# Patient Record
Sex: FEMALE | Race: OTHER | NOT HISPANIC OR LATINO | Employment: FULL TIME | URBAN - METROPOLITAN AREA
[De-identification: names, ages, dates, MRNs, and addresses within clinical notes are randomized per-mention and may not be internally consistent; named-entity substitution may affect disease eponyms.]

---

## 2017-01-20 ENCOUNTER — OFFICE VISIT (OUTPATIENT)
Dept: INTERNAL MEDICINE | Facility: CLINIC | Age: 47
End: 2017-01-20
Payer: OTHER GOVERNMENT

## 2017-01-20 ENCOUNTER — HOSPITAL ENCOUNTER (OUTPATIENT)
Dept: RADIOLOGY | Facility: HOSPITAL | Age: 47
Discharge: HOME | End: 2017-01-20
Attending: FAMILY MEDICINE
Payer: OTHER GOVERNMENT

## 2017-01-20 VITALS
OXYGEN SATURATION: 97 % | HEIGHT: 62 IN | HEART RATE: 68 BPM | BODY MASS INDEX: 23.9 KG/M2 | TEMPERATURE: 99 F | SYSTOLIC BLOOD PRESSURE: 140 MMHG | WEIGHT: 129.88 LBS | DIASTOLIC BLOOD PRESSURE: 80 MMHG

## 2017-01-20 DIAGNOSIS — J84.10 CALCIFIED GRANULOMA OF LUNG: ICD-10-CM

## 2017-01-20 DIAGNOSIS — Z00.00 ANNUAL PHYSICAL EXAM: ICD-10-CM

## 2017-01-20 DIAGNOSIS — Z00.00 ANNUAL PHYSICAL EXAM: Primary | ICD-10-CM

## 2017-01-20 PROBLEM — J98.4 CALCIFIED GRANULOMA OF LUNG: Status: ACTIVE | Noted: 2017-01-20

## 2017-01-20 PROCEDURE — 71020 XR CHEST PA AND LATERAL: CPT | Mod: 26,,, | Performed by: RADIOLOGY

## 2017-01-20 PROCEDURE — 99396 PREV VISIT EST AGE 40-64: CPT | Mod: S$PBB,,, | Performed by: FAMILY MEDICINE

## 2017-01-20 PROCEDURE — 71020 XR CHEST PA AND LATERAL: CPT | Mod: TC

## 2017-01-20 PROCEDURE — 99999 PR PBB SHADOW E&M-EST. PATIENT-LVL III: CPT | Mod: PBBFAC,,, | Performed by: FAMILY MEDICINE

## 2017-01-20 PROCEDURE — 99213 OFFICE O/P EST LOW 20 MIN: CPT | Mod: PBBFAC | Performed by: FAMILY MEDICINE

## 2017-01-20 NOTE — PROGRESS NOTES
Subjective:       Patient ID: Kayleen Patel is a 46 y.o. female.    Chief Complaint: Annual Exam    HPI Comments: Established patient for an annual wellness check/physical exam. No specific complaints, please see ROS.      Review of Systems   Constitutional: Negative for chills, fatigue and fever.   HENT: Negative for congestion and trouble swallowing.    Eyes: Negative for redness.   Respiratory: Negative for cough, chest tightness and shortness of breath.    Cardiovascular: Negative for chest pain, palpitations and leg swelling.   Gastrointestinal: Negative for abdominal pain and blood in stool.   Genitourinary: Negative for hematuria and menstrual problem.   Musculoskeletal: Negative for arthralgias, back pain, gait problem, joint swelling, myalgias and neck pain.   Skin: Negative for color change and rash.   Neurological: Negative for tremors, speech difficulty, weakness, numbness and headaches.   Hematological: Negative for adenopathy. Does not bruise/bleed easily.   Psychiatric/Behavioral: Negative for behavioral problems, confusion and sleep disturbance. The patient is not nervous/anxious.        Objective:      Physical Exam   Constitutional: She is oriented to person, place, and time. She appears well-developed and well-nourished. No distress.   HENT:   Head: Normocephalic.   Right Ear: Tympanic membrane, external ear and ear canal normal.   Left Ear: Tympanic membrane, external ear and ear canal normal.   Nose: Nose normal.   Mouth/Throat: Oropharynx is clear and moist. No oropharyngeal exudate.   Eyes: Conjunctivae are normal. Pupils are equal, round, and reactive to light. Right eye exhibits no discharge. Left eye exhibits no discharge. No scleral icterus.   Neck: Normal range of motion. Neck supple. Carotid bruit is not present. No thyromegaly present.   Cardiovascular: Normal rate, regular rhythm, normal heart sounds and intact distal pulses.  Exam reveals no gallop and no friction rub.     No murmur heard.  Pulmonary/Chest: Effort normal and breath sounds normal. No respiratory distress. She has no wheezes. She has no rales. She exhibits no tenderness.   Abdominal: Soft. Bowel sounds are normal. She exhibits no distension and no mass. There is no tenderness. There is no rebound and no guarding.   Musculoskeletal: Normal range of motion. She exhibits no edema or tenderness.   Lymphadenopathy:     She has no cervical adenopathy.   Neurological: She is alert and oriented to person, place, and time. She has normal strength. She displays normal reflexes. No cranial nerve deficit or sensory deficit. Coordination and gait normal.   Skin: Skin is warm and dry. No rash noted. She is not diaphoretic.   Psychiatric: She has a normal mood and affect. Her behavior is normal. Judgment and thought content normal.   Nursing note and vitals reviewed.      Assessment:       1. Annual physical exam    2. Calcified granuloma of lung        Plan:   Kayleen was seen today for annual exam.    Diagnoses and all orders for this visit:    Annual physical exam  -     CBC Without Differential; Future  -     Basic metabolic panel; Future  -     Lipid panel; Future  -     Cardiac treadmill stress test; Future  -     HIV-1 and HIV-2 antibodies; Future  -     RPR; Future  -     X-Ray Chest PA And Lateral; Future    Calcified granuloma of lung  -     X-Ray Chest PA And Lateral; Future      See meds, orders, follow up, routing and instructions sections of encounter.  Annual physical examination.  The patient was reporting some increase in her   heart rate with exercise; however, she is having no dyspnea, syncope, near   syncope or palpitations.  She has been exercising per year.    She quit smoking a few years ago.  She has a 20-pack-year history.  She has no   overt pulmonary complaints.  She had a calcified granuloma on an old chest x-ray   and would like to have that repeated at this time.    She had a distant stress  echocardiogram that was normal.    RECOMMENDATIONS:  Continue exercise as is.  See meds and orders and she will   follow up with me p.r.n. or next year for her annual.      STEVE/HN  dd: 01/20/2017 18:18:37 (CST)  td: 01/21/2017 05:47:08 (CST)  Doc ID   #[id number]  Job ID #[id number]    CC:

## 2017-01-20 NOTE — MR AVS SNAPSHOT
"    [facility]  [address]  [place] [address]  Phone: [phone]  Fax: [phone]                  Kayleen Patel   2017 4:00 PM   Office Visit    Description:  Female : 1970   Provider:  Herbert Clay MD   Department:  [facility]           Reason for Visit     Annual Exam           Diagnoses this Visit        Comments    Annual physical exam    -  Primary     Calcified granuloma of lung                To Do List           Goals (5 Years of Data)     None      Follow-Up and Disposition     Return in about 1 year (around 2018) for annual physical exam.    Follow-up and Disposition History      [facility] On [facility]     [facility] On Call Nurse Care Line -  Assistance  Registered nurses in the [facility] Center provide clinical advisement, health education, appointment booking, and other advisory services.  Call for this free service at [phone].             Medications           Message regarding Medications     Verify the changes and/or additions to your medication regime listed below are the same as discussed with your clinician today.  If any of these changes or additions are incorrect, please notify your healthcare provider.             Verify that the below list of medications is an accurate representation of the medications you are currently taking.  If none reported, the list may be blank. If incorrect, please contact your healthcare provider. Carry this list with you in case of emergency.           Current Medications     drospirenone-ethinyl estradiol (BRIANNA, 28,) 3-0.02 mg per tablet Take 1 tablet by mouth once daily.    omeprazole (PRILOSEC) 40 MG capsule TAKE 1 CAPSULE DAILY           Clinical Reference Information           Vital Signs - Last Recorded  Most recent update: 2017  4:33 PM by Skylar Valdes MA    BP Pulse Temp Ht Wt SpO2    (!) 140/80 68 98.9 °F (37.2 °C) 5' 2" (1.575 m) 58.9 kg (129 lb 13.6 " oz) 97%    BMI                23.75 kg/m2          Blood Pressure          Most Recent Value    BP  (!)  140/80      Allergies as of 1/20/2017     Penicillins    Adhesive      Immunizations Administered on Date of Encounter - 1/20/2017     None      Orders Placed During Today's Visit     Future Labs/Procedures Expected by Expires    Basic metabolic panel  1/20/2017 1/20/2018    Cardiac treadmill stress test  1/20/2017 1/20/2018    CBC Without Differential  1/20/2017 1/20/2018    HIV-1 and HIV-2 antibodies  1/20/2017 1/20/2018    Lipid panel  1/20/2017 1/20/2018    RPR  1/20/2017 1/20/2018    X-Ray Chest PA And Lateral  1/20/2017 1/20/2018

## 2017-01-21 ENCOUNTER — PATIENT MESSAGE (OUTPATIENT)
Dept: INTERNAL MEDICINE | Facility: CLINIC | Age: 47
End: 2017-01-21

## 2017-02-02 ENCOUNTER — HOSPITAL ENCOUNTER (OUTPATIENT)
Dept: CARDIOLOGY | Facility: CLINIC | Age: 47
Discharge: HOME | End: 2017-02-02
Payer: OTHER GOVERNMENT

## 2017-02-02 DIAGNOSIS — Z00.00 ANNUAL PHYSICAL EXAM: ICD-10-CM

## 2017-02-02 LAB — DIASTOLIC DYSFUNCTION: NO

## 2017-02-02 PROCEDURE — 93018 CV STRESS TEST I&R ONLY: CPT | Mod: S$PBB,,, | Performed by: INTERNAL MEDICINE

## 2017-02-02 PROCEDURE — 93016 CV STRESS TEST SUPVJ ONLY: CPT | Mod: S$PBB,,, | Performed by: INTERNAL MEDICINE

## 2017-02-02 PROCEDURE — 93017 CV STRESS TEST TRACING ONLY: CPT | Mod: PBBFAC | Performed by: INTERNAL MEDICINE

## 2017-03-02 ENCOUNTER — TELEPHONE (OUTPATIENT)
Dept: OBSTETRICS AND GYNECOLOGY | Facility: CLINIC | Age: 47
End: 2017-03-02

## 2017-03-02 DIAGNOSIS — Z12.39 BREAST CANCER SCREENING: Primary | ICD-10-CM

## 2017-03-02 NOTE — TELEPHONE ENCOUNTER
----- Message from Luis Louis sent at 3/1/2017  2:26 PM CST -----  Please put orders in for a mammo so I can schedule thanks

## 2017-03-18 RX ORDER — OMEPRAZOLE 40 MG/1
CAPSULE, DELAYED RELEASE ORAL
Qty: 90 CAPSULE | Refills: 0 | Status: SHIPPED | OUTPATIENT
Start: 2017-03-18 | End: 2017-06-16 | Stop reason: SDUPTHER

## 2017-03-21 ENCOUNTER — HOSPITAL ENCOUNTER (OUTPATIENT)
Dept: RADIOLOGY | Facility: HOSPITAL | Age: 47
Discharge: HOME | End: 2017-03-21
Attending: NURSE PRACTITIONER
Payer: OTHER GOVERNMENT

## 2017-03-21 ENCOUNTER — OFFICE VISIT (OUTPATIENT)
Dept: OBSTETRICS AND GYNECOLOGY | Facility: CLINIC | Age: 47
End: 2017-03-21
Payer: OTHER GOVERNMENT

## 2017-03-21 VITALS
HEIGHT: 62 IN | WEIGHT: 130.06 LBS | BODY MASS INDEX: 23.93 KG/M2 | SYSTOLIC BLOOD PRESSURE: 120 MMHG | DIASTOLIC BLOOD PRESSURE: 68 MMHG

## 2017-03-21 DIAGNOSIS — N95.1 PERIMENOPAUSE: ICD-10-CM

## 2017-03-21 DIAGNOSIS — Z12.39 BREAST CANCER SCREENING: ICD-10-CM

## 2017-03-21 DIAGNOSIS — Z87.42 HISTORY OF ABNORMAL CERVICAL PAP SMEAR: ICD-10-CM

## 2017-03-21 DIAGNOSIS — Z30.41 ENCOUNTER FOR SURVEILLANCE OF CONTRACEPTIVE PILLS: ICD-10-CM

## 2017-03-21 DIAGNOSIS — Z01.419 VISIT FOR GYNECOLOGIC EXAMINATION: Primary | ICD-10-CM

## 2017-03-21 PROCEDURE — 99396 PREV VISIT EST AGE 40-64: CPT | Mod: S$PBB,,, | Performed by: NURSE PRACTITIONER

## 2017-03-21 PROCEDURE — 77067 SCR MAMMO BI INCL CAD: CPT | Mod: 26,,, | Performed by: RADIOLOGY

## 2017-03-21 PROCEDURE — 77067 SCR MAMMO BI INCL CAD: CPT | Mod: TC

## 2017-03-21 PROCEDURE — 88175 CYTOPATH C/V AUTO FLUID REDO: CPT

## 2017-03-21 PROCEDURE — 99213 OFFICE O/P EST LOW 20 MIN: CPT | Mod: PBBFAC | Performed by: NURSE PRACTITIONER

## 2017-03-21 PROCEDURE — 99999 PR PBB SHADOW E&M-EST. PATIENT-LVL III: CPT | Mod: PBBFAC,,, | Performed by: NURSE PRACTITIONER

## 2017-03-21 RX ORDER — DROSPIRENONE AND ETHINYL ESTRADIOL 0.02-3(28)
1 KIT ORAL DAILY
Qty: 84 TABLET | Refills: 4 | Status: SHIPPED | OUTPATIENT
Start: 2017-03-21 | End: 2018-04-27 | Stop reason: SDUPTHER

## 2017-03-21 NOTE — PROGRESS NOTES
HISTORY OF PRESENT ILLNESS:    Kayleen Patel is a 46 y.o. female, , No LMP recorded. Patient is not currently having periods (Reason: Perimenopausal).,  presents for a routine exam and OCP refills.   -Denies withdrawal bleeding for the past year and no vasomotor symptoms.    Past Medical History:   Diagnosis Date    Abnormal cervical Papanicolaou smear ,     Colpo/Cryo    GERD (gastroesophageal reflux disease)     History of acute PID     Thrombocytosis        Past Surgical History:   Procedure Laterality Date    GANGLION CYST EXCISION      Tonsillectomy      TONSILLECTOMY         MEDICATIONS AND ALLERGIES:    Current Outpatient Prescriptions:     drospirenone-ethinyl estradiol (BRIANNA, 28,) 3-0.02 mg per tablet, Take 1 tablet by mouth once daily., Disp: 84 tablet, Rfl: 4    omeprazole (PRILOSEC) 40 MG capsule, TAKE 1 CAPSULE DAILY, Disp: 90 capsule, Rfl: 0    Review of patient's allergies indicates:   Allergen Reactions    Penicillins Hives    Adhesive Rash       Family History   Problem Relation Age of Onset    Adopted: Yes    Heart disease Mother     Cancer Father     Breast cancer Neg Hx     Colon cancer Neg Hx     Ovarian cancer Neg Hx        Social History     Social History    Marital status: Single     Spouse name: N/A    Number of children: N/A    Years of education: N/A     Occupational History    Not on file.     Social History Main Topics    Smoking status: Former Smoker     Packs/day: 1.00     Years: 23.00     Quit date: 2007    Smokeless tobacco: Never Used    Alcohol use Yes      Comment: Social    Drug use: No    Sexual activity: Not Currently     Partners: Male     Birth control/ protection: OCP     Other Topics Concern    Not on file     Social History Narrative    Retired [employer]. Currently, working for the [employer]. Criminal justice degree from [employer].                OBSTETRIC HISTORY: None    COMPREHENSIVE GYN HISTORY:  PAP HISTORY:  "Reports abnormal Paps. Date:1988, 2004. Treatment:1988 Cryo and repeat Paps. LAST PAP 2-23-16 NORMAL.  INFECTION HISTORY: Reports STDs. HPV. Reports PID. Date:1998.  BENIGN HISTORY: Denies uterine fibroids. Denies ovarian cysts. Denies endometriosis. Denies other conditions.  CANCER HISTORY: Denies cervical cancer. Denies uterine cancer or hyperplasia. Denies ovarian cancer. Denies vulvar cancer or pre-cancer. Denies vaginal cancer or pre-cancer. Denies breast cancer. Denies colon cancer.  SEXUAL ACTIVITY HISTORY: Denies currently being sexually active.  MENSTRUAL HISTORY: No withdrawal bleeding.  DYSMENORRHEA HISTORY: Denies dysmenorrhea.  CONTRACEPTION HISTORY: OCP's     ROS:  GENERAL: No weight changes. No swelling. No fatigue. No fever. No vasomotor symptoms.  CARDIOVASCULAR: No chest pain. No shortness of breath. No leg cramps.   NEUROLOGICAL: No headaches. No vision changes.  BREASTS: No pain. No lumps. No discharge.  ABDOMEN: No pain. No nausea. No vomiting. No diarrhea. No constipation.  REPRODUCTIVE: No abnormal bleeding.   VULVA: No pain. No lesions. No itching.  VAGINA: No relaxation. No itching. No odor. No discharge. No lesions.  URINARY: No incontinence. No nocturia. No frequency. No dysuria.    /68  Ht 5' 2" (1.575 m)  Wt 59 kg (130 lb 1.1 oz)  BMI 23.79 kg/m2    PE:  APPEARANCE: Well nourished, well developed, in no acute distress.  AFFECT: WNL, alert and oriented x 3.  SKIN: No acne or hirsutism.  NECK: Neck symmetric, without masses or thyromegaly.  NODES: No inguinal, cervical, axillary or femoral lymph node enlargement.  CHEST: Good respiratory effort.   ABDOMEN: Soft. No tenderness or masses. No hepatosplenomegaly. No hernias.  BREASTS: Symmetrical, no skin changes, visible lesions, palpable masses or nipple discharge bilaterally.  PELVIC: External female genitalia without lesions.  Female hair distribution. Adequate perineal body, Normal urethral meatus. Vagina moist and well rugated " without lesions or discharge.  No significant cystocele or rectocele present. Cervix pink without lesions, discharge or tenderness. Uterus is 6 week size, regular, mobile and nontender. Adnexa without masses or tenderness.  EXTREMITIES: No edema    DIAGNOSIS:  1. Visit for gynecologic examination    2. Perimenopause    3. Encounter for surveillance of contraceptive pills    4. History of abnormal cervical Pap smear    Has mammogram scheduled for today    PLAN:    Orders Placed This Encounter    Liquid-based pap smear, screening    drospirenone-ethinyl estradiol (BRIANNA, Kathrine,) 3-0.02 mg per tablet       COUNSELING:  The patient was counseled today on:  -perimenopause vs menopause and to report severe vasomotor symptoms and/or skipping periods, heavy, prolonged bleeding, spotting in between periods;  -OCP use and potential side effects;  -A.C.S. Pap and pelvic exam guidelines (pap every 3 years - WANTS YEARLY), recomendations for yearly mammogram;  -to follow up with her PCP for other health maintenance.    FOLLOW-UP with me annually.

## 2017-05-19 ENCOUNTER — PATIENT MESSAGE (OUTPATIENT)
Dept: INTERNAL MEDICINE | Facility: CLINIC | Age: 47
End: 2017-05-19

## 2017-06-16 RX ORDER — OMEPRAZOLE 40 MG/1
CAPSULE, DELAYED RELEASE ORAL
Qty: 90 CAPSULE | Refills: 0 | Status: SHIPPED | OUTPATIENT
Start: 2017-06-16 | End: 2017-09-14 | Stop reason: SDUPTHER

## 2017-06-27 ENCOUNTER — PATIENT MESSAGE (OUTPATIENT)
Dept: ADMINISTRATIVE | Facility: OTHER | Age: 47
End: 2017-06-27

## 2017-06-27 ENCOUNTER — LAB VISIT (OUTPATIENT)
Dept: LAB | Facility: HOSPITAL | Age: 47
End: 2017-06-27
Attending: FAMILY MEDICINE
Payer: OTHER GOVERNMENT

## 2017-06-27 ENCOUNTER — OFFICE VISIT (OUTPATIENT)
Dept: INTERNAL MEDICINE | Facility: CLINIC | Age: 47
End: 2017-06-27
Attending: FAMILY MEDICINE
Payer: OTHER GOVERNMENT

## 2017-06-27 VITALS
SYSTOLIC BLOOD PRESSURE: 151 MMHG | HEIGHT: 62 IN | WEIGHT: 133.38 LBS | HEART RATE: 65 BPM | BODY MASS INDEX: 24.54 KG/M2 | DIASTOLIC BLOOD PRESSURE: 101 MMHG

## 2017-06-27 DIAGNOSIS — I10 HYPERTENSION, ESSENTIAL: ICD-10-CM

## 2017-06-27 DIAGNOSIS — I10 HYPERTENSION, ESSENTIAL: Primary | ICD-10-CM

## 2017-06-27 DIAGNOSIS — R25.3 FASCICULATION: ICD-10-CM

## 2017-06-27 LAB — TSH SERPL DL<=0.005 MIU/L-ACNC: 1.16 UIU/ML

## 2017-06-27 PROCEDURE — 99214 OFFICE O/P EST MOD 30 MIN: CPT | Mod: S$PBB,,, | Performed by: FAMILY MEDICINE

## 2017-06-27 PROCEDURE — 99999 PR PBB SHADOW E&M-EST. PATIENT-LVL III: CPT | Mod: PBBFAC,,, | Performed by: FAMILY MEDICINE

## 2017-06-27 PROCEDURE — 99213 OFFICE O/P EST LOW 20 MIN: CPT | Mod: PBBFAC | Performed by: FAMILY MEDICINE

## 2017-06-27 PROCEDURE — 36415 COLL VENOUS BLD VENIPUNCTURE: CPT

## 2017-06-27 PROCEDURE — 84443 ASSAY THYROID STIM HORMONE: CPT

## 2017-06-27 RX ORDER — HYDROCHLOROTHIAZIDE 12.5 MG/1
12.5 TABLET ORAL DAILY
Qty: 30 TABLET | Refills: 11 | Status: SHIPPED | OUTPATIENT
Start: 2017-06-27 | End: 2017-08-07 | Stop reason: SDUPTHER

## 2017-06-27 NOTE — PROGRESS NOTES
Subjective:       Patient ID: Kayleen Patel is a 47 y.o. female.    Chief Complaint: Spasms and Numbness    Spasms   Associated symptoms include numbness and weakness. Pertinent negatives include no abdominal pain, arthralgias, chest pain, chills, congestion, coughing, fatigue, fever, headaches, joint swelling, myalgias, neck pain or rash.     Review of Systems   Constitutional: Negative for chills, fatigue and fever.   HENT: Negative for congestion and trouble swallowing.    Eyes: Negative for redness.   Respiratory: Negative for cough, chest tightness and shortness of breath.    Cardiovascular: Negative for chest pain, palpitations and leg swelling.   Gastrointestinal: Negative for abdominal pain and blood in stool.   Genitourinary: Negative for hematuria and menstrual problem.   Musculoskeletal: Negative for arthralgias, back pain, gait problem, joint swelling, myalgias and neck pain.   Skin: Negative for color change and rash.   Neurological: Positive for tremors, weakness and numbness. Negative for speech difficulty and headaches.   Hematological: Negative for adenopathy. Does not bruise/bleed easily.   Psychiatric/Behavioral: Negative for behavioral problems, confusion and sleep disturbance. The patient is not nervous/anxious.        Objective:      Physical Exam   Constitutional: She is oriented to person, place, and time. She appears well-developed and well-nourished. No distress.   HENT:   Head: Normocephalic and atraumatic.   Right Ear: Hearing normal.   Left Ear: Hearing normal.   Mouth/Throat: Uvula is midline, oropharynx is clear and moist and mucous membranes are normal.   Eyes: EOM are normal. Pupils are equal, round, and reactive to light.   Neck: Normal range of motion. Neck supple.   Pulmonary/Chest: Effort normal.   Musculoskeletal: Normal range of motion. She exhibits no edema or tenderness.        Right lower leg: She exhibits no edema.        Left lower leg: She exhibits no edema.    Neurological: She is alert and oriented to person, place, and time. She has normal strength. No cranial nerve deficit or sensory deficit. She exhibits normal muscle tone. She displays a negative Romberg sign. Coordination normal. GCS eye subscore is 4. GCS verbal subscore is 5. GCS motor subscore is 6.   Reflex Scores:       Tricep reflexes are 2+ on the right side and 2+ on the left side.       Bicep reflexes are 2+ on the right side and 2+ on the left side.       Brachioradialis reflexes are 2+ on the right side and 2+ on the left side.       Patellar reflexes are 1+ on the right side and 1+ on the left side.       Achilles reflexes are 1+ on the right side and 1+ on the left side.  ANT:  HALEY, FNF, Heel-Shin, Finger Tap, Foot Tap WNL.   Skin: Skin is warm and dry. No rash noted.   Psychiatric: She has a normal mood and affect. Her speech is normal and behavior is normal. Judgment and thought content normal. Cognition and memory are normal.   Nursing note and vitals reviewed.      Assessment:       1. Hypertension, essential    2. Fasciculation        Plan:   Kayleen was seen today for spasms and numbness.    Diagnoses and all orders for this visit:    Hypertension, essential  -     TSH; Future  -     Hypertension [facility] ([facility]) Enrollment Order  -     Hypertension Digital Medicine ([facility]): Assign Onboarding Questionnaires    Fasciculation  -     Ambulatory referral to Neurology    Other orders  -     hydrochlorothiazide (HYDRODIURIL) 12.5 MG Tab; Take 1 tablet (12.5 mg total) by mouth once daily.      See meds, orders, follow up, routing and instructions sections of encounter.  The patient is in with twitching or fasciculations, left eye.  She also has left   arm numbness and tingling and twitching in left foot.  These symptoms are   relatively intermittent.    Her blood pressure was noted today and confirmed by repeat with me.    Recommendations noted. Diet and exercise discussed.  Follow up in one  month, BP   recheck.      STEVE/MALLORY  dd: 06/27/2017 12:13:50 (CDT)  td: 07/13/2017 01:09:43 (CDT)  Doc ID   #[id number]  Job ID #[id number]    CC:

## 2017-06-27 NOTE — PATIENT INSTRUCTIONS
Information about cholesterol, high blood pressure and healthy diet and activity recommendations can be found at the following links on the Internet.    http://www.nhlbi.nih.gov/health/health-topics/topics/hbc    http://www.nhlbi.nih.gov/health/educational/lose_wt/index.htm    http://www.nhlbi.nih.gov/files/docs/public/heart/hbp_low.pdf        Digital Hypertension Management Program [phone]    https://www.[employer].org/services/hypertension-digital-medicine/      A New Approach to Treating High Blood Pressure    For decades, high blood pressure has been treated in the doctors office.  Most patients will have anywhere from two to four doctor visits per year and the blood pressure information obtained on those visits form the basis for further treatment decisions.  This method has several problems:    A small number of blood pressure readings  A long period between readings for adjustments in medicine to take place (i.e. needed changes in medication or dosing)  What has been called White Coat Hypertension.  This can occur in some individuals due to anxiety in coming to the doctors office, thus leading to false high blood pressure readings.    A recent study from the Journal of the American Medical Association (JEFFREY) showed that monitoring blood pressure from home using a wireless blood pressure monitor resulted in more patients reaching their blood pressure goal than the standard doctors office method.  This was done using specially trained pharmacists who received the blood pressure readings automatically and called patients with any needed changes to their therapy.  Patients had a high satisfaction rating for the program overall, and the resulting better blood pressure reading would lead to less strokes and heart attacks in the future.

## 2017-07-03 ENCOUNTER — PATIENT MESSAGE (OUTPATIENT)
Dept: ADMINISTRATIVE | Facility: OTHER | Age: 47
End: 2017-07-03

## 2017-07-05 ENCOUNTER — OFFICE VISIT (OUTPATIENT)
Dept: NEUROLOGY | Facility: CLINIC | Age: 47
End: 2017-07-05
Attending: FAMILY MEDICINE
Payer: OTHER GOVERNMENT

## 2017-07-05 VITALS
SYSTOLIC BLOOD PRESSURE: 152 MMHG | HEART RATE: 67 BPM | WEIGHT: 130.06 LBS | HEIGHT: 62 IN | BODY MASS INDEX: 23.93 KG/M2 | DIASTOLIC BLOOD PRESSURE: 96 MMHG

## 2017-07-05 DIAGNOSIS — R25.3 FASCICULATION: ICD-10-CM

## 2017-07-05 PROCEDURE — 99212 OFFICE O/P EST SF 10 MIN: CPT | Mod: PBBFAC | Performed by: PSYCHIATRY & NEUROLOGY

## 2017-07-05 PROCEDURE — 99999 PR PBB SHADOW E&M-EST. PATIENT-LVL II: CPT | Mod: PBBFAC,,, | Performed by: PSYCHIATRY & NEUROLOGY

## 2017-07-05 PROCEDURE — 99202 OFFICE O/P NEW SF 15 MIN: CPT | Mod: S$PBB,,, | Performed by: PSYCHIATRY & NEUROLOGY

## 2017-07-05 NOTE — LETTER
July 5, 2017      Herbert Clay MD  [address]  [place] [address]           [facility]  [address]  [facility] [address]  Phone: [phone]          Patient: Kayleen Patel   MR Number: [id number]   YOB: 1970   Date of Visit: 7/5/2017       Dear Dr. Herbert Clay:    Thank you for referring Kayleen Patel to me for evaluation. Attached you will find relevant portions of my assessment and plan of care.    If you have questions, please do not hesitate to call me. I look forward to following Kayleen Patel along with you.    Sincerely,    Apple Stone MD    Enclosure  CC:  No Recipients    If you would like to receive this communication electronically, please contact [email or web address] or [phone] to request more information on [facility] access.    For providers and/or their staff who would like to refer a patient to [facility], please contact us through our one-stop-shop provider referral line, [facility] , at [phone].    If you feel you have received this communication in error or would no longer like to receive these types of communications, please e-mail [email or web address]

## 2017-07-05 NOTE — PROGRESS NOTES
Subjective:       Patient ID: Kayleen Patel is a 47 y.o. female.    Chief Complaint:  Fasiculations      History of Present Illness  HPI  Twitching  She complains ofTwitching  . Twitching primarily involves the periorbital and recently left arm  Onset of symptoms was sudden, not related to any specific activity. Symptoms are currently of mild severity. Twitching exacerbated by activity (intention) and stress. Twitching is alleviated by sleep. Symptoms occur all day and last month. She also describes symptoms of none. She denies none.         Review of SystemsReview of Systems   Constitutional: Negative.    HENT: Negative.    Respiratory: Negative.    Cardiovascular: Negative.    Gastrointestinal: Negative.    Genitourinary: Negative.    Musculoskeletal: Negative.    Neurological: Positive for numbness.   Psychiatric/Behavioral: Negative.        Objective:      Neurologic Exam     Mental Status   Oriented to person, place, and time.   Registration: recalls 3 of 3 objects. Recall at 5 minutes: recalls 3 of 3 objects. Follows 3 step commands.   Attention: normal.   Level of consciousness: alert  Knowledge: good. Able to perform simple calculations.   Able to name object. Able to read. Able to repeat. Able to write.     Cranial Nerves   Cranial nerves II through XII intact.     CN III, IV, VI   Pupils are equal, round, and reactive to light.  Extraocular motions are normal.     Motor Exam   Muscle bulk: normal  Overall muscle tone: normal    Strength   Strength 5/5 throughout.     Sensory Exam   Light touch normal.   Vibration normal.   Proprioception normal.   Pinprick normal.     Gait, Coordination, and Reflexes     Gait  Gait: normal    Coordination   Romberg: negative  Finger to nose coordination: normal  Heel to shin coordination: normal  Tandem walking coordination: normal    Tremor   Resting tremor: absent  Intention tremor: absent  Action tremor: absent    Reflexes   Right brachioradialis: 2+  Left  brachioradialis: 2+  Right biceps: 2+  Left biceps: 2+  Right triceps: 2+  Left triceps: 2+  Right patellar: 2+  Left patellar: 2+  Right achilles: 2+  Left achilles: 2+  Right : 2+  Left : 2+      Physical Exam   Constitutional: She is oriented to person, place, and time. She appears well-developed and well-nourished.   HENT:   Head: Normocephalic and atraumatic.   Eyes: EOM are normal. Pupils are equal, round, and reactive to light.   Neck: Normal range of motion. Neck supple.   Cardiovascular: Normal rate and normal heart sounds.    Musculoskeletal: Normal range of motion.   Neurological: She is alert and oriented to person, place, and time. She has normal strength and normal reflexes. She has a normal Finger-Nose-Finger Test, a normal Heel to Shin Test, a normal Romberg Test and a normal Tandem Gait Test. Gait normal.   Reflex Scores:       Tricep reflexes are 2+ on the right side and 2+ on the left side.       Bicep reflexes are 2+ on the right side and 2+ on the left side.       Brachioradialis reflexes are 2+ on the right side and 2+ on the left side.       Patellar reflexes are 2+ on the right side and 2+ on the left side.       Achilles reflexes are 2+ on the right side and 2+ on the left side.  Skin: Skin is warm and dry.   Vitals reviewed.        Assessment:           ICD-10-CM ICD-9-CM    1. Fasciculation R25.3 781.0          Plan:     RTC prn

## 2017-07-06 ENCOUNTER — PATIENT OUTREACH (OUTPATIENT)
Dept: OTHER | Facility: OTHER | Age: 47
End: 2017-07-06

## 2017-07-06 ENCOUNTER — OFFICE VISIT (OUTPATIENT)
Dept: OPTOMETRY | Facility: CLINIC | Age: 47
End: 2017-07-06
Payer: OTHER GOVERNMENT

## 2017-07-06 DIAGNOSIS — H52.4 PRESBYOPIA OU: ICD-10-CM

## 2017-07-06 DIAGNOSIS — H02.889 MEIBOMIAN GLAND DYSFUNCTION: Primary | ICD-10-CM

## 2017-07-06 DIAGNOSIS — H52.13 MYOPIA OF BOTH EYES: ICD-10-CM

## 2017-07-06 DIAGNOSIS — I10 HYPERTENSION, ESSENTIAL: ICD-10-CM

## 2017-07-06 PROCEDURE — 99999 PR PBB SHADOW E&M-EST. PATIENT-LVL I: CPT | Mod: PBBFAC,,, | Performed by: OPTOMETRIST

## 2017-07-06 PROCEDURE — 92004 COMPRE OPH EXAM NEW PT 1/>: CPT | Mod: S$PBB,,, | Performed by: OPTOMETRIST

## 2017-07-06 PROCEDURE — 99211 OFF/OP EST MAY X REQ PHY/QHP: CPT | Mod: PBBFAC,PO | Performed by: OPTOMETRIST

## 2017-07-06 RX ORDER — PNV NO.95/FERROUS FUM/FOLIC AC 28MG-0.8MG
100 TABLET ORAL DAILY
COMMUNITY

## 2017-07-06 RX ORDER — FERROUS SULFATE 325(65) MG
325 TABLET ORAL
COMMUNITY

## 2017-07-06 NOTE — TELEPHONE ENCOUNTER
HTN Digital Medicine Program Medication Reconciliation Outreach    Called patient to introduce him/her into the HDMP. Reviewed program details including blood pressure goals, technique for taking readings (timing, cuff placement, body positioning), and what to do in case of emergency. Verified patient's understanding of [facility] use for contacting clinical team and to ensure that [facility] cuff readings continue to transmit (by logging in at least every 2 weeks). Reviewed health , PharmD and Responsible Provider information.  Pt had been taking pressure once daily but would like to do so at least twice - advised that it was ok to do so.  Would like to be contacted prior to HCTZ rx needing to be refilled in case medication changes need to be made. Has been on this medication <1 week at this time.  Takes all medications in the AM along with all of her OTC vitamins.    Screening Questionnaire Review:  1. Depression - not indicated  2. Sleep apnea - not indicated       Verified the following information with the patient:  1. Medication list  Current Outpatient Prescriptions on File Prior to Visit   Medication Sig Dispense Refill    drospirenone-ethinyl estradiol (BRIANNA, 28,) 3-0.02 mg per tablet Take 1 tablet by mouth once daily. 84 tablet 4    hydrochlorothiazide (HYDRODIURIL) 12.5 MG Tab Take 1 tablet (12.5 mg total) by mouth once daily. 30 tablet 11    omeprazole (PRILOSEC) 40 MG capsule TAKE 1 CAPSULE DAILY 90 capsule 0     No current facility-administered medications on file prior to visit.        Previous ADRs      2. Medication compliance: has been compliant with the medicaiton regimen    3. Medication Allergies:   Review of patient's allergies indicates:   Allergen Reactions    Penicillins Hives    Adhesive Rash       Explained that our goal is to get her BP consistently below 140/90mmHg.  Patient denies having further questions, concerns. BP is not at goal.       Last 5 Patient Entered Readings                                                                Current 30 Day Average: 152/95     Recent Readings 7/6/2017 7/5/2017 7/4/2017 7/3/2017    Systolic BP (mmHg) 133 141 163 171    Diastolic BP (mmHg) 88 93 101 97    Pulse 67 69 69 82

## 2017-07-06 NOTE — PROGRESS NOTES
"HPI     Patient states she has noticed changes in her vision at both distance and   near.   DORI 5 years,   S/p Lasik 2009, Dr. Vazquez Forbes   Used OTC Readers +1.50    Gtts: Generic ATs prn       Last edited by Glen Montilla, OD on 7/6/2017  1:09 PM. (History)            Assessment /Plan     For exam results, see Encounter Report.    Meibomian gland dysfunction    Hypertension, essential    Myopia of both eyes    Presbyopia OU      1) Patient is asymptomatic, Monitor yearly     2) No HTN retinopathy, recently dx, pt edu on continued care and monitoring with PCP, Monitor yearly     3-4) Small regression of Lasik (2009) OS>OD BCVA OU 20/25, Patient may consider "touch-up" with Sruthi in the near future, continue used of OTC reader    RTC 1 year Annual Exam ]  Prn with Sruthi for lasik touch-up if desired                     "

## 2017-07-06 NOTE — PROGRESS NOTES
Last 5 Patient Entered Readings                                                               Current 30 Day Average: 152/95     Recent Readings 7/6/2017 7/5/2017 7/4/2017 7/3/2017    Systolic BP (mmHg) 133 141 163 171    Diastolic BP (mmHg) 88 93 101 97    Pulse 67 69 69 82

## 2017-07-06 NOTE — LETTER
Rosina Bach, PharmPIOTR  [address] [place] [address]     Dear Kayleen Patel,    Welcome to the [facility] Program!         My name is Rosina Bach PharmD and I am your dedicated Digital Medicine clinician.  As an expert in medication management, I will help ensure that the medications you are taking continue to provide you with the intended benefits.      I am Monse Wharton and I will be your health  for the duration of the program.  My  job is to help you identify lifestyle changes to improve your blood pressure control.  We will talk about nutrition, exercise, and other ways that you may be able to adjust your current habits to better your health. Together, we will work to improve your overall health and encourage you to meet your goals for a healthier lifestyle.    What we expect from YOU:    You will need to take blood pressure readings multiple times a week and no less than one reading per week.   It is important that you take your measurements at different times during the day, when possible.     What you should expect from your Digital Medicine Care Team:   We will provide you with education about high blood pressure, including lifestyle changes that could help you to control your blood pressure.   We will review your weekly readings and provide you with monthly blood pressure progress reports after you have been in the program for more than 30 days.   We will send monthly progress reports on your blood pressure control to your physician so they can follow along with your progress as well.    You will be able to reach me by phone at [phone] or through your [facility] account by clicking my name under Care Team on the right side of the home screen.    I look forward to working with you to achieve your blood pressure goals!    Sincerely,    Rosina Bach PharmD  Your personal clinician    Please visit  [email or web address] to learn more about high blood pressure and what you can do lower your blood pressure.                                                                                           Kayleen Patel  [address] [place] [id number]

## 2017-07-11 ENCOUNTER — PATIENT OUTREACH (OUTPATIENT)
Dept: OTHER | Facility: OTHER | Age: 47
End: 2017-07-11

## 2017-07-11 NOTE — PROGRESS NOTES
Last 5 Patient Entered Redings Current 30 Day Average: 145/89     Recent Readings 7/11/2017 7/10/2017 7/10/2017 7/9/2017 7/9/2017    Systolic BP (mmHg) 148 143 138 139 145    Diastolic BP (mmHg) 91 91 87 89 88    Pulse 61 70 66 74 75        Feels she follow a low sodium diet. Lots of steamed veggies.  Dr Clay suggested she talk about birth control with OB/GYN due to age - might be a factor for HTN.  Very active - Boot camp 2x/wk & [employer] 2x/wk. Was in the [employer] for 21yrs.  Social drinker, weekends.  Non smoker.    Initial introduction completed with the Ms. Kayleen Patel and the role of the health  was explained via [facility].   Expectations and the process of the program was explained as well. I encouraged her to call or message me back with any questions she may have. I will follow up in a few weeks to see how she are doing.     Resources on diet and exercise were sent.     she is aware that I am not available for emergencies and to call 911 or [facility] on call if one arises.  she is aware of the importance of medication adherence.  she is aware of the importance of diet and exercise.  she is aware that his sodium intake should be no more than 2000mg per day.  she is aware that the recommended physical activity each week should be about 30 minutes per day at least 5 times per week.   she is aware of the importance of taking BP readings at least weekly if not more and during different times each day.  she is aware that the health  can be used as a resource for lifestyle modifications to help reduce or maintain a healthy BP

## 2017-08-04 ENCOUNTER — PATIENT OUTREACH (OUTPATIENT)
Dept: OTHER | Facility: OTHER | Age: 47
End: 2017-08-04

## 2017-08-04 NOTE — PROGRESS NOTES
Last 5 Patient Entered Redings Current 30 Day Average: 136/84     Recent Readings 8/4/2017 8/4/2017 8/3/2017 8/3/2017 8/2/2017    Systolic BP (mmHg) 128 122 143 120 126    Diastolic BP (mmHg) 80 76 83 95 88    Pulse 65 66 65 66 67          Called Ms. Patel to introduce her into the Hypertension Digital Medicine Program.     Ms. Patel's BP is improved since starting HCTZ about 1 month ago. She has no side effects and feels well on current dose. She has follow up with Dr. Sanchez on Monday.    Reviewed patient's medications and verified allergies on file.   Hypertension Medications             hydrochlorothiazide (HYDRODIURIL) 12.5 MG Tab Take 1 tablet (12.5 mg total) by mouth once daily.          Explained that we expect her to obtain several blood pressures/week at random times of day. Also asked that the BP be taken at least 1 hour after taking BP medications.     Explained that our goal is to get her BP to consistently below 140/90mmHg.     Patient and I agreed that the patient will take her BP daily to every other day at varying times of the day.     Emailed patient link to [facility] HTN webpage as well as my direct phone number in case she has in questions.

## 2017-08-07 ENCOUNTER — OFFICE VISIT (OUTPATIENT)
Dept: INTERNAL MEDICINE | Facility: CLINIC | Age: 47
End: 2017-08-07
Attending: FAMILY MEDICINE
Payer: OTHER GOVERNMENT

## 2017-08-07 VITALS
BODY MASS INDEX: 25.39 KG/M2 | HEIGHT: 61 IN | DIASTOLIC BLOOD PRESSURE: 66 MMHG | HEART RATE: 74 BPM | WEIGHT: 134.5 LBS | SYSTOLIC BLOOD PRESSURE: 130 MMHG | OXYGEN SATURATION: 93 %

## 2017-08-07 DIAGNOSIS — I10 HYPERTENSION, ESSENTIAL: Primary | ICD-10-CM

## 2017-08-07 DIAGNOSIS — L98.9 SKIN LESION: ICD-10-CM

## 2017-08-07 PROCEDURE — 99213 OFFICE O/P EST LOW 20 MIN: CPT | Mod: S$PBB,,, | Performed by: FAMILY MEDICINE

## 2017-08-07 PROCEDURE — 3078F DIAST BP <80 MM HG: CPT | Mod: ,,, | Performed by: FAMILY MEDICINE

## 2017-08-07 PROCEDURE — 99213 OFFICE O/P EST LOW 20 MIN: CPT | Mod: PBBFAC | Performed by: FAMILY MEDICINE

## 2017-08-07 PROCEDURE — 3008F BODY MASS INDEX DOCD: CPT | Mod: ,,, | Performed by: FAMILY MEDICINE

## 2017-08-07 PROCEDURE — 99999 PR PBB SHADOW E&M-EST. PATIENT-LVL III: CPT | Mod: PBBFAC,,, | Performed by: FAMILY MEDICINE

## 2017-08-07 PROCEDURE — 3075F SYST BP GE 130 - 139MM HG: CPT | Mod: ,,, | Performed by: FAMILY MEDICINE

## 2017-08-07 RX ORDER — HYDROCHLOROTHIAZIDE 12.5 MG/1
12.5 TABLET ORAL DAILY
Qty: 90 TABLET | Refills: 3 | Status: SHIPPED | OUTPATIENT
Start: 2017-08-07 | End: 2017-09-12 | Stop reason: ALTCHOICE

## 2017-08-07 NOTE — PROGRESS NOTES
Subjective:       Patient ID: Kayleen Patel is a 47 y.o. female.    Chief Complaint: Follow-up    HPI  Review of Systems   Constitutional: Negative for chills, fatigue and fever.   HENT: Negative for congestion and trouble swallowing.    Eyes: Negative for redness.   Respiratory: Negative for cough, chest tightness and shortness of breath.    Cardiovascular: Negative for chest pain, palpitations and leg swelling.   Gastrointestinal: Negative for abdominal pain and blood in stool.   Genitourinary: Negative for hematuria and menstrual problem.   Musculoskeletal: Negative for arthralgias, back pain, gait problem, joint swelling, myalgias and neck pain.   Skin: Negative for color change and rash.   Neurological: Negative for tremors, speech difficulty, weakness, numbness and headaches.   Hematological: Negative for adenopathy. Does not bruise/bleed easily.   Psychiatric/Behavioral: Negative for behavioral problems, confusion and sleep disturbance. The patient is not nervous/anxious.        Objective:      Physical Exam   Constitutional: She is oriented to person, place, and time. She appears well-developed and well-nourished. No distress.   Neck: Neck supple.   Pulmonary/Chest: Effort normal.   Musculoskeletal: She exhibits no edema.        Right lower leg: She exhibits no edema.        Left lower leg: She exhibits no edema.   Neurological: She is alert and oriented to person, place, and time.   Skin: Skin is warm and dry. No rash noted.   Psychiatric: She has a normal mood and affect. Her behavior is normal. Judgment and thought content normal.   Nursing note and vitals reviewed.      Assessment:       1. Hypertension, essential    2. Skin lesion        Plan:   Kayleen was seen today for follow-up.    Diagnoses and all orders for this visit:    Hypertension, essential    Skin lesion  -     Ambulatory referral to Dermatology    Other orders  -     hydrochlorothiazide (HYDRODIURIL) 12.5 MG Tab; Take 1  tablet (12.5 mg total) by mouth once daily.      See meds, orders, follow up, routing and instructions sections of encounter.  A 47-year-old patient who is following up for blood pressure.  She is on HCTZ   12.5.  I did look at her readings on her cell phone through the digital program.    She has come down a little bit.  Last couple of days looked good.    RECOMMENDATIONS:  Continue HCTZ 12.5, follow up in six months.  Continue digital   monitoring.      STEVE/MALLORY  dd: 08/07/2017 16:41:41 (CDT)  td: 08/08/2017 02:00:13 (CDT)  Doc ID   #[id number]  Job ID #[id number]    CC:

## 2017-08-08 ENCOUNTER — INITIAL CONSULT (OUTPATIENT)
Dept: DERMATOLOGY | Facility: CLINIC | Age: 47
End: 2017-08-08
Attending: FAMILY MEDICINE
Payer: OTHER GOVERNMENT

## 2017-08-08 ENCOUNTER — PATIENT OUTREACH (OUTPATIENT)
Dept: OTHER | Facility: OTHER | Age: 47
End: 2017-08-08

## 2017-08-08 VITALS — WEIGHT: 134 LBS | BODY MASS INDEX: 25.32 KG/M2

## 2017-08-08 DIAGNOSIS — D22.9 NEVUS OF MULTIPLE SITES: Primary | ICD-10-CM

## 2017-08-08 DIAGNOSIS — L91.8 CUTANEOUS SKIN TAGS: ICD-10-CM

## 2017-08-08 DIAGNOSIS — L82.1 SEBORRHEIC KERATOSES: ICD-10-CM

## 2017-08-08 PROCEDURE — 99213 OFFICE O/P EST LOW 20 MIN: CPT | Mod: PBBFAC,PO | Performed by: DERMATOLOGY

## 2017-08-08 PROCEDURE — 99203 OFFICE O/P NEW LOW 30 MIN: CPT | Mod: S$PBB,,, | Performed by: DERMATOLOGY

## 2017-08-08 PROCEDURE — 99999 PR PBB SHADOW E&M-EST. PATIENT-LVL III: CPT | Mod: PBBFAC,,, | Performed by: DERMATOLOGY

## 2017-08-08 NOTE — PROGRESS NOTES
"  Subjective:       Patient ID:  Kayleen Patel is a 47 y.o. female who presents for   Chief Complaint   Patient presents with    Skin Check     TBSE    Mole     History of Present Illness: The patient presents with chief complaint of spot.  Location: forehead  Duration: months  Signs/Symptoms: peels     Prior treatments: none        Mole         Review of Systems   Constitutional: Negative for fever.   Skin: Negative for itching and rash.   Hematologic/Lymphatic: Does not bruise/bleed easily.        Objective:    Physical Exam   Constitutional: She appears well-developed and well-nourished. No distress.   Neurological: She is alert and oriented to person, place, and time. She is not disoriented.   Psychiatric: She has a normal mood and affect.   Skin:   Areas Examined (abnormalities noted in diagram):   Scalp / Hair Palpated and Inspected  Head / Face Inspection Performed  Neck Inspection Performed  Chest / Axilla Inspection Performed  Abdomen Inspection Performed  Genitals / Buttocks / Groin Inspection Performed  Back Inspection Performed  RUE Inspected  LUE Inspection Performed  RLE Inspected  LLE Inspection Performed  Nails and Digits Inspection Performed                   Diagram Legend        Pigmented verrucoid papule/plaque c/w seborrheic keratosis        Pedunculated fleshy papule(s) c/w skin tag(s)         Assessment / Plan:        1. Nevus of multiple sites   The "ABCD" rules to observe pigmented lesions were reviewed.  Brochure provided      2. Seborrheic keratoses   Reassurance  Call if grow, bleed or desires removal      3. Cutaneous skin tags   reassurance                 Return in about 1 year (around 8/8/2018).  "

## 2017-08-08 NOTE — LETTER
August 8, 2017      Herbert Clay MD  [address]  [facility] [address]           [place] [facility]  [address]  [place] [address]  Phone: [phone]  Fax: [phone]          Patient: Kayleen Patel   MR Number: [id number]   YOB: 1970   Date of Visit: 8/8/2017       Dear Dr. Herbert Clay:    Thank you for referring Kayleen Patel to me for evaluation. Attached you will find relevant portions of my assessment and plan of care.    If you have questions, please do not hesitate to call me. I look forward to following Kayleen Patel along with you.    Sincerely,    Idalmis Olivas MD    Enclosure  CC:  No Recipients    If you would like to receive this communication electronically, please contact [email or web address] or [phone] to request more information on [employer] [facility] access.    For providers and/or their staff who would like to refer a patient to [facility], please contact us through our one-stop-shop provider referral line, [facility], at [phone].    If you feel you have received this communication in error or would no longer like to receive these types of communications, please e-mail [email or web address]

## 2017-08-08 NOTE — PROGRESS NOTES
Last 5 Patient Entered Redings Current 30 Day Average: 136/84     Recent Readings 8/7/2017 8/6/2017 8/6/2017 8/5/2017 8/5/2017    Systolic BP (mmHg) 129 126 132 140 157    Diastolic BP (mmHg) 80 82 82 88 93    Pulse 62 64 77 58 61        Continuing low sodium diet and regular exercise - Boot camp 2x/wk & [employer] 2x/wk.  Went to PCP yesterday. States Dr Clay was pleased with her recent BP readings.    Follow up with Ms. Kayleen Patel completed. Patient is maintaining a low sodium diet and continuing her exercise regime. Patient did not have any further questions or concerns. I will follow up in a few weeks to see how she is doing and progressing.

## 2017-09-02 ENCOUNTER — PATIENT MESSAGE (OUTPATIENT)
Dept: INTERNAL MEDICINE | Facility: CLINIC | Age: 47
End: 2017-09-02

## 2017-09-08 ENCOUNTER — PATIENT OUTREACH (OUTPATIENT)
Dept: OTHER | Facility: OTHER | Age: 47
End: 2017-09-08

## 2017-09-08 NOTE — PROGRESS NOTES
Last 5 Patient Entered Redings Current 30 Day Average: 147/89     Recent Readings 9/8/2017 9/7/2017 9/6/2017 9/5/2017 9/5/2017    Systolic BP (mmHg) 140 139 146 167 167    Diastolic BP (mmHg) 90 91 92 96 101    Pulse 71 68 71 68 71        Hypertension Digital Medicine Program (HDMP): Health  Follow Up    Recently returned from a trip to [place], feels readings have been higher since her return.    Lifestyle Modifications:    1. Low sodium diet: yes - maintaining low sodium diet.    2.Physical activity: yes - working out regularly - [employer] [place] [employer] fitness    3.Hypotension/Hypertension symptoms: no   Frequency/Alleviating factors/Precipitating factors, etc.     4. Patient has been compliant with the medicaiton regimen    Follow up with Ms. Kayleen Patel completed. No further questions or concerns. I will follow up in a few weeks to assess progress.

## 2017-09-12 ENCOUNTER — PATIENT OUTREACH (OUTPATIENT)
Dept: OTHER | Facility: OTHER | Age: 47
End: 2017-09-12

## 2017-09-12 DIAGNOSIS — I10 ESSENTIAL HYPERTENSION: Primary | ICD-10-CM

## 2017-09-12 RX ORDER — CHLORTHALIDONE 25 MG/1
TABLET ORAL
Qty: 30 TABLET | Refills: 3 | Status: SHIPPED | OUTPATIENT
Start: 2017-09-12 | End: 2017-10-13 | Stop reason: SDUPTHER

## 2017-09-12 NOTE — PROGRESS NOTES
Last 5 Patient Entered Redings Current 30 Day Average: 148/89     Recent Readings 9/12/2017 9/12/2017 9/12/2017 9/11/2017 9/10/2017    Systolic BP (mmHg) 142 141 151 152 150    Diastolic BP (mmHg) 90 102 107 94 93    Pulse 63 62 65 66 70        Ms. Almaraz BP has trended up. She has not changed her diet. She eats a banana for breakfast, spinach with an amino acid supplement sprinkled on top and lean cuisine for lunch, and then a chicken salad or salad with no protein for dinner. On weekends, she eats out at restaurants and drinks alcoholic beverages. She does not follow a low sodium diet on weekends at all. She says she does not drink during the week. Explained that she is likely retaining fluid as a result of her sodium intake. She has been on HCTZ 12.5 mg QD for about 2 months. Will change HCTZ to chlorthalidone 12.5 mg QD and will check BMP on 9/19. Asked that she call back with any side effects or concerns.     Current HTN regimen:  Hypertension Medications             chlorthalidone (HYGROTEN) 25 MG Tab Take 1/2 tablet (12.5 mg) by mouth once daily.               Will continue to monitor regularly. Will follow up in 2-3 weeks, sooner if BP begins to trend upward or downward.    Patient has my contact information and knows to call with any concerns or clinical changes.

## 2017-09-14 RX ORDER — OMEPRAZOLE 40 MG/1
CAPSULE, DELAYED RELEASE ORAL
Qty: 90 CAPSULE | Refills: 0 | Status: SHIPPED | OUTPATIENT
Start: 2017-09-14 | End: 2018-03-22

## 2017-09-19 ENCOUNTER — LAB VISIT (OUTPATIENT)
Dept: LAB | Facility: HOSPITAL | Age: 47
End: 2017-09-19
Payer: OTHER GOVERNMENT

## 2017-09-19 DIAGNOSIS — I10 ESSENTIAL HYPERTENSION: ICD-10-CM

## 2017-09-19 LAB
ANION GAP SERPL CALC-SCNC: 13 MMOL/L
BUN SERPL-MCNC: 12 MG/DL
CALCIUM SERPL-MCNC: 9.5 MG/DL
CHLORIDE SERPL-SCNC: 101 MMOL/L
CO2 SERPL-SCNC: 24 MMOL/L
CREAT SERPL-MCNC: 0.8 MG/DL
EST. GFR  (AFRICAN AMERICAN): >60 ML/MIN/1.73 M^2
EST. GFR  (NON AFRICAN AMERICAN): >60 ML/MIN/1.73 M^2
GLUCOSE SERPL-MCNC: 145 MG/DL
POTASSIUM SERPL-SCNC: 4.2 MMOL/L
SODIUM SERPL-SCNC: 138 MMOL/L

## 2017-09-19 PROCEDURE — 80048 BASIC METABOLIC PNL TOTAL CA: CPT

## 2017-09-19 PROCEDURE — 36415 COLL VENOUS BLD VENIPUNCTURE: CPT

## 2017-09-21 ENCOUNTER — PATIENT MESSAGE (OUTPATIENT)
Dept: INTERNAL MEDICINE | Facility: CLINIC | Age: 47
End: 2017-09-21

## 2017-09-21 ENCOUNTER — TELEPHONE (OUTPATIENT)
Dept: INTERNAL MEDICINE | Facility: CLINIC | Age: 47
End: 2017-09-21

## 2017-09-21 DIAGNOSIS — R73.9 ELEVATED BLOOD SUGAR: Primary | ICD-10-CM

## 2017-09-21 NOTE — TELEPHONE ENCOUNTER
Please call patient and explain that I would like to repeat some labs since there was a borderline result. See lab orders and please schedule patient. Thank you

## 2017-09-22 ENCOUNTER — TELEPHONE (OUTPATIENT)
Dept: DERMATOLOGY | Facility: CLINIC | Age: 47
End: 2017-09-22

## 2017-09-22 ENCOUNTER — OFFICE VISIT (OUTPATIENT)
Dept: URGENT CARE | Facility: CLINIC | Age: 47
End: 2017-09-22
Payer: OTHER GOVERNMENT

## 2017-09-22 ENCOUNTER — PATIENT MESSAGE (OUTPATIENT)
Dept: INTERNAL MEDICINE | Facility: CLINIC | Age: 47
End: 2017-09-22

## 2017-09-22 ENCOUNTER — LAB VISIT (OUTPATIENT)
Dept: LAB | Facility: HOSPITAL | Age: 47
End: 2017-09-22
Attending: FAMILY MEDICINE
Payer: OTHER GOVERNMENT

## 2017-09-22 VITALS
HEART RATE: 71 BPM | TEMPERATURE: 99 F | RESPIRATION RATE: 18 BRPM | HEIGHT: 61 IN | BODY MASS INDEX: 25.3 KG/M2 | OXYGEN SATURATION: 98 % | DIASTOLIC BLOOD PRESSURE: 87 MMHG | WEIGHT: 134 LBS | SYSTOLIC BLOOD PRESSURE: 148 MMHG

## 2017-09-22 DIAGNOSIS — L02.411 CUTANEOUS ABSCESS OF RIGHT AXILLA: ICD-10-CM

## 2017-09-22 DIAGNOSIS — L72.0 EIC (EPIDERMAL INCLUSION CYST): Primary | ICD-10-CM

## 2017-09-22 DIAGNOSIS — R73.9 ELEVATED BLOOD SUGAR: ICD-10-CM

## 2017-09-22 LAB
ESTIMATED AVG GLUCOSE: 103 MG/DL
GLUCOSE SERPL-MCNC: 103 MG/DL
HBA1C MFR BLD HPLC: 5.2 %

## 2017-09-22 PROCEDURE — 10060 I&D ABSCESS SIMPLE/SINGLE: CPT | Mod: S$GLB,,, | Performed by: PHYSICIAN ASSISTANT

## 2017-09-22 PROCEDURE — 83036 HEMOGLOBIN GLYCOSYLATED A1C: CPT

## 2017-09-22 PROCEDURE — 3008F BODY MASS INDEX DOCD: CPT | Mod: S$GLB,,, | Performed by: PHYSICIAN ASSISTANT

## 2017-09-22 PROCEDURE — 36415 COLL VENOUS BLD VENIPUNCTURE: CPT

## 2017-09-22 PROCEDURE — 82947 ASSAY GLUCOSE BLOOD QUANT: CPT

## 2017-09-22 PROCEDURE — 3079F DIAST BP 80-89 MM HG: CPT | Mod: S$GLB,,, | Performed by: PHYSICIAN ASSISTANT

## 2017-09-22 PROCEDURE — 99203 OFFICE O/P NEW LOW 30 MIN: CPT | Mod: 25,S$GLB,, | Performed by: PHYSICIAN ASSISTANT

## 2017-09-22 PROCEDURE — 3077F SYST BP >= 140 MM HG: CPT | Mod: S$GLB,,, | Performed by: PHYSICIAN ASSISTANT

## 2017-09-22 RX ORDER — SULFAMETHOXAZOLE AND TRIMETHOPRIM 800; 160 MG/1; MG/1
1 TABLET ORAL 2 TIMES DAILY
Qty: 14 TABLET | Refills: 0 | Status: SHIPPED | OUTPATIENT
Start: 2017-09-22 | End: 2017-09-29

## 2017-09-22 RX ORDER — IBUPROFEN 800 MG/1
800 TABLET ORAL EVERY 8 HOURS PRN
Qty: 30 TABLET | Refills: 0 | Status: SHIPPED | OUTPATIENT
Start: 2017-09-22 | End: 2017-10-02

## 2017-09-22 NOTE — TELEPHONE ENCOUNTER
Spoke with pt and pt stated she didn't have any further questions and would like to thank dr zuñiga

## 2017-09-22 NOTE — PATIENT INSTRUCTIONS
- Follow up in 2 days for wound check and packing change.   - Please return here or go to the Emergency Department for any concerns or worsening of condition.   - If you were prescribed antibiotics, please take them to completion.  - Please follow up with your primary care provider (PCP) or discussed specialist(s) as needed.       Abscess (Incision & Drainage)  An abscess is sometimes called a boil. It happens when bacteria get trapped under the skin and start to grow. Pus forms inside the abscess as the body responds to the bacteria. An abscess can happen with an insect bite, ingrown hair, blocked oil gland, pimple, cyst, or puncture wound.  Your healthcare provider has drained the pus from your abscess. If the abscess pocket was large, your healthcare provider may have put in gauze packing. Your provider will need to remove it on your next visit. He or she may also replace it at that time. You may not need antibiotics to treat a simple abscess, unless the infection is spreading into the skin around the wound (cellulitis).  The wound will take about 1 to 2 weeks to heal, depending on the size of the abscess. Healthy tissue will grow from the bottom and sides of the opening until it seals over.  Home care  These tips can help your wound heal:  · The wound may drain for the first 2 days. Cover the wound with a clean dry dressing. Change the dressing if it becomes soaked with blood or pus.  · If a gauze packing was placed inside the abscess pocket, you may be told to remove it yourself. You may do this in the shower. Once the packing is removed, you should wash the area in the shower, or clean the area as directed by your provider. Continue to do this until the skin opening has closed. Make sure you wash your hands after changing the packing or cleaning the wound.  · If you were prescribed antibiotics, take them as directed until they are all gone.  · You may use acetaminophen or ibuprofen to control pain, unless  another pain medicine was prescribed. If you have liver disease or ever had a stomach ulcer, talk with your doctor before using these medicines.  Follow-up care  Follow up with your healthcare provider, or as advised. If a gauze packing was put in your wound, it should be removed in 1 to 2 days. Check your wound every day for any signs that the infection is getting worse. The signs are listed below.  When to seek medical advice  Call your healthcare provider right away if any of these occur:  · Increasing redness or swelling  · Red streaks in the skin leading away from the wound  · Increasing local pain or swelling  · Continued pus draining from the wound 2 days after treatment  · Fever of 100.4ºF (38ºC) or higher, or as directed by your healthcare provider  · Boil returns when you are at home  Date Last Reviewed: 9/1/2016  © 4922-3349 [employer]. [address] [place] [address]. All rights reserved. This information is not intended as a substitute for professional medical care. Always follow your healthcare professional's instructions.                Epidermoid Cyst (Sebaceous Cyst), Infected (Incision and Drainage)  You have an epidermoid cyst. This is a small, painless lump under your skin. An epidermoid cyst (often called a sebaceous cyst, epidermal cyst, or epidermal inclusion cyst) is a term most often used for 2 similar types of cysts:  · Epidermoid cysts. These cysts form slowly under the skin. They can be found on most parts of the body. But they are most often found on areas with more hair such as the scalp, face, upper back, and genitals.  · Pilar cysts. These are similar to epidermoid cysts. But they start from a different part of the hair follicle. They are more likely to be on the scalp.  Some general facts about these cysts:  · A cyst is a sac filled with material that is often cheesy, fatty, oily, or stringy. The material inside them can be thick. Or it can be a thin  liquid.  · You can usually move the cyst slightly if you try.  · The cysts can be smaller than a pea or as large as a few inches.  · The cysts are usually not painful, unless they become inflamed or infected.  · The area around the cyst may smell bad. If the cyst breaks open, the material inside it often smells bad too.  Your cyst became inflamed or infected and your healthcare provider wanted to drain it. Gauze packing may have been inserted into the cyst opening (cavity). This keeps the cyst open so it doesnt seal up before it has time to drain more. No matter how well it was cleaned out, no cleaning is perfect. The packing will need to be removed.  Once the pus is drained, antibiotics may not be needed unless the infection has spread into the skin around the wound. The wound will take about 1 to 2 weeks to heal, depending on the size of the abscess.  Home care  The following will help you care for your wound at home:  · The wound may drain for the first 2 days. Cover the opening with a clean dry bandage. If the dressing becomes soaked with blood or pus, change it.  · If a gauze packing was placed inside the opening of the cyst, it will need to be removed. Your healthcare provider will usually do this after 2 days. If it falls out sooner, do not try to put it back inside the wound. Once the packing is removed, you should wash the area carefully in the shower once a day, until the skin opening has closed. This could take up to 5 days depending on the size of the cyst. It is good to direct the shower spray directly into the opening if this is not too painful.  · If you were prescribed antibiotics, take them as directed until they are all used up.  · You may use over-the-counter pain medicine to control pain, unless another medicine was given. If you have chronic liver or kidney disease or ever had a stomach ulcer or GI bleeding, talk with your provider before using these medicines.  Prevention  Once this infection  has healed, use these prevention tips to avoid another infection:  · Keep the cyst opening clean by bathing or showering daily.  · Avoid tight-fitting clothing in the cyst area.  · Watch for the signs of infection listed below so that treatment may be started early.  Follow-up care  Follow up with your healthcare provider, or as advised. If a gauze packing was put in your wound, it should be removed as instructed by your healthcare provider. Check your wound every day for the signs listed below.  When to seek medical advice  Call your healthcare provider right away if any of these occur:  · Pus continues to come from the cyst 2 days after the incision and drainage  · Increasing redness around the wound.  · Increasing local pain or swelling  · Fever of 100.4°F (38ºC) or higher, or as directed by your provider  Date Last Reviewed: 10/1/2016  © 8139-5440 [employer]. [address] [place] [address], [place] [address]. All rights reserved. This information is not intended as a substitute for professional medical care. Always follow your healthcare professional's instructions.

## 2017-09-22 NOTE — TELEPHONE ENCOUNTER
----- Message from Sheri Foy sent at 9/22/2017  9:54 AM CDT -----  Contact: patient  Please call above patient need to get in now has a bump under arm waiting on a call from the nurse call work number

## 2017-09-22 NOTE — PROGRESS NOTES
"Subjective:       Patient ID: Kayleen Patel is a 47 y.o. female.    Vitals:  height is 5' 1" (1.549 m) and weight is 60.8 kg (134 lb). Her temperature is 98.9 °F (37.2 °C). Her blood pressure is 148/87 (abnormal) and her pulse is 71. Her respiration is 18 and oxygen saturation is 98%.     Chief Complaint: Abscess    This is a 47 y.o. female with Past Medical History:  '88, '04: Abnormal cervical Papanicolaou smear      Comment: Colpo/Cryo  No date: GERD (gastroesophageal reflux disease)  1988: History of acute PID  No date: Hypertension  No date: Thrombocytosis   who presents today with a chief complaint of an abcess under her right arm for two days.         Abscess   Chronicity:  NewProgression Since Onset: worsening  Location:  Shoulder/arm  Associated Symptoms: no fever, no chills  Characteristics: draining, painful, redness and swelling    Pain Scale:  5/10  Treatments Tried:  Topical antibiotics  Relieved by:  Warm compresses and topical antibiotics  Worsened by:  Nothing    Review of Systems   Constitution: Negative for chills, fever and malaise/fatigue.   HENT: Negative for congestion, ear pain and sore throat.    Eyes: Negative for blurred vision, pain and visual disturbance.   Cardiovascular: Negative for chest pain, near-syncope and syncope.   Respiratory: Negative for cough, shortness of breath and wheezing.    Hematologic/Lymphatic: Negative for adenopathy.   Skin: Negative for itching and rash.   Musculoskeletal: Negative for back pain, joint pain, neck pain and stiffness.   Gastrointestinal: Negative for abdominal pain, diarrhea, nausea and vomiting.   Neurological: Negative for dizziness, light-headedness and numbness.   Psychiatric/Behavioral: Negative for altered mental status.   Allergic/Immunologic: Negative for hives.   All other systems reviewed and are negative.      Objective:      Physical Exam   Constitutional: She is oriented to person, place, and time. She appears " "well-developed and well-nourished.  Non-toxic appearance. She does not have a sickly appearance. She does not appear ill. No distress.   HENT:   Head: Normocephalic and atraumatic. Head is without abrasion, without contusion and without laceration.   Right Ear: External ear normal.   Left Ear: External ear normal.   Nose: Nose normal.   Mouth/Throat: Oropharynx is clear and moist.   Eyes: Conjunctivae, EOM and lids are normal. Pupils are equal, round, and reactive to light.   Neck: Trachea normal, full passive range of motion without pain and phonation normal. Neck supple.   Cardiovascular: Normal rate, regular rhythm and normal heart sounds.    Pulmonary/Chest: Effort normal and breath sounds normal. No stridor. No respiratory distress.   Musculoskeletal: Normal range of motion.   Neurological: She is alert and oriented to person, place, and time.   Skin: Skin is warm, dry and intact. Capillary refill takes less than 2 seconds. No abrasion, no bruising, no burn, no ecchymosis, no laceration, no lesion and no rash noted. There is erythema.        Psychiatric: She has a normal mood and affect. Her speech is normal and behavior is normal. Judgment and thought content normal. Cognition and memory are normal.   Nursing note and vitals reviewed.      BP (!) 148/87   Pulse 71   Temp 98.9 °F (37.2 °C)   Resp 18   Ht 5' 1" (1.549 m)   Wt 60.8 kg (134 lb)   SpO2 98%   BMI 25.32 kg/m²      Procedure: Kayleen was seen in the clinic room and placed in the supine position on the treatment table. Attention was directed to the abscess which was located on the right axilla, where an wound measuring 1.5 cm is noted. The area was prepped with betadine. Approximately 8cc of 2% lidocaine without epi was injected into the herman-abscess area. Once the area was anesthetized allowing 2-3 minutes for the anesthetic to take effect, I utilized a #11 scalpel to cut through the skin into the abscess area. I allowed the small amount of " "pus and cystic material to drain utilizing gauze to absorb drainage and blood. The abscess cavity was explored breaking up any loculations within the abscess cavity. The wound was then packed with 1/4" plain nugauze, placing a gauze dressing over the wound and securing with paper tape. The patient tolerated the procedure well.    Assessment:       1. EIC (epidermal inclusion cyst)    2. Cutaneous abscess of right axilla        Plan:         EIC (epidermal inclusion cyst)    Cutaneous abscess of right axilla  -     sulfamethoxazole-trimethoprim 800-160mg (BACTRIM DS) 800-160 mg Tab; Take 1 tablet by mouth 2 (two) times daily.  Dispense: 14 tablet; Refill: 0  -     ibuprofen (ADVIL,MOTRIN) 800 MG tablet; Take 1 tablet (800 mg total) by mouth every 8 (eight) hours as needed for Pain.  Dispense: 30 tablet; Refill: 0      - Follow up in 2 days for wound check and packing change.   - Please return here or go to the Emergency Department for any concerns or worsening of condition.   - If you were prescribed antibiotics, please take them to completion.  - Please follow up with your primary care provider (PCP) or discussed specialist(s) as needed.       "

## 2017-09-25 ENCOUNTER — OFFICE VISIT (OUTPATIENT)
Dept: URGENT CARE | Facility: CLINIC | Age: 47
End: 2017-09-25
Payer: OTHER GOVERNMENT

## 2017-09-25 VITALS
OXYGEN SATURATION: 95 % | SYSTOLIC BLOOD PRESSURE: 168 MMHG | RESPIRATION RATE: 16 BRPM | HEART RATE: 65 BPM | TEMPERATURE: 99 F | DIASTOLIC BLOOD PRESSURE: 88 MMHG

## 2017-09-25 DIAGNOSIS — Z51.89 WOUND CHECK, ABSCESS: Primary | ICD-10-CM

## 2017-09-25 PROCEDURE — 99499 UNLISTED E&M SERVICE: CPT | Mod: S$GLB,,, | Performed by: PHYSICIAN ASSISTANT

## 2017-09-25 NOTE — PROGRESS NOTES
Subjective:       Patient ID: Kayleen Patel is a 47 y.o. female.    Vitals:  oral temperature is 98.6 °F (37 °C). Her blood pressure is 168/88 (abnormal) and her pulse is 65. Her respiration is 16 and oxygen saturation is 95%.     Chief Complaint: Wound Check    This is a 47 y.o. female with Past Medical History:  '88, '04: Abnormal cervical Papanicolaou smear      Comment: Colpo/Cryo  No date: GERD (gastroesophageal reflux disease)  1988: History of acute PID  No date: Hypertension  No date: Thrombocytosis   who presents today with a chief complaint of abscess to right axilla. Packing applied Friday.      Wound Check   She was originally treated 2 to 3 days ago. Previous treatment included I&D of abscess. There has been bloody discharge from the wound. The redness has improved. The swelling has improved. The pain has improved. She has no difficulty moving the affected extremity or digit.     Review of Systems   Constitution: Negative for fever.   Eyes: Negative for discharge.   Cardiovascular: Negative for chest pain.   Respiratory: Negative for shortness of breath.    Skin: Negative for poor wound healing.   Musculoskeletal: Positive for joint pain.       Objective:      Physical Exam   Constitutional: She is oriented to person, place, and time. She appears well-developed and well-nourished.   HENT:   Head: Normocephalic and atraumatic.   Eyes: Conjunctivae are normal.   Neck: Normal range of motion. Neck supple. No thyromegaly present.   Cardiovascular: Normal rate and regular rhythm.  Exam reveals no gallop and no friction rub.    No murmur heard.  Pulmonary/Chest: Effort normal and breath sounds normal. She has no wheezes. She has no rales.   Musculoskeletal: Normal range of motion.   Lymphadenopathy:     She has no cervical adenopathy.   Neurological: She is alert and oriented to person, place, and time.   Skin: Skin is warm and dry. No rash noted. No erythema.   Well-healing abscess with packing  in place to the right axilla.   Psychiatric: She has a normal mood and affect. Her behavior is normal. Judgment and thought content normal.   Nursing note and vitals reviewed.      8:14 AM - packing was removed without difficulty.    Assessment:       1. Wound check, abscess        Plan:         Wound check, abscess      Kayleen was seen today for wound check.    Diagnoses and all orders for this visit:    Wound check, abscess      Patient Instructions   - Rest.    - Drink plenty of fluids.    - Tylenol or Ibuprofen as directed as needed for fever/pain.    - Continue the antibiotics as prescribed.  - Follow up with your PCP or specialty clinic as directed in the next 1-2 weeks if not improved or as needed.  You can call [phone] to schedule an appointment with the appropriate provider.    - Go to the ED if your symptoms worsen.

## 2017-09-25 NOTE — PATIENT INSTRUCTIONS
- Rest.    - Drink plenty of fluids.    - Tylenol or Ibuprofen as directed as needed for fever/pain.    - Continue the antibiotics as prescribed.  - Follow up with your PCP or specialty clinic as directed in the next 1-2 weeks if not improved or as needed.  You can call [phone] to schedule an appointment with the appropriate provider.    - Go to the ED if your symptoms worsen.

## 2017-09-26 ENCOUNTER — PATIENT OUTREACH (OUTPATIENT)
Dept: OTHER | Facility: OTHER | Age: 47
End: 2017-09-26

## 2017-09-26 NOTE — PROGRESS NOTES
Last 5 Patient Entered Readings Current 30 Day Average: 144/88     Recent Readings 9/26/2017 9/26/2017 9/24/2017 9/23/2017 9/22/2017    Systolic BP (mmHg) 155 161 140 146 124    Diastolic BP (mmHg) 90 101 94 86 85    Pulse 68 68 65 74 66        Ms. Patel's BP average is improving on chlorthalidone. She had to have an abscess in her mouth drained and packed. She was experiencing pain from this but says today it is feeling better. She is on Bactrim and finishes the course this week. She has had no side effects since starting chlorthalidone and her BMP was WNL. She will remain on this dose for now and we will reassess in 2-3 weeks.     Current HTN regimen:  Hypertension Medications             chlorthalidone (HYGROTEN) 25 MG Tab Take 1/2 tablet (12.5 mg) by mouth once daily.        Will continue to monitor regularly. Will follow up in 2-3 weeks, sooner if BP begins to trend upward or downward.    Patient has my contact information and knows to call with any concerns or clinical changes.

## 2017-09-27 ENCOUNTER — TELEPHONE (OUTPATIENT)
Dept: URGENT CARE | Facility: CLINIC | Age: 47
End: 2017-09-27

## 2017-10-06 ENCOUNTER — PATIENT OUTREACH (OUTPATIENT)
Dept: OTHER | Facility: OTHER | Age: 47
End: 2017-10-06

## 2017-10-06 NOTE — PROGRESS NOTES
Last 5 Patient Entered Redings Current 30 Day Average: 144/90     Recent Readings 9/28/2017 9/27/2017 9/26/2017 9/26/2017 9/24/2017    Systolic BP (mmHg) 165 148 155 161 140    Diastolic BP (mmHg) 99 95 90 101 94    Pulse 81 66 68 68 65        Hypertension Digital Medicine Program (HDMP): Health  Follow Up    Just returned from vacation - went to [place] to watch the [place] game.   Will get back on track soon.  Feeling well.    Lifestyle Modifications:    1.Low sodium diet: no - was not mindful of diet while on vacation.    2.Physical activity: yes - stayed active - walking    Follow up with Ms. Kayleen Patel completed. No further questions or concerns. I will follow up in a few weeks to assess progress.

## 2017-10-13 DIAGNOSIS — I10 ESSENTIAL HYPERTENSION: ICD-10-CM

## 2017-10-13 RX ORDER — CHLORTHALIDONE 25 MG/1
TABLET ORAL
Qty: 90 TABLET | Refills: 0 | Status: SHIPPED | OUTPATIENT
Start: 2017-10-13 | End: 2017-11-17 | Stop reason: SDUPTHER

## 2017-10-18 ENCOUNTER — PATIENT OUTREACH (OUTPATIENT)
Dept: OTHER | Facility: OTHER | Age: 47
End: 2017-10-18

## 2017-10-18 NOTE — PROGRESS NOTES
Last 5 Patient Entered Redings Current 30 Day Average: 145/91     Recent Readings 10/18/2017 10/17/2017 10/16/2017 10/15/2017 10/14/2017    Systolic BP (mmHg) 143 120 148 151 142    Diastolic BP (mmHg) 72 87 98 101 87    Pulse 62 65 66 70 67        Ms. Patel was in [place] at the beginning of October. Her BP readings have been elevated since she has returned. She says she has not been working out, but she is trying to get her diet back on track. BP readings for the past 2 days were improved. Will continue to monitor for about 2-3 weeks before making any medication adjustments.     Current HTN regimen:  Hypertension Medications             chlorthalidone (HYGROTEN) 25 MG Tab Take 1/2 tablet (12.5 mg) by mouth once daily.        Will continue to monitor regularly. Will follow up in 2-3 weeks, sooner if BP begins to trend upward or downward.    Patient has my contact information and knows to call with any concerns or clinical changes.

## 2017-11-03 ENCOUNTER — PATIENT OUTREACH (OUTPATIENT)
Dept: OTHER | Facility: OTHER | Age: 47
End: 2017-11-03

## 2017-11-03 NOTE — PROGRESS NOTES
Last 5 Patient Entered Readings Current 30 Day Average: 141/90     Recent Readings 11/3/2017 11/2/2017 11/1/2017 10/31/2017 10/30/2017    Systolic BP (mmHg) 136 129 129 161 156    Diastolic BP (mmHg) 85 82 79 96 97    Pulse 66 65 64 63 70          Hypertension Digital Medicine (HDMP) Health  Follow Up    LVM to follow up with Ms. Kayleen Patel.    Per 30 day average, blood pressure is not well controlled 141/90 mmHg. Encouraged adherence to low sodium diet and physical activity guidelines. Advised patient to call or message with questions or concerns. WCB in 2 weeks.

## 2017-11-17 ENCOUNTER — PATIENT OUTREACH (OUTPATIENT)
Dept: OTHER | Facility: OTHER | Age: 47
End: 2017-11-17

## 2017-11-17 DIAGNOSIS — I10 ESSENTIAL HYPERTENSION: ICD-10-CM

## 2017-11-17 RX ORDER — VALSARTAN 80 MG/1
80 TABLET ORAL DAILY
Qty: 30 TABLET | Refills: 2 | Status: SHIPPED | OUTPATIENT
Start: 2017-11-17 | End: 2017-12-07 | Stop reason: SDUPTHER

## 2017-11-17 RX ORDER — CHLORTHALIDONE 25 MG/1
25 TABLET ORAL DAILY
Qty: 90 TABLET | Refills: 1 | Status: SHIPPED | OUTPATIENT
Start: 2017-11-17 | End: 2017-12-08 | Stop reason: SDUPTHER

## 2017-11-17 NOTE — PROGRESS NOTES
Last 5 Patient Entered Readings Current 30 Day Average: 144/90     Recent Readings 11/17/2017 11/16/2017 11/14/2017 11/13/2017 11/12/2017    Systolic BP (mmHg) 143 145 141 156 152    Diastolic BP (mmHg) 90 80 86 96 100    Pulse 65 59 64 71 71        Patient's BP average is not controlled. Reviewed 2017 ACC/AHA HTN guidelines with patient and explained new BP goal is <130/80.     Will increase chlorthalidone to 25 mg QD and add valsartan 80 mg QD. She will be out of town after Thanksgiving, so will check BMP on 12/7 since chlorthalidone dose is being increased.     Current HTN regimen:  Hypertension Medications             chlorthalidone (HYGROTEN) 25 MG Tab Take 1 tablet (25 mg total) by mouth once daily.    valsartan (DIOVAN) 80 MG tablet Take 1 tablet (80 mg total) by mouth once daily.          Will continue to monitor regularly. Will follow up in 2-3 weeks, sooner if BP begins to trend upward or downward.    Patient has my contact information and knows to call with any concerns or clinical changes.

## 2017-11-21 NOTE — PROGRESS NOTES
Last 5 Patient Entered Readings Current 30 Day Average: 145/91     Recent Readings 11/21/2017 11/20/2017 11/19/2017 11/17/2017 11/16/2017    Systolic BP (mmHg) 154 149 126 143 145    Diastolic BP (mmHg) 94 93 86 90 80    Pulse 61 65 66 65 59        Hypertension Digital Medicine (HDMP) Health  Follow Up    LVM to follow up with Ms. Kayleen Patel.    Per 30 day average, blood pressure is not well controlled 145/91 mmHg. Encouraged adherence to low sodium diet and physical activity guidelines. Advised patient to call or message with questions or concerns. WCB in 3 weeks.

## 2017-12-07 ENCOUNTER — PATIENT MESSAGE (OUTPATIENT)
Dept: INTERNAL MEDICINE | Facility: CLINIC | Age: 47
End: 2017-12-07

## 2017-12-07 ENCOUNTER — LAB VISIT (OUTPATIENT)
Dept: LAB | Facility: HOSPITAL | Age: 47
End: 2017-12-07
Payer: OTHER GOVERNMENT

## 2017-12-07 DIAGNOSIS — I10 ESSENTIAL HYPERTENSION: ICD-10-CM

## 2017-12-07 LAB
ANION GAP SERPL CALC-SCNC: 10 MMOL/L
BUN SERPL-MCNC: 19 MG/DL
CALCIUM SERPL-MCNC: 9.8 MG/DL
CHLORIDE SERPL-SCNC: 102 MMOL/L
CO2 SERPL-SCNC: 23 MMOL/L
CREAT SERPL-MCNC: 0.7 MG/DL
EST. GFR  (AFRICAN AMERICAN): >60 ML/MIN/1.73 M^2
EST. GFR  (NON AFRICAN AMERICAN): >60 ML/MIN/1.73 M^2
GLUCOSE SERPL-MCNC: 98 MG/DL
POTASSIUM SERPL-SCNC: 3.8 MMOL/L
SODIUM SERPL-SCNC: 135 MMOL/L

## 2017-12-07 PROCEDURE — 80048 BASIC METABOLIC PNL TOTAL CA: CPT

## 2017-12-07 PROCEDURE — 36415 COLL VENOUS BLD VENIPUNCTURE: CPT

## 2017-12-07 RX ORDER — VALSARTAN 80 MG/1
80 TABLET ORAL DAILY
Qty: 90 TABLET | Refills: 1 | Status: SHIPPED | OUTPATIENT
Start: 2017-12-07 | End: 2017-12-08 | Stop reason: SDUPTHER

## 2017-12-08 ENCOUNTER — PATIENT OUTREACH (OUTPATIENT)
Dept: OTHER | Facility: OTHER | Age: 47
End: 2017-12-08

## 2017-12-08 DIAGNOSIS — I10 HYPERTENSION, ESSENTIAL: Primary | ICD-10-CM

## 2017-12-08 DIAGNOSIS — I10 ESSENTIAL HYPERTENSION: ICD-10-CM

## 2017-12-08 RX ORDER — VALSARTAN 80 MG/1
160 TABLET ORAL DAILY
Qty: 90 TABLET | Refills: 1
Start: 2017-12-08 | End: 2017-12-13 | Stop reason: SDUPTHER

## 2017-12-08 RX ORDER — CHLORTHALIDONE 25 MG/1
25 TABLET ORAL DAILY
Qty: 90 TABLET | Refills: 1 | Status: SHIPPED | OUTPATIENT
Start: 2017-12-08 | End: 2017-12-13 | Stop reason: SDUPTHER

## 2017-12-08 NOTE — PROGRESS NOTES
Last 5 Patient Entered Readings Current 30 Day Average: 141/90     Recent Readings 12/8/2017 12/7/2017 12/5/2017 12/2/2017 12/1/2017    Systolic BP (mmHg) 149 120 145 151 131    Diastolic BP (mmHg) 92 85 94 93 86    Pulse 75 69 73 82 73        Patient's BP average is not controlled. Reviewed 2017 ACC/AHA HTN guidelines with patient and explained new BP goal is <130/80.     Ms. Patel is tolerating increased dose of chlorthalidone. BMP showed a drop in sodium to 135. Dr. Sanchez has reviewed labs and has scheduled a repeat BMP in 1 month. Will increase valsartan to 160 mg QD today as BP readings remain above goal.     Current HTN regimen:  Hypertension Medications             chlorthalidone (HYGROTEN) 25 MG Tab Take 1 tablet (25 mg total) by mouth once daily.    valsartan (DIOVAN) 80 MG tablet Take 2 tablets (160 mg total) by mouth once daily.          Will continue to monitor regularly. Will follow up in 2-3 weeks, sooner if BP begins to trend upward or downward.    Patient has my contact information and knows to call with any concerns or clinical changes.

## 2017-12-12 NOTE — PROGRESS NOTES
Last 5 Patient Entered Readings Current 30 Day Average: 139/89     Recent Readings 12/12/2017 12/11/2017 12/9/2017 12/8/2017 12/7/2017    Systolic BP (mmHg) 114 128 152 149 120    Diastolic BP (mmHg) 81 91 94 92 85    Pulse 67 69 70 75 69        Hypertension Digital Medicine Program (HDMP): Health  Follow Up    Feeling well. Really likes the Valsartan med change.  Interested in trying [employer].     Lifestyle Modifications:    1.Low sodium diet: yes - continuing to monitor sodium intake    2.Physical activity: yes - working out regularly during the week. Notices a big difference in readings during the week vs the weekend due to activity level.    3.Hypotension/Hypertension symptoms: no   Frequency/Alleviating factors/Precipitating factors, etc.     4.Patient has been compliant with the medication regimen.     Follow up with Ms. Kayleen Patel completed. No further questions or concerns. I will follow up in a few weeks to assess progress.

## 2017-12-13 ENCOUNTER — PATIENT MESSAGE (OUTPATIENT)
Dept: INTERNAL MEDICINE | Facility: CLINIC | Age: 47
End: 2017-12-13

## 2017-12-13 DIAGNOSIS — I10 ESSENTIAL HYPERTENSION: ICD-10-CM

## 2017-12-13 RX ORDER — VALSARTAN 160 MG/1
160 TABLET ORAL DAILY
Qty: 90 TABLET | Refills: 1
Start: 2017-12-13 | End: 2017-12-15 | Stop reason: SDUPTHER

## 2017-12-13 RX ORDER — CHLORTHALIDONE 25 MG/1
25 TABLET ORAL DAILY
Qty: 90 TABLET | Refills: 1 | Status: SHIPPED | OUTPATIENT
Start: 2017-12-13 | End: 2018-07-25 | Stop reason: SDUPTHER

## 2017-12-15 RX ORDER — VALSARTAN 160 MG/1
160 TABLET ORAL DAILY
Qty: 90 TABLET | Refills: 1 | Status: SHIPPED | OUTPATIENT
Start: 2017-12-15 | End: 2018-05-26 | Stop reason: SDUPTHER

## 2017-12-27 ENCOUNTER — PATIENT MESSAGE (OUTPATIENT)
Dept: ADMINISTRATIVE | Facility: OTHER | Age: 47
End: 2017-12-27

## 2017-12-27 ENCOUNTER — PATIENT OUTREACH (OUTPATIENT)
Dept: OTHER | Facility: OTHER | Age: 47
End: 2017-12-27

## 2017-12-27 NOTE — PROGRESS NOTES
Last 5 Patient Entered Readings Current 30 Day Average: 128/85     Recent Readings 12/27/2017 12/23/2017 12/22/2017 12/21/2017 12/20/2017    SBP (mmHg) 113 133 149 143 103    DBP (mmHg) 79 85 94 86 73    Pulse 69 77 81 74 65        Patient's BP average is controlled based on 2017 ACC/AHA HTN guidelines of goal BP <130/80.      Ms. Patel's BP average is improving on chlorthalidone 25 mg and valsartan 160 mg. She has been going to the gym and has no s/s of hypotension. She has no fatigue or weakness. Dr. Clay is repeating BMP in 2 weeks since sodium was 135 on last labs. Will follow up with Ms. Patel if there are any concerns with labs.     Patient denies s/s of hypotension (lightheadedness, dizziness, nausea, fatigue) associated with low readings. Instructed patient to inform me if this occurs, patient confirms understanding.      Patient's health , Monse Wharton, will be following up every 3-4 weeks. I will continue to monitor regularly and will follow up in 3-4 months, sooner if BP begins to trend upward or downward.    Patient has my contact information and knows to call with any concerns or clinical changes.     Current HTN regimen:  Hypertension Medications             chlorthalidone (HYGROTEN) 25 MG Tab Take 1 tablet (25 mg total) by mouth once daily.    valsartan (DIOVAN) 160 MG tablet Take 1 tablet (160 mg total) by mouth once daily.

## 2018-01-11 ENCOUNTER — LAB VISIT (OUTPATIENT)
Dept: LAB | Facility: HOSPITAL | Age: 48
End: 2018-01-11
Attending: FAMILY MEDICINE
Payer: OTHER GOVERNMENT

## 2018-01-11 DIAGNOSIS — I10 ESSENTIAL HYPERTENSION: ICD-10-CM

## 2018-01-11 LAB
ANION GAP SERPL CALC-SCNC: 11 MMOL/L
BUN SERPL-MCNC: 14 MG/DL
CALCIUM SERPL-MCNC: 9.4 MG/DL
CHLORIDE SERPL-SCNC: 105 MMOL/L
CO2 SERPL-SCNC: 22 MMOL/L
CREAT SERPL-MCNC: 0.7 MG/DL
EST. GFR  (AFRICAN AMERICAN): >60 ML/MIN/1.73 M^2
EST. GFR  (NON AFRICAN AMERICAN): >60 ML/MIN/1.73 M^2
GLUCOSE SERPL-MCNC: 95 MG/DL
POTASSIUM SERPL-SCNC: 4.1 MMOL/L
SODIUM SERPL-SCNC: 138 MMOL/L

## 2018-01-11 PROCEDURE — 80048 BASIC METABOLIC PNL TOTAL CA: CPT

## 2018-01-11 PROCEDURE — 36415 COLL VENOUS BLD VENIPUNCTURE: CPT

## 2018-01-16 ENCOUNTER — PATIENT OUTREACH (OUTPATIENT)
Dept: OTHER | Facility: OTHER | Age: 48
End: 2018-01-16

## 2018-01-16 NOTE — PROGRESS NOTES
Last 5 Patient Entered Readings                                      Current 30 Day Average: 122/81     Recent Readings 1/16/2018 1/14/2018 1/13/2018 1/12/2018 1/11/2018    SBP (mmHg) 137 132 138 119 115    DBP (mmHg) 90 81 86 77 73    Pulse 69 68 72 70 66        Hypertension Digital Medicine Program (HDMP): Health  Follow Up    Feeling well.    Lifestyle Modifications:    1.Low sodium diet: yes - monitoring sodium intake and following healthful diet    2.Physical activity: yes - continuing workout routine    3.Hypotension/Hypertension symptoms: no   Frequency/Alleviating factors/Precipitating factors, etc.     4.Patient has been compliant with the medication regimen.     Follow up with Ms. Kayleen Patel completed. No further questions or concerns. I will follow up in a few weeks to assess progress.

## 2018-02-23 ENCOUNTER — PATIENT OUTREACH (OUTPATIENT)
Dept: OTHER | Facility: OTHER | Age: 48
End: 2018-02-23

## 2018-02-23 NOTE — PROGRESS NOTES
Last 5 Patient Entered Readings                                      Current 30 Day Average: 116/77     Recent Readings 2/23/2018 2/22/2018 2/21/2018 2/20/2018 2/19/2018    SBP (mmHg) 108 125 112 99 111    DBP (mmHg) 76 78 77 72 74    Pulse 73 76 72 78 72        Hypertension Digital Medicine Program (HDMP): Health  Follow Up    Feeling well. Very pleased with BP readings.    Lifestyle Modifications:    1.Low sodium diet: yes - continuing to monitor sodium intake/diet    2.Physical activity: yes - working out regularly - [employer]    3.Hypotension/Hypertension symptoms: no   Frequency/Alleviating factors/Precipitating factors, etc.     4.Patient has been compliant with the medication regimen.     Follow up with Ms. Kayleen Patel completed. No further questions or concerns. I will follow up in a few weeks to assess progress.

## 2018-02-27 ENCOUNTER — OFFICE VISIT (OUTPATIENT)
Dept: INTERNAL MEDICINE | Facility: CLINIC | Age: 48
End: 2018-02-27
Attending: FAMILY MEDICINE
Payer: OTHER GOVERNMENT

## 2018-02-27 VITALS
WEIGHT: 136 LBS | DIASTOLIC BLOOD PRESSURE: 72 MMHG | HEIGHT: 62 IN | SYSTOLIC BLOOD PRESSURE: 117 MMHG | TEMPERATURE: 99 F | BODY MASS INDEX: 25.03 KG/M2

## 2018-02-27 DIAGNOSIS — I10 HYPERTENSION, ESSENTIAL: ICD-10-CM

## 2018-02-27 DIAGNOSIS — Z00.00 ANNUAL PHYSICAL EXAM: Primary | ICD-10-CM

## 2018-02-27 PROCEDURE — 99396 PREV VISIT EST AGE 40-64: CPT | Mod: S$PBB,,, | Performed by: FAMILY MEDICINE

## 2018-02-27 PROCEDURE — 99999 PR PBB SHADOW E&M-EST. PATIENT-LVL III: CPT | Mod: PBBFAC,,, | Performed by: FAMILY MEDICINE

## 2018-02-27 PROCEDURE — 99213 OFFICE O/P EST LOW 20 MIN: CPT | Mod: PBBFAC | Performed by: FAMILY MEDICINE

## 2018-02-27 NOTE — PROGRESS NOTES
Subjective:       Patient ID: Kayleen Patel is a 47 y.o. female.    Chief Complaint: Annual Exam    Established patient for an annual wellness check/physical exam and also chronic disease management. Specific complaints - see dictation and please see ROS.        Review of Systems   Constitutional: Negative for activity change, chills, fatigue, fever and unexpected weight change.   HENT: Negative for congestion, hearing loss, rhinorrhea and trouble swallowing.    Eyes: Negative for discharge, redness and visual disturbance.   Respiratory: Negative for cough, chest tightness, shortness of breath and wheezing.    Cardiovascular: Negative for chest pain, palpitations and leg swelling.   Gastrointestinal: Negative for abdominal pain, blood in stool, constipation, diarrhea and vomiting.   Endocrine: Positive for polyuria. Negative for polydipsia.   Genitourinary: Negative for difficulty urinating, dysuria, hematuria and menstrual problem.   Musculoskeletal: Negative for arthralgias, back pain, gait problem, joint swelling, myalgias and neck pain.   Skin: Negative for color change and rash.   Neurological: Negative for tremors, speech difficulty, weakness, numbness and headaches.   Hematological: Negative for adenopathy. Does not bruise/bleed easily.   Psychiatric/Behavioral: Negative for behavioral problems, confusion, dysphoric mood and sleep disturbance. The patient is not nervous/anxious.        Objective:      Physical Exam   Constitutional: She is oriented to person, place, and time. She appears well-developed and well-nourished.   HENT:   Head: Normocephalic.   Right Ear: Tympanic membrane, external ear and ear canal normal.   Left Ear: Tympanic membrane, external ear and ear canal normal.   Eyes: Pupils are equal, round, and reactive to light. No scleral icterus.   Neck: Normal range of motion. Neck supple. Carotid bruit is not present. No thyromegaly present.   Cardiovascular: Normal rate, regular  rhythm, normal heart sounds and intact distal pulses.  Exam reveals no gallop and no friction rub.    No murmur heard.  Pulmonary/Chest: Effort normal and breath sounds normal. She exhibits no tenderness.   Abdominal: Soft. Bowel sounds are normal. She exhibits no distension and no mass. There is no tenderness. There is no rebound and no guarding.   Musculoskeletal: Normal range of motion. She exhibits no edema or tenderness.   Lymphadenopathy:     She has no cervical adenopathy.   Neurological: She is alert and oriented to person, place, and time. She has normal strength. She displays normal reflexes. No cranial nerve deficit or sensory deficit. Coordination and gait normal.   Skin: Skin is warm and dry. No rash noted.   Psychiatric: She has a normal mood and affect. Her behavior is normal. Judgment and thought content normal.   Nursing note and vitals reviewed.      Assessment:       1. Annual physical exam    2. Hypertension, essential        Plan:   Kayleen was seen today for annual exam.    Diagnoses and all orders for this visit:    Annual physical exam  -     Basic metabolic panel; Future  -     Lipid panel; Future  -     TSH; Future  -     CBC Without Differential; Future  -     HIV-1 and HIV-2 antibodies; Future  -     RPR; Future    Hypertension, essential  -     Basic metabolic panel; Future  -     TSH; Future      See meds, orders, follow up, routing and instructions sections of encounter.  A 47-year-old established female patient presenting for her annual physical   examination.  She will see her gynecologist next week.  Her laboratory was   within normal limits last year.  We will repeat those this year.    She had some concerns about elevated heart rate.  This occurs with exercise and   is in the 160s range.  She had a negative stress test last year.    She states her fasciculations that have diminished and has no other acute   complaints.  No syncope, near syncope and she has non-exercise related    complaints at this time.    RECOMMENDATIONS:  I think her heart rate values are within normal limits.    Should there be any questions or change such as spikes, rapid onset or declined   palpitations or other symptoms, I would recommend a Holter monitor.  At this   time, the patient and I agreed to observe symptoms.  She is under the blood   pressure monitoring program and will continue.      STEVE/MALLORY  dd: 02/27/2018 17:13:02 (CST)  td: 02/28/2018 10:35:49 (CST)  Doc ID   #[id number]  Job ID #[id number]    CC:

## 2018-02-28 ENCOUNTER — LAB VISIT (OUTPATIENT)
Dept: LAB | Facility: HOSPITAL | Age: 48
End: 2018-02-28
Attending: FAMILY MEDICINE
Payer: OTHER GOVERNMENT

## 2018-02-28 DIAGNOSIS — I10 HYPERTENSION, ESSENTIAL: ICD-10-CM

## 2018-02-28 DIAGNOSIS — Z00.00 ANNUAL PHYSICAL EXAM: ICD-10-CM

## 2018-02-28 LAB
ANION GAP SERPL CALC-SCNC: 14 MMOL/L
BUN SERPL-MCNC: 13 MG/DL
CALCIUM SERPL-MCNC: 9.7 MG/DL
CHLORIDE SERPL-SCNC: 101 MMOL/L
CHOLEST SERPL-MCNC: 222 MG/DL
CHOLEST/HDLC SERPL: 2.6 {RATIO}
CO2 SERPL-SCNC: 22 MMOL/L
CREAT SERPL-MCNC: 0.7 MG/DL
ERYTHROCYTE [DISTWIDTH] IN BLOOD BY AUTOMATED COUNT: 11.7 %
EST. GFR  (AFRICAN AMERICAN): >60 ML/MIN/1.73 M^2
EST. GFR  (NON AFRICAN AMERICAN): >60 ML/MIN/1.73 M^2
GLUCOSE SERPL-MCNC: 103 MG/DL
HCT VFR BLD AUTO: 39.4 %
HDLC SERPL-MCNC: 87 MG/DL
HDLC SERPL: 39.2 %
HGB BLD-MCNC: 13.3 G/DL
HIV 1+2 AB+HIV1 P24 AG SERPL QL IA: NEGATIVE
LDLC SERPL CALC-MCNC: 118.2 MG/DL
MCH RBC QN AUTO: 32.4 PG
MCHC RBC AUTO-ENTMCNC: 33.8 G/DL
MCV RBC AUTO: 96 FL
NONHDLC SERPL-MCNC: 135 MG/DL
PLATELET # BLD AUTO: 369 K/UL
PMV BLD AUTO: 9.6 FL
POTASSIUM SERPL-SCNC: 3.8 MMOL/L
RBC # BLD AUTO: 4.1 M/UL
SODIUM SERPL-SCNC: 137 MMOL/L
TRIGL SERPL-MCNC: 84 MG/DL
TSH SERPL DL<=0.005 MIU/L-ACNC: 1.66 UIU/ML
WBC # BLD AUTO: 6.05 K/UL

## 2018-02-28 PROCEDURE — 84443 ASSAY THYROID STIM HORMONE: CPT

## 2018-02-28 PROCEDURE — 85027 COMPLETE CBC AUTOMATED: CPT

## 2018-02-28 PROCEDURE — 86703 HIV-1/HIV-2 1 RESULT ANTBDY: CPT

## 2018-02-28 PROCEDURE — 36415 COLL VENOUS BLD VENIPUNCTURE: CPT

## 2018-02-28 PROCEDURE — 80061 LIPID PANEL: CPT

## 2018-02-28 PROCEDURE — 80048 BASIC METABOLIC PNL TOTAL CA: CPT

## 2018-02-28 PROCEDURE — 86592 SYPHILIS TEST NON-TREP QUAL: CPT

## 2018-03-01 LAB — RPR SER QL: NORMAL

## 2018-03-07 ENCOUNTER — PATIENT MESSAGE (OUTPATIENT)
Dept: OBSTETRICS AND GYNECOLOGY | Facility: CLINIC | Age: 48
End: 2018-03-07

## 2018-03-08 ENCOUNTER — TELEPHONE (OUTPATIENT)
Dept: OBSTETRICS AND GYNECOLOGY | Facility: CLINIC | Age: 48
End: 2018-03-08

## 2018-03-08 DIAGNOSIS — Z12.39 BREAST CANCER SCREENING: Primary | ICD-10-CM

## 2018-03-21 ENCOUNTER — PATIENT OUTREACH (OUTPATIENT)
Dept: OTHER | Facility: OTHER | Age: 48
End: 2018-03-21

## 2018-03-21 NOTE — PROGRESS NOTES
Last 5 Patient Entered Readings                                      Current 30 Day Average: 114/75     Recent Readings 3/21/2018 3/20/2018 3/18/2018 3/18/2018 3/18/2018    SBP (mmHg) 123 123 135 147 143    DBP (mmHg) 79 75 78 85 88    Pulse 64 69 68 68 76        Ms. Patel's BP is well controlled. Left a message informing her that she is meeting goal. Asked that she call back with any concerns or questions and I will plan to follow up in 2-3 months, sooner if her BP trends up or down.     Patient's BP average is controlled based on 2017 ACC/AHA HTN guidelines of goal BP <130/80.      Patient's health , Monse Wharton, will be following up every 3-4 weeks. I will continue to monitor regularly and will follow up in 3-4 months, sooner if BP begins to trend upward or downward.    Patient has my contact information and knows to call with any concerns or clinical changes.     Current HTN regimen:  Hypertension Medications             chlorthalidone (HYGROTEN) 25 MG Tab Take 1 tablet (25 mg total) by mouth once daily.    valsartan (DIOVAN) 160 MG tablet Take 1 tablet (160 mg total) by mouth once daily.

## 2018-03-22 ENCOUNTER — HOSPITAL ENCOUNTER (OUTPATIENT)
Dept: RADIOLOGY | Facility: HOSPITAL | Age: 48
Discharge: HOME | End: 2018-03-22
Attending: NURSE PRACTITIONER
Payer: OTHER GOVERNMENT

## 2018-03-22 ENCOUNTER — TELEPHONE (OUTPATIENT)
Dept: OBSTETRICS AND GYNECOLOGY | Facility: CLINIC | Age: 48
End: 2018-03-22

## 2018-03-22 ENCOUNTER — OFFICE VISIT (OUTPATIENT)
Dept: OBSTETRICS AND GYNECOLOGY | Facility: CLINIC | Age: 48
End: 2018-03-22
Payer: OTHER GOVERNMENT

## 2018-03-22 VITALS
SYSTOLIC BLOOD PRESSURE: 118 MMHG | DIASTOLIC BLOOD PRESSURE: 72 MMHG | WEIGHT: 136 LBS | BODY MASS INDEX: 25.03 KG/M2 | HEIGHT: 62 IN

## 2018-03-22 DIAGNOSIS — Z01.419 WELL WOMAN EXAM WITH ROUTINE GYNECOLOGICAL EXAM: Primary | ICD-10-CM

## 2018-03-22 DIAGNOSIS — Z87.42 HISTORY OF ABNORMAL CERVICAL PAP SMEAR: ICD-10-CM

## 2018-03-22 DIAGNOSIS — Z12.39 BREAST CANCER SCREENING: ICD-10-CM

## 2018-03-22 DIAGNOSIS — Z30.41 ENCOUNTER FOR SURVEILLANCE OF CONTRACEPTIVE PILLS: ICD-10-CM

## 2018-03-22 PROCEDURE — 77063 BREAST TOMOSYNTHESIS BI: CPT | Mod: 26,,, | Performed by: RADIOLOGY

## 2018-03-22 PROCEDURE — 99396 PREV VISIT EST AGE 40-64: CPT | Mod: S$PBB,,, | Performed by: NURSE PRACTITIONER

## 2018-03-22 PROCEDURE — 77067 SCR MAMMO BI INCL CAD: CPT | Mod: TC

## 2018-03-22 PROCEDURE — 99213 OFFICE O/P EST LOW 20 MIN: CPT | Mod: PBBFAC | Performed by: NURSE PRACTITIONER

## 2018-03-22 PROCEDURE — 77067 SCR MAMMO BI INCL CAD: CPT | Mod: 26,,, | Performed by: RADIOLOGY

## 2018-03-22 PROCEDURE — 99999 PR PBB SHADOW E&M-EST. PATIENT-LVL III: CPT | Mod: PBBFAC,,, | Performed by: NURSE PRACTITIONER

## 2018-03-22 PROCEDURE — 88175 CYTOPATH C/V AUTO FLUID REDO: CPT

## 2018-03-22 NOTE — PROGRESS NOTES
HISTORY OF PRESENT ILLNESS:    Kayleen Patel is a 47 y.o. female, , No LMP recorded. Patient is not currently having periods (Reason: Perimenopausal).,  presents for a routine exam and OCP refills.   -Denies vasomotor symptoms and continues to report no withdrawal bleeding on OCPs.  -Wants to stay on pills, however, now reports being on two medications for HTN control.     Past Medical History:   Diagnosis Date    Abnormal cervical Papanicolaou smear ,     Colpo/Cryo    GERD (gastroesophageal reflux disease)     History of acute PID     Hypertension     Thrombocytosis        Past Surgical History:   Procedure Laterality Date    GANGLION CYST EXCISION      REFRACTIVE SURGERY Bilateral     Dr. Vazquez Forbes     TONSILLECTOMY         MEDICATIONS AND ALLERGIES:      Current Outpatient Prescriptions:     CALCIUM CARBONATE (CALCIUM 500 ORAL), Take 1 capsule by mouth., Disp: , Rfl:     chlorthalidone (HYGROTEN) 25 MG Tab, Take 1 tablet (25 mg total) by mouth once daily., Disp: 90 tablet, Rfl: 1    cyanocobalamin (VITAMIN B-12) 100 MCG tablet, Take 100 mcg by mouth once daily., Disp: , Rfl:     drospirenone-ethinyl estradiol (BRIANNA, 28,) 3-0.02 mg per tablet, Take 1 tablet by mouth once daily., Disp: 84 tablet, Rfl: 4    ferrous sulfate 325 mg (65 mg iron) Tab tablet, Take 325 mg by mouth daily with breakfast., Disp: , Rfl:     OMEGA-3/DHA/EPA/FISH OIL (OMEGA-3 FISH OIL ORAL), Take 1 capsule by mouth., Disp: , Rfl:     valsartan (DIOVAN) 160 MG tablet, Take 1 tablet (160 mg total) by mouth once daily., Disp: 90 tablet, Rfl: 1    Review of patient's allergies indicates:   Allergen Reactions    Penicillins Hives    Adhesive Rash       Family History   Problem Relation Age of Onset    Adopted: Yes    Heart disease Mother     Cancer Father     Breast cancer Neg Hx     Colon cancer Neg Hx     Ovarian cancer Neg Hx        Social History     Social History    Marital status:  "Single     Spouse name: N/A    Number of children: N/A    Years of education: N/A     Occupational History    Not on file.     Social History Main Topics    Smoking status: Former Smoker     Packs/day: 1.00     Years: 23.00     Quit date: 7/9/2007    Smokeless tobacco: Never Used    Alcohol use Yes      Comment: Social    Drug use: No    Sexual activity: Not Currently     Partners: Male     Birth control/ protection: OCP     Other Topics Concern    Not on file     Social History Narrative    Retired [employer]. Currently, working for the [employer]. Criminal justice degree from [employer].                OBSTETRIC HISTORY: None    COMPREHENSIVE GYN HISTORY:  PAP HISTORY: Reports abnormal Paps. Date:1988, 2004. Treatment:1988 Cryo and repeat Paps. LAST PAP 3-21-17 NORMAL.  INFECTION HISTORY: Reports STDs. HPV. Reports PID. Date:1998.  BENIGN HISTORY: Denies uterine fibroids. Denies ovarian cysts. Denies endometriosis. Denies other conditions.  CANCER HISTORY: Denies cervical cancer. Denies uterine cancer or hyperplasia. Denies ovarian cancer. Denies vulvar cancer or pre-cancer. Denies vaginal cancer or pre-cancer. Denies breast cancer. Denies colon cancer.  SEXUAL ACTIVITY HISTORY: Denies currently being sexually active.  MENSTRUAL HISTORY: No withdrawal bleeding.  DYSMENORRHEA HISTORY: Denies dysmenorrhea.  CONTRACEPTION HISTORY: OCP's      ROS:  GENERAL: No weight changes. No swelling. No fatigue. No fever. No vasomotor symptoms.  CARDIOVASCULAR: No chest pain. No shortness of breath. No leg cramps.   NEUROLOGICAL: No headaches. No vision changes.  BREASTS: No pain. No lumps. No discharge.  ABDOMEN: No pain. No nausea. No vomiting. + LOOSE STOOLS. No constipation.  REPRODUCTIVE: No abnormal bleeding.   VULVA: No pain. No lesions. No itching.  VAGINA: No relaxation. No itching. No odor. No discharge. No lesions.  URINARY: No incontinence. No nocturia. No frequency. No dysuria.    /72   Ht 5' 2" (1.575 " m)   Wt 61.7 kg (136 lb 0.4 oz)   BMI 24.88 kg/m²     PE:  APPEARANCE: Well nourished, well developed, in no acute distress.  AFFECT: WNL, alert and oriented x 3.  SKIN: No acne or hirsutism.  NECK: Neck symmetric, without masses or thyromegaly.  NODES: No inguinal, cervical, axillary or femoral lymph node enlargement.  CHEST: Good respiratory effort.   ABDOMEN: Soft. No tenderness or masses.   BREASTS: Symmetrical, no skin changes, visible lesions, palpable masses or nipple discharge bilaterally.  PELVIC: External female genitalia without lesions.  Female hair distribution. Adequate perineal body, Normal urethral meatus. Vagina moist and well rugated without lesions or discharge.  No significant cystocele or rectocele present. Cervix pink without lesions, discharge or tenderness. Uterus is 4-6 week size, regular, mobile and nontender. Adnexa without masses or tenderness.  EXTREMITIES: No edema    DIAGNOSIS:  1. Well woman exam with routine gynecological exam    2. Encounter for surveillance of contraceptive pills    3. History of abnormal cervical Pap smear        PLAN:    Orders Placed This Encounter    Liquid-based pap smear, screening   Has mammogram scheduled today    COUNSELING:  The patient was counseled today on:  -OCP use and potential side effects;  -that will check hormone levels at end of sugar pills at age 49;  -alternative non estrogen contraceptive options such as Mirena IUD, Nexplanon, Depo Provera injection, if BP continues to be out of control;  -A.C.S. Pap and pelvic exam guidelines (pap every 3 years - pt wants yearly), recomendations for yearly mammogram;  -to follow up with her PCP for other health maintenance.    FOLLOW-UP with me annually.

## 2018-03-29 ENCOUNTER — PATIENT MESSAGE (OUTPATIENT)
Dept: OBSTETRICS AND GYNECOLOGY | Facility: CLINIC | Age: 48
End: 2018-03-29

## 2018-03-29 ENCOUNTER — PATIENT OUTREACH (OUTPATIENT)
Dept: OTHER | Facility: OTHER | Age: 48
End: 2018-03-29

## 2018-03-29 NOTE — PROGRESS NOTES
Last 5 Patient Entered Readings                                      Current 30 Day Average: 115/74     Recent Readings 3/25/2018 3/23/2018 3/22/2018 3/21/2018 3/20/2018    SBP (mmHg) 113 107 115 123 123    DBP (mmHg) 70 67 75 79 75    Pulse 76 68 65 64 69        Hypertension Digital Medicine Program (HDMP): Health  Follow Up    Feeling well.  Currently out of town until Saturday. Did not bring cuff. Will take a reading when she returns.    Lifestyle Modifications:    1.Low sodium diet: yes - continuing to be mindful of sodium intake    2.Physical activity: yes - stays very active    3.Hypotension/Hypertension symptoms: no   Frequency/Alleviating factors/Precipitating factors, etc.     4.Patient has been compliant with the medication regimen.     Follow up with Ms. Kayleen Patel completed. No further questions or concerns. I will follow up in a few weeks to assess progress.

## 2018-04-19 ENCOUNTER — PATIENT OUTREACH (OUTPATIENT)
Dept: OTHER | Facility: OTHER | Age: 48
End: 2018-04-19

## 2018-04-19 NOTE — PROGRESS NOTES
Last 5 Patient Entered Readings                                      Current 30 Day Average: 115/73     Recent Readings 4/19/2018 4/18/2018 4/17/2018 4/17/2018 4/12/2018    SBP (mmHg) 108 118 144 142 112    DBP (mmHg) 69 76 88 86 82    Pulse 71 77 76 72 67        Hypertension Digital Medicine Program (HDMP): Health  Follow Up    Feeling well. Very pleased with readings.    Lifestyle Modifications:    1.Low sodium diet: yes - monitoring sodium intake    2.Physical activity: yes - continuing to stay active with fitness classes    3.Hypotension/Hypertension symptoms: no   Frequency/Alleviating factors/Precipitating factors, etc.     4.Patient has been compliant with the medication regimen.     Follow up with Ms. Kayleen Patel completed. No further questions or concerns. I will follow up in a few weeks to assess progress.

## 2018-04-27 DIAGNOSIS — Z30.41 ENCOUNTER FOR SURVEILLANCE OF CONTRACEPTIVE PILLS: ICD-10-CM

## 2018-05-26 DIAGNOSIS — I10 ESSENTIAL HYPERTENSION: ICD-10-CM

## 2018-05-26 RX ORDER — VALSARTAN 160 MG/1
TABLET ORAL
Qty: 90 TABLET | Refills: 1 | Status: SHIPPED | OUTPATIENT
Start: 2018-05-26 | End: 2018-11-22 | Stop reason: SDUPTHER

## 2018-05-31 ENCOUNTER — PATIENT OUTREACH (OUTPATIENT)
Dept: OTHER | Facility: OTHER | Age: 48
End: 2018-05-31

## 2018-05-31 NOTE — PROGRESS NOTES
Last 5 Patient Entered Readings                                      Current 30 Day Average: 112/75     Recent Readings 5/31/2018 5/30/2018 5/29/2018 5/25/2018 5/24/2018    SBP (mmHg) 100 97 137 98 121    DBP (mmHg) 70 71 85 65 79    Pulse 71 68 74 65 62          Digital Medicine: Health  Follow Up    Left voicemail to follow up with Ms. Kayleen Patel.  Current BP average 112/75 mmHg is at goal.

## 2018-06-13 ENCOUNTER — PATIENT OUTREACH (OUTPATIENT)
Dept: OTHER | Facility: OTHER | Age: 48
End: 2018-06-13

## 2018-06-13 NOTE — PROGRESS NOTES
Last 5 Patient Entered Readings                                      Current 30 Day Average: 109/74     Recent Readings 6/13/2018 6/6/2018 6/5/2018 6/1/2018 5/31/2018    SBP (mmHg) 116 105 109 103 100    DBP (mmHg) 74 67 74 70 70    Pulse 71 68 65 72 71        Patient's BP average is controlled based on 2017 ACC/AHA HTN guidelines of goal BP <130/80.      Ms. Patel is doing well. She has no questions or concerns at this time. She is pleased with BP readings and thanks me for the check in call.     Patient denies s/s of hypotension (lightheadedness, dizziness, nausea, fatigue) associated with low readings. Instructed patient to inform me if this occurs, patient confirms understanding.      Patient's health , Monse Wharton, will be following up every 3-4 weeks. I will continue to monitor regularly and will follow up in 4-6 months, sooner if BP begins to trend upward or downward.    Patient has my contact information and knows to call with any concerns or clinical changes.     Current HTN regimen:  Hypertension Medications             chlorthalidone (HYGROTEN) 25 MG Tab Take 1 tablet (25 mg total) by mouth once daily.    valsartan (DIOVAN) 160 MG tablet TAKE 1 TABLET DAILY

## 2018-06-15 NOTE — PROGRESS NOTES
Last 5 Patient Entered Readings                                      Current 30 Day Average: 110/74     Recent Readings 6/15/2018 6/14/2018 6/13/2018 6/6/2018 6/5/2018    SBP (mmHg) 112 115 116 105 109    DBP (mmHg) 74 76 74 67 74    Pulse 70 69 71 68 65        Digital Medicine: Health  Follow Up    Feeling well.  Apologized for missed calls. Has been travelling extensively for work and was about to board a plane to [place].    Lifestyle Modifications:    1.Dietary Modifications (Sodium intake <2,000mg/day, food labels, dining out): Continuing to monitor sodium intake.    2.Physical Activity: deferred    Follow up with Ms. Kayleen Patel completed. No further questions or concerns. Will continue follow up to achieve health goals.

## 2018-07-16 ENCOUNTER — PATIENT MESSAGE (OUTPATIENT)
Dept: ADMINISTRATIVE | Facility: OTHER | Age: 48
End: 2018-07-16

## 2018-07-25 DIAGNOSIS — I10 ESSENTIAL HYPERTENSION: ICD-10-CM

## 2018-07-25 RX ORDER — CHLORTHALIDONE 25 MG/1
TABLET ORAL
Qty: 90 TABLET | Refills: 1 | Status: SHIPPED | OUTPATIENT
Start: 2018-07-25 | End: 2019-01-21 | Stop reason: SDUPTHER

## 2018-07-27 ENCOUNTER — PATIENT OUTREACH (OUTPATIENT)
Dept: OTHER | Facility: OTHER | Age: 48
End: 2018-07-27

## 2018-07-27 NOTE — PROGRESS NOTES
Last 5 Patient Entered Readings                                      Current 30 Day Average: 113/73     Recent Readings 7/25/2018 7/24/2018 7/23/2018 7/20/2018 7/19/2018    SBP (mmHg) 103 109 134 114 118    DBP (mmHg) 66 73 82 71 74    Pulse 63 69 67 68 73          Digital Medicine: Health  Follow Up    Left voicemail to follow up with Ms. Kayleen Patel.  Current BP average 113/73 mmHg is at goal.

## 2018-08-10 NOTE — PROGRESS NOTES
Last 5 Patient Entered Readings                                      Current 30 Day Average: 112/72     Recent Readings 8/10/2018 8/7/2018 8/2/2018 7/31/2018 7/25/2018    SBP (mmHg) 106 135 99 93 103    DBP (mmHg) 75 82 64 65 66    Pulse 76 71 65 64 63        Digital Medicine: Health  Follow Up    Feeling well.    Lifestyle Modifications:    1.Dietary Modifications (Sodium intake <2,000mg/day, food labels, dining out): continuing to monitor sodium intake    2.Physical Activity: taking fitness classes when not travelling    3.Medication Therapy: Patient has been compliant with the medication regimen.    4.Patient has the following medication side effects/concerns:   (Frequency/Alleviating factors/Precipitating factors, etc.)     Follow up with Ms. Kayleen Patel completed. No further questions or concerns. Will continue follow up to achieve health goals.

## 2018-09-20 ENCOUNTER — PATIENT OUTREACH (OUTPATIENT)
Dept: OTHER | Facility: OTHER | Age: 48
End: 2018-09-20

## 2018-09-20 NOTE — PROGRESS NOTES
Last 5 Patient Entered Readings                                      Current 30 Day Average: 110/71     Recent Readings 9/20/2018 9/19/2018 9/18/2018 9/14/2018 9/13/2018    SBP (mmHg) 119 106 107 119 116    DBP (mmHg) 75 71 70 74 74    Pulse 69 70 67 65 62        Digital Medicine: Health  Follow Up    Left voicemail to follow up with Ms. Kayleen Patel.  Current BP average 110/71 mmHg is at goal.

## 2018-09-26 ENCOUNTER — PATIENT MESSAGE (OUTPATIENT)
Dept: ADMINISTRATIVE | Facility: OTHER | Age: 48
End: 2018-09-26

## 2018-10-03 DIAGNOSIS — I10 HYPERTENSION, ESSENTIAL: Primary | ICD-10-CM

## 2018-10-04 NOTE — PROGRESS NOTES
Last 5 Patient Entered Readings                                      Current 30 Day Average: 112/71     Recent Readings 10/3/2018 10/2/2018 9/28/2018 9/27/2018 9/26/2018    SBP (mmHg) 107 124 99 103 108    DBP (mmHg) 67 79 60 66 65    Pulse 67 66 72 69 68        Digital Medicine: Health  Follow Up    Left voicemail to follow up with Ms. Kayleen Patel.  Current BP average 112/71 mmHg is at goal.    Sending [employer] message.

## 2018-10-05 NOTE — PROGRESS NOTES
Last 5 Patient Entered Readings                                      Current 30 Day Average: 111/71     Recent Readings 10/5/2018 10/3/2018 10/2/2018 9/28/2018 9/27/2018    SBP (mmHg) 114 107 124 99 103    DBP (mmHg) 73 67 79 60 66    Pulse 69 67 66 72 69        Digital Medicine: Health  Follow Up    Ms. Patel responded via [email or web address] message.  Increased work related stress.     Lifestyle Modifications:    1.Dietary Modifications: being mindful of sodium intake    2.Physical Activity: continuing to take exercise classes    3.Medication Therapy: Patient has been compliant with the medication regimen.    4.Patient has the following medication side effects/concerns:   (Frequency/Alleviating factors/Precipitating factors, etc.)     Follow up with Ms. Kayleen Patel completed. No further questions or concerns. Will continue to follow up to achieve health goals.

## 2018-10-09 ENCOUNTER — OFFICE VISIT (OUTPATIENT)
Dept: INTERNAL MEDICINE | Facility: CLINIC | Age: 48
End: 2018-10-09
Attending: FAMILY MEDICINE
Payer: OTHER GOVERNMENT

## 2018-10-09 ENCOUNTER — HOSPITAL ENCOUNTER (OUTPATIENT)
Dept: RADIOLOGY | Facility: HOSPITAL | Age: 48
Discharge: HOME | End: 2018-10-09
Attending: FAMILY MEDICINE
Payer: OTHER GOVERNMENT

## 2018-10-09 ENCOUNTER — IMMUNIZATION (OUTPATIENT)
Dept: INTERNAL MEDICINE | Facility: CLINIC | Age: 48
End: 2018-10-09
Payer: OTHER GOVERNMENT

## 2018-10-09 VITALS
HEART RATE: 74 BPM | DIASTOLIC BLOOD PRESSURE: 60 MMHG | TEMPERATURE: 99 F | HEIGHT: 62 IN | SYSTOLIC BLOOD PRESSURE: 108 MMHG | OXYGEN SATURATION: 97 % | BODY MASS INDEX: 25.15 KG/M2 | WEIGHT: 136.69 LBS

## 2018-10-09 DIAGNOSIS — I10 HYPERTENSION, ESSENTIAL: ICD-10-CM

## 2018-10-09 DIAGNOSIS — S83.241A ACUTE MEDIAL MENISCUS TEAR OF RIGHT KNEE, INITIAL ENCOUNTER: ICD-10-CM

## 2018-10-09 DIAGNOSIS — M17.0 OSTEOARTHRITIS OF BOTH KNEES, UNSPECIFIED OSTEOARTHRITIS TYPE: ICD-10-CM

## 2018-10-09 DIAGNOSIS — M17.0 OSTEOARTHRITIS OF BOTH KNEES, UNSPECIFIED OSTEOARTHRITIS TYPE: Primary | ICD-10-CM

## 2018-10-09 PROCEDURE — 99999 PR PBB SHADOW E&M-EST. PATIENT-LVL III: CPT | Mod: PBBFAC,,, | Performed by: FAMILY MEDICINE

## 2018-10-09 PROCEDURE — 99214 OFFICE O/P EST MOD 30 MIN: CPT | Mod: S$PBB,,, | Performed by: FAMILY MEDICINE

## 2018-10-09 PROCEDURE — 99213 OFFICE O/P EST LOW 20 MIN: CPT | Mod: PBBFAC | Performed by: FAMILY MEDICINE

## 2018-10-09 PROCEDURE — 73564 X-RAY EXAM KNEE 4 OR MORE: CPT | Mod: 26,50,, | Performed by: RADIOLOGY

## 2018-10-09 PROCEDURE — 90686 IIV4 VACC NO PRSV 0.5 ML IM: CPT | Mod: PBBFAC

## 2018-10-09 PROCEDURE — 73564 X-RAY EXAM KNEE 4 OR MORE: CPT | Mod: TC,50

## 2018-10-09 NOTE — PROGRESS NOTES
Subjective:       Patient ID: Kayleen Patel is a 48 y.o. female.    Chief Complaint: No chief complaint on file.    HPI  Review of Systems   Constitutional: Negative for activity change, chills, fatigue, fever and unexpected weight change.   HENT: Negative for congestion, hearing loss, rhinorrhea and trouble swallowing.    Eyes: Negative for discharge, redness and visual disturbance.   Respiratory: Negative for cough, chest tightness, shortness of breath and wheezing.    Cardiovascular: Negative for chest pain, palpitations and leg swelling.   Gastrointestinal: Negative for abdominal pain, blood in stool, constipation, diarrhea and vomiting.   Endocrine: Positive for polyuria. Negative for polydipsia.   Genitourinary: Negative for difficulty urinating, dysuria, hematuria and menstrual problem.   Musculoskeletal: Positive for arthralgias. Negative for back pain, gait problem, joint swelling, myalgias and neck pain.   Skin: Negative for color change and rash.   Neurological: Negative for tremors, speech difficulty, weakness, numbness and headaches.   Hematological: Negative for adenopathy. Does not bruise/bleed easily.   Psychiatric/Behavioral: Negative for behavioral problems, confusion, dysphoric mood and sleep disturbance. The patient is not nervous/anxious.        Objective:      Physical Exam   Constitutional: She is oriented to person, place, and time. She appears well-developed and well-nourished. No distress.   Neck: Neck supple.   Pulmonary/Chest: Effort normal.   Musculoskeletal: She exhibits no edema.        Right knee: She exhibits swelling. She exhibits normal range of motion, no effusion, no ecchymosis, no deformity, no laceration, no erythema, normal alignment, no LCL laxity, normal patellar mobility, no bony tenderness, normal meniscus and no MCL laxity. Tenderness found. Lateral joint line and patellar tendon tenderness noted. No medial joint line, no MCL and no LCL tenderness noted.         Left knee: She exhibits swelling. She exhibits normal range of motion, no effusion, no ecchymosis, no deformity, no laceration, no erythema, normal alignment, no LCL laxity, normal patellar mobility, no bony tenderness, normal meniscus and no MCL laxity. Tenderness found. Patellar tendon tenderness noted. No medial joint line, no lateral joint line, no MCL and no LCL tenderness noted.        Right lower leg: She exhibits no edema.        Left lower leg: She exhibits no edema.   Neurological: She is alert and oriented to person, place, and time. She has normal strength. No sensory deficit. She exhibits normal muscle tone. Coordination and gait normal. GCS eye subscore is 4. GCS verbal subscore is 5. GCS motor subscore is 6.   Skin: Skin is warm and dry. No rash noted.   Psychiatric: She has a normal mood and affect. Her behavior is normal. Judgment and thought content normal.   Nursing note and vitals reviewed.      Assessment:       1. Osteoarthritis of both knees, unspecified osteoarthritis type    2. Acute medial meniscus tear of right knee, initial encounter    3. Hypertension, essential        Plan:   Diagnoses and all orders for this visit:    Osteoarthritis of both knees, unspecified osteoarthritis type  -     X-ray Knee Ortho Bilateral with Flexion; Future    Acute medial meniscus tear of right knee, initial encounter  -     X-ray Knee Ortho Bilateral with Flexion; Future  -     MRI Knee Without Contrast Right; Future    Hypertension, essential      See meds, orders, follow up, routing and instructions sections of encounter.  A 48-year-old established female patient with right greater than left knee pain   and swelling, approximately one month, but more chronic.  It is anterolateral   location on the right and anterior location on the left.  The left is worse with   stairs up and down or squats.  The right is worse with side to side motions   and/or twisting.    Her examination is consistent with a possible  anterior lateral meniscus tear on   the right with a light degree of fluid, full range of motion and an equivocal or   equivocally positive Will test.  On the left, her symptoms and signs are   more consistent with patellar tendinitis or PFS.    Given nonresolution over the last four weeks concerning the right knee, we will   proceed with an MRI of the right knee to rule out meniscus tear given the   suggestive physical exam findings.  The patient declines physical therapy   consult at this time.  We will check bilateral knee x-rays to assess for DJD.    She has had viscosupplementation injections in the past.      STEVE/MALLORY  dd: 10/09/2018 17:41:21 (CDT)  td: 10/10/2018 05:21:48 (CDT)  Doc ID   #[id number]  Job ID #[id number]    CC:

## 2018-10-10 ENCOUNTER — PATIENT MESSAGE (OUTPATIENT)
Dept: INTERNAL MEDICINE | Facility: CLINIC | Age: 48
End: 2018-10-10

## 2018-10-11 ENCOUNTER — HOSPITAL ENCOUNTER (OUTPATIENT)
Dept: RADIOLOGY | Facility: HOSPITAL | Age: 48
Discharge: HOME | End: 2018-10-11
Attending: FAMILY MEDICINE
Payer: OTHER GOVERNMENT

## 2018-10-11 DIAGNOSIS — S83.241A ACUTE MEDIAL MENISCUS TEAR OF RIGHT KNEE, INITIAL ENCOUNTER: ICD-10-CM

## 2018-10-11 PROCEDURE — 73721 MRI JNT OF LWR EXTRE W/O DYE: CPT | Mod: 26,RT,, | Performed by: RADIOLOGY

## 2018-10-11 PROCEDURE — 73721 MRI JNT OF LWR EXTRE W/O DYE: CPT | Mod: TC,RT

## 2018-10-15 ENCOUNTER — TELEPHONE (OUTPATIENT)
Dept: INTERNAL MEDICINE | Facility: CLINIC | Age: 48
End: 2018-10-15

## 2018-10-15 DIAGNOSIS — M17.0 OSTEOARTHRITIS OF BOTH KNEES, UNSPECIFIED OSTEOARTHRITIS TYPE: Primary | ICD-10-CM

## 2018-10-15 NOTE — TELEPHONE ENCOUNTER
Called patient and discussed labs and or test results. Patient expressed understanding and had the opportunity to ask questions. Any questions were answered. See meds, orders, follow up and instructions sections of encounter.    Specific issues include:  MRI - findings seem more degenerative, will consult SM due to her high activity level

## 2018-10-18 ENCOUNTER — DOCUMENTATION ONLY (OUTPATIENT)
Dept: INTERNAL MEDICINE | Facility: CLINIC | Age: 48
End: 2018-10-18

## 2018-11-01 ENCOUNTER — OFFICE VISIT (OUTPATIENT)
Dept: SPORTS MEDICINE | Facility: CLINIC | Age: 48
End: 2018-11-01
Attending: FAMILY MEDICINE
Payer: OTHER GOVERNMENT

## 2018-11-01 VITALS
HEART RATE: 69 BPM | WEIGHT: 135 LBS | BODY MASS INDEX: 24.84 KG/M2 | SYSTOLIC BLOOD PRESSURE: 116 MMHG | HEIGHT: 62 IN | DIASTOLIC BLOOD PRESSURE: 75 MMHG

## 2018-11-01 DIAGNOSIS — M17.11 OSTEOARTHRITIS OF RIGHT KNEE: ICD-10-CM

## 2018-11-01 DIAGNOSIS — M17.12 DEGENERATIVE ARTHRITIS OF LEFT KNEE: ICD-10-CM

## 2018-11-01 DIAGNOSIS — G89.29 CHRONIC PAIN OF BOTH KNEES: Primary | ICD-10-CM

## 2018-11-01 DIAGNOSIS — M25.562 CHRONIC PAIN OF BOTH KNEES: Primary | ICD-10-CM

## 2018-11-01 DIAGNOSIS — M25.561 CHRONIC PAIN OF BOTH KNEES: Primary | ICD-10-CM

## 2018-11-01 PROCEDURE — 99213 OFFICE O/P EST LOW 20 MIN: CPT | Mod: PBBFAC,PO | Performed by: ORTHOPAEDIC SURGERY

## 2018-11-01 PROCEDURE — 99999 PR PBB SHADOW E&M-EST. PATIENT-LVL III: CPT | Mod: PBBFAC,,, | Performed by: ORTHOPAEDIC SURGERY

## 2018-11-01 PROCEDURE — 99203 OFFICE O/P NEW LOW 30 MIN: CPT | Mod: S$PBB,,, | Performed by: ORTHOPAEDIC SURGERY

## 2018-11-01 NOTE — PROGRESS NOTES
"CC: Bilateral knee pain, R > L    48 y.o. Female with a history of bilateral knee pain for several years but worse recently. She is fairly active working out and notes that she has increased difficulty doing "laterals" and squats. She notes some swelling in her knees as well. The pain in the L knee is more anteriorly around the patella. The R knee is more laterally and posteriorly. She denies mechanical symptoms in her knees or feelings of instability. She has had Euflexxa injections in her knees a couple years ago w/ good relief.    REVIEW OF SYSTEMS:   Constitution: Negative. Negative for chills, fever and night sweats.   HENT: Negative for congestion and headaches.    Eyes: Negative for blurred vision, left vision loss and right vision loss.   Cardiovascular: Negative for chest pain and syncope.   Respiratory: Negative for cough and shortness of breath.    Endocrine: Negative for polydipsia, polyphagia and polyuria.   Hematologic/Lymphatic: Negative for bleeding problem. Does not bruise/bleed easily.   Skin: Negative for dry skin, itching and rash.   Musculoskeletal: Negative for falls. Positive for right knee pain and  muscle weakness.   Gastrointestinal: Negative for abdominal pain and bowel incontinence.   Genitourinary: Negative for bladder incontinence and nocturia.   Neurological: Negative for disturbances in coordination, loss of balance and seizures.   Psychiatric/Behavioral: Negative for depression. The patient does not have insomnia.    Allergic/Immunologic: Negative for hives and persistent infections.     PAST MEDICAL HISTORY:   Past Medical History:   Diagnosis Date    Abnormal cervical Papanicolaou smear '88, '04    Colpo/Cryo    GERD (gastroesophageal reflux disease)     History of acute PID 1988    Hypertension     Thrombocytosis        PAST SURGICAL HISTORY:   Past Surgical History:   Procedure Laterality Date    COLONOSCOPY N/A 2/12/2015    Performed by [facility] " (4[facility] F[facility])    GANGLION CYST EXCISION      REFRACTIVE SURGERY Bilateral 2009    Dr. Vazquez Forbes     TONSILLECTOMY         FAMILY HISTORY:   Family History   Adopted: Yes   Problem Relation Age of Onset    Heart disease Mother     Cancer Father     Breast cancer Neg Hx     Colon cancer Neg Hx     Ovarian cancer Neg Hx        SOCIAL HISTORY:   Social History     Socioeconomic History    Marital status: Single     Spouse name: Not on file    Number of children: Not on file    Years of education: Not on file    Highest education level: Not on file   Social Needs    Financial resource strain: Not on file    Food insecurity - worry: Not on file    Food insecurity - inability: Not on file    Transportation needs - medical: Not on file    Transportation needs - non-medical: Not on file   Occupational History    Not on file   Tobacco Use    Smoking status: Former Smoker     Packs/day: 1.00     Years: 23.00     Pack years: 23.00     Last attempt to quit: 2007     Years since quittin.3    Smokeless tobacco: Never Used   Substance and Sexual Activity    Alcohol use: Yes     Comment: Social    Drug use: No    Sexual activity: Not Currently     Partners: Male     Birth control/protection: OCP   Other Topics Concern    Are you pregnant or think you may be? Not Asked    Breast-feeding Not Asked   Social History Narrative    Retired [employer]. Currently, working for the [employer]. Criminal justice degree from [employer].            MEDICATIONS:     Current Outpatient Medications:     CALCIUM CARBONATE (CALCIUM 500 ORAL), Take 1 capsule by mouth., Disp: , Rfl:     chlorthalidone (HYGROTEN) 25 MG Tab, TAKE 1 TABLET DAILY, Disp: 90 tablet, Rfl: 1    cyanocobalamin (VITAMIN B-12) 100 MCG tablet, Take 100 mcg by mouth once daily., Disp: , Rfl:     ferrous sulfate 325 mg (65 mg iron) Tab tablet, Take 325 mg by mouth daily with breakfast., Disp: , Rfl:     GIANVI, 28, 3-0.02 mg per tablet, TAKE 1  "TABLET DAILY, Disp: 84 tablet, Rfl: 4    OMEGA-3/DHA/EPA/FISH OIL (OMEGA-3 FISH OIL ORAL), Take 1 capsule by mouth., Disp: , Rfl:     valsartan (DIOVAN) 160 MG tablet, TAKE 1 TABLET DAILY, Disp: 90 tablet, Rfl: 1    ALLERGIES:   Review of patient's allergies indicates:   Allergen Reactions    Penicillins Hives    Adhesive Rash       VITAL SIGNS:   /75   Pulse 69   Ht 5' 2" (1.575 m)   Wt 61.2 kg (135 lb)   BMI 24.69 kg/m²      PHYSICAL EXAMINATION:  General:  The patient is alert and oriented x 3.  Mood is pleasant.  Observation of ears, eyes and nose reveal no gross abnormalities.  No labored breathing observed.    RIGHT KNEE EXAMINATION     OBSERVATION / INSPECTION   Gait:   Nonantalgic   Alignment:  Neutral   Scars:   None   Muscle atrophy: Mild  Effusion:  mild   Warmth:  None   Discoloration:   none     TENDERNESS / CREPITUS (T / C):          T / C      T / C   Patella   - / -   Lateral joint line   - / -   Peripatellar medial  +  Medial joint line    + / -   Peripatellar lateral +  Medial plica   - / -   Patellar tendon -   Popliteal fossa   - / -   Quad tendon   -   Gastrocnemius   -   Prepatellar Bursa - / -   Quadricep   -   Tibial tubercle  -  Thigh/hamstring  -   Pes anserine/HS -  Fibula    -   ITB   - / -  Tibia     -   Tib/fib joint  - / -  LCL    -     MFC   - / -   MCL: Proximal  -    LFC   - / -    Distal   -          ROM: (* = pain)  PASSIVE   ACTIVE    Left :   5 / 0 / 145   5 / 0 / 145     Right :    5 / 0 / 145   5 / 0 / 145    Patellofemoral examination:  See above noted areas of tenderness.   Patella position    Subluxation / dislocation: Centered           Sup. / Inf;   Normal   Crepitus (PF):    Absent   Patellar Mobility:       Medial-lateral:   Normal    Superior-inferior:  Normal    Inferior tilt   Normal    Patellar tendon:  Normal   Lateral tilt:    Normal   J-sign:     None   Patellofemoral grind:   +    MENISCAL SIGNS:     Pain on terminal extension:  -  Pain on " terminal flexion:  -  Wills maneuver:  + for lateral pain in L knee  Squat     + for pain bilateral knees    LIGAMENT EXAMINATION:  ACL / Lachman:  normal (-1 to 2mm)    PCL-Post.  drawer: normal 0 to 2mm  MCL- Valgus:  normal 0 to 2mm  LCL- Varus:  normal 0 to 2mm  Pivot shift: normal (Equal)   Dial Test: difference c/w other side   At 30° flexion: normal (< 5°)    At 90° flexion: normal (< 5°)   Reverse Pivot Shift:   normal (Equal)     STRENGTH: (* = with pain) PAINFUL SIDE   Quadricep   5/5   Hamstrin/5    EXTREMITY NEURO-VASCULAR EXAMINATION:   Sensation:  Grossly intact to light touch all dermatomal regions.   Motor Function:  Fully intact motor function at hip, knee, foot and ankle    DTRs;  quadriceps and  achilles 2+.  No clonus and downgoing Babinski.    Vascular status:  DP and PT pulses 2+, brisk capillary refill, symmetric.       IMAGINV XR bilateral knees 10/9/18 - demonstrates normal alignment. Mild tricompartmental degen changes but overall spacing well preserved. No acute bony findings other than possible loose body R knee.     MRI R knee 10/11/18 -   Mild tricompartment cartilage loss/ osteoarthritis, most prominent in the patellofemoral compartment, as above.    Small knee joint effusion.    Small Baker cyst.        ASSESSMENT:    Bilateral knee pain     PLAN:   1. Euflexxa B knees - pre-auth submitted today - will return in 1-2 weeks to commence series   2. Cont NSAIDs prn for pain control  3. If continues to have symptoms after Euflexxa may ultimately need knee scope for chondroplasty and debridement    All questions were answered, pt will contact us for questions or concerns in the interim.

## 2018-11-15 ENCOUNTER — PATIENT OUTREACH (OUTPATIENT)
Dept: OTHER | Facility: OTHER | Age: 48
End: 2018-11-15

## 2018-11-15 NOTE — PROGRESS NOTES
Last 5 Patient Entered Readings                                      Current 30 Day Average: 112/73     Recent Readings 11/15/2018 11/14/2018 11/9/2018 11/8/2018 11/7/2018    SBP (mmHg) 101 112 105 102 100    DBP (mmHg) 69 67 69 72 65    Pulse 73 75 71 69 71        Patient's BP average is controlled based on 2017 ACC/AHA HTN guidelines of goal BP <130/80.      LVM for Ms. Patel. Her BP is well managed. Will send My Chart message as well.     Patient's health , Monse Wharton, will be following up every 3-4 weeks. I will continue to monitor regularly and will follow up in 4-6 months, sooner if BP begins to trend upward or downward.    Patient has my contact information and knows to call with any concerns or clinical changes.     Current HTN regimen:  Hypertension Medications             chlorthalidone (HYGROTEN) 25 MG Tab TAKE 1 TABLET DAILY    valsartan (DIOVAN) 160 MG tablet TAKE 1 TABLET DAILY

## 2018-11-22 DIAGNOSIS — I10 ESSENTIAL HYPERTENSION: ICD-10-CM

## 2018-11-22 RX ORDER — VALSARTAN 160 MG/1
TABLET ORAL
Qty: 90 TABLET | Refills: 1 | Status: SHIPPED | OUTPATIENT
Start: 2018-11-22 | End: 2019-05-21 | Stop reason: SDUPTHER

## 2018-11-27 ENCOUNTER — PATIENT OUTREACH (OUTPATIENT)
Dept: OTHER | Facility: OTHER | Age: 48
End: 2018-11-27

## 2018-11-27 NOTE — PROGRESS NOTES
Last 5 Patient Entered Readings                                      Current 30 Day Average: 109/70     Recent Readings 11/21/2018 11/15/2018 11/14/2018 11/9/2018 11/8/2018    SBP (mmHg) 110 101 112 105 102    DBP (mmHg) 70 69 67 69 72    Pulse 69 73 75 71 69        Digital Medicine: Health  Follow Up    Left voicemail to follow up with Ms. Kayleen Patel.  Current BP average 109/70 mmHg is at goal.

## 2018-11-29 ENCOUNTER — OFFICE VISIT (OUTPATIENT)
Dept: SPORTS MEDICINE | Facility: CLINIC | Age: 48
End: 2018-11-29
Payer: OTHER GOVERNMENT

## 2018-11-29 VITALS
BODY MASS INDEX: 24.84 KG/M2 | SYSTOLIC BLOOD PRESSURE: 126 MMHG | WEIGHT: 135 LBS | HEIGHT: 62 IN | DIASTOLIC BLOOD PRESSURE: 86 MMHG | HEART RATE: 74 BPM

## 2018-11-29 DIAGNOSIS — M17.11 OSTEOARTHRITIS OF RIGHT KNEE: Primary | ICD-10-CM

## 2018-11-29 DIAGNOSIS — M17.12 DEGENERATIVE ARTHRITIS OF LEFT KNEE: ICD-10-CM

## 2018-11-29 PROCEDURE — 99213 OFFICE O/P EST LOW 20 MIN: CPT | Mod: PBBFAC,PO | Performed by: ORTHOPAEDIC SURGERY

## 2018-11-29 PROCEDURE — 20610 DRAIN/INJ JOINT/BURSA W/O US: CPT | Mod: 50,PBBFAC,PO | Performed by: ORTHOPAEDIC SURGERY

## 2018-11-29 PROCEDURE — 99499 UNLISTED E&M SERVICE: CPT | Mod: S$PBB,,, | Performed by: ORTHOPAEDIC SURGERY

## 2018-11-29 PROCEDURE — 99999 PR PBB SHADOW E&M-EST. PATIENT-LVL III: CPT | Mod: PBBFAC,,, | Performed by: ORTHOPAEDIC SURGERY

## 2018-11-29 RX ADMIN — Medication 20 MG: at 03:11

## 2018-11-29 NOTE — LETTER
November 29, 2018      David Dominguez MD  [address]  [facility] [address]           [facility]  [address]  [facility] [address]  Phone: [phone]          Patient: Kayleen Patel   MR Number: [id number]   YOB: 1970   Date of Visit: 11/29/2018       Dear Dr. David Dominguez:    Thank you for referring Kayleen Patel to me for evaluation. Attached you will find relevant portions of my assessment and plan of care.    If you have questions, please do not hesitate to call me. I look forward to following Kayleen Patel along with you.    Sincerely,    Wolfgang Tabor MD    Enclosure  CC:  No Recipients    If you would like to receive this communication electronically, please contact [email or web address] or [phone] to request more information on [employer] Link access.    For providers and/or their staff who would like to refer a patient to [facility], please contact us through our one-stop-shop provider referral line, [facility], at [phone].    If you feel you have received this communication in error or would no longer like to receive these types of communications, please e-mail [email or web address]

## 2018-11-29 NOTE — PROGRESS NOTES
Euflexxa Injection Procedure #1     A time out was performed, including verification of patient ID, procedure, site and side, availability of information and equipment, review of safety issues, and agreement with consent, the procedure site was marked.     The patient was prepped aseptically with povidone-iodine swabsticks. A diagnostic and therapeutic injection of 2cc Euflexxa was given under sterile technique using a 21.5g x 1.5 needle from the superolateral aspect of the bilateral knee joints in the supine position.       Kayleen Patel had no adverse reactions to the medication. Pain decreased. She was instructed to apply ice to the joint for 20 minutes and avoid strenuous activities for 24-36 hours following the injection. She was warned of possible blood pressure changes during that time. Following that time, she can resume activities as prior to the injection.     She was reminded to call the clinic immediately for any adverse side effects as explained in clinic today.     Both knees:  Lot: [id number]  Exp: 2019-10-21

## 2018-11-29 NOTE — PROCEDURES
Large Joint Aspiration/Injection: L knee, R knee  Date/Time: 11/29/2018 3:16 PM  Performed by: Wolfgang Tabor MD  Authorized by: Wolfgang Tabor MD     Consent Done?:  Yes (Verbal)  Indications:  Pain  Procedure site marked: Yes    Timeout: Prior to procedure the correct patient, procedure, and site was verified      Location:  Knee  Site:  L knee and R knee  Prep: Patient was prepped and draped in usual sterile fashion    Ultrasonic Guidance for needle placement: No  Needle size:  22 G  Approach:  Superior  Medications:  20 mg sodium hyaluronate (EUFLEXXA) 10 mg/mL(mw 2.4 -3.6 million); 20 mg sodium hyaluronate (EUFLEXXA) 10 mg/mL(mw 2.4 -3.6 million)  Patient tolerance:  Patient tolerated the procedure well with no immediate complications

## 2018-11-30 ENCOUNTER — TELEPHONE (OUTPATIENT)
Dept: SPORTS MEDICINE | Facility: CLINIC | Age: 48
End: 2018-11-30

## 2018-11-30 NOTE — TELEPHONE ENCOUNTER
LVM informing patient that her appointment time was changed due to b/o. Dr. Tabor will be doing surgery the morning of 12/06/18.

## 2018-12-06 ENCOUNTER — OFFICE VISIT (OUTPATIENT)
Dept: SPORTS MEDICINE | Facility: CLINIC | Age: 48
End: 2018-12-06
Payer: OTHER GOVERNMENT

## 2018-12-06 VITALS
BODY MASS INDEX: 24.84 KG/M2 | DIASTOLIC BLOOD PRESSURE: 80 MMHG | WEIGHT: 135 LBS | HEART RATE: 70 BPM | HEIGHT: 62 IN | SYSTOLIC BLOOD PRESSURE: 117 MMHG

## 2018-12-06 DIAGNOSIS — M17.12 DEGENERATIVE ARTHRITIS OF LEFT KNEE: ICD-10-CM

## 2018-12-06 DIAGNOSIS — M17.11 OSTEOARTHRITIS OF RIGHT KNEE: Primary | ICD-10-CM

## 2018-12-06 PROCEDURE — 99213 OFFICE O/P EST LOW 20 MIN: CPT | Mod: PBBFAC,PO | Performed by: ORTHOPAEDIC SURGERY

## 2018-12-06 PROCEDURE — 99499 UNLISTED E&M SERVICE: CPT | Mod: S$PBB,,, | Performed by: ORTHOPAEDIC SURGERY

## 2018-12-06 PROCEDURE — 99999 PR PBB SHADOW E&M-EST. PATIENT-LVL III: CPT | Mod: PBBFAC,,, | Performed by: ORTHOPAEDIC SURGERY

## 2018-12-06 PROCEDURE — 20610 DRAIN/INJ JOINT/BURSA W/O US: CPT | Mod: 50,PBBFAC,PO | Performed by: ORTHOPAEDIC SURGERY

## 2018-12-06 RX ADMIN — Medication 20 MG: at 05:12

## 2018-12-06 NOTE — PROCEDURES
Large Joint Aspiration/Injection: L knee, R knee  Date/Time: 12/6/2018 5:10 PM  Performed by: Wolfgang Tabor MD  Authorized by: Wolfgang Tabor MD     Consent Done?:  Yes (Verbal)  Indications:  Pain  Procedure site marked: Yes    Timeout: Prior to procedure the correct patient, procedure, and site was verified      Location:  Knee  Site:  L knee and R knee  Prep: Patient was prepped and draped in usual sterile fashion    Ultrasonic Guidance for needle placement: No  Needle size:  22 G  Approach:  Superior  Medications:  20 mg sodium hyaluronate (EUFLEXXA) 10 mg/mL(mw 2.4 -3.6 million); 20 mg sodium hyaluronate (EUFLEXXA) 10 mg/mL(mw 2.4 -3.6 million)  Patient tolerance:  Patient tolerated the procedure well with no immediate complications

## 2018-12-06 NOTE — LETTER
December 6, 2018      David Dominguez MD  [address]  [facility] [address]           [facility]  [address]  [facility] [address]  Phone: [phone]          Patient: Kayleen Patel   MR Number: [id number]   YOB: 1970   Date of Visit: 12/6/2018       Dear Dr. David Dominguez:    Thank you for referring Kayleen Patel to me for evaluation. Attached you will find relevant portions of my assessment and plan of care.    If you have questions, please do not hesitate to call me. I look forward to following Kayleen Patel along with you.    Sincerely,    Wolfgang Tabor MD    Enclosure  CC:  No Recipients    If you would like to receive this communication electronically, please contact [email or web address] or [phone] to request more information on [employer] Link access.    For providers and/or their staff who would like to refer a patient to [facility], please contact us through our one-stop-shop provider referral line, [facility], at [phone].    If you feel you have received this communication in error or would no longer like to receive these types of communications, please e-mail [email or web address]

## 2018-12-06 NOTE — PROGRESS NOTES
Euflexxa Injection Procedure #2     A time out was performed, including verification of patient ID, procedure, site and side, availability of information and equipment, review of safety issues, and agreement with consent, the procedure site was marked.     The patient was prepped aseptically with povidone-iodine swabsticks. A diagnostic and therapeutic injection of 2cc Euflexxa was given under sterile technique using a 21.5g x 1.5 needle from the superolateral aspect of the bilateral knee joints in the supine position.       Kayleen Patel had no adverse reactions to the medication. Pain decreased. She was instructed to apply ice to the joint for 20 minutes and avoid strenuous activities for 24-36 hours following the injection. She was warned of possible blood pressure changes during that time. Following that time, she can resume activities as prior to the injection.     She was reminded to call the clinic immediately for any adverse side effects as explained in clinic today.     Both knees:  Lot: [id number]  Exp: 2019-10-21

## 2018-12-13 ENCOUNTER — OFFICE VISIT (OUTPATIENT)
Dept: SPORTS MEDICINE | Facility: CLINIC | Age: 48
End: 2018-12-13
Payer: OTHER GOVERNMENT

## 2018-12-13 VITALS
HEIGHT: 62 IN | WEIGHT: 135 LBS | SYSTOLIC BLOOD PRESSURE: 118 MMHG | HEART RATE: 79 BPM | DIASTOLIC BLOOD PRESSURE: 78 MMHG | BODY MASS INDEX: 24.84 KG/M2

## 2018-12-13 DIAGNOSIS — M17.11 OSTEOARTHRITIS OF RIGHT KNEE: Primary | ICD-10-CM

## 2018-12-13 DIAGNOSIS — M17.12 DEGENERATIVE ARTHRITIS OF LEFT KNEE: ICD-10-CM

## 2018-12-13 PROCEDURE — 99499 UNLISTED E&M SERVICE: CPT | Mod: S$PBB,,, | Performed by: ORTHOPAEDIC SURGERY

## 2018-12-13 PROCEDURE — 99213 OFFICE O/P EST LOW 20 MIN: CPT | Mod: PBBFAC,PO | Performed by: ORTHOPAEDIC SURGERY

## 2018-12-13 PROCEDURE — 99999 PR PBB SHADOW E&M-EST. PATIENT-LVL III: CPT | Mod: PBBFAC,,, | Performed by: ORTHOPAEDIC SURGERY

## 2018-12-13 PROCEDURE — 20610 DRAIN/INJ JOINT/BURSA W/O US: CPT | Mod: 50,PBBFAC,PO | Performed by: ORTHOPAEDIC SURGERY

## 2018-12-13 RX ADMIN — Medication 20 MG: at 04:12

## 2018-12-13 NOTE — PROCEDURES
Large Joint Aspiration/Injection: L knee, R knee  Date/Time: 12/13/2018 4:18 PM  Performed by: Wolfgang Tabor MD  Authorized by: Wolfgang Tabor MD     Consent Done?:  Yes (Verbal)  Indications:  Pain  Procedure site marked: Yes    Timeout: Prior to procedure the correct patient, procedure, and site was verified      Location:  Knee  Site:  L knee and R knee  Prep: Patient was prepped and draped in usual sterile fashion    Ultrasonic Guidance for needle placement: No  Needle size:  22 G  Approach:  Superior  Medications:  20 mg sodium hyaluronate (EUFLEXXA) 10 mg/mL(mw 2.4 -3.6 million); 20 mg sodium hyaluronate (EUFLEXXA) 10 mg/mL(mw 2.4 -3.6 million)  Patient tolerance:  Patient tolerated the procedure well with no immediate complications

## 2018-12-13 NOTE — LETTER
December 13, 2018      David Dominguez MD  [address]  [facility] [address]           [facility]  [address]  [facility] [address]  Phone: [phone]          Patient: Kayleen Patel   MR Number: [id number]   YOB: 1970   Date of Visit: 12/13/2018       Dear Dr. David Dominguez:    Thank you for referring Kayleen Patel to me for evaluation. Attached you will find relevant portions of my assessment and plan of care.    If you have questions, please do not hesitate to call me. I look forward to following Kayleen Patel along with you.    Sincerely,    Wolfgang Tabor MD    Enclosure  CC:  No Recipients    If you would like to receive this communication electronically, please contact [email or web address] or [phone] to request more information on [employer] Link access.    For providers and/or their staff who would like to refer a patient to [facility], please contact us through our one-stop-shop provider referral line, [facility], at [phone].    If you feel you have received this communication in error or would no longer like to receive these types of communications, please e-mail [email or web address]

## 2018-12-13 NOTE — PROGRESS NOTES
Euflexxa Injection Procedure #3     A time out was performed, including verification of patient ID, procedure, site and side, availability of information and equipment, review of safety issues, and agreement with consent, the procedure site was marked.     The patient was prepped aseptically with povidone-iodine swabsticks. A diagnostic and therapeutic injection of 2cc Euflexxa was given under sterile technique using a 21.5g x 1.5 needle from the superolateral aspect of the bilateral knee joints in the supine position.       Kayleen Patel had no adverse reactions to the medication. Pain decreased. She was instructed to apply ice to the joint for 20 minutes and avoid strenuous activities for 24-36 hours following the injection. She was warned of possible blood pressure changes during that time. Following that time, she can resume activities as prior to the injection.     She was reminded to call the clinic immediately for any adverse side effects as explained in clinic today.     Both knees:  Lot: [id number]  Exp: 2019-11-18

## 2018-12-18 NOTE — PROGRESS NOTES
Last 5 Patient Entered Readings                                      Current 30 Day Average: 116/76     Recent Readings 12/14/2018 12/12/2018 12/11/2018 12/10/2018 12/9/2018    SBP (mmHg) 109 105 114 112 132    DBP (mmHg) 68 68 74 76 79    Pulse 73 72 72 76 66        Digital Medicine: Health  Follow Up    Left voicemail to follow up with Ms. Kayleen Patel.  Current BP average 116/76 mmHg is at goal.  Sending [facility] message.

## 2018-12-19 ENCOUNTER — PATIENT MESSAGE (OUTPATIENT)
Dept: ADMINISTRATIVE | Facility: OTHER | Age: 48
End: 2018-12-19

## 2019-01-15 NOTE — PROGRESS NOTES
"Last 5 Patient Entered Readings                                      Current 30 Day Average: 116/77     Recent Readings 1/13/2019 1/13/2019 1/10/2019 1/8/2019 1/4/2019    SBP (mmHg) 129 144 108 119 101    DBP (mmHg) 89 85 70 78 69    Pulse 67 70 71 72 75        Digital Medicine: Health  Follow Up    Feeling well.    Looks forward to getting monthly "progress report" but didn't get one for December.    Lifestyle Modifications:    1.Dietary Modifications: continuing to be mindful of sodium intake. Does, however, attributes diet and drinking to reading on 1/13 ([employer] game).     2.Physical Activity: staying very active by taking exercise classes regularly.    3.Medication Therapy: Patient has been compliant with the medication regimen.    4.Patient has the following medication side effects/concerns:   (Frequency/Alleviating factors/Precipitating factors, etc.)     Follow up with Ms. Kayleen Patel completed. No further questions or concerns. Will continue to follow up to achieve health goals.    "

## 2019-01-21 DIAGNOSIS — I10 ESSENTIAL HYPERTENSION: ICD-10-CM

## 2019-01-23 RX ORDER — CHLORTHALIDONE 25 MG/1
TABLET ORAL
Qty: 90 TABLET | Refills: 0 | Status: SHIPPED | OUTPATIENT
Start: 2019-01-23 | End: 2019-04-21 | Stop reason: SDUPTHER

## 2019-02-26 ENCOUNTER — PATIENT OUTREACH (OUTPATIENT)
Dept: OTHER | Facility: OTHER | Age: 49
End: 2019-02-26

## 2019-02-26 NOTE — PROGRESS NOTES
Last 5 Patient Entered Readings                                      Current 30 Day Average: 114/73     Recent Readings 2/26/2019 2/25/2019 2/24/2019 2/22/2019 2/21/2019    SBP (mmHg) 120 125 105 108 106    DBP (mmHg) 75 85 67 65 67    Pulse 66 69 68 73 69          Digital Medicine: Health  Follow Up    Left voicemail to follow up with Ms. Kalyeen Patel.  Current BP average 114/73 mmHg is at goal.

## 2019-03-07 ENCOUNTER — TELEPHONE (OUTPATIENT)
Dept: INTERNAL MEDICINE | Facility: CLINIC | Age: 49
End: 2019-03-07

## 2019-03-07 DIAGNOSIS — Z00.00 ANNUAL PHYSICAL EXAM: Primary | ICD-10-CM

## 2019-03-07 DIAGNOSIS — I10 HYPERTENSION, ESSENTIAL: ICD-10-CM

## 2019-03-07 NOTE — TELEPHONE ENCOUNTER
----- Message from Brenda Alamo sent at 3/7/2019  9:30 AM CST -----  Regarding: Annual Visit  Good morning,    Pt has an upcoming annual visit.  Would you like to order labs?    Thank you,    Brenda

## 2019-03-07 NOTE — TELEPHONE ENCOUNTER
Patient has an existing appointment - See lab orders and please call patient to schedule labs before appointment. Thank you  CMP, Lipids, CBC

## 2019-03-12 ENCOUNTER — LAB VISIT (OUTPATIENT)
Dept: LAB | Facility: HOSPITAL | Age: 49
End: 2019-03-12
Attending: FAMILY MEDICINE
Payer: OTHER GOVERNMENT

## 2019-03-12 DIAGNOSIS — Z00.00 ANNUAL PHYSICAL EXAM: ICD-10-CM

## 2019-03-12 LAB
ALBUMIN SERPL BCP-MCNC: 4.3 G/DL
ALP SERPL-CCNC: 47 U/L
ALT SERPL W/O P-5'-P-CCNC: 22 U/L
ANION GAP SERPL CALC-SCNC: 11 MMOL/L
AST SERPL-CCNC: 25 U/L
BILIRUB SERPL-MCNC: 0.4 MG/DL
BUN SERPL-MCNC: 15 MG/DL
CALCIUM SERPL-MCNC: 10 MG/DL
CHLORIDE SERPL-SCNC: 101 MMOL/L
CHOLEST SERPL-MCNC: 194 MG/DL
CHOLEST/HDLC SERPL: 2.2 {RATIO}
CO2 SERPL-SCNC: 24 MMOL/L
CREAT SERPL-MCNC: 0.7 MG/DL
ERYTHROCYTE [DISTWIDTH] IN BLOOD BY AUTOMATED COUNT: 11.8 %
EST. GFR  (AFRICAN AMERICAN): >60 ML/MIN/1.73 M^2
EST. GFR  (NON AFRICAN AMERICAN): >60 ML/MIN/1.73 M^2
GLUCOSE SERPL-MCNC: 106 MG/DL
HCT VFR BLD AUTO: 40.5 %
HDLC SERPL-MCNC: 87 MG/DL
HDLC SERPL: 44.8 %
HGB BLD-MCNC: 13.3 G/DL
LDLC SERPL CALC-MCNC: 75.8 MG/DL
MCH RBC QN AUTO: 32 PG
MCHC RBC AUTO-ENTMCNC: 32.8 G/DL
MCV RBC AUTO: 98 FL
NONHDLC SERPL-MCNC: 107 MG/DL
PLATELET # BLD AUTO: 334 K/UL
PMV BLD AUTO: 9.3 FL
POTASSIUM SERPL-SCNC: 3.9 MMOL/L
PROT SERPL-MCNC: 7.5 G/DL
RBC # BLD AUTO: 4.15 M/UL
SODIUM SERPL-SCNC: 136 MMOL/L
TRIGL SERPL-MCNC: 156 MG/DL
WBC # BLD AUTO: 7.45 K/UL

## 2019-03-12 PROCEDURE — 85027 COMPLETE CBC AUTOMATED: CPT

## 2019-03-12 PROCEDURE — 36415 COLL VENOUS BLD VENIPUNCTURE: CPT

## 2019-03-12 PROCEDURE — 80061 LIPID PANEL: CPT

## 2019-03-12 PROCEDURE — 80053 COMPREHEN METABOLIC PANEL: CPT

## 2019-03-14 ENCOUNTER — TELEPHONE (OUTPATIENT)
Dept: OBSTETRICS AND GYNECOLOGY | Facility: CLINIC | Age: 49
End: 2019-03-14

## 2019-03-14 ENCOUNTER — PATIENT MESSAGE (OUTPATIENT)
Dept: OBSTETRICS AND GYNECOLOGY | Facility: CLINIC | Age: 49
End: 2019-03-14

## 2019-03-14 DIAGNOSIS — Z12.31 SCREENING MAMMOGRAM, ENCOUNTER FOR: Primary | ICD-10-CM

## 2019-03-15 ENCOUNTER — HOSPITAL ENCOUNTER (OUTPATIENT)
Dept: RADIOLOGY | Facility: HOSPITAL | Age: 49
Discharge: HOME | End: 2019-03-15
Attending: FAMILY MEDICINE
Payer: OTHER GOVERNMENT

## 2019-03-15 ENCOUNTER — OFFICE VISIT (OUTPATIENT)
Dept: INTERNAL MEDICINE | Facility: CLINIC | Age: 49
End: 2019-03-15
Attending: FAMILY MEDICINE
Payer: OTHER GOVERNMENT

## 2019-03-15 VITALS
SYSTOLIC BLOOD PRESSURE: 112 MMHG | HEART RATE: 73 BPM | OXYGEN SATURATION: 98 % | BODY MASS INDEX: 25.62 KG/M2 | HEIGHT: 62 IN | TEMPERATURE: 98 F | DIASTOLIC BLOOD PRESSURE: 60 MMHG | WEIGHT: 139.25 LBS

## 2019-03-15 DIAGNOSIS — S69.92XA INJURY OF FINGER OF LEFT HAND, INITIAL ENCOUNTER: ICD-10-CM

## 2019-03-15 DIAGNOSIS — Z00.00 ANNUAL PHYSICAL EXAM: Primary | ICD-10-CM

## 2019-03-15 DIAGNOSIS — M17.0 OSTEOARTHRITIS OF BOTH KNEES, UNSPECIFIED OSTEOARTHRITIS TYPE: ICD-10-CM

## 2019-03-15 DIAGNOSIS — I10 HYPERTENSION, ESSENTIAL: ICD-10-CM

## 2019-03-15 DIAGNOSIS — R00.0 TACHYCARDIA: ICD-10-CM

## 2019-03-15 DIAGNOSIS — G47.00 INSOMNIA, UNSPECIFIED TYPE: ICD-10-CM

## 2019-03-15 PROCEDURE — 99396 PR PREVENTIVE VISIT,EST,40-64: ICD-10-PCS | Mod: S$PBB,,, | Performed by: FAMILY MEDICINE

## 2019-03-15 PROCEDURE — 99396 PREV VISIT EST AGE 40-64: CPT | Mod: S$PBB,,, | Performed by: FAMILY MEDICINE

## 2019-03-15 PROCEDURE — 99215 OFFICE O/P EST HI 40 MIN: CPT | Mod: PBBFAC,25 | Performed by: FAMILY MEDICINE

## 2019-03-15 PROCEDURE — 99999 PR PBB SHADOW E&M-EST. PATIENT-LVL V: ICD-10-PCS | Mod: PBBFAC,,, | Performed by: FAMILY MEDICINE

## 2019-03-15 PROCEDURE — 73140 X-RAY EXAM OF FINGER(S): CPT | Mod: TC

## 2019-03-15 PROCEDURE — 73140 XR FINGER 2 OR MORE VIEWS: ICD-10-PCS | Mod: 26,LT,, | Performed by: RADIOLOGY

## 2019-03-15 PROCEDURE — 99999 PR PBB SHADOW E&M-EST. PATIENT-LVL V: CPT | Mod: PBBFAC,,, | Performed by: FAMILY MEDICINE

## 2019-03-15 PROCEDURE — 73140 X-RAY EXAM OF FINGER(S): CPT | Mod: 26,LT,, | Performed by: RADIOLOGY

## 2019-03-15 RX ORDER — IBUPROFEN 800 MG/1
800 TABLET ORAL DAILY PRN
Qty: 30 TABLET | Refills: 0 | Status: SHIPPED | OUTPATIENT
Start: 2019-03-15 | End: 2020-11-18 | Stop reason: SDUPTHER

## 2019-03-15 NOTE — PROGRESS NOTES
Subjective:       Patient ID: Kayleen Patel is a 48 y.o. female.    Chief Complaint: Annual Exam    Established patient for an annual wellness check/physical exam and also chronic disease management. Specific complaints - see dictation and please see ROS.  P, S, Fm, Soc Hx's; Meds, allergies reviewed and reconciled.  Health maintenance file reviewed and addressed items due.      Review of Systems   Constitutional: Negative for activity change, chills, fatigue, fever and unexpected weight change.   HENT: Negative for congestion, hearing loss, rhinorrhea and trouble swallowing.    Eyes: Negative for discharge, redness and visual disturbance.   Respiratory: Negative for cough, chest tightness, shortness of breath and wheezing.    Cardiovascular: Positive for palpitations. Negative for chest pain and leg swelling.   Gastrointestinal: Negative for abdominal pain, blood in stool, constipation, diarrhea and vomiting.   Endocrine: Negative for polydipsia and polyuria.   Genitourinary: Negative for difficulty urinating, dysuria, hematuria and menstrual problem.   Musculoskeletal: Positive for arthralgias, gait problem and joint swelling. Negative for back pain, myalgias and neck pain.   Skin: Negative for color change and rash.   Neurological: Negative for tremors, speech difficulty, weakness, numbness and headaches.   Hematological: Negative for adenopathy. Does not bruise/bleed easily.   Psychiatric/Behavioral: Positive for sleep disturbance. Negative for behavioral problems, confusion and dysphoric mood. The patient is not nervous/anxious.        Objective:      Physical Exam   Constitutional: She is oriented to person, place, and time. She appears well-developed and well-nourished. No distress.   HENT:   Head: Normocephalic.   Right Ear: Tympanic membrane, external ear and ear canal normal.   Left Ear: Tympanic membrane, external ear and ear canal normal.   Mouth/Throat: Oropharynx is clear and moist.    Eyes: Pupils are equal, round, and reactive to light. No scleral icterus.   Neck: Normal range of motion. Neck supple. Carotid bruit is not present. No thyromegaly present.   Cardiovascular: Normal rate, regular rhythm, normal heart sounds and intact distal pulses. Exam reveals no gallop and no friction rub.   No murmur heard.  Pulmonary/Chest: Effort normal and breath sounds normal. She exhibits no tenderness.   Abdominal: Soft. She exhibits no distension and no mass. There is no tenderness. There is no rebound and no guarding.   Musculoskeletal: Normal range of motion. She exhibits no edema.        Left knee: She exhibits normal range of motion, no swelling and no effusion. Tenderness found.        Left hand: She exhibits tenderness and bony tenderness. She exhibits normal range of motion, normal capillary refill, no deformity and no laceration. Normal sensation noted. Normal strength noted.        Hands:       Right lower leg: She exhibits no edema.        Left lower leg: She exhibits no edema.   Lymphadenopathy:     She has no cervical adenopathy.   Neurological: She is alert and oriented to person, place, and time. She has normal strength. She displays normal reflexes. No cranial nerve deficit or sensory deficit. Coordination and gait normal.   Skin: Skin is warm and dry. No rash noted.   Psychiatric: She has a normal mood and affect. Her behavior is normal. Judgment and thought content normal.   Nursing note and vitals reviewed.      Assessment:       1. Annual physical exam    2. Hypertension, essential    3. Osteoarthritis of both knees, unspecified osteoarthritis type    4. Insomnia, unspecified type    5. Tachycardia    6. Injury of finger of left hand, initial encounter        Plan:   Kayleen was seen today for annual exam.    Diagnoses and all orders for this visit:    Annual physical exam    Hypertension, essential    Osteoarthritis of both knees, unspecified osteoarthritis type    Insomnia,  unspecified type    Tachycardia  -     Ambulatory Referral to Electrophysiology  -     Holter monitor - 48 hour (Cupid Only); Future    Injury of finger of left hand, initial encounter  -     X-Ray Finger 2 or More Views; Future    Other orders  -     ibuprofen (ADVIL,MOTRIN) 800 MG tablet; Take 1 tablet (800 mg total) by mouth daily as needed for Pain.      See meds, orders, follow up, routing and instructions sections of encounter.  A 48-year-old established female patient.  She is in for her annual physical   examination.  She misstepped on Wednesday.  She landed on her left knee and   hand.  She is complaining of pain in the fifth digit on the left and pain on the   anterior medial aspect of the left knee.  The knee is improving.  She did run   yesterday.  Her finger is becoming less swollen.    She notes clicking and/or pain in the left ear.  She also has difficulty with   sleep and noticed some change in her menses.    RECOMMENDATIONS:  Her hand examination revealed intact flexor and extensor   tendon functions to the left fifth digit.  She was tender at the DIP more so   than the PIP, but there was no deformity noted.  Capillary refill was intact.    Her x-ray reveals no obvious fracture, dislocation or tendon avulsion.  Urge   cautious return to activity.    As far as her knee concern, she did have a small contusion to the left anterior   knee noted.  Her range of motion was full and she is functionally intact.    She did report some excessive heart rate with exercising.  No syncope, near   syncope, chest pain, dyspnea or diaphoresis.  She has mentioned this in the   past.  She had a stress test a couple of years ago.  We will add a Holter   monitor and get an EPS consult since she states her rate gets into the 180s by   wrist monitor.      STEVE/HN  dd: 03/15/2019 11:20:26 (CDT)  td: 03/16/2019 04:04:51 (CDT)  Doc ID   #[id number]  Job ID #[id number]    CC:

## 2019-03-19 ENCOUNTER — PATIENT MESSAGE (OUTPATIENT)
Dept: ADMINISTRATIVE | Facility: OTHER | Age: 49
End: 2019-03-19

## 2019-03-20 NOTE — PROGRESS NOTES
Last 5 Patient Entered Readings                                      Current 30 Day Average: 113/72     Recent Readings 3/20/2019 3/19/2019 3/15/2019 3/13/2019 3/12/2019    SBP (mmHg) 107 111 101 111 127    DBP (mmHg) 72 69 65 69 81    Pulse 69 68 65 70 69        Digital Medicine: Health  Follow Up    Lifestyle Modifications:    1.Dietary Modifications (Sodium intake <2,000mg/day, food labels, dining out): being mindful of sodium intake    2.Physical Activity: continuing to workout 4x/wk    3.Medication Therapy: Patient has been compliant with the medication regimen.    4.Patient has the following medication side effects/concerns:   (Frequency/Alleviating factors/Precipitating factors, etc.)     Follow up with Ms. Kayleen Patel completed. No further questions or concerns. Will continue to follow up to achieve health goals.

## 2019-03-25 ENCOUNTER — HOSPITAL ENCOUNTER (OUTPATIENT)
Dept: RADIOLOGY | Facility: HOSPITAL | Age: 49
Discharge: HOME | End: 2019-03-25
Attending: NURSE PRACTITIONER
Payer: OTHER GOVERNMENT

## 2019-03-25 VITALS — BODY MASS INDEX: 25.58 KG/M2 | WEIGHT: 139 LBS | HEIGHT: 62 IN

## 2019-03-25 DIAGNOSIS — Z12.31 SCREENING MAMMOGRAM, ENCOUNTER FOR: ICD-10-CM

## 2019-03-25 PROCEDURE — 77067 SCR MAMMO BI INCL CAD: CPT | Mod: 26,,, | Performed by: RADIOLOGY

## 2019-03-25 PROCEDURE — 77067 SCR MAMMO BI INCL CAD: CPT | Mod: TC

## 2019-03-25 PROCEDURE — 77067 MAMMO DIGITAL SCREENING BILAT WITH CAD: ICD-10-PCS | Mod: 26,,, | Performed by: RADIOLOGY

## 2019-03-27 ENCOUNTER — PATIENT MESSAGE (OUTPATIENT)
Dept: OBSTETRICS AND GYNECOLOGY | Facility: CLINIC | Age: 49
End: 2019-03-27

## 2019-04-02 ENCOUNTER — CLINICAL SUPPORT (OUTPATIENT)
Dept: CARDIOLOGY | Facility: HOSPITAL | Age: 49
End: 2019-04-02
Attending: FAMILY MEDICINE
Payer: OTHER GOVERNMENT

## 2019-04-02 DIAGNOSIS — R00.0 TACHYCARDIA: ICD-10-CM

## 2019-04-02 PROCEDURE — 93227 XTRNL ECG REC<48 HR R&I: CPT | Mod: ,,, | Performed by: INTERNAL MEDICINE

## 2019-04-02 PROCEDURE — 93225 XTRNL ECG REC<48 HRS REC: CPT

## 2019-04-02 PROCEDURE — 93227 HOLTER MONITOR - 48 HOUR (CUPID ONLY): ICD-10-PCS | Mod: ,,, | Performed by: INTERNAL MEDICINE

## 2019-04-08 ENCOUNTER — TELEPHONE (OUTPATIENT)
Dept: INTERNAL MEDICINE | Facility: CLINIC | Age: 49
End: 2019-04-08

## 2019-04-08 PROBLEM — R00.0 TACHYCARDIA: Status: ACTIVE | Noted: 2019-04-08

## 2019-04-08 PROBLEM — R00.2 PALPITATIONS: Status: ACTIVE | Noted: 2019-04-08

## 2019-04-08 NOTE — TELEPHONE ENCOUNTER
----- Message from Latasha Mercado sent at 4/8/2019  9:14 AM CDT -----  Contact: [facility]  Please be advised that the referral to Electrophysiology is currently in a pending status with [facility].

## 2019-04-08 NOTE — TELEPHONE ENCOUNTER
Spoke to pt and re-scheduled to the 11th but informed pt that the appt may need to be re-scheduled again if the referral auth is still pending.    Pt said [facility] told her that they don't consider the appt as a consult.   Please advise. Thank you

## 2019-04-10 DIAGNOSIS — R00.0 TACHYCARDIA: Primary | ICD-10-CM

## 2019-04-10 LAB
OHS CV EVENT MONITOR DAY: 0
OHS CV HOLTER LENGTH DECIMAL HOURS: 48
OHS CV HOLTER LENGTH HOURS: 48
OHS CV HOLTER LENGTH MINUTES: 0

## 2019-04-11 ENCOUNTER — INITIAL CONSULT (OUTPATIENT)
Dept: ELECTROPHYSIOLOGY | Facility: CLINIC | Age: 49
End: 2019-04-11
Attending: FAMILY MEDICINE
Payer: OTHER GOVERNMENT

## 2019-04-11 ENCOUNTER — HOSPITAL ENCOUNTER (OUTPATIENT)
Dept: CARDIOLOGY | Facility: CLINIC | Age: 49
Discharge: HOME | End: 2019-04-11
Payer: OTHER GOVERNMENT

## 2019-04-11 VITALS
DIASTOLIC BLOOD PRESSURE: 77 MMHG | BODY MASS INDEX: 25.03 KG/M2 | WEIGHT: 136 LBS | HEART RATE: 68 BPM | SYSTOLIC BLOOD PRESSURE: 131 MMHG | HEIGHT: 62 IN

## 2019-04-11 DIAGNOSIS — I10 HYPERTENSION, ESSENTIAL: ICD-10-CM

## 2019-04-11 DIAGNOSIS — R00.0 TACHYCARDIA: ICD-10-CM

## 2019-04-11 PROCEDURE — 99999 PR PBB SHADOW E&M-EST. PATIENT-LVL III: CPT | Mod: PBBFAC,,, | Performed by: INTERNAL MEDICINE

## 2019-04-11 PROCEDURE — 99204 PR OFFICE/OUTPT VISIT, NEW, LEVL IV, 45-59 MIN: ICD-10-PCS | Mod: S$PBB,,, | Performed by: INTERNAL MEDICINE

## 2019-04-11 PROCEDURE — 99999 PR PBB SHADOW E&M-EST. PATIENT-LVL III: ICD-10-PCS | Mod: PBBFAC,,, | Performed by: INTERNAL MEDICINE

## 2019-04-11 PROCEDURE — 93010 ELECTROCARDIOGRAM REPORT: CPT | Mod: S$PBB,,, | Performed by: INTERNAL MEDICINE

## 2019-04-11 PROCEDURE — 99204 OFFICE O/P NEW MOD 45 MIN: CPT | Mod: S$PBB,,, | Performed by: INTERNAL MEDICINE

## 2019-04-11 PROCEDURE — 93005 ELECTROCARDIOGRAM TRACING: CPT | Mod: PBBFAC | Performed by: INTERNAL MEDICINE

## 2019-04-11 PROCEDURE — 99213 OFFICE O/P EST LOW 20 MIN: CPT | Mod: PBBFAC,25 | Performed by: INTERNAL MEDICINE

## 2019-04-11 PROCEDURE — 93010 RHYTHM STRIP: ICD-10-PCS | Mod: S$PBB,,, | Performed by: INTERNAL MEDICINE

## 2019-04-11 NOTE — PROGRESS NOTES
"Subjective:    Patient ID:  Kayleen Patel is a 48 y.o. female who presents for evaluation of Tachycardia    Referring Physician: Herbert Sanchez MD    HPI  I had the pleasure of seeing Mrs. Patel today in our electrophysiology clinic in consultation for her elevated heart rate with exercise. 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 She notes at times when she exercises her heart rate is in the 180s on heart rate monitoring. She exercises at [employer] and they perform heart rate monitoring for their training. She notes her heart rate at times can stay in the "red zone" with for her is over her target heart rate of 150 bpm and at times can be in the 180s. Overall however her exercise average heart rate is ~150s. She discussed this with Dr. Sanchez in the past. She was able to exercise to 15 METS with a peak heart rate of 169 beats per minute on an exercise stress test in 2017. She denies chest pain, palpitations, near syncope or syncope. A recent holter monitor noted sinus rhythm very rare PVCs and PACs and an average rate of 79 bpm with a range of  bpm.     I reviewed all available ECGs, including her exercise stress test, in Epic which note sinus rhythm with normal intervals and a normal QRS.    My interpretation of today's in clinic ECG is normal     Review of Systems   Constitution: Negative for malaise/fatigue and night sweats.   HENT: Negative for congestion and sore throat.    Eyes: Negative for blurred vision and visual disturbance.   Cardiovascular: Negative for chest pain, dyspnea on exertion, irregular heartbeat, near-syncope, palpitations and syncope.   Respiratory: Negative for cough and shortness of breath.    Hematologic/Lymphatic: Negative for bleeding problem. Does not bruise/bleed easily.   Skin: Negative.    Musculoskeletal: Negative.    Gastrointestinal: Negative for bloating and abdominal pain. "   Neurological: Negative for dizziness and focal weakness.        Objective:    Physical Exam   Constitutional: She is oriented to person, place, and time. She appears well-developed and well-nourished. No distress.   HENT:   Head: Normocephalic and atraumatic.   Eyes: Conjunctivae are normal. Right eye exhibits no discharge. Left eye exhibits no discharge.   Neck: Neck supple. No JVD present.   Cardiovascular: Normal rate and regular rhythm. Exam reveals no gallop and no friction rub.   No murmur heard.  Pulmonary/Chest: Effort normal and breath sounds normal. No respiratory distress. She has no wheezes.   Abdominal: Soft. Bowel sounds are normal. She exhibits no distension. There is no tenderness. There is no rebound.   Musculoskeletal: She exhibits no edema.   Neurological: She is alert and oriented to person, place, and time.   Skin: Skin is warm and dry. She is not diaphoretic.   Psychiatric: She has a normal mood and affect. Her behavior is normal. Judgment and thought content normal.         Assessment:       1. Tachycardia    2. Hypertension, essential         Plan:       In summary, Mrs. Patel is a pleasant 48 year-old active woman with hypertension. She presents to discuss apparent excessive heart rate elevation with exercise. Discussed general recommended guidance for age-dependent average maximum achievable heart rate as well as general exercise target heart rate (50-85% max rate), of which she is aware of. Discussed however this is a general guidance. If she is noting her heart rate if in the 180s with aerobic exercise she should lower her intensity until she settles around 150bpm. American Heart Association literature printed out and given to her. There is no evidence of an exercise induced arrhythmia. Discussed her specific questions on if she is putting herself at cardiovascular risk with this exercise would be better answered by Dr. Luis Fernando Us, whom I consider a leading world expert in this  field. Will arrange consultation with him. She can return to clinic as needed.    Thank you for allowing me to participate in the care of this patient. Please do not hesitate to call me with any questions or concerns.    Armand Cui MD, PhD  Cardiac Electrophysiology

## 2019-04-11 NOTE — LETTER
April 11, 2019      Herbert Clay MD  [address]  [place] [id number]           [facility]  [address]  [place] [address]  Phone: [phone]  Fax: [phone]          Patient: Kayleen Patel   MR Number: [id number]   YOB: 1970   Date of Visit: 4/11/2019       Dear Dr. Herbert Clay:    Thank you for referring Kayleen Patel to me for evaluation. Attached you will find relevant portions of my assessment and plan of care.    If you have questions, please do not hesitate to call me. I look forward to following Kayleen Patel along with you.    Sincerely,    Armand Cui MD    Enclosure  CC:  No Recipients    If you would like to receive this communication electronically, please contact [email or web address] or [phone] to request more information on [facility] access.    For providers and/or their staff who would like to refer a patient to [facility], please contact us through our one-stop-shop provider referral line, [facility], at [phone].    If you feel you have received this communication in error or would no longer like to receive these types of communications, please e-mail [email or web address]

## 2019-04-16 ENCOUNTER — OFFICE VISIT (OUTPATIENT)
Dept: OBSTETRICS AND GYNECOLOGY | Facility: CLINIC | Age: 49
End: 2019-04-16
Payer: OTHER GOVERNMENT

## 2019-04-16 ENCOUNTER — TELEPHONE (OUTPATIENT)
Dept: OBSTETRICS AND GYNECOLOGY | Facility: CLINIC | Age: 49
End: 2019-04-16

## 2019-04-16 VITALS
HEIGHT: 62 IN | SYSTOLIC BLOOD PRESSURE: 120 MMHG | WEIGHT: 139 LBS | BODY MASS INDEX: 25.58 KG/M2 | DIASTOLIC BLOOD PRESSURE: 78 MMHG

## 2019-04-16 DIAGNOSIS — N95.1 PERIMENOPAUSE: ICD-10-CM

## 2019-04-16 DIAGNOSIS — Z87.42 HISTORY OF ABNORMAL CERVICAL PAP SMEAR: ICD-10-CM

## 2019-04-16 DIAGNOSIS — Z01.419 WELL WOMAN EXAM WITH ROUTINE GYNECOLOGICAL EXAM: Primary | ICD-10-CM

## 2019-04-16 PROCEDURE — 88175 CYTOPATH C/V AUTO FLUID REDO: CPT

## 2019-04-16 PROCEDURE — 99999 PR PBB SHADOW E&M-EST. PATIENT-LVL II: CPT | Mod: PBBFAC,,, | Performed by: NURSE PRACTITIONER

## 2019-04-16 PROCEDURE — 99999 PR PBB SHADOW E&M-EST. PATIENT-LVL II: ICD-10-PCS | Mod: PBBFAC,,, | Performed by: NURSE PRACTITIONER

## 2019-04-16 PROCEDURE — 99396 PR PREVENTIVE VISIT,EST,40-64: ICD-10-PCS | Mod: S$PBB,,, | Performed by: NURSE PRACTITIONER

## 2019-04-16 PROCEDURE — 99396 PREV VISIT EST AGE 40-64: CPT | Mod: S$PBB,,, | Performed by: NURSE PRACTITIONER

## 2019-04-16 PROCEDURE — 99212 OFFICE O/P EST SF 10 MIN: CPT | Mod: PBBFAC | Performed by: NURSE PRACTITIONER

## 2019-04-16 NOTE — PROGRESS NOTES
HISTORY OF PRESENT ILLNESS:    Kayleen Patel is a 48 y.o. female, , No LMP recorded. Patient is perimenopausal.,  presents for a routine exam and has no gyn complaints.  -Happy on pills, no withdrawal bleeding and no hot flashes.    Past Medical History:   Diagnosis Date    Abnormal cervical Papanicolaou smear ,     Colpo/Cryo    Chronic diarrhea 2015    GERD (gastroesophageal reflux disease)     History of acute PID     Hypertension     Thrombocytosis        Past Surgical History:   Procedure Laterality Date    COLONOSCOPY N/A 2015    [facility] ([facility]    GANGLION CYST EXCISION      REFRACTIVE SURGERY Bilateral     Dr. Vazquez Forbes     TONSILLECTOMY         MEDICATIONS AND ALLERGIES:      Current Outpatient Medications:     CALCIUM CARBONATE (CALCIUM 500 ORAL), Take 1 capsule by mouth., Disp: , Rfl:     chlorthalidone (HYGROTEN) 25 MG Tab, TAKE 1 TABLET DAILY, Disp: 90 tablet, Rfl: 0    cyanocobalamin (VITAMIN B-12) 100 MCG tablet, Take 100 mcg by mouth once daily., Disp: , Rfl:     ferrous sulfate 325 mg (65 mg iron) Tab tablet, Take 325 mg by mouth daily with breakfast., Disp: , Rfl:     GIANVI, 28, 3-0.02 mg per tablet, TAKE 1 TABLET DAILY, Disp: 84 tablet, Rfl: 4    ibuprofen (ADVIL,MOTRIN) 800 MG tablet, Take 1 tablet (800 mg total) by mouth daily as needed for Pain., Disp: 30 tablet, Rfl: 0    OMEGA-3/DHA/EPA/FISH OIL (OMEGA-3 FISH OIL ORAL), Take 1 capsule by mouth., Disp: , Rfl:     valsartan (DIOVAN) 160 MG tablet, TAKE 1 TABLET DAILY, Disp: 90 tablet, Rfl: 1    Review of patient's allergies indicates:   Allergen Reactions    Penicillins Hives    Adhesive Rash       Family History   Adopted: Yes   Problem Relation Age of Onset    Heart disease Mother     Cancer Father     Breast cancer Neg Hx     Colon cancer Neg Hx     Ovarian cancer Neg Hx        Social History     Socioeconomic History    Marital  status: Single     Spouse name: Not on file    Number of children: Not on file    Years of education: Not on file    Highest education level: Not on file   Occupational History    Not on file   Social Needs    Financial resource strain: Not on file    Food insecurity:     Worry: Not on file     Inability: Not on file    Transportation needs:     Medical: Not on file     Non-medical: Not on file   Tobacco Use    Smoking status: Former Smoker     Packs/day: 1.00     Years: 23.00     Pack years: 23.00     Last attempt to quit: 2007     Years since quittin.7    Smokeless tobacco: Never Used   Substance and Sexual Activity    Alcohol use: Yes     Comment: Social    Drug use: No    Sexual activity: Yes     Partners: Male     Birth control/protection: OCP   Lifestyle    Physical activity:     Days per week: Not on file     Minutes per session: Not on file    Stress: Not on file   Relationships    Social connections:     Talks on phone: Not on file     Gets together: Not on file     Attends Advent service: Not on file     Active member of club or organization: Not on file     Attends meetings of clubs or organizations: Not on file     Relationship status: Not on file   Other Topics Concern    Are you pregnant or think you may be? Not Asked    Breast-feeding Not Asked   Social History Narrative    Retired [employer]. Currently, working for the [employer]. Criminal justice degree from [employer].            OBSTETRIC HISTORY: None    COMPREHENSIVE GYN HISTORY:  PAP HISTORY: Reports abnormal Paps. Date:2004. Treatment: Cryo and repeat Paps. LAST PAP 3-21-17 NORMAL.  INFECTION HISTORY: Reports STDs. HPV. Reports PID. Date:.  BENIGN HISTORY: Denies uterine fibroids. Denies ovarian cysts. Denies endometriosis. Denies other conditions.  CANCER HISTORY: Denies cervical cancer. Denies uterine cancer or hyperplasia. Denies ovarian cancer. Denies vulvar cancer or pre-cancer. Denies vaginal  "cancer or pre-cancer. Denies breast cancer. Denies colon cancer.  SEXUAL ACTIVITY HISTORY: Denies currently being sexually active.  MENSTRUAL HISTORY: No withdrawal bleeding.  DYSMENORRHEA HISTORY: Denies dysmenorrhea.  CONTRACEPTION HISTORY: OCP's          ROS:  GENERAL: No weight changes. No swelling. No fatigue. No fever. No vasomotor symptoms.  CARDIOVASCULAR: No chest pain. No shortness of breath. No leg cramps.   NEUROLOGICAL: No headaches. No vision changes.  BREASTS: No pain. No lumps. No discharge.  ABDOMEN: No pain. No nausea. No vomiting. No diarrhea. No constipation.  REPRODUCTIVE: No abnormal bleeding.   VULVA: No pain. No lesions. No itching.  VAGINA: No relaxation. No itching. No odor. No discharge. No lesions.  URINARY: No incontinence. No nocturia. No frequency. No dysuria.    /78   Ht 5' 2" (1.575 m)   Wt 63 kg (139 lb)   BMI 25.42 kg/m²    3-22-18 Wt 61.7 kg (136 lb 0.4 oz)     PE:  APPEARANCE: Well nourished, well developed, in no acute distress.  AFFECT: WNL, alert and oriented x 3.  SKIN: No acne or hirsutism.  NECK: Neck symmetric, without masses or thyromegaly.  NODES: No inguinal, cervical, axillary or femoral lymph node enlargement.  CHEST: Good respiratory effort.   ABDOMEN: Soft. No tenderness or masses.   BREASTS: Symmetrical, no skin changes, visible lesions, palpable masses or nipple discharge bilaterally.  PELVIC: External female genitalia without lesions.  Female hair distribution. Adequate perineal body, Normal urethral meatus. Vagina moist and well rugated without lesions or discharge.  No significant cystocele or rectocele present. Cervix pink without lesions, discharge or tenderness. Uterus is 8 week size, regular, mobile and nontender. Adnexa without masses or tenderness.  EXTREMITIES: No edema    DIAGNOSIS:  1. Well woman exam with routine gynecological exam    2. Perimenopause    3. History of abnormal cervical Pap smear        PLAN:    Orders Placed This Encounter "    Liquid-based pap smear, screening   Up to date on mammogram    COUNSELING:  The patient was counseled today on:  -perimenopause vs menopause and to report severe vasomotor symptoms and/or skipping periods, heavy, prolonged bleeding, spotting in between periods;  -option to stop pills and check hormone levels, which she declined for this year;  -A.C.S. Pap and pelvic exam guidelines (pap every 3 years WANTS YEARLY), recomendations for yearly mammogram;  -to follow up with her PCP for other health maintenance.    FOLLOW-UP with Dr Herrera annually.

## 2019-04-18 ENCOUNTER — PATIENT MESSAGE (OUTPATIENT)
Dept: INTERNAL MEDICINE | Facility: CLINIC | Age: 49
End: 2019-04-18

## 2019-04-18 DIAGNOSIS — R00.0 TACHYCARDIA: Primary | ICD-10-CM

## 2019-04-18 NOTE — TELEPHONE ENCOUNTER
Please see referral orders and please call patient to schedule a consult with cardioligy - Dr. Us. Thank you.

## 2019-04-21 DIAGNOSIS — I10 ESSENTIAL HYPERTENSION: ICD-10-CM

## 2019-04-21 RX ORDER — CHLORTHALIDONE 25 MG/1
TABLET ORAL
Qty: 90 TABLET | Refills: 0 | Status: SHIPPED | OUTPATIENT
Start: 2019-04-21 | End: 2019-07-20 | Stop reason: SDUPTHER

## 2019-04-22 PROBLEM — E66.3 OVERWEIGHT (BMI 25.0-29.9): Status: ACTIVE | Noted: 2019-04-22

## 2019-04-22 NOTE — PROGRESS NOTES
Subjective:   Patient ID:  Kayleen Patel is a 48 y.o. female who presents for evaluation of CVD    HPI:The patient is here for CAD risk factors.    The patient has no chest pain, SOB, TIA, palpitations, syncope or pre-syncope.Patient does exercise a lot.She is really here for very high HR with exercise in 170s even occasionally low 180s.        Review of Systems   Constitution: Negative for chills, decreased appetite, diaphoresis, fever, malaise/fatigue, night sweats, weight gain and weight loss.   HENT: Negative for congestion, hoarse voice, nosebleeds, sore throat and tinnitus.    Eyes: Negative for blurred vision, double vision, vision loss in left eye, vision loss in right eye, visual disturbance and visual halos.   Cardiovascular: Negative for chest pain, claudication, cyanosis, dyspnea on exertion, irregular heartbeat, leg swelling, near-syncope, orthopnea, palpitations, paroxysmal nocturnal dyspnea and syncope.   Respiratory: Negative for cough, hemoptysis, shortness of breath, sleep disturbances due to breathing, snoring, sputum production and wheezing.    Endocrine: Negative for cold intolerance, heat intolerance, polydipsia, polyphagia and polyuria.   Hematologic/Lymphatic: Negative for adenopathy and bleeding problem. Does not bruise/bleed easily.   Skin: Negative for color change, dry skin, flushing, itching, nail changes, poor wound healing, rash, skin cancer, suspicious lesions and unusual hair distribution.   Musculoskeletal: Negative for arthritis, back pain, falls, gout, joint pain, joint swelling, muscle cramps, muscle weakness, myalgias and stiffness.   Gastrointestinal: Negative for abdominal pain, anorexia, change in bowel habit, constipation, diarrhea, dysphagia, heartburn, hematemesis, hematochezia, melena and vomiting.   Genitourinary: Negative for decreased libido, dysuria, hematuria, hesitancy and urgency.   Neurological: Negative for excessive daytime sleepiness, dizziness,  "focal weakness, headaches, light-headedness, loss of balance, numbness, paresthesias, seizures, sensory change, tremors, vertigo and weakness.   Psychiatric/Behavioral: Negative for altered mental status, depression, hallucinations, memory loss, substance abuse and suicidal ideas. The patient does not have insomnia and is not nervous/anxious.    Allergic/Immunologic: Negative for environmental allergies and hives.       Objective: /60   Pulse 74   Ht 5' 2" (1.575 m)   Wt 62.1 kg (136 lb 14.5 oz)   SpO2 98%   BMI 25.04 kg/m²      Physical Exam   Constitutional: She is oriented to person, place, and time. She appears well-developed and well-nourished.   HENT:   Head: Normocephalic.   Eyes: Pupils are equal, round, and reactive to light. EOM are normal.   Neck: Normal range of motion. Normal carotid pulses, no hepatojugular reflux and no JVD present. Carotid bruit is not present. No thyromegaly present.   Cardiovascular: Normal rate, regular rhythm, normal heart sounds and intact distal pulses. Exam reveals no gallop and no friction rub.   No murmur heard.  Pulmonary/Chest: Effort normal and breath sounds normal. No tachypnea. No respiratory distress. She has no wheezes. She has no rales. She exhibits no tenderness.   Abdominal: Soft. Bowel sounds are normal. She exhibits no distension and no mass. There is no tenderness. There is no rebound and no guarding.   Musculoskeletal: Normal range of motion. She exhibits no edema or tenderness.   Lymphadenopathy:     She has no cervical adenopathy.   Neurological: She is alert and oriented to person, place, and time. No cranial nerve deficit. Coordination normal.   Skin: Skin is warm. No rash noted. No erythema.   Psychiatric: She has a normal mood and affect. Her behavior is normal. Judgment and thought content normal.       Assessment:     1. Hypertension, essential    2. Overweight (BMI 25.0-29.9)    3. Tachycardia        Plan:   Discussed diet , achieving and " maintaining ideal body weight, and exercise.   We reviewed meds in detail.  Reassured-Discussed goals, options, plan.  Explained variations in peak heart rate  Increase K in diet  Kayleen was seen today for tachycardia.    Diagnoses and all orders for this visit:    Hypertension, essential    Overweight (BMI 25.0-29.9)    Tachycardia            Follow prn patient or primary.

## 2019-04-23 ENCOUNTER — OFFICE VISIT (OUTPATIENT)
Dept: CARDIOLOGY | Facility: CLINIC | Age: 49
End: 2019-04-23
Payer: OTHER GOVERNMENT

## 2019-04-23 VITALS
HEIGHT: 62 IN | BODY MASS INDEX: 25.19 KG/M2 | WEIGHT: 136.88 LBS | HEART RATE: 74 BPM | OXYGEN SATURATION: 98 % | DIASTOLIC BLOOD PRESSURE: 60 MMHG | SYSTOLIC BLOOD PRESSURE: 120 MMHG

## 2019-04-23 DIAGNOSIS — R00.0 TACHYCARDIA: ICD-10-CM

## 2019-04-23 DIAGNOSIS — I10 HYPERTENSION, ESSENTIAL: Primary | ICD-10-CM

## 2019-04-23 DIAGNOSIS — E66.3 OVERWEIGHT (BMI 25.0-29.9): ICD-10-CM

## 2019-04-23 PROCEDURE — 99999 PR PBB SHADOW E&M-EST. PATIENT-LVL IV: ICD-10-PCS | Mod: PBBFAC,,, | Performed by: INTERNAL MEDICINE

## 2019-04-23 PROCEDURE — 99999 PR PBB SHADOW E&M-EST. PATIENT-LVL IV: CPT | Mod: PBBFAC,,, | Performed by: INTERNAL MEDICINE

## 2019-04-23 PROCEDURE — 99213 OFFICE O/P EST LOW 20 MIN: CPT | Mod: S$PBB,,, | Performed by: INTERNAL MEDICINE

## 2019-04-23 PROCEDURE — 99214 OFFICE O/P EST MOD 30 MIN: CPT | Mod: PBBFAC | Performed by: INTERNAL MEDICINE

## 2019-04-23 PROCEDURE — 99213 PR OFFICE/OUTPT VISIT, EST, LEVL III, 20-29 MIN: ICD-10-PCS | Mod: S$PBB,,, | Performed by: INTERNAL MEDICINE

## 2019-04-23 NOTE — PATIENT INSTRUCTIONS
Discussed diet , achieving and maintaining ideal body weight, and exercise.   We reviewed meds in detail.  Reassured-Discussed goals, options, plan.  Explained variations in peak heart rate  Increase K in diet

## 2019-04-23 NOTE — LETTER
April 23, 2019      Armand Cui MD  [address]  [facility] [address]           [facility]  [address]  [facility] [address]  Phone: [phone]          Patient: Kayleen Patel   MR Number: [id number]   YOB: 1970   Date of Visit: 4/23/2019       Dear Dr. Armand Cui:    Thank you for referring Kayleen Patel to me for evaluation. Attached you will find relevant portions of my assessment and plan of care.    If you have questions, please do not hesitate to call me. I look forward to following Kayleen Patel along with you.    Sincerely,    John Us MD    Enclosure  CC:  No Recipients    If you would like to receive this communication electronically, please contact [email or web address] or [phone] to request more information on [employer] Link access.    For providers and/or their staff who would like to refer a patient to [facility], please contact us through our one-stop-shop provider referral line, [facility], at [phone].    If you feel you have received this communication in error or would no longer like to receive these types of communications, please e-mail [email or web address]

## 2019-05-01 ENCOUNTER — PATIENT OUTREACH (OUTPATIENT)
Dept: OTHER | Facility: OTHER | Age: 49
End: 2019-05-01

## 2019-05-01 NOTE — PROGRESS NOTES
Last 5 Patient Entered Readings                                      Current 30 Day Average: 113/74     Recent Readings 5/1/2019 4/24/2019 4/17/2019 4/16/2019 4/12/2019    SBP (mmHg) 108 115 120 113 113    DBP (mmHg) 73 76 85 73 72    Pulse 78 75 73 65 76        Digital Medicine: Health  Follow Up    Left voicemail to follow up with Ms. Kayleen Patel.  Current BP average 113/74 mmHg is at goal.

## 2019-05-09 ENCOUNTER — PATIENT OUTREACH (OUTPATIENT)
Dept: OTHER | Facility: OTHER | Age: 49
End: 2019-05-09

## 2019-05-09 NOTE — PROGRESS NOTES
Last 5 Patient Entered Readings                                      Current 30 Day Average: 114/76     Recent Readings 5/8/2019 5/7/2019 5/2/2019 5/1/2019 4/24/2019    SBP (mmHg) 118 121 113 108 115    DBP (mmHg) 79 79 72 73 76    Pulse 74 70 73 78 75        Per 30 day average, 114/76 mmHg, patient's BP is at goal.     Ms. Patel is doing well. She has no questions or concerns at this time.     Patient's health , Monse Wharton, will be following up. I will continue to monitor regularly and will follow up in 4-6 months, sooner if BP begins to trend upward or downward.    Asked patient to call or message with questions or concerns.     Current HTN regimen:  Hypertension Medications             chlorthalidone (HYGROTEN) 25 MG Tab TAKE 1 TABLET DAILY    valsartan (DIOVAN) 160 MG tablet TAKE 1 TABLET DAILY

## 2019-05-17 NOTE — PROGRESS NOTES
Last 5 Patient Entered Readings                                      Current 30 Day Average: 115/76     Recent Readings 5/17/2019 5/14/2019 5/13/2019 5/12/2019 5/10/2019    SBP (mmHg) 105 111 118 119 117    DBP (mmHg) 67 74 73 84 74    Pulse 75 71 75 71 71        Digital Medicine: Health  Follow Up    Feeling well. Please with BP readings.    Saw Cardiologist recently due to concerns with elevated HR.     Lifestyle Modifications:    1.Dietary Modifications: Trying to increase potassium rich foods per cardiologist. Sending list of potassium rich foods, other than OJ, bananas and avocados.     2.Physical Activity: Staying very active. Doing regular HIIT work outs 4x/wk.    3.Medication Therapy: Patient has been compliant with the medication regimen.    4.Patient has the following medication side effects/concerns:   (Frequency/Alleviating factors/Precipitating factors, etc.)     Follow up with Ms. Kayleen Patel completed. No further questions or concerns. Will continue to follow up to achieve health goals.

## 2019-05-21 DIAGNOSIS — I10 ESSENTIAL HYPERTENSION: ICD-10-CM

## 2019-05-21 RX ORDER — VALSARTAN 160 MG/1
TABLET ORAL
Qty: 90 TABLET | Refills: 1 | Status: SHIPPED | OUTPATIENT
Start: 2019-05-21 | End: 2019-11-17 | Stop reason: SDUPTHER

## 2019-05-21 NOTE — LETTER
November 1, 2018      Herbert Clay MD  [address]  [facility] [address]           [facility]  [address]  [facility] [address]  Phone: [phone]          Patient: Kayleen Patel   MR Number: [id number]   YOB: 1970   Date of Visit: 11/1/2018       Dear Dr. Herbert Clay:    Thank you for referring Kayleen Patel to me for evaluation. Attached you will find relevant portions of my assessment and plan of care.    If you have questions, please do not hesitate to call me. I look forward to following Kayleen Patel along with you.    Sincerely,    Wolfgang Tabor MD    Enclosure  CC:  No Recipients    If you would like to receive this communication electronically, please contact [email or web address] or [phone] to request more information on [employer] Link access.    For providers and/or their staff who would like to refer a patient to [facility], please contact us through our one-stop-shop provider referral line, [facility], at [phone].    If you feel you have received this communication in error or would no longer like to receive these types of communications, please e-mail [email or web address]          not applicable

## 2019-06-24 DIAGNOSIS — Z30.41 ENCOUNTER FOR SURVEILLANCE OF CONTRACEPTIVE PILLS: ICD-10-CM

## 2019-07-12 ENCOUNTER — PATIENT OUTREACH (OUTPATIENT)
Dept: OTHER | Facility: OTHER | Age: 49
End: 2019-07-12

## 2019-07-12 NOTE — PROGRESS NOTES
Last 5 Patient Entered Readings                                      Current 30 Day Average: 114/73     Recent Readings 7/12/2019 7/11/2019 7/10/2019 7/9/2019 7/3/2019    SBP (mmHg) 110 117 103 114 109    DBP (mmHg) 74 74 69 74 70    Pulse 76 79 78 76 70        Digital Medicine: Health  Follow Up  Left voicemail to follow up with Ms. Kayleen Patel.  Current BP average 114/73 mmHg is at goal.

## 2019-07-20 DIAGNOSIS — I10 ESSENTIAL HYPERTENSION: ICD-10-CM

## 2019-07-20 RX ORDER — CHLORTHALIDONE 25 MG/1
TABLET ORAL
Qty: 90 TABLET | Refills: 0 | Status: SHIPPED | OUTPATIENT
Start: 2019-07-20 | End: 2019-10-18 | Stop reason: SDUPTHER

## 2019-08-02 NOTE — PROGRESS NOTES
Last 5 Patient Entered Readings                                      Current 30 Day Average: 112/72     Recent Readings 8/2/2019 7/31/2019 7/30/2019 7/26/2019 7/25/2019    SBP (mmHg) 102 102 108 116 108    DBP (mmHg) 63 68 70 71 74    Pulse 63 68 64 72 70          Digital Medicine: Health  Follow Up    Left voicemail to follow up with Ms. Kayleen Patel.  Current BP average 112/72 mmHg is at goal.

## 2019-08-23 NOTE — PROGRESS NOTES
Last 5 Patient Entered Readings                                      Current 30 Day Average: 115/73     Recent Readings 8/23/2019 8/22/2019 8/20/2019 8/15/2019 8/14/2019    SBP (mmHg) 121 123 130 111 113    DBP (mmHg) 75 75 84 69 76    Pulse 83 73 72 67 69        Digital Medicine: Health  Follow Up    Feeling well. Pleased with BP readings.    Lifestyle Modifications:    1.Dietary Modifications: Staying on track with diet/sodium monitoring.    2.Physical Activity: Continuing to do her usual exercises - Doing regular HIIT work outs 4x/wk.    3.Medication Therapy: Patient has been compliant with the medication regimen.    4.Patient has the following medication side effects/concerns:   (Frequency/Alleviating factors/Precipitating factors, etc.)     Follow up with Ms. Kayleen Patel completed. No further questions or concerns. Will continue to follow up to achieve health goals.

## 2019-08-26 ENCOUNTER — OFFICE VISIT (OUTPATIENT)
Dept: URGENT CARE | Facility: CLINIC | Age: 49
End: 2019-08-26
Payer: OTHER GOVERNMENT

## 2019-08-26 VITALS
BODY MASS INDEX: 25.76 KG/M2 | DIASTOLIC BLOOD PRESSURE: 78 MMHG | WEIGHT: 140 LBS | TEMPERATURE: 98 F | SYSTOLIC BLOOD PRESSURE: 123 MMHG | OXYGEN SATURATION: 98 % | HEIGHT: 62 IN | RESPIRATION RATE: 16 BRPM | HEART RATE: 67 BPM

## 2019-08-26 DIAGNOSIS — M79.10 MUSCLE PAIN: ICD-10-CM

## 2019-08-26 DIAGNOSIS — M79.605 BILATERAL LEG PAIN: Primary | ICD-10-CM

## 2019-08-26 DIAGNOSIS — M79.604 BILATERAL LEG PAIN: Primary | ICD-10-CM

## 2019-08-26 DIAGNOSIS — M79.89 LEG SWELLING: ICD-10-CM

## 2019-08-26 LAB
BILIRUB UR QL STRIP: NEGATIVE
GLUCOSE SERPL-MCNC: 101 MG/DL (ref 70–110)
GLUCOSE UR QL STRIP: NEGATIVE
KETONES UR QL STRIP: NEGATIVE
LEUKOCYTE ESTERASE UR QL STRIP: NEGATIVE
PH, POC UA: 5.5 (ref 5–8)
POC ANION GAP: 16 MMOL/L (ref 10–20)
POC BLOOD, URINE: NEGATIVE
POC BUN: 17 MMOL/L (ref 8–26)
POC CHLORIDE: 98 MMOL/L (ref 98–109)
POC CREATININE: 0.5 MG/DL (ref 0.6–1.3)
POC HEMATOCRIT: 37 %PCV (ref 37–47)
POC HEMOGLOBIN: 12.6 G/DL (ref 12.5–16)
POC ICA: 1.26 MMOL/L (ref 1.12–1.32)
POC NITRATES, URINE: NEGATIVE
POC POTASSIUM: 3.7 MMOL/L (ref 3.5–4.9)
POC SODIUM: 134 MMOL/L (ref 138–146)
POC TCO2: 25 MMOL/L (ref 24–29)
PROT UR QL STRIP: NEGATIVE
SP GR UR STRIP: 1.01 (ref 1–1.03)
UROBILINOGEN UR STRIP-ACNC: NORMAL (ref 0.1–1.1)

## 2019-08-26 PROCEDURE — 99214 PR OFFICE/OUTPT VISIT, EST, LEVL IV, 30-39 MIN: ICD-10-PCS | Mod: 25,S$GLB,, | Performed by: FAMILY MEDICINE

## 2019-08-26 PROCEDURE — 99214 OFFICE O/P EST MOD 30 MIN: CPT | Mod: 25,S$GLB,, | Performed by: FAMILY MEDICINE

## 2019-08-26 PROCEDURE — 80047 BASIC METABLC PNL IONIZED CA: CPT | Mod: QW,S$GLB,, | Performed by: FAMILY MEDICINE

## 2019-08-26 PROCEDURE — 81003 POCT URINALYSIS, DIPSTICK, AUTOMATED, W/O SCOPE: ICD-10-PCS | Mod: QW,S$GLB,, | Performed by: FAMILY MEDICINE

## 2019-08-26 PROCEDURE — 96372 THER/PROPH/DIAG INJ SC/IM: CPT | Mod: S$GLB,,, | Performed by: FAMILY MEDICINE

## 2019-08-26 PROCEDURE — 96372 PR INJECTION,THERAP/PROPH/DIAG2ST, IM OR SUBCUT: ICD-10-PCS | Mod: S$GLB,,, | Performed by: FAMILY MEDICINE

## 2019-08-26 PROCEDURE — 81003 URINALYSIS AUTO W/O SCOPE: CPT | Mod: QW,S$GLB,, | Performed by: FAMILY MEDICINE

## 2019-08-26 PROCEDURE — 80047 POCT CHEMISTRY PANEL: ICD-10-PCS | Mod: QW,S$GLB,, | Performed by: FAMILY MEDICINE

## 2019-08-26 RX ORDER — BETAMETHASONE SODIUM PHOSPHATE AND BETAMETHASONE ACETATE 3; 3 MG/ML; MG/ML
9 INJECTION, SUSPENSION INTRA-ARTICULAR; INTRALESIONAL; INTRAMUSCULAR; SOFT TISSUE
Status: COMPLETED | OUTPATIENT
Start: 2019-08-26 | End: 2019-08-26

## 2019-08-26 RX ORDER — IBUPROFEN 800 MG/1
800 TABLET ORAL 3 TIMES DAILY
Qty: 30 TABLET | Refills: 0 | Status: SHIPPED | OUTPATIENT
Start: 2019-08-26 | End: 2019-09-05

## 2019-08-26 RX ORDER — KETOROLAC TROMETHAMINE 30 MG/ML
30 INJECTION, SOLUTION INTRAMUSCULAR; INTRAVENOUS
Status: COMPLETED | OUTPATIENT
Start: 2019-08-26 | End: 2019-08-26

## 2019-08-26 RX ADMIN — KETOROLAC TROMETHAMINE 30 MG: 30 INJECTION, SOLUTION INTRAMUSCULAR; INTRAVENOUS at 12:08

## 2019-08-26 RX ADMIN — BETAMETHASONE SODIUM PHOSPHATE AND BETAMETHASONE ACETATE 9 MG: 3; 3 INJECTION, SUSPENSION INTRA-ARTICULAR; INTRALESIONAL; INTRAMUSCULAR; SOFT TISSUE at 12:08

## 2019-08-26 NOTE — PROGRESS NOTES
"Subjective:       Patient ID: Kayleen Patel is a 49 y.o. female.    Vitals:  height is 5' 2" (1.575 m) and weight is 63.5 kg (140 lb). Her temperature is 97.8 °F (36.6 °C). Her blood pressure is 123/78 and her pulse is 67. Her respiration is 16 and oxygen saturation is 98%.     Chief Complaint: Leg Swelling    Upper leg swelling that began yesterday. Pt states soreness in legs that began on Thursday.  Pt has been working out strenuously with new exercise routine.  A few days ago pt work on her legs which most likely cause swelling and tenderness in the thighs/knees/lower leg.  No association with redness/warmth, sob, cough or cp.    Constitution: Negative for chills, fatigue and fever.   HENT: Negative for congestion and sore throat.    Neck: Negative for painful lymph nodes.   Cardiovascular: Negative for chest pain and leg swelling.   Eyes: Negative for double vision and blurred vision.   Respiratory: Negative for cough and shortness of breath.    Gastrointestinal: Negative for nausea, vomiting and diarrhea.   Genitourinary: Negative for dysuria, frequency, urgency and history of kidney stones.   Musculoskeletal: Negative for joint pain, joint swelling, muscle cramps and muscle ache.   Skin: Negative for color change, pale, rash, erythema and bruising.   Allergic/Immunologic: Negative for seasonal allergies.   Neurological: Negative for dizziness, history of vertigo, light-headedness, passing out and headaches.   Hematologic/Lymphatic: Negative for swollen lymph nodes.   Psychiatric/Behavioral: Negative for nervous/anxious, sleep disturbance and depression. The patient is not nervous/anxious.        Objective:      Physical Exam   Constitutional: She is oriented to person, place, and time. She appears well-developed and well-nourished.   HENT:   Head: Normocephalic and atraumatic.   Eyes: Pupils are equal, round, and reactive to light. EOM are normal.   Neck: Normal range of motion. Neck supple. "   Cardiovascular: Normal rate, regular rhythm and normal heart sounds. Exam reveals no gallop and no friction rub.   No murmur heard.  Pulmonary/Chest: Breath sounds normal. No respiratory distress. She has no wheezes. She has no rales. She exhibits no tenderness.   Abdominal: Soft. Bowel sounds are normal. She exhibits no distension. There is no tenderness. There is no rebound and no guarding.   Musculoskeletal:   Both legs has mild swelling with moderate tenderness in the thighs/knees/lower legs, right is than left.  No redness, no warmth.   Lymphadenopathy:     She has no cervical adenopathy.   Neurological: She is alert and oriented to person, place, and time. No cranial nerve deficit or sensory deficit. She exhibits normal muscle tone. Coordination normal.   Skin: Skin is warm. Capillary refill takes less than 2 seconds. No rash noted. No erythema. No pallor.   Psychiatric: She has a normal mood and affect. Her behavior is normal. Judgment and thought content normal.   Vitals reviewed.      Assessment:       1. Bilateral leg pain    2. Muscle pain    3. Leg swelling        Plan:         Bilateral leg pain  -     ketorolac injection 30 mg  -     betamethasone acetate-betamethasone sodium phosphate injection 9 mg  -     ibuprofen (ADVIL,MOTRIN) 800 MG tablet; Take 1 tablet (800 mg total) by mouth 3 (three) times daily. for 10 days  Dispense: 30 tablet; Refill: 0    Muscle pain  -     POCT Chemistry Panel  -     POCT Urinalysis, Dipstick, Automated, W/O Scope  -     betamethasone acetate-betamethasone sodium phosphate injection 9 mg  -     ibuprofen (ADVIL,MOTRIN) 800 MG tablet; Take 1 tablet (800 mg total) by mouth 3 (three) times daily. for 10 days  Dispense: 30 tablet; Refill: 0    Leg swelling          Patient Instructions     Myalgias  Myalgias are another word for muscle aches and soreness. This is a symptom, not a disease. Myalgias can have many causes. A cold, the flu, or an acute infection can cause  them. So can any illness with a high fever. They may happen after exertion (such as heavy exercise) or injury (such as an accident or fall). Some medicines (such as statins and certain antidepressants) can cause myalgias. They can also be a symptom of chronic or ongoing medical problems (such as lupus, chronic fatigue, or hypothyroidism). With these illnesses, other serious symptoms often occur in addition to muscle pain and soreness.    Myalgias most often go away on their own. If they don't go away, come back, or are severe, testing may be needed to help find the cause.  Home care  · Rest until you feel better.  · Follow instructions that you were given for how to care for yourself. This may depend on the cause of your myalgias.   · If myalgia is thought to be due to a medicine, be sure to talk to the doctor that prescribed the medicine about the best course of action.  · To control pain, take prescription or over-the-counter medicines as directed. Unless told not to, you can try acetaminophen or ibuprofen.  Follow-up care  Follow up with your healthcare provider or as advised. If your symptoms do not go away in a few days or if they come back, follow up with your healthcare provider for an exam and testing.  When to see medical advice  Call your healthcare provider for any of the following:  · Fever of 100.4°F (38ºC) or higher, or as directed by your healthcare provider  · Pain that gets worse and not better, or that goes away and comes back  · New joint pains  · New rash  · Severe headache, neck pain, drowsiness, or confusion  Date Last Reviewed: 3/1/2017  © 4106-2411 [employer]. [address] [place] [address], [place] [address]. All rights reserved. This information is not intended as a substitute for professional medical care. Always follow your healthcare professional's instructions.    Follow up with your doctor in a few days as needed.  Return to the urgent care or go to the ER if symptoms get  worse.    Fuad Allison MD

## 2019-08-26 NOTE — PATIENT INSTRUCTIONS
Myalgias  Myalgias are another word for muscle aches and soreness. This is a symptom, not a disease. Myalgias can have many causes. A cold, the flu, or an acute infection can cause them. So can any illness with a high fever. They may happen after exertion (such as heavy exercise) or injury (such as an accident or fall). Some medicines (such as statins and certain antidepressants) can cause myalgias. They can also be a symptom of chronic or ongoing medical problems (such as lupus, chronic fatigue, or hypothyroidism). With these illnesses, other serious symptoms often occur in addition to muscle pain and soreness.    Myalgias most often go away on their own. If they don't go away, come back, or are severe, testing may be needed to help find the cause.  Home care  · Rest until you feel better.  · Follow instructions that you were given for how to care for yourself. This may depend on the cause of your myalgias.   · If myalgia is thought to be due to a medicine, be sure to talk to the doctor that prescribed the medicine about the best course of action.  · To control pain, take prescription or over-the-counter medicines as directed. Unless told not to, you can try acetaminophen or ibuprofen.  Follow-up care  Follow up with your healthcare provider or as advised. If your symptoms do not go away in a few days or if they come back, follow up with your healthcare provider for an exam and testing.  When to see medical advice  Call your healthcare provider for any of the following:  · Fever of 100.4°F (38ºC) or higher, or as directed by your healthcare provider  · Pain that gets worse and not better, or that goes away and comes back  · New joint pains  · New rash  · Severe headache, neck pain, drowsiness, or confusion  Date Last Reviewed: 3/1/2017  © 3440-4402 [employer]. [address] [place] [address], [place] [address]. All rights reserved. This information is not intended as a substitute for professional medical  care. Always follow your healthcare professional's instructions.    Follow up with your doctor in a few days as needed.  Return to the urgent care or go to the ER if symptoms get worse.    Fuad Allison MD

## 2019-09-16 ENCOUNTER — PATIENT MESSAGE (OUTPATIENT)
Dept: ADMINISTRATIVE | Facility: OTHER | Age: 49
End: 2019-09-16

## 2019-09-30 ENCOUNTER — PATIENT OUTREACH (OUTPATIENT)
Dept: OTHER | Facility: OTHER | Age: 49
End: 2019-09-30

## 2019-10-18 DIAGNOSIS — I10 ESSENTIAL HYPERTENSION: ICD-10-CM

## 2019-10-19 RX ORDER — CHLORTHALIDONE 25 MG/1
TABLET ORAL
Qty: 90 TABLET | Refills: 0 | Status: SHIPPED | OUTPATIENT
Start: 2019-10-19 | End: 2020-01-15 | Stop reason: SDUPTHER

## 2019-11-04 NOTE — PROGRESS NOTES
Digital Medicine: Health  Follow-Up    Feeling well. Pleased with BP readings.    The history is provided by the patient.     Follow Up  Follow-up reason(s): goal follow-up          INTERVENTION(S)  encouragement/support and denied resources      There are no preventive care reminders to display for this patient.    Last 5 Patient Entered Readings                                      Current 30 Day Average: 109/70     Recent Readings 11/4/2019 11/3/2019 11/1/2019 10/30/2019 10/29/2019    SBP (mmHg) 107 104 107 108 123    DBP (mmHg) 71 65 68 73 72    Pulse 71 70 72 74 72            Diet Screening   No change to diet.  She has the following dietary restrictions: low sodium dietShe cooks for self.    Patient does the shopping for groceries.  She gets groceries from the grocery store.      Assigning the following patient goals: meal plan and maintain low sodium diet    Physical Activity Screening   When asked if exercising, patient responded: yes4 day(s) a week.  Her level of intensity when exercising is high.    Patient participates in the following activities: group classes    Assigning the following patient goal(s): 150 minutes of exercise per week and participate in exercise weekly

## 2019-11-17 DIAGNOSIS — I10 ESSENTIAL HYPERTENSION: ICD-10-CM

## 2019-11-17 RX ORDER — VALSARTAN 160 MG/1
TABLET ORAL
Qty: 90 TABLET | Refills: 4 | Status: SHIPPED | OUTPATIENT
Start: 2019-11-17 | End: 2020-05-18 | Stop reason: SDUPTHER

## 2019-12-03 NOTE — PROGRESS NOTES
Patient's BP remains well controlled. Will continue to monitor.     Last 5 Patient Entered Readings                                      Current 30 Day Average: 110/70     Recent Readings 12/2/2019 11/28/2019 11/27/2019 11/26/2019 11/22/2019    SBP (mmHg) 112 115 102 111 102    DBP (mmHg) 71 68 65 70 65    Pulse 62 71 67 66 69          Hypertension Medications             chlorthalidone (HYGROTEN) 25 MG Tab TAKE 1 TABLET DAILY    valsartan (DIOVAN) 160 MG tablet TAKE 1 TABLET DAILY

## 2019-12-19 ENCOUNTER — PATIENT MESSAGE (OUTPATIENT)
Dept: ADMINISTRATIVE | Facility: OTHER | Age: 49
End: 2019-12-19

## 2020-01-03 ENCOUNTER — PATIENT OUTREACH (OUTPATIENT)
Dept: OTHER | Facility: OTHER | Age: 50
End: 2020-01-03

## 2020-01-15 ENCOUNTER — PATIENT MESSAGE (OUTPATIENT)
Dept: INTERNAL MEDICINE | Facility: CLINIC | Age: 50
End: 2020-01-15

## 2020-01-15 DIAGNOSIS — I10 ESSENTIAL HYPERTENSION: ICD-10-CM

## 2020-01-16 DIAGNOSIS — I10 ESSENTIAL HYPERTENSION: ICD-10-CM

## 2020-01-16 RX ORDER — CHLORTHALIDONE 25 MG/1
25 TABLET ORAL DAILY
Qty: 90 TABLET | Refills: 0 | Status: SHIPPED | OUTPATIENT
Start: 2020-01-16 | End: 2020-01-31 | Stop reason: SDUPTHER

## 2020-01-16 NOTE — TELEPHONE ENCOUNTER
I have reviewed and agree with the assessment below.  Since [facility]  is out of the office will approve for 90d; BP at goal.  Thanks     Lab Results   Component Value Date    CREATININE 0.7 03/12/2019    BUN 15 03/12/2019     03/12/2019    K 3.9 03/12/2019     03/12/2019    CO2 24 03/12/2019     Last 5 Patient Entered Readings                                      Current 30 Day Average: 118/76     Recent Readings 1/16/2020 1/15/2020 1/9/2020 1/8/2020 1/7/2020    SBP (mmHg) 102 112 118 114 135    DBP (mmHg) 67 75 68 72 81    Pulse 67 65 65 66 61

## 2020-01-16 NOTE — TELEPHONE ENCOUNTER
Refill Routing Note     Medication(s) are not appropriate for processing by [facility]:    Non Participating Provider in Refill Center     Appointments  past 12m or future 3m with PCP    Date Provider   Last Visit   3/15/2019 Herbert Clay MD   Next Visit   1/15/2020 Herbert Clay MD           Automatic Epic Protocol Generated Data:    Requested Prescriptions   Pending Prescriptions Disp Refills    chlorthalidone (HYGROTEN) 25 MG Tab 90 tablet 0     Sig: Take 1 tablet (25 mg total) by mouth once daily.       Cardiovascular: Diuretics - Thiazide Failed - 1/15/2020  2:42 PM        Failed - Na in normal range and within 180 days     POC Sodium   Date Value Ref Range Status   08/26/2019 134 (A) 138 - 146 MMOL/L Final     Sodium   Date Value Ref Range Status   03/12/2019 136 136 - 145 mmol/L Final   02/28/2018 137 136 - 145 mmol/L Final   01/11/2018 138 136 - 145 mmol/L Final              Failed - eGFR within 180 days     eGFR if non    Date Value Ref Range Status   03/12/2019 >60 >60 mL/min/1.73 m^2 Final     Comment:     Calculation used to obtain the estimated glomerular filtration  rate (eGFR) is the CKD-EPI equation.      02/28/2018 >60 >60 mL/min/1.73 m^2 Final     Comment:     Calculation used to obtain the estimated glomerular filtration  rate (eGFR) is the CKD-EPI equation.      01/11/2018 >60 >60 mL/min/1.73 m^2 Final     Comment:     Calculation used to obtain the estimated glomerular filtration  rate (eGFR) is the CKD-EPI equation.        eGFR if    Date Value Ref Range Status   03/12/2019 >60 >60 mL/min/1.73 m^2 Final   02/28/2018 >60 >60 mL/min/1.73 m^2 Final   01/11/2018 >60 >60 mL/min/1.73 m^2 Final              Passed - Patient is at least 18 years old        Passed - Negative Pregnancy Status Check        Passed - Last BP in normal range within 360 days     BP Readings from Last 3 Encounters:   08/26/19 123/78   04/23/19 120/60    04/16/19 120/78              Passed - Office visit in past 12 months or future 90 days     Recent Outpatient Visits            4 months ago Bilateral leg pain    [facility] [place] Allison MD    8 months ago Hypertension, essential    [facility] John Us MD    9 months ago Well woman exam with routine gynecological exam    [facility] 5th Floor KADIE Her NP    10 months ago Annual physical exam    [facility] Herbert Clay MD    1 year ago Osteoarthritis of right knee    [place] - [facility] Wolfgang Tabor MD                    Passed - Ca in normal range and within 360 days     Calcium   Date Value Ref Range Status   03/12/2019 10.0 8.7 - 10.5 mg/dL Final   02/28/2018 9.7 8.7 - 10.5 mg/dL Final   01/11/2018 9.4 8.7 - 10.5 mg/dL Final              Passed - Cr is 1.4 or below and within 180 days     Creatinine   Date Value Ref Range Status   03/12/2019 0.7 0.5 - 1.4 mg/dL Final   02/28/2018 0.7 0.5 - 1.4 mg/dL Final   01/11/2018 0.7 0.5 - 1.4 mg/dL Final              Passed - K in normal range and within 180 days     POC Potassium   Date Value Ref Range Status   08/26/2019 3.7 3.5 - 4.9 MMOL/L Final     Potassium   Date Value Ref Range Status   03/12/2019 3.9 3.5 - 5.1 mmol/L Final   02/28/2018 3.8 3.5 - 5.1 mmol/L Final   01/11/2018 4.1 3.5 - 5.1 mmol/L Final

## 2020-01-17 RX ORDER — CHLORTHALIDONE 25 MG/1
TABLET ORAL
Qty: 90 TABLET | Refills: 4 | OUTPATIENT
Start: 2020-01-17

## 2020-01-17 RX ORDER — CHLORTHALIDONE 25 MG/1
25 TABLET ORAL DAILY
Qty: 90 TABLET | Refills: 0 | OUTPATIENT
Start: 2020-01-17

## 2020-01-17 NOTE — PROGRESS NOTES
Refill Authorization Note     is requesting a refill authorization.    Brief assessment and rationale for refill: QUICK DC: Duplicate Request          Medication Therapy Plan: 3 ms escripted 1/16/2020    Medication reconciliation completed: No                         Comments:   Last Prescribed Info:    chlorthalidone (HYGROTEN) 25 MG Tab 90 tablet 0 1/16/2020     Sig - Route: Take 1 tablet (25 mg total) by mouth once daily. - Oral    Sent to pharmacy as: chlorthalidone (HYGROTEN) 25 MG Tab    Notes to Pharmacy: Please delete all prior scripts with same name and strength including on holds.    E-Prescribing Status: Receipt confirmed by pharmacy (1/16/2020  4:21 PM CST)      Ordering User:  Moe Santana, JeseniaD            Diagnosis Association: Essential hypertension (I10)      Original Order:  chlorthalidone (HYGROTEN) 25 MG Tab [[id number]]      Pharmacy:  [facility] - [address]

## 2020-01-17 NOTE — PROGRESS NOTES
Refill Authorization Note     is requesting a refill authorization.    Brief assessment and rationale for refill: QUICK DC: duplicate                                         Comments:   Last Prescribed Info:    Ordering Provider: Herbert Clay MD ALYSA #:  [id number] NPI:  [id number]    Authorizing Provider: Herbert Clay MD ALYSA #:  [id number] NPI:  [id number]    Ordering User:  Moe Santana, JeseniaD            Diagnosis Association: Essential hypertension (I10)      Original Order:  chlorthalidone (HYGROTEN) 25 MG Tab [[id number]]      Pharmacy:  [facility] - [address] ALYSA #:  --     Pharmacy Comments:  --          Fill quantity remaining:  -- Fill quantity used:  -- Next fill due: --       Outpatient Medication Detail      Disp Refills Start End    chlorthalidone (HYGROTEN) 25 MG Tab 90 tablet 0 1/16/2020     Sig - Route: Take 1 tablet (25 mg total) by mouth once daily. - Oral    Sent to pharmacy as: chlorthalidone (HYGROTEN) 25 MG Tab    Notes to Pharmacy: Please delete all prior scripts with same name and strength including on holds.    E-Prescribing Status: Receipt confirmed by pharmacy (1/16/2020  4:21 PM CST)         Appointments  past 12m or future 3m with PCP    Date Provider   Last Visit   3/15/2019 Herbert Clay MD   Next Visit   Visit date not found Herbert Clay MD

## 2020-01-31 ENCOUNTER — PATIENT MESSAGE (OUTPATIENT)
Dept: INTERNAL MEDICINE | Facility: CLINIC | Age: 50
End: 2020-01-31

## 2020-01-31 DIAGNOSIS — I10 ESSENTIAL HYPERTENSION: ICD-10-CM

## 2020-01-31 NOTE — PROGRESS NOTES
Digital Medicine: Health  Follow-Up    LVM      Current avg BP: 117/74 mmHg is at goal <130/80mmHg.    Will follow up in 3 wks.          Intervention/Plan    There are no preventive care reminders to display for this patient.    Last 5 Patient Entered Readings                                      Current 30 Day Average: 117/74     Recent Readings 1/31/2020 1/24/2020 1/23/2020 1/22/2020 1/21/2020    SBP (mmHg) 124 104 113 113 133    DBP (mmHg) 78 62 72 71 79    Pulse 65 69 67 67 67                  Screenings    SDOH

## 2020-02-03 RX ORDER — CHLORTHALIDONE 25 MG/1
25 TABLET ORAL DAILY
Qty: 90 TABLET | Refills: 0 | Status: SHIPPED | OUTPATIENT
Start: 2020-02-03 | End: 2020-03-24 | Stop reason: SDUPTHER

## 2020-02-03 NOTE — TELEPHONE ENCOUNTER
Refill Authorization Note     is requesting a refill authorization.    Brief assessment and rationale for refill: APPROVE: needs labs/ needs appt(Annual)     Medication-related problems identified:   Requires labs  Requires appointment    Medication Therapy Plan: NTBS(CMP/LIPID); needs appt(Annual)                              Comments:   Requested Prescriptions   Pending Prescriptions Disp Refills    chlorthalidone (HYGROTEN) 25 MG Tab 90 tablet 0     Sig: Take 1 tablet (25 mg total) by mouth once daily.       Cardiovascular: Diuretics - Thiazide Failed - 1/31/2020  8:51 AM        Failed - Na in normal range and within 180 days     POC Sodium   Date Value Ref Range Status   08/26/2019 134 (A) 138 - 146 MMOL/L Final     Sodium   Date Value Ref Range Status   03/12/2019 136 136 - 145 mmol/L Final   02/28/2018 137 136 - 145 mmol/L Final   01/11/2018 138 136 - 145 mmol/L Final              Failed - eGFR within 180 days     eGFR if non    Date Value Ref Range Status   03/12/2019 >60 >60 mL/min/1.73 m^2 Final     Comment:     Calculation used to obtain the estimated glomerular filtration  rate (eGFR) is the CKD-EPI equation.      02/28/2018 >60 >60 mL/min/1.73 m^2 Final     Comment:     Calculation used to obtain the estimated glomerular filtration  rate (eGFR) is the CKD-EPI equation.      01/11/2018 >60 >60 mL/min/1.73 m^2 Final     Comment:     Calculation used to obtain the estimated glomerular filtration  rate (eGFR) is the CKD-EPI equation.        eGFR if    Date Value Ref Range Status   03/12/2019 >60 >60 mL/min/1.73 m^2 Final   02/28/2018 >60 >60 mL/min/1.73 m^2 Final   01/11/2018 >60 >60 mL/min/1.73 m^2 Final              Passed - Patient is at least 18 years old        Passed - Negative Pregnancy Status Check        Passed - Last BP in normal range within 360 days     BP Readings from Last 3 Encounters:   08/26/19 123/78   04/23/19 120/60   04/16/19 120/78               Passed - Office visit in past 12 months or future 90 days     Recent Outpatient Visits            5 months ago Bilateral leg pain    [facility] [place] Allison MD    9 months ago Hypertension, essential    [facility] - Cardiology John Us MD    9 months ago Well woman exam with routine gynecological exam    [facility] - OB/GYN 5th Floor KADIE Her NP    10 months ago Annual physical exam    [facility] - Internal Medicine Herbert Clay MD    1 year ago Osteoarthritis of right knee    [place] - Sports Medicine Wolfgang Tabor MD          Future Appointments              In 1 month MD [facility] - Internal Medicine, [facility]                Passed - Ca in normal range and within 360 days     Calcium   Date Value Ref Range Status   03/12/2019 10.0 8.7 - 10.5 mg/dL Final   02/28/2018 9.7 8.7 - 10.5 mg/dL Final   01/11/2018 9.4 8.7 - 10.5 mg/dL Final              Passed - Cr is 1.4 or below and within 180 days     Creatinine   Date Value Ref Range Status   03/12/2019 0.7 0.5 - 1.4 mg/dL Final   02/28/2018 0.7 0.5 - 1.4 mg/dL Final   01/11/2018 0.7 0.5 - 1.4 mg/dL Final              Passed - K in normal range and within 180 days     POC Potassium   Date Value Ref Range Status   08/26/2019 3.7 3.5 - 4.9 MMOL/L Final     Potassium   Date Value Ref Range Status   03/12/2019 3.9 3.5 - 5.1 mmol/L Final   02/28/2018 3.8 3.5 - 5.1 mmol/L Final   01/11/2018 4.1 3.5 - 5.1 mmol/L Final

## 2020-02-21 NOTE — PROGRESS NOTES
Digital Medicine: Health  Follow-Up    Pt feels well. She is pleased with her BP readings.    Pt is requesting a refill for Chlorthalidone 25mg p.o. 1 tab qd #90 3 additional refills to be sent to [facility]. Task placed for clinician.           Intervention/Plan    There are no preventive care reminders to display for this patient.    Last 5 Patient Entered Readings                                      Current 30 Day Average: 112/71     Recent Readings 2/19/2020 2/18/2020 2/14/2020 2/12/2020 2/11/2020    SBP (mmHg) 109 131 92 106 102    DBP (mmHg) 75 78 58 69 67    Pulse 75 73 70 67 69                      Diet Screening   No change to diet.  She has the following dietary restrictions: low sodium dietShe cooks for self.      Barriers to a Healthy Diet: no barriers to healthy eating    Physical Activity Screening   No change to exercise routine.    When asked if exercising, patient responded: yes4 day(s) a week.        SDOH

## 2020-02-24 ENCOUNTER — PATIENT OUTREACH (OUTPATIENT)
Dept: OTHER | Facility: OTHER | Age: 50
End: 2020-02-24

## 2020-02-27 ENCOUNTER — TELEPHONE (OUTPATIENT)
Dept: OBSTETRICS AND GYNECOLOGY | Facility: CLINIC | Age: 50
End: 2020-02-27

## 2020-02-27 DIAGNOSIS — Z12.31 VISIT FOR SCREENING MAMMOGRAM: Primary | ICD-10-CM

## 2020-02-27 NOTE — TELEPHONE ENCOUNTER
----- Message from Luis Louis sent at 2/27/2020  9:16 AM CST -----  Former pt of Maira SHE SCHEDULE A APPT WITH YOU ON 04/21 SHE NEEDS TO SCHEDULE HER MAMMO  PLEASE PUT ORDERS IN SO I CAN SCHEDULE THANKS

## 2020-03-04 ENCOUNTER — LAB VISIT (OUTPATIENT)
Dept: LAB | Facility: HOSPITAL | Age: 50
End: 2020-03-04
Attending: FAMILY MEDICINE
Payer: OTHER GOVERNMENT

## 2020-03-04 DIAGNOSIS — Z00.00 ANNUAL PHYSICAL EXAM: ICD-10-CM

## 2020-03-04 LAB
ALBUMIN SERPL BCP-MCNC: 4.2 G/DL (ref 3.5–5.2)
ALP SERPL-CCNC: 50 U/L (ref 55–135)
ALT SERPL W/O P-5'-P-CCNC: 25 U/L (ref 10–44)
ANION GAP SERPL CALC-SCNC: 11 MMOL/L (ref 8–16)
AST SERPL-CCNC: 28 U/L (ref 10–40)
BILIRUB SERPL-MCNC: 0.4 MG/DL (ref 0.1–1)
BUN SERPL-MCNC: 12 MG/DL (ref 6–20)
CALCIUM SERPL-MCNC: 9.6 MG/DL (ref 8.7–10.5)
CHLORIDE SERPL-SCNC: 103 MMOL/L (ref 95–110)
CHOLEST SERPL-MCNC: 190 MG/DL (ref 120–199)
CHOLEST/HDLC SERPL: 2.2 {RATIO} (ref 2–5)
CO2 SERPL-SCNC: 27 MMOL/L (ref 23–29)
CREAT SERPL-MCNC: 0.7 MG/DL (ref 0.5–1.4)
EST. GFR  (AFRICAN AMERICAN): >60 ML/MIN/1.73 M^2
EST. GFR  (NON AFRICAN AMERICAN): >60 ML/MIN/1.73 M^2
GLUCOSE SERPL-MCNC: 99 MG/DL (ref 70–110)
HDLC SERPL-MCNC: 85 MG/DL (ref 40–75)
HDLC SERPL: 44.7 % (ref 20–50)
LDLC SERPL CALC-MCNC: 90.4 MG/DL (ref 63–159)
NONHDLC SERPL-MCNC: 105 MG/DL
POTASSIUM SERPL-SCNC: 3.7 MMOL/L (ref 3.5–5.1)
PROT SERPL-MCNC: 7.2 G/DL (ref 6–8.4)
SODIUM SERPL-SCNC: 141 MMOL/L (ref 136–145)
TRIGL SERPL-MCNC: 73 MG/DL (ref 30–150)

## 2020-03-04 PROCEDURE — 80061 LIPID PANEL: CPT

## 2020-03-04 PROCEDURE — 36415 COLL VENOUS BLD VENIPUNCTURE: CPT

## 2020-03-04 PROCEDURE — 80053 COMPREHEN METABOLIC PANEL: CPT

## 2020-03-05 ENCOUNTER — PATIENT OUTREACH (OUTPATIENT)
Dept: OTHER | Facility: OTHER | Age: 50
End: 2020-03-05

## 2020-03-12 ENCOUNTER — OFFICE VISIT (OUTPATIENT)
Dept: INTERNAL MEDICINE | Facility: CLINIC | Age: 50
End: 2020-03-12
Attending: FAMILY MEDICINE
Payer: OTHER GOVERNMENT

## 2020-03-12 VITALS
HEART RATE: 67 BPM | HEIGHT: 62 IN | BODY MASS INDEX: 25.11 KG/M2 | TEMPERATURE: 98 F | SYSTOLIC BLOOD PRESSURE: 112 MMHG | WEIGHT: 136.44 LBS | OXYGEN SATURATION: 99 % | DIASTOLIC BLOOD PRESSURE: 62 MMHG

## 2020-03-12 DIAGNOSIS — I65.23 CAROTID STENOSIS, BILATERAL: ICD-10-CM

## 2020-03-12 DIAGNOSIS — R19.7 DIARRHEA, UNSPECIFIED TYPE: ICD-10-CM

## 2020-03-12 DIAGNOSIS — Z00.00 ANNUAL PHYSICAL EXAM: Primary | ICD-10-CM

## 2020-03-12 DIAGNOSIS — I10 HYPERTENSION, ESSENTIAL: ICD-10-CM

## 2020-03-12 PROCEDURE — 99999 PR PBB SHADOW E&M-EST. PATIENT-LVL IV: ICD-10-PCS | Mod: PBBFAC,,, | Performed by: FAMILY MEDICINE

## 2020-03-12 PROCEDURE — 99396 PREV VISIT EST AGE 40-64: CPT | Mod: S$PBB,,, | Performed by: FAMILY MEDICINE

## 2020-03-12 PROCEDURE — 99214 OFFICE O/P EST MOD 30 MIN: CPT | Mod: PBBFAC | Performed by: FAMILY MEDICINE

## 2020-03-12 PROCEDURE — 99396 PR PREVENTIVE VISIT,EST,40-64: ICD-10-PCS | Mod: S$PBB,,, | Performed by: FAMILY MEDICINE

## 2020-03-12 PROCEDURE — 99999 PR PBB SHADOW E&M-EST. PATIENT-LVL IV: CPT | Mod: PBBFAC,,, | Performed by: FAMILY MEDICINE

## 2020-03-12 NOTE — PROGRESS NOTES
Subjective:       Patient ID: Kayleen Patel is a 49 y.o. female.    Chief Complaint: Annual Exam    Established patient for an annual wellness check/physical exam and also chronic disease management. Specific complaints - see dictation and please see ROS.  P, S, Fm, Soc Hx's; Meds, allergies reviewed and reconciled.  Health maintenance file reviewed and addressed items due.        Review of Systems   Constitutional: Negative for activity change, chills, fatigue, fever and unexpected weight change.   HENT: Negative for congestion, hearing loss, rhinorrhea and trouble swallowing.    Eyes: Negative for discharge, redness and visual disturbance.   Respiratory: Negative for cough, chest tightness, shortness of breath and wheezing.    Cardiovascular: Negative for chest pain, palpitations and leg swelling.   Gastrointestinal: Positive for diarrhea. Negative for abdominal pain, blood in stool, constipation and vomiting.   Endocrine: Negative for polydipsia and polyuria.   Genitourinary: Negative for difficulty urinating, dysuria, hematuria and menstrual problem.   Musculoskeletal: Negative for arthralgias, back pain, gait problem, joint swelling, myalgias and neck pain.   Skin: Negative for color change and rash.   Neurological: Negative for tremors, speech difficulty, weakness, numbness and headaches.   Hematological: Negative for adenopathy. Does not bruise/bleed easily.   Psychiatric/Behavioral: Negative for behavioral problems, confusion, dysphoric mood and sleep disturbance. The patient is not nervous/anxious.        Objective:      Physical Exam   Constitutional: She is oriented to person, place, and time. She appears well-developed and well-nourished.   HENT:   Head: Normocephalic.   Right Ear: Tympanic membrane, external ear and ear canal normal.   Left Ear: Tympanic membrane, external ear and ear canal normal.   Mouth/Throat: Oropharynx is clear and moist.   Eyes: Pupils are equal, round, and reactive  to light. No scleral icterus.   Neck: Normal range of motion. Neck supple. Carotid bruit is not present. No thyromegaly present.   Cardiovascular: Normal rate, regular rhythm, normal heart sounds and intact distal pulses. Exam reveals no gallop and no friction rub.   No murmur heard.  Pulmonary/Chest: Effort normal and breath sounds normal. She exhibits no tenderness.   Abdominal: Soft. She exhibits no distension and no mass. There is no tenderness. There is no rebound and no guarding.   Musculoskeletal: Normal range of motion. She exhibits no edema or tenderness.   Lymphadenopathy:     She has no cervical adenopathy.   Neurological: She is alert and oriented to person, place, and time. She has normal strength. She displays normal reflexes. No cranial nerve deficit or sensory deficit. Coordination and gait normal.   Skin: Skin is warm and dry. No rash noted.   Psychiatric: She has a normal mood and affect. Her behavior is normal. Judgment and thought content normal.   Nursing note and vitals reviewed.      Assessment:       1. Annual physical exam    2. Hypertension, essential    3. Carotid stenosis, bilateral    4. Diarrhea, unspecified type        Plan:     Medication List with Changes/Refills   Current Medications    CALCIUM CARBONATE (CALCIUM 500 ORAL)    Take 1 capsule by mouth.    CHLORTHALIDONE (HYGROTEN) 25 MG TAB    Take 1 tablet (25 mg total) by mouth once daily.    CYANOCOBALAMIN (VITAMIN B-12) 100 MCG TABLET    Take 100 mcg by mouth once daily.    FERROUS SULFATE 325 MG (65 MG IRON) TAB TABLET    Take 325 mg by mouth daily with breakfast.    GIANVI, 28, 3-0.02 MG PER TABLET    TAKE 1 TABLET DAILY    IBUPROFEN (ADVIL,MOTRIN) 800 MG TABLET    Take 1 tablet (800 mg total) by mouth daily as needed for Pain.    OMEGA-3/DHA/EPA/FISH OIL (OMEGA-3 FISH OIL ORAL)    Take 1 capsule by mouth.    VALSARTAN (DIOVAN) 160 MG TABLET    TAKE 1 TABLET DAILY     Kayleen was seen today for annual exam.    Diagnoses and all  orders for this visit:    Annual physical exam    Hypertension, essential    Carotid stenosis, bilateral  -     CV Ultrasound Bilateral Doppler Carotid; Future    Diarrhea, unspecified type  Comments:  am only  Orders:  -     Clostridium difficile EIA; Future  -     Stool culture; Future  -     H. pylori antigen, stool; Future  -     Stool Exam-Ova,Cysts,Parasites; Future  -     Giardia / Cryptosporidum, EIA; Future  -     WBC, Stool; Future  -     Rotavirus antigen, stool; Future      See meds, orders, follow up, routing and instructions sections of encounter and AVS. Discussed with patient and provided on AVS.    Discussed diet and exercise and links provided on AVS for detailed information.    Patient reported chronic diarrhea that occurs on a morning, regular basis.  No abdominal pain.  No bleeding.  Possibly diet related.    Also had a Life [employer] proprietary screen.  This demonstrated mild carotid stenosis.  The other parameters were listed as normal.    Pooled cohort calculation of 0.7% today.  Do not recommend statin medication at this time, however suggested getting a repeat carotid ultrasound through reference level laboratory.

## 2020-03-13 ENCOUNTER — LAB VISIT (OUTPATIENT)
Dept: LAB | Facility: HOSPITAL | Age: 50
End: 2020-03-13
Attending: FAMILY MEDICINE
Payer: OTHER GOVERNMENT

## 2020-03-13 ENCOUNTER — PATIENT MESSAGE (OUTPATIENT)
Dept: ADMINISTRATIVE | Facility: OTHER | Age: 50
End: 2020-03-13

## 2020-03-13 ENCOUNTER — CLINICAL SUPPORT (OUTPATIENT)
Dept: CARDIOLOGY | Facility: CLINIC | Age: 50
End: 2020-03-13
Attending: FAMILY MEDICINE
Payer: OTHER GOVERNMENT

## 2020-03-13 DIAGNOSIS — I65.23 CAROTID STENOSIS, BILATERAL: ICD-10-CM

## 2020-03-13 DIAGNOSIS — R19.7 DIARRHEA, UNSPECIFIED TYPE: ICD-10-CM

## 2020-03-13 LAB
C DIFF GDH STL QL: NEGATIVE
C DIFF TOX A+B STL QL IA: NEGATIVE
CRYPTOSP AG STL QL IA: NEGATIVE
G LAMBLIA AG STL QL IA: NEGATIVE
LEFT ARM DIASTOLIC BLOOD PRESSURE: 82 MMHG
LEFT ARM SYSTOLIC BLOOD PRESSURE: 116 MMHG
LEFT CBA DIAS: 20 CM/S
LEFT CBA SYS: 67 CM/S
LEFT CCA DIST DIAS: 24 CM/S
LEFT CCA DIST SYS: 75 CM/S
LEFT CCA MID DIAS: 22 CM/S
LEFT CCA MID SYS: 73 CM/S
LEFT CCA PROX DIAS: 24 CM/S
LEFT CCA PROX SYS: 86 CM/S
LEFT ECA DIAS: 13 CM/S
LEFT ECA SYS: 67 CM/S
LEFT ICA DIST DIAS: 41 CM/S
LEFT ICA DIST SYS: 98 CM/S
LEFT ICA MID DIAS: 33 CM/S
LEFT ICA MID SYS: 91 CM/S
LEFT ICA PROX DIAS: 20 CM/S
LEFT ICA PROX SYS: 59 CM/S
LEFT VERTEBRAL DIAS: 18 CM/S
LEFT VERTEBRAL SYS: 49 CM/S
OHS CV CAROTID RIGHT ICA EDV HIGHEST: 34
OHS CV CAROTID ULTRASOUND LEFT ICA/CCA RATIO: 1.14
OHS CV CAROTID ULTRASOUND RIGHT ICA/CCA RATIO: 0.93
OHS CV PV CAROTID LEFT HIGHEST CCA: 86
OHS CV PV CAROTID LEFT HIGHEST ICA: 98
OHS CV PV CAROTID RIGHT HIGHEST CCA: 92
OHS CV PV CAROTID RIGHT HIGHEST ICA: 86
OHS CV US CAROTID LEFT HIGHEST EDV: 41
RIGHT ARM DIASTOLIC BLOOD PRESSURE: 79 MMHG
RIGHT ARM SYSTOLIC BLOOD PRESSURE: 113 MMHG
RIGHT CBA DIAS: 15 CM/S
RIGHT CBA SYS: 47 CM/S
RIGHT CCA DIST DIAS: 15 CM/S
RIGHT CCA DIST SYS: 59 CM/S
RIGHT CCA MID DIAS: 16 CM/S
RIGHT CCA MID SYS: 81 CM/S
RIGHT CCA PROX DIAS: 20 CM/S
RIGHT CCA PROX SYS: 92 CM/S
RIGHT ECA DIAS: 14 CM/S
RIGHT ECA SYS: 86 CM/S
RIGHT ICA DIST DIAS: 34 CM/S
RIGHT ICA DIST SYS: 86 CM/S
RIGHT ICA MID DIAS: 29 CM/S
RIGHT ICA MID SYS: 77 CM/S
RIGHT ICA PROX DIAS: 21 CM/S
RIGHT ICA PROX SYS: 55 CM/S
RIGHT VERTEBRAL DIAS: 15 CM/S
RIGHT VERTEBRAL SYS: 43 CM/S
RV AG STL QL IA.RAPID: NEGATIVE
WBC #/AREA STL HPF: NORMAL /[HPF]

## 2020-03-13 PROCEDURE — 93880 EXTRACRANIAL BILAT STUDY: CPT | Mod: PBBFAC | Performed by: INTERNAL MEDICINE

## 2020-03-13 PROCEDURE — 87329 GIARDIA AG IA: CPT

## 2020-03-13 PROCEDURE — 89055 LEUKOCYTE ASSESSMENT FECAL: CPT

## 2020-03-13 PROCEDURE — 87425 ROTAVIRUS AG IA: CPT

## 2020-03-13 PROCEDURE — 87324 CLOSTRIDIUM AG IA: CPT

## 2020-03-13 PROCEDURE — 87338 HPYLORI STOOL AG IA: CPT

## 2020-03-13 PROCEDURE — 87046 STOOL CULTR AEROBIC BACT EA: CPT | Mod: 59

## 2020-03-13 PROCEDURE — 87177 OVA AND PARASITES SMEARS: CPT

## 2020-03-13 PROCEDURE — 87045 FECES CULTURE AEROBIC BACT: CPT

## 2020-03-13 PROCEDURE — 87427 SHIGA-LIKE TOXIN AG IA: CPT

## 2020-03-13 PROCEDURE — 87449 NOS EACH ORGANISM AG IA: CPT

## 2020-03-13 PROCEDURE — 30000890 MAYO MISCELLANEOUS TEST (REFLEX)

## 2020-03-13 PROCEDURE — 93880 CV US DOPPLER CAROTID (CUPID ONLY): ICD-10-PCS | Mod: 26,S$PBB,, | Performed by: INTERNAL MEDICINE

## 2020-03-16 LAB
BACTERIA STL CULT: NORMAL
E COLI SXT1 STL QL IA: NEGATIVE
E COLI SXT2 STL QL IA: NEGATIVE
H PYLORI AG STL QL IA: NOT DETECTED

## 2020-03-17 LAB — MAYO MISCELLANEOUS RESULT (REF): NORMAL

## 2020-03-20 NOTE — PROGRESS NOTES
Digital Medicine: Health  Follow-Up    Routine HC follow up for DM HTN    Mrs. Kayleen Patel, reports that things are going fine. She takes her medications daily without any complaints.     Encouraged patient to continue submitting weekly BP readings.     Informed her that her care team is available may she have any question or concerns to reach out to us.    Follow up complete.    The history is provided by the patient.     Follow Up  Follow-up reason(s): reading review and goal follow-up          INTERVENTION(S)  encouragement/support    PLAN  continue monitoring      There are no preventive care reminders to display for this patient.    Last 5 Patient Entered Readings                                      Current 30 Day Average: 112/70     Recent Readings 3/19/2020 3/18/2020 3/17/2020 3/13/2020 3/11/2020    SBP (mmHg) 112 110 116 121 94    DBP (mmHg) 71 69 76 74 59    Pulse 71 73 69 73 66                      Diet Screening   No change to diet.  She has the following dietary restrictions: low sodium diet    Physical Activity Screening   No change to exercise routine.    When asked if exercising, patient responded: yes4 day(s) a week.      She identified the following barriers to physical activity: no barriers to being active    Medication Adherence Screening   She did not miss a dose this month.      SDOH

## 2020-03-20 NOTE — PROGRESS NOTES
Digital Medicine: Health  Follow-Up    The history is provided by the patient.       Intervention/Plan    There are no preventive care reminders to display for this patient.    Last 5 Patient Entered Readings                                      Current 30 Day Average: 112/70     Recent Readings 3/19/2020 3/18/2020 3/17/2020 3/13/2020 3/11/2020    SBP (mmHg) 112 110 116 121 94    DBP (mmHg) 71 69 76 74 59    Pulse 71 73 69 73 66                  Screenings    SDOH

## 2020-03-24 DIAGNOSIS — I10 ESSENTIAL HYPERTENSION: ICD-10-CM

## 2020-03-25 ENCOUNTER — PATIENT MESSAGE (OUTPATIENT)
Dept: ADMINISTRATIVE | Facility: OTHER | Age: 50
End: 2020-03-25

## 2020-03-25 RX ORDER — CHLORTHALIDONE 25 MG/1
25 TABLET ORAL DAILY
Qty: 90 TABLET | Refills: 0 | Status: SHIPPED | OUTPATIENT
Start: 2020-03-25 | End: 2020-06-01 | Stop reason: SDUPTHER

## 2020-03-25 NOTE — PROGRESS NOTES
Refill Authorization Note     is requesting a refill authorization.    Brief assessment and rationale for refill: APPROVE: prr               Medication reconciliation completed: No                         Comments:   Refill Center Care Gap Closure protocols temporarily suspended.   Requested Prescriptions   Pending Prescriptions Disp Refills    chlorthalidone (HYGROTEN) 25 MG Tab 90 tablet 0     Sig: Take 1 tablet (25 mg total) by mouth once daily.       Cardiovascular: Diuretics - Thiazide Passed - 3/25/2020  8:28 AM        Passed - Patient is at least 18 years old        Passed - Negative Pregnancy Status Check        Passed - Last BP in normal range within 360 days.     BP Readings from Last 3 Encounters:   03/12/20 112/62   08/26/19 123/78   04/23/19 120/60              Passed - Office visit in past 12 months or future 90 days.     Recent Outpatient Visits            1 week ago Annual physical exam    [facility] Herbert Clay MD    7 months ago Bilateral leg pain    [facility] [place] Allison MD    11 months ago Hypertension, essential    [facility] John Us MD    11 months ago Well woman exam with routine gynecological exam    [facility] 5th Floor KADIE Her NP    1 year ago Annual physical exam    [facility] Herbert Clay MD          Future Appointments              In 3 weeks MD [facility] 5th Floor, [facility]    In 4 weeks [id number]                Passed - Ca in normal range and within 360 days     Calcium   Date Value Ref Range Status   03/04/2020 9.6 8.7 - 10.5 mg/dL Final   03/12/2019 10.0 8.7 - 10.5 mg/dL Final   02/28/2018 9.7 8.7 - 10.5 mg/dL Final              Passed - Cr is 1.3 or below and within 180 days     Creatinine   Date Value Ref Range Status   03/04/2020 0.7 0.5 - 1.4 mg/dL Final   03/12/2019 0.7 0.5 - 1.4  mg/dL Final   02/28/2018 0.7 0.5 - 1.4 mg/dL Final              Passed - K in normal range and within 180 days     POC Potassium   Date Value Ref Range Status   08/26/2019 3.7 3.5 - 4.9 MMOL/L Final     Potassium   Date Value Ref Range Status   03/04/2020 3.7 3.5 - 5.1 mmol/L Final   03/12/2019 3.9 3.5 - 5.1 mmol/L Final   02/28/2018 3.8 3.5 - 5.1 mmol/L Final              Passed - Na is between 130 and 148 and within 180 days     POC Sodium   Date Value Ref Range Status   08/26/2019 134 (A) 138 - 146 MMOL/L Final     Sodium   Date Value Ref Range Status   03/04/2020 141 136 - 145 mmol/L Final   03/12/2019 136 136 - 145 mmol/L Final   02/28/2018 137 136 - 145 mmol/L Final              Passed - eGFR within 180 days     eGFR if non    Date Value Ref Range Status   03/04/2020 >60.0 >60 mL/min/1.73 m^2 Final     Comment:     Calculation used to obtain the estimated glomerular filtration  rate (eGFR) is the CKD-EPI equation.      03/12/2019 >60 >60 mL/min/1.73 m^2 Final     Comment:     Calculation used to obtain the estimated glomerular filtration  rate (eGFR) is the CKD-EPI equation.      02/28/2018 >60 >60 mL/min/1.73 m^2 Final     Comment:     Calculation used to obtain the estimated glomerular filtration  rate (eGFR) is the CKD-EPI equation.        eGFR if    Date Value Ref Range Status   03/04/2020 >60.0 >60 mL/min/1.73 m^2 Final   03/12/2019 >60 >60 mL/min/1.73 m^2 Final   02/28/2018 >60 >60 mL/min/1.73 m^2 Final               Appointments  past 12m or future 3m with PCP    Date Provider   Last Visit   3/12/2020 Herbert Clay MD   Next Visit   Visit date not found Herbert Clay MD   .  ED visits in past 90 days: 0       Note composed:8:30 AM 03/25/2020

## 2020-03-25 NOTE — PROGRESS NOTES
Digital Medicine: Clinician Follow-Up    Called patient for hypertension follow-up. Ms. Beyer reports that she is doing well. Request for refills of chlorthalidone was completed by PCP Danna. She has no additional questions/concerns at this time.     The history is provided by the patient. No  was used.     Follow Up  Follow-up reason(s): reading review          INTERVENTION(S)  encouragement/support    PLAN  continue monitoring    Current 30-day BP avg of 110/69 is well-controlled and meets goal of <130/80.  Readings reviewed: 100% of readings are meeting goal.    Continue current regimen.    Follow-up in 12 weeks or sooner if needed.       There are no preventive care reminders to display for this patient.    Last 5 Patient Entered Readings                                      Current 30 Day Average: 110/69     Recent Readings 3/25/2020 3/24/2020 3/23/2020 3/19/2020 3/18/2020    SBP (mmHg) 106 109 124 112 110    DBP (mmHg) 68 71 78 71 69    Pulse 70 71 70 71 73             Hypertension Medications             chlorthalidone (HYGROTEN) 25 MG Tab Take 1 tablet (25 mg total) by mouth once daily.    valsartan (DIOVAN) 160 MG tablet TAKE 1 TABLET DAILY                 Screenings

## 2020-04-17 ENCOUNTER — PATIENT OUTREACH (OUTPATIENT)
Dept: OTHER | Facility: OTHER | Age: 50
End: 2020-04-17

## 2020-04-17 NOTE — PROGRESS NOTES
Digital Medicine: Health  Follow-Up    Called pt for follow up. Pt feels well, without any complaints. She reports medication adherence. Mrs. Kayleen Patel, reports no changes and denies any question or concerns at this time.     Follow up complete.    The history is provided by the patient.     Follow Up  Follow-up reason(s): reading review          INTERVENTION(S)  encouragement/support    PLAN  continue monitoring      There are no preventive care reminders to display for this patient.    Last 5 Patient Entered Readings                                      Current 30 Day Average: 109/68     Recent Readings 4/16/2020 4/15/2020 4/14/2020 4/13/2020 4/10/2020    SBP (mmHg) 110 113 104 118 101    DBP (mmHg) 69 68 66 72 66    Pulse 64 67 67 70 65                      Diet Screening   No change to diet.  She has the following dietary restrictions: low sodium diet    Physical Activity Screening   No change to exercise routine.    When asked if exercising, patient responded: yes    Medication Adherence Screening   She did not miss a dose this month.      SDOH

## 2020-04-17 NOTE — PROGRESS NOTES
Digital Medicine: Health  Follow-Up      Hypertension         Intervention/Plan    There are no preventive care reminders to display for this patient.    Last 5 Patient Entered Readings                                      Current 30 Day Average: 109/68     Recent Readings 4/16/2020 4/15/2020 4/14/2020 4/13/2020 4/10/2020    SBP (mmHg) 110 113 104 118 101    DBP (mmHg) 69 68 66 72 66    Pulse 64 67 67 70 65                  Screenings    SDOH

## 2020-05-07 ENCOUNTER — TELEPHONE (OUTPATIENT)
Dept: OBSTETRICS AND GYNECOLOGY | Facility: CLINIC | Age: 50
End: 2020-05-07

## 2020-05-07 NOTE — TELEPHONE ENCOUNTER
Called patient to reschedule her appointment. Left voicemail for her to give the office a call back.

## 2020-05-14 ENCOUNTER — PATIENT MESSAGE (OUTPATIENT)
Dept: INTERNAL MEDICINE | Facility: CLINIC | Age: 50
End: 2020-05-14

## 2020-05-14 DIAGNOSIS — Z20.822 SUSPECTED COVID-19 VIRUS INFECTION: Primary | ICD-10-CM

## 2020-05-15 ENCOUNTER — LAB VISIT (OUTPATIENT)
Dept: PRIMARY CARE CLINIC | Facility: CLINIC | Age: 50
End: 2020-05-15
Payer: OTHER GOVERNMENT

## 2020-05-15 ENCOUNTER — RESEARCH ENCOUNTER (OUTPATIENT)
Dept: RESEARCH | Facility: HOSPITAL | Age: 50
End: 2020-05-15

## 2020-05-15 DIAGNOSIS — Z00.6 RESEARCH STUDY PATIENT: Primary | ICD-10-CM

## 2020-05-15 LAB — SARS-COV-2 IGG SERPLBLD QL IA.RAPID: NEGATIVE

## 2020-05-15 PROCEDURE — 86769 SARS-COV-2 COVID-19 ANTIBODY: CPT

## 2020-05-15 PROCEDURE — U0003 INFECTIOUS AGENT DETECTION BY NUCLEIC ACID (DNA OR RNA); SEVERE ACUTE RESPIRATORY SYNDROME CORONAVIRUS 2 (SARS-COV-2) (CORONAVIRUS DISEASE [COVID-19]), AMPLIFIED PROBE TECHNIQUE, MAKING USE OF HIGH THROUGHPUT TECHNOLOGIES AS DESCRIBED BY CMS-2020-01-R: HCPCS

## 2020-05-15 NOTE — PROGRESS NOTES
Date of Consent: 5/15/2020    Sponsor: [facility]    Study Title/IRB Number: Observational study of Sars-CoV2 Immunoglobulin G (IgG) seroprevalence among the [facility] population over time 2020.163  Principle Investigator: Niru Ulloa, PhD    Did the patient need translation services? No   name: N/A    Prior to the Informed Consent (IC) being signed, or any study protocol required data collection, testing, procedure, or intervention being performed, the following was done and/or discussed:   Patient was given a paper copy of the IC for review    Patient was given study FAQ   Purpose of the study and qualifications to participate    Study design and tests or procedures done at this visit   Confidentiality and HIPAA Authorization for Release of Medical Records for the research trial/ subject's rights/research related injury   Risk, Benefits, Alternative Treatments, Compensation and Costs   Participation in the research trial is voluntary and patient may withdraw at anytime   Contact information for study related questions    Patient verbalizes understanding of the above: Yes  Contact information for PI and IRB given to patient: Yes  Patient able to adequately summarize: the purpose of the study, the risks associated with the study, and all procedures, testing, and follow-ups associated with the study: Yes    The consent was discussed verbally with the patient and all questions were answered satisfactorily. Patient gave verbal consent for the Seroprevalence research study with an IRB approval date of 05/08/2020.      The Consent, Consent Witness and name of Clinical Research Coordinator consenting was captured and documented in [employer]Cap.    All Inclusion and Exclusion Criteria reviewed, subject meets all Inclusion criteria and does not meet any Exclusion Criteria at this time.     Patient Eligibility was confirmed.    Patient responded to survey questions.    The following biospecimen  collection procedures were collected:    -Nasopharyngeal Swab Collection  -Blood collection

## 2020-05-18 ENCOUNTER — PATIENT MESSAGE (OUTPATIENT)
Dept: ADMINISTRATIVE | Facility: OTHER | Age: 50
End: 2020-05-18

## 2020-05-18 DIAGNOSIS — I10 ESSENTIAL HYPERTENSION: ICD-10-CM

## 2020-05-18 LAB — SARS-COV-2 RNA RESP QL NAA+PROBE: NOT DETECTED

## 2020-05-18 RX ORDER — VALSARTAN 160 MG/1
160 TABLET ORAL DAILY
Qty: 90 TABLET | Refills: 1 | Status: SHIPPED | OUTPATIENT
Start: 2020-05-18 | End: 2021-02-10

## 2020-05-19 ENCOUNTER — HOSPITAL ENCOUNTER (OUTPATIENT)
Dept: RADIOLOGY | Facility: HOSPITAL | Age: 50
Discharge: HOME | End: 2020-05-19
Attending: OBSTETRICS & GYNECOLOGY
Payer: OTHER GOVERNMENT

## 2020-05-19 DIAGNOSIS — Z12.31 VISIT FOR SCREENING MAMMOGRAM: ICD-10-CM

## 2020-05-19 PROCEDURE — 77063 BREAST TOMOSYNTHESIS BI: CPT | Mod: 26,,, | Performed by: RADIOLOGY

## 2020-05-19 PROCEDURE — 77067 SCR MAMMO BI INCL CAD: CPT | Mod: TC

## 2020-05-19 PROCEDURE — 77063 MAMMO DIGITAL SCREENING BILAT WITH TOMOSYNTHESIS_CAD: ICD-10-PCS | Mod: 26,,, | Performed by: RADIOLOGY

## 2020-05-19 PROCEDURE — 77067 MAMMO DIGITAL SCREENING BILAT WITH TOMOSYNTHESIS_CAD: ICD-10-PCS | Mod: 26,,, | Performed by: RADIOLOGY

## 2020-05-19 PROCEDURE — 77067 SCR MAMMO BI INCL CAD: CPT | Mod: 26,,, | Performed by: RADIOLOGY

## 2020-05-20 ENCOUNTER — PATIENT OUTREACH (OUTPATIENT)
Dept: ADMINISTRATIVE | Facility: OTHER | Age: 50
End: 2020-05-20

## 2020-05-22 ENCOUNTER — OFFICE VISIT (OUTPATIENT)
Dept: OBSTETRICS AND GYNECOLOGY | Facility: CLINIC | Age: 50
End: 2020-05-22
Payer: OTHER GOVERNMENT

## 2020-05-22 VITALS
SYSTOLIC BLOOD PRESSURE: 130 MMHG | BODY MASS INDEX: 24.78 KG/M2 | HEIGHT: 62 IN | DIASTOLIC BLOOD PRESSURE: 82 MMHG | WEIGHT: 134.69 LBS

## 2020-05-22 DIAGNOSIS — Z01.419 WELL WOMAN EXAM WITH ROUTINE GYNECOLOGICAL EXAM: Primary | ICD-10-CM

## 2020-05-22 PROCEDURE — 99396 PREV VISIT EST AGE 40-64: CPT | Mod: S$PBB,,, | Performed by: OBSTETRICS & GYNECOLOGY

## 2020-05-22 PROCEDURE — 99213 OFFICE O/P EST LOW 20 MIN: CPT | Mod: PBBFAC | Performed by: OBSTETRICS & GYNECOLOGY

## 2020-05-22 PROCEDURE — 99999 PR PBB SHADOW E&M-EST. PATIENT-LVL III: ICD-10-PCS | Mod: PBBFAC,,, | Performed by: OBSTETRICS & GYNECOLOGY

## 2020-05-22 PROCEDURE — 99396 PR PREVENTIVE VISIT,EST,40-64: ICD-10-PCS | Mod: S$PBB,,, | Performed by: OBSTETRICS & GYNECOLOGY

## 2020-05-22 PROCEDURE — 99999 PR PBB SHADOW E&M-EST. PATIENT-LVL III: CPT | Mod: PBBFAC,,, | Performed by: OBSTETRICS & GYNECOLOGY

## 2020-05-22 NOTE — PROGRESS NOTES
History & Physical  Gynecology      SUBJECTIVE:     Chief Complaint: Well Woman (pt concerned about hormone levels )       History of Present Illness:  Annual Exam-Postmenopausal  Patient presents for annual exam. The patient has no complaints today. Patient denies post-menopausal vaginal bleeding. She is still taking OCPs, but has not had a period in two periods. The patient is sexually active. The patient participates in regular exercise: yes.  She does not smoke.     GYN screening history: last pap: approximate date  and was normal  Mammogram history:   Colonoscopy history:     FH:   Adopted    Review of patient's allergies indicates:   Allergen Reactions    Penicillins Hives    Adhesive Rash       Past Medical History:   Diagnosis Date    Abnormal cervical Papanicolaou smear ,     Colpo/Cryo    Chronic diarrhea 2015    GERD (gastroesophageal reflux disease)     History of acute PID     Hypertension     Thrombocytosis      Past Surgical History:   Procedure Laterality Date    GANGLION CYST EXCISION      REFRACTIVE SURGERY Bilateral     Dr. Vazquez Forbes     TONSILLECTOMY       OB History        0    Para        Term   0            AB        Living           SAB        TAB        Ectopic        Multiple        Live Births                   Family History   Adopted: Yes     Social History     Tobacco Use    Smoking status: Former Smoker     Packs/day: 1.00     Years: 23.00     Pack years: 23.00     Last attempt to quit: 2007     Years since quittin.8    Smokeless tobacco: Never Used   Substance Use Topics    Alcohol use: Yes     Frequency: 2-3 times a week     Drinks per session: 5 or 6     Binge frequency: Monthly     Comment: Social    Drug use: No       Current Outpatient Medications   Medication Sig    CALCIUM CARBONATE (CALCIUM 500 ORAL) Take 1 capsule by mouth.    chlorthalidone (HYGROTEN) 25 MG Tab Take 1 tablet (25 mg total) by mouth once  daily.    cyanocobalamin (VITAMIN B-12) 100 MCG tablet Take 100 mcg by mouth once daily.    ferrous sulfate 325 mg (65 mg iron) Tab tablet Take 325 mg by mouth daily with breakfast.    GIANVI, 28, 3-0.02 mg per tablet TAKE 1 TABLET DAILY    OMEGA-3/DHA/EPA/FISH OIL (OMEGA-3 FISH OIL ORAL) Take 1 capsule by mouth.    valsartan (DIOVAN) 160 MG tablet Take 1 tablet (160 mg total) by mouth once daily.    ibuprofen (ADVIL,MOTRIN) 800 MG tablet Take 1 tablet (800 mg total) by mouth daily as needed for Pain. (Patient not taking: Reported on 5/22/2020)     No current facility-administered medications for this visit.        Review of Systems:  Review of Systems   Constitutional: Negative for activity change, appetite change and fever.   Respiratory: Negative for shortness of breath.    Cardiovascular: Negative for chest pain.   Gastrointestinal: Negative for abdominal pain, constipation, diarrhea, nausea and vomiting.   Genitourinary: Negative for hot flashes, menorrhagia, menstrual problem, pelvic pain, vaginal bleeding, vaginal discharge and vaginal pain.   Integumentary:  Negative for nipple discharge.   Neurological: Negative for numbness and headaches.   Breast: Negative for mastodynia and nipple discharge       OBJECTIVE:     Physical Exam:  Physical Exam   Constitutional: She is oriented to person, place, and time. She appears well-developed and well-nourished.   Neck: Normal range of motion. Neck supple. No tracheal deviation present. No thyromegaly present.   Cardiovascular: Normal rate, regular rhythm and normal heart sounds.   Pulmonary/Chest: Effort normal and breath sounds normal. Right breast exhibits no inverted nipple, no mass, no nipple discharge, no skin change and no tenderness. Left breast exhibits no inverted nipple, no mass, no nipple discharge, no skin change and no tenderness. Breasts are symmetrical.   Abdominal: Soft.   Genitourinary: Vagina normal and uterus normal. No labial fusion. There is  no rash, tenderness, lesion or injury on the right labia. There is no rash, tenderness, lesion or injury on the left labia. Uterus is not deviated, not enlarged, not fixed and not tender. Cervix exhibits no motion tenderness, no discharge and no friability. Right adnexum displays no mass, no tenderness and no fullness. Left adnexum displays no mass, no tenderness and no fullness. No erythema, tenderness or bleeding in the vagina. No foreign body in the vagina. No signs of injury around the vagina. No vaginal discharge found.   Genitourinary Comments: Urethra: normal appearing urethra with no masses, tenderness or lesions  Urethral meatus: normal size, anterior vaginal wall with no prolapse, no lesions   Neurological: She is alert and oriented to person, place, and time.   Psychiatric: She has a normal mood and affect. Her behavior is normal. Judgment and thought content normal.   Nursing note and vitals reviewed.      Chaperoned by: Iván    ASSESSMENT:       ICD-10-CM ICD-9-CM    1. Well woman exam with routine gynecological exam Z01.419 V72.31           Plan:      Kayleen was seen today for well woman.    Diagnoses and all orders for this visit:    Well woman exam with routine gynecological exam        No orders of the defined types were placed in this encounter.      Well Woman:   - Pap smear: up to date  - Mammogram: up to date  - Colonoscopy: up to date  - Dexa: n/a  - Immunizations: with pcp  - Labs: with pcp  - Exercise counseling provided  - plan to stop OCPs and see if patient still has periods, has menopausal symptoms, etc    Follow up in one year for annual, or prn.    Maya Staley

## 2020-06-01 ENCOUNTER — PATIENT MESSAGE (OUTPATIENT)
Dept: ADMINISTRATIVE | Facility: OTHER | Age: 50
End: 2020-06-01

## 2020-06-01 DIAGNOSIS — I10 ESSENTIAL HYPERTENSION: ICD-10-CM

## 2020-06-01 RX ORDER — CHLORTHALIDONE 25 MG/1
25 TABLET ORAL DAILY
Qty: 90 TABLET | Refills: 1 | Status: SHIPPED | OUTPATIENT
Start: 2020-06-01 | End: 2020-06-01 | Stop reason: SDUPTHER

## 2020-06-01 RX ORDER — CHLORTHALIDONE 25 MG/1
25 TABLET ORAL DAILY
Qty: 90 TABLET | Refills: 1 | Status: SHIPPED | OUTPATIENT
Start: 2020-06-01 | End: 2021-02-02

## 2020-06-01 NOTE — TELEPHONE ENCOUNTER
Refilled chlorthalidone prescription for a 90 day supply per patient request. BMP in March was WNL. BP is well managed.     Last 5 Patient Entered Readings                                      Current 30 Day Average: 119/73     Recent Readings 6/1/2020 5/29/2020 5/28/2020 5/27/2020 5/26/2020    SBP (mmHg) 138 105 130 114 128    DBP (mmHg) 80 70 77 67 76    Pulse 66 61 65 69 68

## 2020-06-12 ENCOUNTER — PATIENT OUTREACH (OUTPATIENT)
Dept: OTHER | Facility: OTHER | Age: 50
End: 2020-06-12

## 2020-07-10 NOTE — PROGRESS NOTES
Digital Medicine: Health  Follow-Up    The history is provided by the patient.       Intervention/Plan    There are no preventive care reminders to display for this patient.    Last 5 Patient Entered Readings                                      Current 30 Day Average: 117/76     Recent Readings 7/8/2020 7/7/2020 7/1/2020 6/30/2020 6/26/2020    SBP (mmHg) 104 120 110 117 124    DBP (mmHg) 69 80 73 74 78    Pulse 66 68 62 68 65                  Screenings    SDOH

## 2020-08-24 ENCOUNTER — PATIENT OUTREACH (OUTPATIENT)
Dept: OTHER | Facility: OTHER | Age: 50
End: 2020-08-24

## 2020-09-21 ENCOUNTER — PATIENT OUTREACH (OUTPATIENT)
Dept: OTHER | Facility: OTHER | Age: 50
End: 2020-09-21

## 2020-09-21 NOTE — PROGRESS NOTES
Digital Medicine: Health  Follow-Up    The history is provided by the patient.             Reason for review: Blood pressure at goal      Additional Follow-up details: Via [facility] portal pt response :    Thais Shea.  I am doing good.  Thank you for checking in.     Kayleen        Lifestyle  Plan  There are no preventive care reminders to display for this patient.    Last 5 Patient Entered Readings                                      Current 30 Day Average: 120/78     Recent Readings 9/17/2020 9/16/2020 9/15/2020 9/9/2020 9/8/2020    SBP (mmHg) 106 135 141 113 124    DBP (mmHg) 74 86 87 75 78    Pulse 70 70 69 68 69

## 2020-09-27 ENCOUNTER — PATIENT MESSAGE (OUTPATIENT)
Dept: ADMINISTRATIVE | Facility: OTHER | Age: 50
End: 2020-09-27

## 2020-11-18 ENCOUNTER — PATIENT MESSAGE (OUTPATIENT)
Dept: INTERNAL MEDICINE | Facility: CLINIC | Age: 50
End: 2020-11-18

## 2020-11-18 RX ORDER — IBUPROFEN 800 MG/1
800 TABLET ORAL DAILY PRN
Qty: 30 TABLET | Refills: 1 | Status: SHIPPED | OUTPATIENT
Start: 2020-11-18 | End: 2021-06-17

## 2020-11-18 NOTE — TELEPHONE ENCOUNTER
Dr. Clay,     Would you please consider writing me a refill prescription for the 800 MG Motrin prescription you wrote in August 2019.  It was for 30 tablets and I just took my last one yesterday.  I ran a 5K on Sunday after over a year and my knees were sore.  I know you don't like me taking them too much so I have been using them sparingly.  Thank you.     Sincerely,  Kayleen Patel

## 2020-11-19 ENCOUNTER — PATIENT MESSAGE (OUTPATIENT)
Dept: INTERNAL MEDICINE | Facility: CLINIC | Age: 50
End: 2020-11-19

## 2020-12-02 ENCOUNTER — PATIENT MESSAGE (OUTPATIENT)
Dept: INTERNAL MEDICINE | Facility: CLINIC | Age: 50
End: 2020-12-02

## 2020-12-02 DIAGNOSIS — L98.9 SKIN LESION: Primary | ICD-10-CM

## 2020-12-02 NOTE — TELEPHONE ENCOUNTER
Hi Dr. Clay,     I have a question.  I would like to see a dermatologist so they can check out a mole I have on my neck.  Do you need to see me first before referring me to a dermatologist?  Thank you.     Sincerely,  Kayleen    Patient is requesting a referral to Derm, for a mole on the back of her neck

## 2020-12-03 ENCOUNTER — PATIENT MESSAGE (OUTPATIENT)
Dept: INTERNAL MEDICINE | Facility: CLINIC | Age: 50
End: 2020-12-03

## 2020-12-07 ENCOUNTER — PATIENT OUTREACH (OUTPATIENT)
Dept: OTHER | Facility: OTHER | Age: 50
End: 2020-12-07

## 2020-12-08 NOTE — TELEPHONE ENCOUNTER
Please address  referral, and contact patient.  This was for dermatology and placed on December 2.  Thank you.

## 2020-12-09 NOTE — TELEPHONE ENCOUNTER
This message has been sent via Teams Messaging  to the pre service clinic team and Maria Fernanda Montilla, the supervisor over that area.   She will get this worked for you

## 2020-12-15 ENCOUNTER — PATIENT OUTREACH (OUTPATIENT)
Dept: ADMINISTRATIVE | Facility: OTHER | Age: 50
End: 2020-12-15

## 2020-12-16 ENCOUNTER — OFFICE VISIT (OUTPATIENT)
Dept: DERMATOLOGY | Facility: CLINIC | Age: 50
End: 2020-12-16
Payer: OTHER GOVERNMENT

## 2020-12-16 VITALS — BODY MASS INDEX: 24.51 KG/M2 | WEIGHT: 134 LBS

## 2020-12-16 DIAGNOSIS — L82.1 SEBORRHEIC KERATOSES: Primary | ICD-10-CM

## 2020-12-16 DIAGNOSIS — L81.4 LENTIGINES: ICD-10-CM

## 2020-12-16 DIAGNOSIS — D22.9 NEVUS OF MULTIPLE SITES: ICD-10-CM

## 2020-12-16 PROCEDURE — 99213 OFFICE O/P EST LOW 20 MIN: CPT | Mod: PBBFAC,PO | Performed by: DERMATOLOGY

## 2020-12-16 PROCEDURE — 99999 PR PBB SHADOW E&M-EST. PATIENT-LVL III: CPT | Mod: PBBFAC,,, | Performed by: DERMATOLOGY

## 2020-12-16 PROCEDURE — 99999 PR PBB SHADOW E&M-EST. PATIENT-LVL III: ICD-10-PCS | Mod: PBBFAC,,, | Performed by: DERMATOLOGY

## 2020-12-16 PROCEDURE — 99203 OFFICE O/P NEW LOW 30 MIN: CPT | Mod: S$PBB,,, | Performed by: DERMATOLOGY

## 2020-12-16 PROCEDURE — 99203 PR OFFICE/OUTPT VISIT, NEW, LEVL III, 30-44 MIN: ICD-10-PCS | Mod: S$PBB,,, | Performed by: DERMATOLOGY

## 2020-12-16 NOTE — PROGRESS NOTES
Subjective:       Patient ID:  Kayleen Patel is a 50 y.o. female who presents for   Chief Complaint   Patient presents with    Mole     forehead, shape change    Skin Check     UBSE     Spot on left neck for over a year, thinks it is darker.       Review of Systems   Constitutional: Negative for fever, chills, weight loss, weight gain, fatigue, night sweats and malaise.   Skin: Positive for daily sunscreen use, activity-related sunscreen use and wears hat.   Hematologic/Lymphatic: Does not bruise/bleed easily.        Objective:    Physical Exam   Constitutional: She appears well-developed and well-nourished. No distress.   Neurological: She is alert and oriented to person, place, and time. She is not disoriented.   Psychiatric: She has a normal mood and affect.   Skin:   Areas Examined (abnormalities noted in diagram):   Head / Face Inspection Performed  Neck Inspection Performed  Chest / Axilla Inspection Performed  Abdomen Inspection Performed  Back Inspection Performed  RUE Inspected  LUE Inspection Performed  RLE Inspected  LLE Inspection Performed                   Diagram Legend     Erythematous scaling macule/papule c/w actinic keratosis       Vascular papule c/w angioma      Pigmented verrucoid papule/plaque c/w seborrheic keratosis      Yellow umbilicated papule c/w sebaceous hyperplasia      Irregularly shaped tan macule c/w lentigo     1-2 mm smooth white papules consistent with Milia      Movable subcutaneous cyst with punctum c/w epidermal inclusion cyst      Subcutaneous movable cyst c/w pilar cyst      Firm pink to brown papule c/w dermatofibroma      Pedunculated fleshy papule(s) c/w skin tag(s)      Evenly pigmented macule c/w junctional nevus     Mildly variegated pigmented, slightly irregular-bordered macule c/w mildly atypical nevus      Flesh colored to evenly pigmented papule c/w intradermal nevus       Pink pearly papule/plaque c/w basal cell carcinoma      Erythematous  "hyperkeratotic cursted plaque c/w SCC      Surgical scar with no sign of skin cancer recurrence      Open and closed comedones      Inflammatory papules and pustules      Verrucoid papule consistent consistent with wart     Erythematous eczematous patches and plaques     Dystrophic onycholytic nail with subungual debris c/w onychomycosis     Umbilicated papule    Erythematous-base heme-crusted tan verrucoid plaque consistent with inflamed seborrheic keratosis     Erythematous Silvery Scaling Plaque c/w Psoriasis     See annotation      Assessment / Plan:        Seborrheic keratoses  reassurance  Brochure provided  .      Lentigines  The "ABCD" rules to observe pigmented lesions were reviewed.      Nevus of multiple sites  The "ABCD" rules to observe pigmented lesions were reviewed.    Also irritated sebaceous glands face from mask, will use lotion with retinol           Follow up in about 1 year (around 12/16/2021).  "

## 2020-12-17 ENCOUNTER — PATIENT MESSAGE (OUTPATIENT)
Dept: ADMINISTRATIVE | Facility: OTHER | Age: 50
End: 2020-12-17

## 2021-01-25 ENCOUNTER — PATIENT MESSAGE (OUTPATIENT)
Dept: INTERNAL MEDICINE | Facility: CLINIC | Age: 51
End: 2021-01-25

## 2021-02-01 ENCOUNTER — PATIENT MESSAGE (OUTPATIENT)
Dept: DERMATOLOGY | Facility: CLINIC | Age: 51
End: 2021-02-01

## 2021-02-02 ENCOUNTER — TELEPHONE (OUTPATIENT)
Dept: DERMATOLOGY | Facility: CLINIC | Age: 51
End: 2021-02-02

## 2021-02-03 ENCOUNTER — PATIENT OUTREACH (OUTPATIENT)
Dept: OTHER | Facility: OTHER | Age: 51
End: 2021-02-03

## 2021-02-04 ENCOUNTER — OFFICE VISIT (OUTPATIENT)
Dept: DERMATOLOGY | Facility: CLINIC | Age: 51
End: 2021-02-04
Payer: OTHER GOVERNMENT

## 2021-02-04 VITALS — BODY MASS INDEX: 24.51 KG/M2 | WEIGHT: 134 LBS

## 2021-02-04 DIAGNOSIS — L72.0 EPIDERMAL INCLUSION CYST: Primary | ICD-10-CM

## 2021-02-04 DIAGNOSIS — D22.9 NEVUS OF MULTIPLE SITES: ICD-10-CM

## 2021-02-04 DIAGNOSIS — B07.8 COMMON WART: ICD-10-CM

## 2021-02-04 PROCEDURE — 99999 PR PBB SHADOW E&M-EST. PATIENT-LVL III: ICD-10-PCS | Mod: PBBFAC,,, | Performed by: DERMATOLOGY

## 2021-02-04 PROCEDURE — 17000 DESTRUCT PREMALG LESION: CPT | Mod: S$PBB,,, | Performed by: DERMATOLOGY

## 2021-02-04 PROCEDURE — 99999 PR PBB SHADOW E&M-EST. PATIENT-LVL III: CPT | Mod: PBBFAC,,, | Performed by: DERMATOLOGY

## 2021-02-04 PROCEDURE — 17000 PR DESTRUCTION(LASER SURGERY,CRYOSURGERY,CHEMOSURGERY),PREMALIGNANT LESIONS,FIRST LESION: ICD-10-PCS | Mod: S$PBB,,, | Performed by: DERMATOLOGY

## 2021-02-04 PROCEDURE — 99214 PR OFFICE/OUTPT VISIT, EST, LEVL IV, 30-39 MIN: ICD-10-PCS | Mod: S$PBB,25,, | Performed by: DERMATOLOGY

## 2021-02-04 PROCEDURE — 99214 OFFICE O/P EST MOD 30 MIN: CPT | Mod: S$PBB,25,, | Performed by: DERMATOLOGY

## 2021-02-04 PROCEDURE — 99213 OFFICE O/P EST LOW 20 MIN: CPT | Mod: PBBFAC,PO | Performed by: DERMATOLOGY

## 2021-02-04 RX ORDER — DOXYCYCLINE HYCLATE 100 MG
TABLET ORAL
Qty: 14 TABLET | Refills: 0 | Status: SHIPPED | OUTPATIENT
Start: 2021-02-04 | End: 2021-05-13 | Stop reason: ALTCHOICE

## 2021-02-09 DIAGNOSIS — I10 ESSENTIAL HYPERTENSION: ICD-10-CM

## 2021-02-10 ENCOUNTER — OFFICE VISIT (OUTPATIENT)
Dept: DERMATOLOGY | Facility: CLINIC | Age: 51
End: 2021-02-10
Attending: FAMILY MEDICINE
Payer: OTHER GOVERNMENT

## 2021-02-10 DIAGNOSIS — L98.9 SKIN LESION: ICD-10-CM

## 2021-02-10 DIAGNOSIS — L72.0 EPIDERMAL CYST: Primary | ICD-10-CM

## 2021-02-10 PROCEDURE — 99213 OFFICE O/P EST LOW 20 MIN: CPT | Mod: PBBFAC,PN | Performed by: DERMATOLOGY

## 2021-02-10 PROCEDURE — 99999 PR PBB SHADOW E&M-EST. PATIENT-LVL III: CPT | Mod: PBBFAC,,, | Performed by: DERMATOLOGY

## 2021-02-10 PROCEDURE — 99213 OFFICE O/P EST LOW 20 MIN: CPT | Mod: S$PBB,24,, | Performed by: DERMATOLOGY

## 2021-02-10 PROCEDURE — 99213 PR OFFICE/OUTPT VISIT, EST, LEVL III, 20-29 MIN: ICD-10-PCS | Mod: S$PBB,24,, | Performed by: DERMATOLOGY

## 2021-02-10 PROCEDURE — 99999 PR PBB SHADOW E&M-EST. PATIENT-LVL III: ICD-10-PCS | Mod: PBBFAC,,, | Performed by: DERMATOLOGY

## 2021-02-10 RX ORDER — VALSARTAN 160 MG/1
TABLET ORAL
Qty: 90 TABLET | Refills: 0 | Status: SHIPPED | OUTPATIENT
Start: 2021-02-10 | End: 2021-03-12 | Stop reason: SDUPTHER

## 2021-02-17 ENCOUNTER — PATIENT MESSAGE (OUTPATIENT)
Dept: INTERNAL MEDICINE | Facility: CLINIC | Age: 51
End: 2021-02-17

## 2021-02-17 ENCOUNTER — PROCEDURE VISIT (OUTPATIENT)
Dept: DERMATOLOGY | Facility: CLINIC | Age: 51
End: 2021-02-17
Payer: OTHER GOVERNMENT

## 2021-02-17 DIAGNOSIS — L98.9 SKIN LESION: Primary | ICD-10-CM

## 2021-02-17 DIAGNOSIS — L72.0 EPIDERMAL CYST: Primary | ICD-10-CM

## 2021-02-17 PROCEDURE — 88304 TISSUE EXAM BY PATHOLOGIST: CPT | Mod: 26,,, | Performed by: PATHOLOGY

## 2021-02-17 PROCEDURE — 12031 INTMD RPR S/A/T/EXT 2.5 CM/<: CPT | Mod: S$PBB,,, | Performed by: DERMATOLOGY

## 2021-02-17 PROCEDURE — 11403 EXC TR-EXT B9+MARG 2.1-3CM: CPT | Mod: S$PBB,51,, | Performed by: DERMATOLOGY

## 2021-02-17 PROCEDURE — 11403 PR EXC SKIN BENIG 2.1-3 CM TRUNK,ARM,LEG: ICD-10-PCS | Mod: S$PBB,51,, | Performed by: DERMATOLOGY

## 2021-02-17 PROCEDURE — 88304 TISSUE EXAM BY PATHOLOGIST: CPT | Performed by: PATHOLOGY

## 2021-02-17 PROCEDURE — 12031 INTMD RPR S/A/T/EXT 2.5 CM/<: CPT | Mod: PBBFAC,PN | Performed by: DERMATOLOGY

## 2021-02-17 PROCEDURE — 88304 PR  SURG PATH,LEVEL III: ICD-10-PCS | Mod: 26,,, | Performed by: PATHOLOGY

## 2021-02-17 PROCEDURE — 12031 PR LAYR CLOS WND TRUNK,ARM,LEG <2.5 CM: ICD-10-PCS | Mod: S$PBB,,, | Performed by: DERMATOLOGY

## 2021-02-17 PROCEDURE — 11403 EXC TR-EXT B9+MARG 2.1-3CM: CPT | Mod: PBBFAC,PN | Performed by: DERMATOLOGY

## 2021-02-22 LAB
FINAL PATHOLOGIC DIAGNOSIS: NORMAL
GROSS: NORMAL
MICROSCOPIC EXAM: NORMAL

## 2021-02-23 ENCOUNTER — TELEPHONE (OUTPATIENT)
Dept: DERMATOLOGY | Facility: CLINIC | Age: 51
End: 2021-02-23

## 2021-02-24 ENCOUNTER — PATIENT MESSAGE (OUTPATIENT)
Dept: DERMATOLOGY | Facility: CLINIC | Age: 51
End: 2021-02-24

## 2021-03-03 ENCOUNTER — CLINICAL SUPPORT (OUTPATIENT)
Dept: DERMATOLOGY | Facility: CLINIC | Age: 51
End: 2021-03-03
Payer: OTHER GOVERNMENT

## 2021-03-03 DIAGNOSIS — Z48.02 VISIT FOR SUTURE REMOVAL: Primary | ICD-10-CM

## 2021-03-03 PROCEDURE — 99499 UNLISTED E&M SERVICE: CPT | Mod: S$PBB,,, | Performed by: DERMATOLOGY

## 2021-03-03 PROCEDURE — 99499 NO LOS: ICD-10-PCS | Mod: S$PBB,,, | Performed by: DERMATOLOGY

## 2021-03-11 ENCOUNTER — PATIENT MESSAGE (OUTPATIENT)
Dept: INTERNAL MEDICINE | Facility: CLINIC | Age: 51
End: 2021-03-11

## 2021-03-16 ENCOUNTER — PATIENT MESSAGE (OUTPATIENT)
Dept: ADMINISTRATIVE | Facility: OTHER | Age: 51
End: 2021-03-16

## 2021-03-24 ENCOUNTER — PATIENT MESSAGE (OUTPATIENT)
Dept: OBSTETRICS AND GYNECOLOGY | Facility: CLINIC | Age: 51
End: 2021-03-24

## 2021-03-24 DIAGNOSIS — Z12.31 SCREENING MAMMOGRAM, ENCOUNTER FOR: Primary | ICD-10-CM

## 2021-03-29 ENCOUNTER — PATIENT MESSAGE (OUTPATIENT)
Dept: DERMATOLOGY | Facility: CLINIC | Age: 51
End: 2021-03-29

## 2021-03-29 ENCOUNTER — PATIENT MESSAGE (OUTPATIENT)
Dept: INTERNAL MEDICINE | Facility: CLINIC | Age: 51
End: 2021-03-29

## 2021-05-06 ENCOUNTER — PATIENT MESSAGE (OUTPATIENT)
Dept: INTERNAL MEDICINE | Facility: CLINIC | Age: 51
End: 2021-05-06

## 2021-05-06 DIAGNOSIS — Z00.00 ANNUAL PHYSICAL EXAM: Primary | ICD-10-CM

## 2021-05-11 ENCOUNTER — TELEPHONE (OUTPATIENT)
Dept: INTERNAL MEDICINE | Facility: CLINIC | Age: 51
End: 2021-05-11

## 2021-05-11 ENCOUNTER — LAB VISIT (OUTPATIENT)
Dept: LAB | Facility: HOSPITAL | Age: 51
End: 2021-05-11
Attending: FAMILY MEDICINE
Payer: OTHER GOVERNMENT

## 2021-05-11 DIAGNOSIS — Z00.00 ANNUAL PHYSICAL EXAM: ICD-10-CM

## 2021-05-11 DIAGNOSIS — R79.9 ABNORMAL BLOOD CHEMISTRY: ICD-10-CM

## 2021-05-11 DIAGNOSIS — R73.9 ELEVATED BLOOD SUGAR: Primary | ICD-10-CM

## 2021-05-11 LAB
ALBUMIN SERPL BCP-MCNC: 4.3 G/DL (ref 3.5–5.2)
ALP SERPL-CCNC: 68 U/L (ref 55–135)
ALT SERPL W/O P-5'-P-CCNC: 22 U/L (ref 10–44)
ANION GAP SERPL CALC-SCNC: 10 MMOL/L (ref 8–16)
AST SERPL-CCNC: 27 U/L (ref 10–40)
BILIRUB SERPL-MCNC: 0.6 MG/DL (ref 0.1–1)
BUN SERPL-MCNC: 11 MG/DL (ref 6–20)
CALCIUM SERPL-MCNC: 10.6 MG/DL (ref 8.7–10.5)
CHLORIDE SERPL-SCNC: 99 MMOL/L (ref 95–110)
CHOLEST SERPL-MCNC: 205 MG/DL (ref 120–199)
CHOLEST/HDLC SERPL: 2.3 {RATIO} (ref 2–5)
CO2 SERPL-SCNC: 29 MMOL/L (ref 23–29)
CREAT SERPL-MCNC: 0.7 MG/DL (ref 0.5–1.4)
ERYTHROCYTE [DISTWIDTH] IN BLOOD BY AUTOMATED COUNT: 12 % (ref 11.5–14.5)
EST. GFR  (AFRICAN AMERICAN): >60 ML/MIN/1.73 M^2
EST. GFR  (NON AFRICAN AMERICAN): >60 ML/MIN/1.73 M^2
GLUCOSE SERPL-MCNC: 118 MG/DL (ref 70–110)
HCT VFR BLD AUTO: 40.5 % (ref 37–48.5)
HDLC SERPL-MCNC: 91 MG/DL (ref 40–75)
HDLC SERPL: 44.4 % (ref 20–50)
HGB BLD-MCNC: 13.6 G/DL (ref 12–16)
LDLC SERPL CALC-MCNC: 90.4 MG/DL (ref 63–159)
MCH RBC QN AUTO: 32.4 PG (ref 27–31)
MCHC RBC AUTO-ENTMCNC: 33.6 G/DL (ref 32–36)
MCV RBC AUTO: 96 FL (ref 82–98)
NONHDLC SERPL-MCNC: 114 MG/DL
PLATELET # BLD AUTO: 320 K/UL (ref 150–450)
PMV BLD AUTO: 9.2 FL (ref 9.2–12.9)
POTASSIUM SERPL-SCNC: 4.6 MMOL/L (ref 3.5–5.1)
PROT SERPL-MCNC: 7.6 G/DL (ref 6–8.4)
RBC # BLD AUTO: 4.2 M/UL (ref 4–5.4)
SODIUM SERPL-SCNC: 138 MMOL/L (ref 136–145)
TRIGL SERPL-MCNC: 118 MG/DL (ref 30–150)
WBC # BLD AUTO: 6.18 K/UL (ref 3.9–12.7)

## 2021-05-11 PROCEDURE — 36415 COLL VENOUS BLD VENIPUNCTURE: CPT | Performed by: FAMILY MEDICINE

## 2021-05-11 PROCEDURE — 80061 LIPID PANEL: CPT | Performed by: FAMILY MEDICINE

## 2021-05-11 PROCEDURE — 86787 VARICELLA-ZOSTER ANTIBODY: CPT | Performed by: FAMILY MEDICINE

## 2021-05-11 PROCEDURE — 80053 COMPREHEN METABOLIC PANEL: CPT | Performed by: FAMILY MEDICINE

## 2021-05-11 PROCEDURE — 85027 COMPLETE CBC AUTOMATED: CPT | Performed by: FAMILY MEDICINE

## 2021-05-12 ENCOUNTER — PATIENT MESSAGE (OUTPATIENT)
Dept: OTHER | Facility: OTHER | Age: 51
End: 2021-05-12

## 2021-05-12 ENCOUNTER — PATIENT MESSAGE (OUTPATIENT)
Dept: INTERNAL MEDICINE | Facility: CLINIC | Age: 51
End: 2021-05-12

## 2021-05-12 LAB
VARICELLA INTERPRETATION: POSITIVE
VARICELLA ZOSTER IGG: 3.22 ISR (ref 0–0.9)

## 2021-05-13 ENCOUNTER — LAB VISIT (OUTPATIENT)
Dept: LAB | Facility: HOSPITAL | Age: 51
End: 2021-05-13
Attending: FAMILY MEDICINE
Payer: OTHER GOVERNMENT

## 2021-05-13 ENCOUNTER — OFFICE VISIT (OUTPATIENT)
Dept: INTERNAL MEDICINE | Facility: CLINIC | Age: 51
End: 2021-05-13
Attending: FAMILY MEDICINE
Payer: OTHER GOVERNMENT

## 2021-05-13 ENCOUNTER — PATIENT MESSAGE (OUTPATIENT)
Dept: PHARMACY | Facility: CLINIC | Age: 51
End: 2021-05-13

## 2021-05-13 VITALS
WEIGHT: 134 LBS | DIASTOLIC BLOOD PRESSURE: 62 MMHG | OXYGEN SATURATION: 99 % | SYSTOLIC BLOOD PRESSURE: 122 MMHG | HEART RATE: 77 BPM | BODY MASS INDEX: 24.66 KG/M2 | HEIGHT: 62 IN

## 2021-05-13 DIAGNOSIS — I10 ESSENTIAL HYPERTENSION: ICD-10-CM

## 2021-05-13 DIAGNOSIS — N95.9 MENOPAUSAL AND PERIMENOPAUSAL DISORDER: ICD-10-CM

## 2021-05-13 DIAGNOSIS — R79.9 ABNORMAL BLOOD CHEMISTRY: ICD-10-CM

## 2021-05-13 DIAGNOSIS — I10 HYPERTENSION, ESSENTIAL: ICD-10-CM

## 2021-05-13 DIAGNOSIS — Z00.00 ANNUAL PHYSICAL EXAM: Primary | ICD-10-CM

## 2021-05-13 DIAGNOSIS — R73.9 ELEVATED BLOOD SUGAR: ICD-10-CM

## 2021-05-13 DIAGNOSIS — R20.0 HAND NUMBNESS: ICD-10-CM

## 2021-05-13 LAB
CA-I BLDV-SCNC: 1.28 MMOL/L (ref 1.06–1.42)
ESTIMATED AVG GLUCOSE: 111 MG/DL (ref 68–131)
FSH SERPL-ACNC: 62.8 MIU/ML
HBA1C MFR BLD: 5.5 % (ref 4–5.6)
LH SERPL-ACNC: 18.3 MIU/ML

## 2021-05-13 PROCEDURE — 83002 ASSAY OF GONADOTROPIN (LH): CPT | Performed by: FAMILY MEDICINE

## 2021-05-13 PROCEDURE — 82330 ASSAY OF CALCIUM: CPT | Performed by: FAMILY MEDICINE

## 2021-05-13 PROCEDURE — 83036 HEMOGLOBIN GLYCOSYLATED A1C: CPT | Performed by: FAMILY MEDICINE

## 2021-05-13 PROCEDURE — 99396 PREV VISIT EST AGE 40-64: CPT | Mod: S$PBB,,, | Performed by: FAMILY MEDICINE

## 2021-05-13 PROCEDURE — 99214 OFFICE O/P EST MOD 30 MIN: CPT | Mod: PBBFAC | Performed by: FAMILY MEDICINE

## 2021-05-13 PROCEDURE — 99999 PR PBB SHADOW E&M-EST. PATIENT-LVL IV: CPT | Mod: PBBFAC,,, | Performed by: FAMILY MEDICINE

## 2021-05-13 PROCEDURE — 36415 COLL VENOUS BLD VENIPUNCTURE: CPT | Performed by: FAMILY MEDICINE

## 2021-05-13 PROCEDURE — 99396 PR PREVENTIVE VISIT,EST,40-64: ICD-10-PCS | Mod: S$PBB,,, | Performed by: FAMILY MEDICINE

## 2021-05-13 PROCEDURE — 99999 PR PBB SHADOW E&M-EST. PATIENT-LVL IV: ICD-10-PCS | Mod: PBBFAC,,, | Performed by: FAMILY MEDICINE

## 2021-05-13 PROCEDURE — 83001 ASSAY OF GONADOTROPIN (FSH): CPT | Performed by: FAMILY MEDICINE

## 2021-05-13 RX ORDER — VALSARTAN 160 MG/1
160 TABLET ORAL DAILY
Qty: 90 TABLET | Refills: 3 | Status: SHIPPED | OUTPATIENT
Start: 2021-05-13 | End: 2022-06-07 | Stop reason: SDUPTHER

## 2021-05-13 RX ORDER — CHLORTHALIDONE 25 MG/1
25 TABLET ORAL DAILY
Qty: 90 TABLET | Refills: 3 | Status: SHIPPED | OUTPATIENT
Start: 2021-05-13 | End: 2022-06-07 | Stop reason: SDUPTHER

## 2021-05-13 RX ORDER — PNEUMOCOCCAL 13-VALENT CONJUGATE VACCINE 2.2; 2.2; 2.2; 2.2; 2.2; 4.4; 2.2; 2.2; 2.2; 2.2; 2.2; 2.2; 2.2 UG/.5ML; UG/.5ML; UG/.5ML; UG/.5ML; UG/.5ML; UG/.5ML; UG/.5ML; UG/.5ML; UG/.5ML; UG/.5ML; UG/.5ML; UG/.5ML; UG/.5ML
INJECTION, SUSPENSION INTRAMUSCULAR
COMMUNITY
Start: 2020-06-19 | End: 2021-08-20 | Stop reason: ALTCHOICE

## 2021-05-21 ENCOUNTER — PATIENT MESSAGE (OUTPATIENT)
Dept: OBSTETRICS AND GYNECOLOGY | Facility: CLINIC | Age: 51
End: 2021-05-21

## 2021-05-21 ENCOUNTER — HOSPITAL ENCOUNTER (OUTPATIENT)
Dept: RADIOLOGY | Facility: HOSPITAL | Age: 51
Discharge: HOME | End: 2021-05-21
Attending: OBSTETRICS & GYNECOLOGY
Payer: OTHER GOVERNMENT

## 2021-05-21 ENCOUNTER — TELEPHONE (OUTPATIENT)
Dept: INTERNAL MEDICINE | Facility: CLINIC | Age: 51
End: 2021-05-21

## 2021-05-21 VITALS — BODY MASS INDEX: 24.84 KG/M2 | WEIGHT: 135 LBS | HEIGHT: 62 IN

## 2021-05-21 DIAGNOSIS — Z87.891 PERSONAL HISTORY OF NICOTINE DEPENDENCE: Primary | ICD-10-CM

## 2021-05-21 DIAGNOSIS — Z12.31 SCREENING MAMMOGRAM, ENCOUNTER FOR: ICD-10-CM

## 2021-05-21 DIAGNOSIS — R92.8 ABNORMAL MAMMOGRAM: ICD-10-CM

## 2021-05-21 PROCEDURE — 77061 BREAST TOMOSYNTHESIS UNI: CPT | Mod: TC,RT

## 2021-05-21 PROCEDURE — 77063 MAMMO DIGITAL SCREENING BILAT WITH TOMO: ICD-10-PCS | Mod: 26,,, | Performed by: RADIOLOGY

## 2021-05-21 PROCEDURE — 77061 MAMMO DIGITAL DIAGNOSTIC RIGHT WITH TOMO: ICD-10-PCS | Mod: 26,RT,, | Performed by: RADIOLOGY

## 2021-05-21 PROCEDURE — 77067 SCR MAMMO BI INCL CAD: CPT | Mod: 26,,, | Performed by: RADIOLOGY

## 2021-05-21 PROCEDURE — 77063 BREAST TOMOSYNTHESIS BI: CPT | Mod: 26,,, | Performed by: RADIOLOGY

## 2021-05-21 PROCEDURE — 77065 DX MAMMO INCL CAD UNI: CPT | Mod: 26,RT,, | Performed by: RADIOLOGY

## 2021-05-21 PROCEDURE — 77067 MAMMO DIGITAL SCREENING BILAT WITH TOMO: ICD-10-PCS | Mod: 26,,, | Performed by: RADIOLOGY

## 2021-05-21 PROCEDURE — 77061 BREAST TOMOSYNTHESIS UNI: CPT | Mod: 26,RT,, | Performed by: RADIOLOGY

## 2021-05-21 PROCEDURE — 77065 MAMMO DIGITAL DIAGNOSTIC RIGHT WITH TOMO: ICD-10-PCS | Mod: 26,RT,, | Performed by: RADIOLOGY

## 2021-05-21 PROCEDURE — 77067 SCR MAMMO BI INCL CAD: CPT | Mod: TC,59

## 2021-05-24 ENCOUNTER — TELEPHONE (OUTPATIENT)
Dept: RADIOLOGY | Facility: HOSPITAL | Age: 51
End: 2021-05-24

## 2021-06-03 ENCOUNTER — OFFICE VISIT (OUTPATIENT)
Dept: OBSTETRICS AND GYNECOLOGY | Facility: CLINIC | Age: 51
End: 2021-06-03
Payer: OTHER GOVERNMENT

## 2021-06-03 VITALS
SYSTOLIC BLOOD PRESSURE: 124 MMHG | HEIGHT: 62 IN | DIASTOLIC BLOOD PRESSURE: 70 MMHG | WEIGHT: 134.94 LBS | BODY MASS INDEX: 24.83 KG/M2

## 2021-06-03 DIAGNOSIS — Z01.419 WELL WOMAN EXAM WITH ROUTINE GYNECOLOGICAL EXAM: Primary | ICD-10-CM

## 2021-06-03 PROCEDURE — 99999 PR PBB SHADOW E&M-EST. PATIENT-LVL III: ICD-10-PCS | Mod: PBBFAC,,, | Performed by: OBSTETRICS & GYNECOLOGY

## 2021-06-03 PROCEDURE — 99396 PREV VISIT EST AGE 40-64: CPT | Mod: S$PBB,,, | Performed by: OBSTETRICS & GYNECOLOGY

## 2021-06-03 PROCEDURE — 99999 PR PBB SHADOW E&M-EST. PATIENT-LVL III: CPT | Mod: PBBFAC,,, | Performed by: OBSTETRICS & GYNECOLOGY

## 2021-06-03 PROCEDURE — 99396 PR PREVENTIVE VISIT,EST,40-64: ICD-10-PCS | Mod: S$PBB,,, | Performed by: OBSTETRICS & GYNECOLOGY

## 2021-06-03 PROCEDURE — 99213 OFFICE O/P EST LOW 20 MIN: CPT | Mod: PBBFAC | Performed by: OBSTETRICS & GYNECOLOGY

## 2021-06-07 ENCOUNTER — HOSPITAL ENCOUNTER (OUTPATIENT)
Dept: RADIOLOGY | Facility: HOSPITAL | Age: 51
Discharge: HOME | End: 2021-06-07
Attending: FAMILY MEDICINE
Payer: OTHER GOVERNMENT

## 2021-06-07 ENCOUNTER — PATIENT MESSAGE (OUTPATIENT)
Dept: INTERNAL MEDICINE | Facility: CLINIC | Age: 51
End: 2021-06-07

## 2021-06-07 DIAGNOSIS — Z87.891 PERSONAL HISTORY OF NICOTINE DEPENDENCE: ICD-10-CM

## 2021-06-07 PROCEDURE — 71271 CT CHEST LUNG SCREENING LOW DOSE: ICD-10-PCS | Mod: 26,,, | Performed by: RADIOLOGY

## 2021-06-07 PROCEDURE — 71271 CT THORAX LUNG CANCER SCR C-: CPT | Mod: 26,,, | Performed by: RADIOLOGY

## 2021-06-07 PROCEDURE — 71271 CT THORAX LUNG CANCER SCR C-: CPT | Mod: TC

## 2021-06-09 ENCOUNTER — HOSPITAL ENCOUNTER (OUTPATIENT)
Dept: RADIOLOGY | Facility: HOSPITAL | Age: 51
Discharge: HOME | End: 2021-06-09
Attending: OBSTETRICS & GYNECOLOGY
Payer: OTHER GOVERNMENT

## 2021-06-09 DIAGNOSIS — R92.8 ABNORMAL MAMMOGRAM: ICD-10-CM

## 2021-06-09 PROCEDURE — 88305 TISSUE EXAM BY PATHOLOGIST: ICD-10-PCS | Mod: 26,,, | Performed by: PATHOLOGY

## 2021-06-09 PROCEDURE — 77065 MAMMO DIGITAL DIAGNOSTIC RIGHT: ICD-10-PCS | Mod: 26,RT,, | Performed by: RADIOLOGY

## 2021-06-09 PROCEDURE — 25000003 PHARM REV CODE 250: Performed by: OBSTETRICS & GYNECOLOGY

## 2021-06-09 PROCEDURE — 88305 TISSUE EXAM BY PATHOLOGIST: CPT | Mod: 26,,, | Performed by: PATHOLOGY

## 2021-06-09 PROCEDURE — 27200939 MAMMO BREAST STEREOTACTIC BREAST BIOPSY RIGHT

## 2021-06-09 PROCEDURE — 77065 DX MAMMO INCL CAD UNI: CPT | Mod: 26,RT,, | Performed by: RADIOLOGY

## 2021-06-09 PROCEDURE — 77065 DX MAMMO INCL CAD UNI: CPT | Mod: TC,RT

## 2021-06-09 PROCEDURE — 19081 BX BREAST 1ST LESION STRTCTC: CPT | Mod: RT,,, | Performed by: RADIOLOGY

## 2021-06-09 PROCEDURE — 88305 TISSUE EXAM BY PATHOLOGIST: CPT | Performed by: PATHOLOGY

## 2021-06-09 PROCEDURE — 19081 MAMMO BREAST STEREOTACTIC BREAST BIOPSY RIGHT: ICD-10-PCS | Mod: RT,,, | Performed by: RADIOLOGY

## 2021-06-09 RX ORDER — LIDOCAINE HYDROCHLORIDE AND EPINEPHRINE 20; 10 MG/ML; UG/ML
30 INJECTION, SOLUTION INFILTRATION; PERINEURAL ONCE
Status: COMPLETED | OUTPATIENT
Start: 2021-06-09 | End: 2021-06-09

## 2021-06-09 RX ORDER — LIDOCAINE HYDROCHLORIDE 10 MG/ML
20 INJECTION INFILTRATION; PERINEURAL ONCE
Status: COMPLETED | OUTPATIENT
Start: 2021-06-09 | End: 2021-06-09

## 2021-06-09 RX ADMIN — LIDOCAINE HYDROCHLORIDE 3 ML: 10 INJECTION, SOLUTION INFILTRATION; PERINEURAL at 01:06

## 2021-06-09 RX ADMIN — LIDOCAINE HYDROCHLORIDE AND EPINEPHRINE 20 ML: 20; 10 INJECTION, SOLUTION INFILTRATION; PERINEURAL at 01:06

## 2021-06-10 ENCOUNTER — PATIENT MESSAGE (OUTPATIENT)
Dept: INTERNAL MEDICINE | Facility: CLINIC | Age: 51
End: 2021-06-10

## 2021-06-10 ENCOUNTER — PATIENT MESSAGE (OUTPATIENT)
Dept: OBSTETRICS AND GYNECOLOGY | Facility: CLINIC | Age: 51
End: 2021-06-10

## 2021-06-10 LAB
FINAL PATHOLOGIC DIAGNOSIS: NORMAL
GROSS: NORMAL
Lab: NORMAL

## 2021-06-11 ENCOUNTER — TELEPHONE (OUTPATIENT)
Dept: SURGERY | Facility: CLINIC | Age: 51
End: 2021-06-11

## 2021-06-17 ENCOUNTER — TELEPHONE (OUTPATIENT)
Dept: INTERNAL MEDICINE | Facility: CLINIC | Age: 51
End: 2021-06-17

## 2021-06-17 DIAGNOSIS — Z87.891 PERSONAL HISTORY OF NICOTINE DEPENDENCE: ICD-10-CM

## 2021-06-17 DIAGNOSIS — I70.0 AORTIC ATHEROSCLEROSIS: ICD-10-CM

## 2021-06-17 DIAGNOSIS — R91.1 PULMONARY NODULE: Primary | ICD-10-CM

## 2021-06-19 ENCOUNTER — TELEPHONE (OUTPATIENT)
Dept: INTERNAL MEDICINE | Facility: CLINIC | Age: 51
End: 2021-06-19

## 2021-06-19 DIAGNOSIS — I25.10 ATHEROSCLEROSIS OF NATIVE CORONARY ARTERY OF NATIVE HEART WITHOUT ANGINA PECTORIS: ICD-10-CM

## 2021-06-19 DIAGNOSIS — I70.0 AORTIC ATHEROSCLEROSIS: Primary | ICD-10-CM

## 2021-06-28 ENCOUNTER — TELEPHONE (OUTPATIENT)
Dept: INTERNAL MEDICINE | Facility: CLINIC | Age: 51
End: 2021-06-28

## 2021-06-28 ENCOUNTER — PROCEDURE VISIT (OUTPATIENT)
Dept: NEUROLOGY | Facility: CLINIC | Age: 51
End: 2021-06-28
Attending: FAMILY MEDICINE
Payer: OTHER GOVERNMENT

## 2021-06-28 DIAGNOSIS — G56.03 BILATERAL CARPAL TUNNEL SYNDROME: Primary | ICD-10-CM

## 2021-06-28 DIAGNOSIS — R20.0 HAND NUMBNESS: ICD-10-CM

## 2021-06-28 PROCEDURE — 95886 PR EMG COMPLETE, W/ NERVE CONDUCTION STUDIES, 5+ MUSCLES: ICD-10-PCS | Mod: 26,S$PBB,, | Performed by: PSYCHIATRY & NEUROLOGY

## 2021-06-28 PROCEDURE — 95913 PR NERVE CONDUCTION STUDY; 13 OR MORE STUDIES: ICD-10-PCS | Mod: 26,S$PBB,, | Performed by: PSYCHIATRY & NEUROLOGY

## 2021-06-28 PROCEDURE — 95886 MUSC TEST DONE W/N TEST COMP: CPT | Mod: 26,S$PBB,, | Performed by: PSYCHIATRY & NEUROLOGY

## 2021-06-28 PROCEDURE — 95886 MUSC TEST DONE W/N TEST COMP: CPT | Mod: PBBFAC | Performed by: PSYCHIATRY & NEUROLOGY

## 2021-06-28 PROCEDURE — 95913 NRV CNDJ TEST 13/> STUDIES: CPT | Mod: 26,S$PBB,, | Performed by: PSYCHIATRY & NEUROLOGY

## 2021-06-28 PROCEDURE — 95913 NRV CNDJ TEST 13/> STUDIES: CPT | Mod: PBBFAC | Performed by: PSYCHIATRY & NEUROLOGY

## 2021-06-29 ENCOUNTER — PATIENT MESSAGE (OUTPATIENT)
Dept: INTERNAL MEDICINE | Facility: CLINIC | Age: 51
End: 2021-06-29

## 2021-07-07 ENCOUNTER — LAB VISIT (OUTPATIENT)
Dept: LAB | Facility: HOSPITAL | Age: 51
End: 2021-07-07
Attending: FAMILY MEDICINE
Payer: OTHER GOVERNMENT

## 2021-07-07 DIAGNOSIS — I25.10 ATHEROSCLEROSIS OF NATIVE CORONARY ARTERY OF NATIVE HEART WITHOUT ANGINA PECTORIS: ICD-10-CM

## 2021-07-07 DIAGNOSIS — I70.0 AORTIC ATHEROSCLEROSIS: ICD-10-CM

## 2021-07-07 LAB — CRP SERPL-MCNC: 0.72 MG/L (ref 0–3.19)

## 2021-07-07 PROCEDURE — 86141 C-REACTIVE PROTEIN HS: CPT | Performed by: FAMILY MEDICINE

## 2021-07-07 PROCEDURE — 83695 ASSAY OF LIPOPROTEIN(A): CPT | Performed by: FAMILY MEDICINE

## 2021-07-09 LAB — LPA SERPL-MCNC: 11 MG/DL (ref 0–30)

## 2021-07-23 ENCOUNTER — PATIENT OUTREACH (OUTPATIENT)
Dept: OTHER | Facility: OTHER | Age: 51
End: 2021-07-23
Payer: OTHER GOVERNMENT

## 2021-07-23 ENCOUNTER — PATIENT MESSAGE (OUTPATIENT)
Dept: OTHER | Facility: OTHER | Age: 51
End: 2021-07-23

## 2021-07-27 ENCOUNTER — HOSPITAL ENCOUNTER (OUTPATIENT)
Dept: RADIOLOGY | Facility: HOSPITAL | Age: 51
Discharge: HOME | End: 2021-07-27
Attending: FAMILY MEDICINE
Payer: OTHER GOVERNMENT

## 2021-07-27 DIAGNOSIS — I70.0 AORTIC ATHEROSCLEROSIS: ICD-10-CM

## 2021-07-27 DIAGNOSIS — I25.10 ATHEROSCLEROSIS OF NATIVE CORONARY ARTERY OF NATIVE HEART WITHOUT ANGINA PECTORIS: ICD-10-CM

## 2021-07-27 PROCEDURE — 75571 CT HRT W/O DYE W/CA TEST: CPT | Mod: TC

## 2021-07-27 PROCEDURE — 75571 CT HRT W/O DYE W/CA TEST: CPT | Mod: 26,,, | Performed by: RADIOLOGY

## 2021-07-27 PROCEDURE — 75571 CT CALCIUM SCORING CARDIAC: ICD-10-PCS | Mod: 26,,, | Performed by: RADIOLOGY

## 2021-08-03 ENCOUNTER — PATIENT MESSAGE (OUTPATIENT)
Dept: INTERNAL MEDICINE | Facility: CLINIC | Age: 51
End: 2021-08-03

## 2021-08-09 DIAGNOSIS — R52 PAIN: Primary | ICD-10-CM

## 2021-08-10 ENCOUNTER — PATIENT OUTREACH (OUTPATIENT)
Dept: ADMINISTRATIVE | Facility: OTHER | Age: 51
End: 2021-08-10

## 2021-08-11 ENCOUNTER — OFFICE VISIT (OUTPATIENT)
Dept: ORTHOPEDICS | Facility: CLINIC | Age: 51
End: 2021-08-11
Attending: FAMILY MEDICINE
Payer: OTHER MISCELLANEOUS

## 2021-08-11 ENCOUNTER — HOSPITAL ENCOUNTER (OUTPATIENT)
Dept: RADIOLOGY | Facility: HOSPITAL | Age: 51
Discharge: HOME | End: 2021-08-11
Attending: ORTHOPAEDIC SURGERY
Payer: OTHER GOVERNMENT

## 2021-08-11 VITALS — HEIGHT: 62 IN | WEIGHT: 134 LBS | BODY MASS INDEX: 24.66 KG/M2

## 2021-08-11 DIAGNOSIS — M19.041 PRIMARY OSTEOARTHRITIS OF BOTH HANDS: ICD-10-CM

## 2021-08-11 DIAGNOSIS — M79.643 CHRONIC HAND PAIN, UNSPECIFIED LATERALITY: Primary | ICD-10-CM

## 2021-08-11 DIAGNOSIS — Z01.818 PRE-OP TESTING: Primary | ICD-10-CM

## 2021-08-11 DIAGNOSIS — R52 PAIN: ICD-10-CM

## 2021-08-11 DIAGNOSIS — M19.042 PRIMARY OSTEOARTHRITIS OF BOTH HANDS: ICD-10-CM

## 2021-08-11 DIAGNOSIS — G89.29 CHRONIC HAND PAIN, UNSPECIFIED LATERALITY: Primary | ICD-10-CM

## 2021-08-11 DIAGNOSIS — G56.03 BILATERAL CARPAL TUNNEL SYNDROME: Primary | ICD-10-CM

## 2021-08-11 DIAGNOSIS — G56.03 BILATERAL CARPAL TUNNEL SYNDROME: ICD-10-CM

## 2021-08-11 PROCEDURE — 73130 X-RAY EXAM OF HAND: CPT | Mod: TC,50,PO

## 2021-08-11 PROCEDURE — 99999 PR PBB SHADOW E&M-EST. PATIENT-LVL III: CPT | Mod: PBBFAC,,, | Performed by: ORTHOPAEDIC SURGERY

## 2021-08-11 PROCEDURE — 73130 XR HAND COMPLETE 3 VIEWS BILATERAL: ICD-10-PCS | Mod: 26,50,, | Performed by: RADIOLOGY

## 2021-08-11 PROCEDURE — 99213 OFFICE O/P EST LOW 20 MIN: CPT | Mod: PBBFAC,PO | Performed by: ORTHOPAEDIC SURGERY

## 2021-08-11 PROCEDURE — 73130 X-RAY EXAM OF HAND: CPT | Mod: 26,50,, | Performed by: RADIOLOGY

## 2021-08-11 PROCEDURE — 99204 OFFICE O/P NEW MOD 45 MIN: CPT | Mod: S$GLB,,, | Performed by: ORTHOPAEDIC SURGERY

## 2021-08-11 PROCEDURE — 99204 PR OFFICE/OUTPT VISIT, NEW, LEVL IV, 45-59 MIN: ICD-10-PCS | Mod: S$GLB,,, | Performed by: ORTHOPAEDIC SURGERY

## 2021-08-11 PROCEDURE — 99999 PR PBB SHADOW E&M-EST. PATIENT-LVL III: ICD-10-PCS | Mod: PBBFAC,,, | Performed by: ORTHOPAEDIC SURGERY

## 2021-08-16 ENCOUNTER — PATIENT MESSAGE (OUTPATIENT)
Dept: SURGERY | Facility: HOSPITAL | Age: 51
End: 2021-08-16

## 2021-08-16 ENCOUNTER — TELEPHONE (OUTPATIENT)
Dept: ORTHOPEDICS | Facility: CLINIC | Age: 51
End: 2021-08-16

## 2021-08-20 ENCOUNTER — TELEPHONE (OUTPATIENT)
Dept: INTERNAL MEDICINE | Facility: CLINIC | Age: 51
End: 2021-08-20

## 2021-08-20 DIAGNOSIS — I70.0 AORTIC ATHEROSCLEROSIS: ICD-10-CM

## 2021-08-20 DIAGNOSIS — Z87.891 PERSONAL HISTORY OF NICOTINE DEPENDENCE: Primary | ICD-10-CM

## 2021-08-23 ENCOUNTER — PATIENT MESSAGE (OUTPATIENT)
Dept: SURGERY | Facility: HOSPITAL | Age: 51
End: 2021-08-23

## 2021-09-03 ENCOUNTER — PATIENT MESSAGE (OUTPATIENT)
Dept: ADMINISTRATIVE | Facility: OTHER | Age: 51
End: 2021-09-03

## 2021-09-13 ENCOUNTER — ANESTHESIA EVENT (OUTPATIENT)
Dept: SURGERY | Facility: HOSPITAL | Age: 51
End: 2021-09-13
Payer: OTHER GOVERNMENT

## 2021-09-17 ENCOUNTER — TELEPHONE (OUTPATIENT)
Dept: ORTHOPEDICS | Facility: CLINIC | Age: 51
End: 2021-09-17

## 2021-09-18 ENCOUNTER — LAB VISIT (OUTPATIENT)
Dept: SURGERY | Facility: CLINIC | Age: 51
End: 2021-09-18
Payer: OTHER GOVERNMENT

## 2021-09-18 DIAGNOSIS — Z01.818 PRE-OP TESTING: ICD-10-CM

## 2021-09-18 PROCEDURE — U0005 INFEC AGEN DETEC AMPLI PROBE: HCPCS | Performed by: ORTHOPAEDIC SURGERY

## 2021-09-18 PROCEDURE — U0003 INFECTIOUS AGENT DETECTION BY NUCLEIC ACID (DNA OR RNA); SEVERE ACUTE RESPIRATORY SYNDROME CORONAVIRUS 2 (SARS-COV-2) (CORONAVIRUS DISEASE [COVID-19]), AMPLIFIED PROBE TECHNIQUE, MAKING USE OF HIGH THROUGHPUT TECHNOLOGIES AS DESCRIBED BY CMS-2020-01-R: HCPCS | Performed by: ORTHOPAEDIC SURGERY

## 2021-09-19 LAB
SARS-COV-2 RNA RESP QL NAA+PROBE: NOT DETECTED
SARS-COV-2- CYCLE NUMBER: NORMAL

## 2021-09-20 ENCOUNTER — PATIENT MESSAGE (OUTPATIENT)
Dept: SURGERY | Facility: HOSPITAL | Age: 51
End: 2021-09-20

## 2021-09-20 ENCOUNTER — TELEPHONE (OUTPATIENT)
Dept: ORTHOPEDICS | Facility: CLINIC | Age: 51
End: 2021-09-20

## 2021-09-20 RX ORDER — ACETAMINOPHEN 500 MG
1000 TABLET ORAL EVERY 6 HOURS PRN
Qty: 30 TABLET | Refills: 0 | Status: SHIPPED | OUTPATIENT
Start: 2021-09-20 | End: 2021-09-20 | Stop reason: SDUPTHER

## 2021-09-20 RX ORDER — TRAMADOL HYDROCHLORIDE 50 MG/1
50 TABLET ORAL EVERY 6 HOURS
Qty: 20 TABLET | Refills: 0 | Status: SHIPPED | OUTPATIENT
Start: 2021-09-20 | End: 2023-01-05

## 2021-09-20 RX ORDER — ACETAMINOPHEN 500 MG
1000 TABLET ORAL EVERY 8 HOURS PRN
Qty: 30 TABLET | Refills: 0 | Status: SHIPPED | OUTPATIENT
Start: 2021-09-20 | End: 2022-07-07

## 2021-09-20 RX ORDER — IBUPROFEN 600 MG/1
600 TABLET ORAL EVERY 8 HOURS PRN
Qty: 15 TABLET | Refills: 0 | Status: SHIPPED | OUTPATIENT
Start: 2021-09-20 | End: 2021-09-29 | Stop reason: SDUPTHER

## 2021-09-20 RX ORDER — IBUPROFEN 600 MG/1
600 TABLET ORAL 3 TIMES DAILY
Qty: 15 TABLET | Refills: 0 | Status: SHIPPED | OUTPATIENT
Start: 2021-09-20 | End: 2021-09-20 | Stop reason: SDUPTHER

## 2021-09-21 ENCOUNTER — HOSPITAL ENCOUNTER (OUTPATIENT)
Facility: HOSPITAL | Age: 51
Discharge: HOME | End: 2021-09-21
Attending: ORTHOPAEDIC SURGERY | Admitting: ORTHOPAEDIC SURGERY
Payer: OTHER GOVERNMENT

## 2021-09-21 ENCOUNTER — TELEPHONE (OUTPATIENT)
Dept: ORTHOPEDICS | Facility: CLINIC | Age: 51
End: 2021-09-21

## 2021-09-21 ENCOUNTER — ANESTHESIA (OUTPATIENT)
Dept: SURGERY | Facility: HOSPITAL | Age: 51
End: 2021-09-21
Payer: OTHER GOVERNMENT

## 2021-09-21 DIAGNOSIS — G56.02 LEFT CARPAL TUNNEL SYNDROME: Primary | ICD-10-CM

## 2021-09-21 PROCEDURE — D9220A PRA ANESTHESIA: ICD-10-PCS | Mod: QY,ANES,, | Performed by: ANESTHESIOLOGY

## 2021-09-21 PROCEDURE — 99900035 HC TECH TIME PER 15 MIN (STAT)

## 2021-09-21 PROCEDURE — D9220A PRA ANESTHESIA: ICD-10-PCS | Mod: QY,CRNA,, | Performed by: NURSE ANESTHETIST, CERTIFIED REGISTERED

## 2021-09-21 PROCEDURE — 63600175 PHARM REV CODE 636 W HCPCS: Performed by: NURSE ANESTHETIST, CERTIFIED REGISTERED

## 2021-09-21 PROCEDURE — 25000003 PHARM REV CODE 250: Performed by: NURSE ANESTHETIST, CERTIFIED REGISTERED

## 2021-09-21 PROCEDURE — 25000003 PHARM REV CODE 250: Performed by: STUDENT IN AN ORGANIZED HEALTH CARE EDUCATION/TRAINING PROGRAM

## 2021-09-21 PROCEDURE — 36000706: Performed by: ORTHOPAEDIC SURGERY

## 2021-09-21 PROCEDURE — 63600175 PHARM REV CODE 636 W HCPCS: Performed by: ORTHOPAEDIC SURGERY

## 2021-09-21 PROCEDURE — D9220A PRA ANESTHESIA: Mod: QY,ANES,, | Performed by: ANESTHESIOLOGY

## 2021-09-21 PROCEDURE — 37000008 HC ANESTHESIA 1ST 15 MINUTES: Performed by: ORTHOPAEDIC SURGERY

## 2021-09-21 PROCEDURE — 36000707: Performed by: ORTHOPAEDIC SURGERY

## 2021-09-21 PROCEDURE — 20526 PR INJECT CARPAL TUNNEL: ICD-10-PCS | Mod: 59,51,RT, | Performed by: ORTHOPAEDIC SURGERY

## 2021-09-21 PROCEDURE — D9220A PRA ANESTHESIA: Mod: QY,CRNA,, | Performed by: NURSE ANESTHETIST, CERTIFIED REGISTERED

## 2021-09-21 PROCEDURE — 64721 PR REVISE MEDIAN N/CARPAL TUNNEL SURG: ICD-10-PCS | Mod: LT,,, | Performed by: ORTHOPAEDIC SURGERY

## 2021-09-21 PROCEDURE — 37000009 HC ANESTHESIA EA ADD 15 MINS: Performed by: ORTHOPAEDIC SURGERY

## 2021-09-21 PROCEDURE — 20526 THER INJECTION CARP TUNNEL: CPT | Mod: 59,51,RT, | Performed by: ORTHOPAEDIC SURGERY

## 2021-09-21 PROCEDURE — 25000003 PHARM REV CODE 250

## 2021-09-21 PROCEDURE — 64721 CARPAL TUNNEL SURGERY: CPT | Mod: LT,,, | Performed by: ORTHOPAEDIC SURGERY

## 2021-09-21 PROCEDURE — 94761 N-INVAS EAR/PLS OXIMETRY MLT: CPT

## 2021-09-21 PROCEDURE — 25000003 PHARM REV CODE 250: Performed by: ORTHOPAEDIC SURGERY

## 2021-09-21 PROCEDURE — 63600175 PHARM REV CODE 636 W HCPCS

## 2021-09-21 PROCEDURE — 71000015 HC POSTOP RECOV 1ST HR: Performed by: ORTHOPAEDIC SURGERY

## 2021-09-21 PROCEDURE — 71000033 HC RECOVERY, INTIAL HOUR: Performed by: ORTHOPAEDIC SURGERY

## 2021-09-21 PROCEDURE — 27201423 OPTIME MED/SURG SUP & DEVICES STERILE SUPPLY: Performed by: ORTHOPAEDIC SURGERY

## 2021-09-21 RX ORDER — VANCOMYCIN HCL IN 5 % DEXTROSE 1G/250ML
1000 PLASTIC BAG, INJECTION (ML) INTRAVENOUS
Status: COMPLETED | OUTPATIENT
Start: 2021-09-21 | End: 2021-09-21

## 2021-09-21 RX ORDER — MIDAZOLAM HYDROCHLORIDE 1 MG/ML
INJECTION, SOLUTION INTRAMUSCULAR; INTRAVENOUS
Status: DISCONTINUED | OUTPATIENT
Start: 2021-09-21 | End: 2021-09-21

## 2021-09-21 RX ORDER — DEXAMETHASONE SODIUM PHOSPHATE 4 MG/ML
INJECTION, SOLUTION INTRA-ARTICULAR; INTRALESIONAL; INTRAMUSCULAR; INTRAVENOUS; SOFT TISSUE
Status: DISCONTINUED | OUTPATIENT
Start: 2021-09-21 | End: 2021-09-21

## 2021-09-21 RX ORDER — PROPOFOL 10 MG/ML
VIAL (ML) INTRAVENOUS
Status: DISCONTINUED | OUTPATIENT
Start: 2021-09-21 | End: 2021-09-21

## 2021-09-21 RX ORDER — METHYLPREDNISOLONE ACETATE 40 MG/ML
INJECTION, SUSPENSION INTRA-ARTICULAR; INTRALESIONAL; INTRAMUSCULAR; SOFT TISSUE
Status: DISCONTINUED | OUTPATIENT
Start: 2021-09-21 | End: 2021-09-21 | Stop reason: HOSPADM

## 2021-09-21 RX ORDER — ACETAMINOPHEN 500 MG
1000 TABLET ORAL
Status: COMPLETED | OUTPATIENT
Start: 2021-09-21 | End: 2021-09-21

## 2021-09-21 RX ORDER — SODIUM CHLORIDE 9 MG/ML
INJECTION, SOLUTION INTRAVENOUS CONTINUOUS
Status: DISCONTINUED | OUTPATIENT
Start: 2021-09-21 | End: 2021-09-21 | Stop reason: HOSPADM

## 2021-09-21 RX ORDER — MUPIROCIN 20 MG/G
OINTMENT TOPICAL
Status: DISCONTINUED | OUTPATIENT
Start: 2021-09-21 | End: 2021-09-21 | Stop reason: HOSPADM

## 2021-09-21 RX ORDER — SODIUM CHLORIDE 0.9 % (FLUSH) 0.9 %
10 SYRINGE (ML) INJECTION
Status: DISCONTINUED | OUTPATIENT
Start: 2021-09-21 | End: 2021-09-21 | Stop reason: HOSPADM

## 2021-09-21 RX ORDER — LIDOCAINE HYDROCHLORIDE 10 MG/ML
INJECTION, SOLUTION EPIDURAL; INFILTRATION; INTRACAUDAL; PERINEURAL
Status: DISCONTINUED | OUTPATIENT
Start: 2021-09-21 | End: 2021-09-21 | Stop reason: HOSPADM

## 2021-09-21 RX ORDER — LIDOCAINE HYDROCHLORIDE 20 MG/ML
INJECTION INTRAVENOUS
Status: DISCONTINUED | OUTPATIENT
Start: 2021-09-21 | End: 2021-09-21

## 2021-09-21 RX ORDER — BACITRACIN ZINC 500 UNIT/G
OINTMENT (GRAM) TOPICAL
Status: DISCONTINUED | OUTPATIENT
Start: 2021-09-21 | End: 2021-09-21 | Stop reason: HOSPADM

## 2021-09-21 RX ORDER — HYDROMORPHONE HYDROCHLORIDE 1 MG/ML
0.2 INJECTION, SOLUTION INTRAMUSCULAR; INTRAVENOUS; SUBCUTANEOUS EVERY 5 MIN PRN
Status: DISCONTINUED | OUTPATIENT
Start: 2021-09-21 | End: 2021-09-21 | Stop reason: HOSPADM

## 2021-09-21 RX ORDER — CELECOXIB 200 MG/1
400 CAPSULE ORAL ONCE
Status: COMPLETED | OUTPATIENT
Start: 2021-09-21 | End: 2021-09-21

## 2021-09-21 RX ORDER — BUPIVACAINE HYDROCHLORIDE 2.5 MG/ML
INJECTION, SOLUTION EPIDURAL; INFILTRATION; INTRACAUDAL
Status: DISCONTINUED | OUTPATIENT
Start: 2021-09-21 | End: 2021-09-21 | Stop reason: HOSPADM

## 2021-09-21 RX ADMIN — PROPOFOL 50 MG: 10 INJECTION, EMULSION INTRAVENOUS at 07:09

## 2021-09-21 RX ADMIN — PROPOFOL 75 MCG/KG/MIN: 10 INJECTION, EMULSION INTRAVENOUS at 07:09

## 2021-09-21 RX ADMIN — CELECOXIB 400 MG: 200 CAPSULE ORAL at 06:09

## 2021-09-21 RX ADMIN — ACETAMINOPHEN 1000 MG: 500 TABLET ORAL at 06:09

## 2021-09-21 RX ADMIN — DEXAMETHASONE SODIUM PHOSPHATE 8 MG: 4 INJECTION, SOLUTION INTRAMUSCULAR; INTRAVENOUS at 07:09

## 2021-09-21 RX ADMIN — SODIUM CHLORIDE: 0.9 INJECTION, SOLUTION INTRAVENOUS at 06:09

## 2021-09-21 RX ADMIN — LIDOCAINE HYDROCHLORIDE 75 MG: 20 INJECTION, SOLUTION INTRAVENOUS at 07:09

## 2021-09-21 RX ADMIN — VANCOMYCIN HYDROCHLORIDE 1000 MG: 1 INJECTION, POWDER, LYOPHILIZED, FOR SOLUTION INTRAVENOUS at 06:09

## 2021-09-21 RX ADMIN — MUPIROCIN: 20 OINTMENT TOPICAL at 06:09

## 2021-09-21 RX ADMIN — MIDAZOLAM HYDROCHLORIDE 2 MG: 1 INJECTION, SOLUTION INTRAMUSCULAR; INTRAVENOUS at 06:09

## 2021-09-22 VITALS
WEIGHT: 134.06 LBS | BODY MASS INDEX: 24.67 KG/M2 | RESPIRATION RATE: 11 BRPM | TEMPERATURE: 98 F | DIASTOLIC BLOOD PRESSURE: 76 MMHG | HEART RATE: 63 BPM | OXYGEN SATURATION: 98 % | SYSTOLIC BLOOD PRESSURE: 122 MMHG | HEIGHT: 62 IN

## 2021-09-29 ENCOUNTER — TELEPHONE (OUTPATIENT)
Dept: ORTHOPEDICS | Facility: CLINIC | Age: 51
End: 2021-09-29

## 2021-09-29 RX ORDER — IBUPROFEN 600 MG/1
600 TABLET ORAL EVERY 8 HOURS PRN
Qty: 40 TABLET | Refills: 0 | Status: SHIPPED | OUTPATIENT
Start: 2021-09-29 | End: 2022-05-02

## 2021-10-01 ENCOUNTER — OFFICE VISIT (OUTPATIENT)
Dept: ORTHOPEDICS | Facility: CLINIC | Age: 51
End: 2021-10-01
Payer: OTHER GOVERNMENT

## 2021-10-01 VITALS — BODY MASS INDEX: 24.67 KG/M2 | HEIGHT: 62 IN | WEIGHT: 134.06 LBS

## 2021-10-01 DIAGNOSIS — G56.03 BILATERAL CARPAL TUNNEL SYNDROME: Primary | ICD-10-CM

## 2021-10-01 PROCEDURE — 99999 PR PBB SHADOW E&M-EST. PATIENT-LVL III: ICD-10-PCS | Mod: PBBFAC,,, | Performed by: PHYSICIAN ASSISTANT

## 2021-10-01 PROCEDURE — 99024 POSTOP FOLLOW-UP VISIT: CPT | Mod: ,,, | Performed by: PHYSICIAN ASSISTANT

## 2021-10-01 PROCEDURE — 99024 PR POST-OP FOLLOW-UP VISIT: ICD-10-PCS | Mod: ,,, | Performed by: PHYSICIAN ASSISTANT

## 2021-10-01 PROCEDURE — 99999 PR PBB SHADOW E&M-EST. PATIENT-LVL III: CPT | Mod: PBBFAC,,, | Performed by: PHYSICIAN ASSISTANT

## 2021-10-01 PROCEDURE — 99213 OFFICE O/P EST LOW 20 MIN: CPT | Mod: PBBFAC | Performed by: PHYSICIAN ASSISTANT

## 2021-10-07 ENCOUNTER — PATIENT MESSAGE (OUTPATIENT)
Dept: ORTHOPEDICS | Facility: CLINIC | Age: 51
End: 2021-10-07

## 2021-10-22 ENCOUNTER — PATIENT MESSAGE (OUTPATIENT)
Dept: OBSTETRICS AND GYNECOLOGY | Facility: CLINIC | Age: 51
End: 2021-10-22
Payer: OTHER GOVERNMENT

## 2021-10-26 ENCOUNTER — HOSPITAL ENCOUNTER (OUTPATIENT)
Dept: RADIOLOGY | Facility: HOSPITAL | Age: 51
Discharge: HOME | End: 2021-10-26
Attending: ORTHOPAEDIC SURGERY
Payer: OTHER GOVERNMENT

## 2021-10-26 DIAGNOSIS — Z98.890 POST-OPERATIVE STATE: ICD-10-CM

## 2021-10-26 DIAGNOSIS — G56.02 LEFT CARPAL TUNNEL SYNDROME: Primary | ICD-10-CM

## 2021-10-26 DIAGNOSIS — G56.02 LEFT CARPAL TUNNEL SYNDROME: ICD-10-CM

## 2021-10-26 PROCEDURE — 73130 X-RAY EXAM OF HAND: CPT | Mod: TC,LT

## 2021-10-26 PROCEDURE — 73130 X-RAY EXAM OF HAND: CPT | Mod: 26,LT,, | Performed by: RADIOLOGY

## 2021-10-26 PROCEDURE — 73130 XR HAND COMPLETE 3 VIEW LEFT: ICD-10-PCS | Mod: 26,LT,, | Performed by: RADIOLOGY

## 2021-10-27 ENCOUNTER — OFFICE VISIT (OUTPATIENT)
Dept: ORTHOPEDICS | Facility: CLINIC | Age: 51
End: 2021-10-27
Payer: OTHER GOVERNMENT

## 2021-10-27 VITALS — WEIGHT: 134 LBS | HEIGHT: 62 IN | BODY MASS INDEX: 24.66 KG/M2

## 2021-10-27 DIAGNOSIS — G56.21 CUBITAL TUNNEL SYNDROME ON RIGHT: ICD-10-CM

## 2021-10-27 DIAGNOSIS — G56.03 BILATERAL CARPAL TUNNEL SYNDROME: Primary | ICD-10-CM

## 2021-10-27 PROCEDURE — 99999 PR PBB SHADOW E&M-EST. PATIENT-LVL III: ICD-10-PCS | Mod: PBBFAC,,, | Performed by: ORTHOPAEDIC SURGERY

## 2021-10-27 PROCEDURE — 99024 POSTOP FOLLOW-UP VISIT: CPT | Mod: ,,, | Performed by: ORTHOPAEDIC SURGERY

## 2021-10-27 PROCEDURE — 99024 PR POST-OP FOLLOW-UP VISIT: ICD-10-PCS | Mod: ,,, | Performed by: ORTHOPAEDIC SURGERY

## 2021-10-27 PROCEDURE — 99999 PR PBB SHADOW E&M-EST. PATIENT-LVL III: CPT | Mod: PBBFAC,,, | Performed by: ORTHOPAEDIC SURGERY

## 2021-10-27 PROCEDURE — 99213 OFFICE O/P EST LOW 20 MIN: CPT | Mod: PBBFAC | Performed by: ORTHOPAEDIC SURGERY

## 2021-11-03 ENCOUNTER — PATIENT MESSAGE (OUTPATIENT)
Dept: ADMINISTRATIVE | Facility: OTHER | Age: 51
End: 2021-11-03
Payer: OTHER GOVERNMENT

## 2021-11-05 ENCOUNTER — PATIENT MESSAGE (OUTPATIENT)
Dept: OBSTETRICS AND GYNECOLOGY | Facility: CLINIC | Age: 51
End: 2021-11-05
Payer: OTHER GOVERNMENT

## 2021-11-05 DIAGNOSIS — Z98.890 H/O RIGHT BREAST BIOPSY: Primary | ICD-10-CM

## 2021-11-18 ENCOUNTER — PATIENT MESSAGE (OUTPATIENT)
Dept: OTHER | Facility: OTHER | Age: 51
End: 2021-11-18
Payer: OTHER GOVERNMENT

## 2021-11-23 ENCOUNTER — HOSPITAL ENCOUNTER (OUTPATIENT)
Dept: RADIOLOGY | Facility: HOSPITAL | Age: 51
Discharge: HOME | End: 2021-11-23
Attending: OBSTETRICS & GYNECOLOGY
Payer: OTHER GOVERNMENT

## 2021-11-23 VITALS — BODY MASS INDEX: 24.84 KG/M2 | WEIGHT: 135 LBS | HEIGHT: 62 IN

## 2021-11-23 DIAGNOSIS — R92.8 ABNORMAL MAMMOGRAM: ICD-10-CM

## 2021-11-23 PROCEDURE — 77061 BREAST TOMOSYNTHESIS UNI: CPT | Mod: 26,RT,, | Performed by: RADIOLOGY

## 2021-11-23 PROCEDURE — 77061 MAMMO DIGITAL DIAGNOSTIC RIGHT WITH TOMO: ICD-10-PCS | Mod: 26,RT,, | Performed by: RADIOLOGY

## 2021-11-23 PROCEDURE — 77061 BREAST TOMOSYNTHESIS UNI: CPT | Mod: TC,RT

## 2021-11-23 PROCEDURE — 77065 DX MAMMO INCL CAD UNI: CPT | Mod: 26,RT,, | Performed by: RADIOLOGY

## 2021-11-23 PROCEDURE — 77065 MAMMO DIGITAL DIAGNOSTIC RIGHT WITH TOMO: ICD-10-PCS | Mod: 26,RT,, | Performed by: RADIOLOGY

## 2021-12-13 ENCOUNTER — HOSPITAL ENCOUNTER (OUTPATIENT)
Dept: RADIOLOGY | Facility: HOSPITAL | Age: 51
Discharge: HOME | End: 2021-12-13
Attending: FAMILY MEDICINE
Payer: OTHER GOVERNMENT

## 2021-12-13 DIAGNOSIS — Z87.891 PERSONAL HISTORY OF NICOTINE DEPENDENCE: ICD-10-CM

## 2021-12-13 DIAGNOSIS — R91.1 PULMONARY NODULE: ICD-10-CM

## 2021-12-13 PROCEDURE — 71271 CT THORAX LUNG CANCER SCR C-: CPT | Mod: 26,,, | Performed by: RADIOLOGY

## 2021-12-13 PROCEDURE — 71271 CT THORAX LUNG CANCER SCR C-: CPT | Mod: TC

## 2021-12-13 PROCEDURE — 71271 CT CHEST LUNG SCREENING LOW DOSE: ICD-10-PCS | Mod: 26,,, | Performed by: RADIOLOGY

## 2021-12-20 ENCOUNTER — TELEPHONE (OUTPATIENT)
Dept: INTERNAL MEDICINE | Facility: CLINIC | Age: 51
End: 2021-12-20
Payer: OTHER GOVERNMENT

## 2021-12-20 DIAGNOSIS — Z87.891 PERSONAL HISTORY OF NICOTINE DEPENDENCE: Primary | ICD-10-CM

## 2022-01-03 ENCOUNTER — OFFICE VISIT (OUTPATIENT)
Dept: ORTHOPEDICS | Facility: CLINIC | Age: 52
End: 2022-01-03
Payer: OTHER GOVERNMENT

## 2022-01-03 VITALS — WEIGHT: 135 LBS | HEIGHT: 62 IN | BODY MASS INDEX: 24.84 KG/M2

## 2022-01-03 DIAGNOSIS — G56.03 BILATERAL CARPAL TUNNEL SYNDROME: Primary | ICD-10-CM

## 2022-01-03 PROCEDURE — 99024 POSTOP FOLLOW-UP VISIT: CPT | Mod: ,,, | Performed by: ORTHOPAEDIC SURGERY

## 2022-01-03 PROCEDURE — 99999 PR PBB SHADOW E&M-EST. PATIENT-LVL II: ICD-10-PCS | Mod: PBBFAC,,, | Performed by: ORTHOPAEDIC SURGERY

## 2022-01-03 PROCEDURE — 99212 OFFICE O/P EST SF 10 MIN: CPT | Mod: PBBFAC | Performed by: ORTHOPAEDIC SURGERY

## 2022-01-03 PROCEDURE — 99999 PR PBB SHADOW E&M-EST. PATIENT-LVL II: CPT | Mod: PBBFAC,,, | Performed by: ORTHOPAEDIC SURGERY

## 2022-01-03 PROCEDURE — 99024 PR POST-OP FOLLOW-UP VISIT: ICD-10-PCS | Mod: ,,, | Performed by: ORTHOPAEDIC SURGERY

## 2022-01-03 RX ORDER — ZOSTER VACCINE RECOMBINANT, ADJUVANTED 50 MCG/0.5
KIT INTRAMUSCULAR
COMMUNITY
Start: 2021-07-27 | End: 2022-06-07 | Stop reason: ALTCHOICE

## 2022-01-03 NOTE — PROGRESS NOTES
Kayleen Patel presents for post-operative evaluation of   Encounter Diagnosis   Name Primary?    Bilateral carpal tunnel syndrome Yes   The patient is now 3 months s/p left carpal tunnel release and right carpal tunnel steroid injection.  She states that she does not have numbness in either hand. She is having some pillar pain on the left with planks and push ups.     PE:    AA&O x 4.  NAD  HEENT:  NCAT, sclera nonicteric  Lungs:  Respirations are equal and unlabored.  CV:  2+ bilateral upper and lower extremity pulses.  MSK: The incision is well healed.  Full wrist and finger motion.  Neurovascularly intact and has 5/5 thenar and intrinsic musculature strength.     A/P: Status post above, doing well  1) Continue with strengthening  2) F/U as needed for any worsening of symptoms  3) Call with any questions/concerns in the interim        Alla Goff MD     Please be aware that this note has been generated with the assistance of [facility] voice-to-text.  Please excuse any spelling or grammatical errors.

## 2022-01-18 NOTE — PROGRESS NOTES
Digital Medicine: Health  Follow-Up    The history is provided by the patient.             Reviewed BP Readings. Patients BP average is 125/75 mmHg, which is at goal. Patient's BP goal is less than 130/80 mmHg.             Topics Covered on Call: physical activity and Diet    Additional Follow-up details: Brief follow up today, patient was getting out of [employer] exercise class. She reports doing well without concerns.                  Addressed patient questions and patient has my contact information if needed prior to next outreach.   Explained the importance of self-monitoring and medication adherence. Encouraged the patient to communicate with their health  for lifestyle modifications to help improve or maintain a healthy lifestyle.            There are no preventive care reminders to display for this patient.       Last 5 Patient Entered Readings                Current 30 Day Average: 125/75  Recent Readings 1/18/2022 1/13/2022 1/12/2022 1/11/2022 1/5/2022   SBP (mmHg) 111 126 134 141 112   DBP (mmHg) 66 78 74 82 70   Pulse 71 66 68 68 72

## 2022-03-04 ENCOUNTER — PATIENT MESSAGE (OUTPATIENT)
Dept: ADMINISTRATIVE | Facility: OTHER | Age: 52
End: 2022-03-04
Payer: OTHER GOVERNMENT

## 2022-04-19 ENCOUNTER — OFFICE VISIT (OUTPATIENT)
Dept: INTERNAL MEDICINE | Facility: CLINIC | Age: 52
End: 2022-04-19
Attending: FAMILY MEDICINE
Payer: OTHER GOVERNMENT

## 2022-04-19 ENCOUNTER — HOSPITAL ENCOUNTER (OUTPATIENT)
Dept: RADIOLOGY | Facility: HOSPITAL | Age: 52
Discharge: HOME | End: 2022-04-19
Attending: FAMILY MEDICINE
Payer: OTHER GOVERNMENT

## 2022-04-19 VITALS
OXYGEN SATURATION: 97 % | WEIGHT: 135.25 LBS | BODY MASS INDEX: 24.89 KG/M2 | HEIGHT: 62 IN | HEART RATE: 64 BPM | SYSTOLIC BLOOD PRESSURE: 116 MMHG | DIASTOLIC BLOOD PRESSURE: 72 MMHG

## 2022-04-19 DIAGNOSIS — M79.652 PAIN IN BOTH THIGHS: Primary | ICD-10-CM

## 2022-04-19 DIAGNOSIS — M79.652 PAIN IN BOTH THIGHS: ICD-10-CM

## 2022-04-19 DIAGNOSIS — M79.651 PAIN IN BOTH THIGHS: Primary | ICD-10-CM

## 2022-04-19 DIAGNOSIS — M79.651 PAIN IN BOTH THIGHS: ICD-10-CM

## 2022-04-19 PROCEDURE — 73521 X-RAY EXAM HIPS BI 2 VIEWS: CPT | Mod: TC

## 2022-04-19 PROCEDURE — 99999 PR PBB SHADOW E&M-EST. PATIENT-LVL IV: CPT | Mod: PBBFAC,,, | Performed by: FAMILY MEDICINE

## 2022-04-19 PROCEDURE — 99213 PR OFFICE/OUTPT VISIT, EST, LEVL III, 20-29 MIN: ICD-10-PCS | Mod: S$PBB,,, | Performed by: FAMILY MEDICINE

## 2022-04-19 PROCEDURE — 99999 PR PBB SHADOW E&M-EST. PATIENT-LVL IV: ICD-10-PCS | Mod: PBBFAC,,, | Performed by: FAMILY MEDICINE

## 2022-04-19 PROCEDURE — 73521 X-RAY EXAM HIPS BI 2 VIEWS: CPT | Mod: 26,,, | Performed by: RADIOLOGY

## 2022-04-19 PROCEDURE — 99213 OFFICE O/P EST LOW 20 MIN: CPT | Mod: S$PBB,,, | Performed by: FAMILY MEDICINE

## 2022-04-19 PROCEDURE — 99214 OFFICE O/P EST MOD 30 MIN: CPT | Mod: PBBFAC | Performed by: FAMILY MEDICINE

## 2022-04-19 PROCEDURE — 73521 XR HIPS BILATERAL 2 VIEW INCL AP PELVIS: ICD-10-PCS | Mod: 26,,, | Performed by: RADIOLOGY

## 2022-04-19 NOTE — PROGRESS NOTES
Answers for HPI/ROS submitted by the patient on 4/14/2022  activity change: No  unexpected weight change: No  neck pain: No  hearing loss: No  rhinorrhea: No  trouble swallowing: No  eye discharge: No  visual disturbance: No  chest tightness: No  wheezing: No  chest pain: No  palpitations: No  blood in stool: No  constipation: No  vomiting: No  diarrhea: No  polydipsia: No  polyuria: No  difficulty urinating: No  hematuria: No  menstrual problem: No  dysuria: No  joint swelling: Yes  arthralgias: Yes  headaches: No  weakness: No  confusion: No  dysphoric mood: No    Subjective:       Patient ID: Kayleen Patel is a 51 y.o. female.    Chief Complaint: Hip Pain    Bilateral upper, anterior thigh pain, aggravated by jogging, fiber 6 months.  No injury.  Seems to be worsening.  Some stiffness noted in the hips.  No definite distal pain radiation.  She tries exercise by running a couple times a week and does [employer].  She recently got a standup desk.    Review of Systems   Constitutional: Negative for activity change and unexpected weight change.   HENT: Negative for hearing loss, rhinorrhea and trouble swallowing.    Eyes: Negative for discharge and visual disturbance.   Respiratory: Negative for chest tightness and wheezing.    Cardiovascular: Negative for chest pain and palpitations.   Gastrointestinal: Negative for blood in stool, constipation, diarrhea and vomiting.   Endocrine: Negative for polydipsia and polyuria.   Genitourinary: Negative for difficulty urinating, dysuria, hematuria and menstrual problem.   Musculoskeletal: Positive for arthralgias and joint swelling. Negative for neck pain.   Neurological: Negative for weakness and headaches.   Psychiatric/Behavioral: Negative for confusion and dysphoric mood.       Objective:      Physical Exam  Vitals and nursing note reviewed.   Constitutional:       General: She is not in acute distress.     Appearance: She is well-developed.   Pulmonary:       Effort: Pulmonary effort is normal.   Musculoskeletal:      Cervical back: Neck supple.      Right hip: Tenderness present. Decreased range of motion.      Left hip: Tenderness present. Decreased range of motion.      Right lower leg: No edema.      Left lower leg: No edema.   Skin:     General: Skin is warm and dry.      Findings: No rash.   Neurological:      Mental Status: She is alert and oriented to person, place, and time.   Psychiatric:         Behavior: Behavior normal.         Thought Content: Thought content normal.         Judgment: Judgment normal.         Assessment:       1. Pain in both thighs        Plan:     Medication List with Changes/Refills   Current Medications    ACETAMINOPHEN (TYLENOL) 500 MG TABLET    Take 2 tablets (1,000 mg total) by mouth every 8 (eight) hours as needed for Pain.    CALCIUM CARBONATE (CALCIUM 500 ORAL)    Take 1 capsule by mouth.    CHLORTHALIDONE (HYGROTEN) 25 MG TAB    Take 1 tablet (25 mg total) by mouth once daily.    CYANOCOBALAMIN (VITAMIN B-12) 100 MCG TABLET    Take 100 mcg by mouth once daily.    FERROUS SULFATE 325 MG (65 MG IRON) TAB TABLET    Take 325 mg by mouth daily with breakfast.    IBUPROFEN (ADVIL,MOTRIN) 600 MG TABLET    Take 1 tablet (600 mg total) by mouth every 8 (eight) hours as needed for Pain.    OMEGA-3/DHA/EPA/FISH OIL (OMEGA-3 FISH OIL ORAL)    Take 1 capsule by mouth.    TRAMADOL (ULTRAM) 50 MG TABLET    Take 1 tablet (50 mg total) by mouth every 6 (six) hours.    VALSARTAN (DIOVAN) 160 MG TABLET    Take 1 tablet (160 mg total) by mouth once daily.    VARICELLA-ZOSTER GE-AS01B, PF, (SHINGRIX, PF,) 50 MCG/0.5 ML INJECTION         Kayleen was seen today for hip pain.    Diagnoses and all orders for this visit:    Pain in both thighs  -     X-Ray Hips Bilateral 2 View Inc AP Pelvis; Future  -     Ambulatory referral/consult to Physical/Occupational Therapy; Future      See meds, orders, follow up, routing and instructions sections of  encounter and AVS. Discussed with patient and provided on AVS.    Suspect hip arthritis.  X-ray, physical therapy.  Consider orthopedics consult.  Follow-up next month for physical examination annual.

## 2022-04-22 ENCOUNTER — CLINICAL SUPPORT (OUTPATIENT)
Dept: REHABILITATION | Facility: HOSPITAL | Age: 52
End: 2022-04-22
Payer: OTHER GOVERNMENT

## 2022-04-22 DIAGNOSIS — M79.652 PAIN IN BOTH THIGHS: ICD-10-CM

## 2022-04-22 DIAGNOSIS — M79.651 PAIN IN BOTH THIGHS: ICD-10-CM

## 2022-04-22 DIAGNOSIS — R29.898 DECREASED STRENGTH OF LOWER EXTREMITY: ICD-10-CM

## 2022-04-22 PROCEDURE — 97161 PT EVAL LOW COMPLEX 20 MIN: CPT

## 2022-04-23 ENCOUNTER — TELEPHONE (OUTPATIENT)
Dept: INTERNAL MEDICINE | Facility: CLINIC | Age: 52
End: 2022-04-23

## 2022-04-23 DIAGNOSIS — M25.859 FEMORAL ACETABULAR IMPINGEMENT: Primary | ICD-10-CM

## 2022-04-23 NOTE — TELEPHONE ENCOUNTER
Please call patient and explain that the tests show hip impingement.    I would like to refer patient to the Orthopedic Sports Medicine, Dr. Richard for further evaluation and treatment.    Please see referral orders and please call patient to schedule.     Thank you.

## 2022-04-25 ENCOUNTER — CLINICAL SUPPORT (OUTPATIENT)
Dept: REHABILITATION | Facility: HOSPITAL | Age: 52
End: 2022-04-25
Payer: OTHER GOVERNMENT

## 2022-04-25 DIAGNOSIS — R29.898 DECREASED STRENGTH OF LOWER EXTREMITY: ICD-10-CM

## 2022-04-25 DIAGNOSIS — M25.859 FEMORAL ACETABULAR IMPINGEMENT: Primary | ICD-10-CM

## 2022-04-25 PROCEDURE — 97110 THERAPEUTIC EXERCISES: CPT

## 2022-04-25 NOTE — PLAN OF CARE
"[facility]   Physical Therapy Initial Evaluation     Date: 4/22/2022   Name: Kayleen Patel  Clinic Number: [id number]    Therapy Diagnosis:   Encounter Diagnoses   Name Primary?    Pain in both thighs     Decreased strength of lower extremity        Physician: Herbert Clay MD    Physician Orders: PT Eval and Treat   Medical Diagnosis from Referral: M79.651,M79.652 (ICD-10-CM) - Pain in both thighs  Evaluation Date: 4/22/2022  Authorization Period Expiration: 8/11/2022  Plan of Care Expiration: 7/14/2022  Progress Note Due: 10th visit  Visit # / Visits authorized: 1/ 1    FOTO: 1/3    Precautions: Standard     Time In: 8:00 am  Time Out: 9:00 am  Total Appointment Time (timed & untimed codes): 60 minutes    SUBJECTIVE     Date of onset: 3-6 months ago    History of current condition - Kayleen reports: she began having right hip pain 3-6 months ago, and more recently her left hip has now started to bother her. Patient reports she will have pain with onset of running, then improvement throughout her run (rougly 3 miles), and then pain returns a few hours later. Patient reports she has tried lunge stretching, foam rolling, and a massage gun with no improvement. Patient reports she has dropped working out from 3x a week at [employer], to 2x a week. Patient notes she has been running and working out at [employer] for the last 5 years and denies any recent changes or injuries. Patient reports she will feel "sore" in the AM, noting something the pain will go away, but will return by end of day. Patient reports working out is the only relief. Patient denies history of back pain, however notes bilateral knee OA. Pain often wakes her up at night, but she is able to return to sleep.    Falls: denies any falls    Imaging, please see imaging.    Prior Therapy: none  Social History: lives alone, 1 story home, no steps to enter  Occupation: uses a standing desk,  for " "[employer]; [employer]: 21 years of service  Prior Level of Function: 3x week with [employer]  Current Level of Function: dropped to 2x per week for [employer]    Pain:  Current 5/10, worst 10/10, best 3/10   Location: bilateral hip  Description: patient states "feels like a sore muscle"  Aggravating Factors: running, descending stairs, CHANDA position, putting on shoes, walking  Easing Factors: ice, but only temporarily    Patients goals: return to running     Medical History:   Past Medical History:   Diagnosis Date    Abnormal cervical Papanicolaou smear '88, '04    Colpo/Cryo    Chronic diarrhea 2/12/2015    GERD (gastroesophageal reflux disease)     History of acute PID 1988    Hypertension     Thrombocytosis        Surgical History:   Kayleen Patel  has a past surgical history that includes Tonsillectomy; Ganglion cyst excision; Refractive surgery (Bilateral, 2009); Carpal tunnel release (Left, 9/21/2021); Injection of steroid (Right, 9/21/2021); and Breast biopsy (Right, 06/09/2021).    Medications:   Kayleen has a current medication list which includes the following prescription(s): acetaminophen, calcium carbonate, chlorthalidone, cyanocobalamin, ferrous sulfate, ibuprofen, omega-3/dha/epa/fish oil, tramadol, valsartan, and shingrix (pf).    Allergies:   Review of patient's allergies indicates:   Allergen Reactions    Penicillins Hives    Adhesive Rash          OBJECTIVE     Observation: Pt ambulates with no observable gait deficit.     Postural examination: rounded shoulder and Forward Head    Palpation: tender to palpation of bilateral anterior groin and thigh      Strength: manual muscle test grades below      Lower Extremity Strength  Right LE   Left LE     Hip Ext 3+/5 Hip Ext 3+/5    Hip Flexion: 3+/5 pain Hip Flexion: 3+/5   Hip ER:  3-/5 pain Hip ER: 3-/5   Hip IR: 3-/5 pain Hip IR: 3-/5   Hip Abduction: 3/5  Hip Abduction 3+/5   Knee Extension: 5/5 Knee Extension: 5/5   Knee Flexion: " 5/5 Knee Flexion: 5/5   Ankle Dorsiflexion: 5/5 Ankle Dorsiflexion: 5/5   Ankle Plantarflexion: 5/5 Ankle Plantarflexion: 5/5      Range of Motion:  R Hip Active(Passive) L Hip Active (Passive)   Flexion 90 (90 p!) flexion 90 (90 p!)   Extension >10 extension >10    IR 18 (20 p!) IR 22 (22 p!)   ER 20 (35 p!) ER 20 (35 p!)      Functional Mobility Assessment:  Double leg Squat: 5x   R forward Lunge onto chair: at first follow up  L forward lunge onto chair: at first follow up    Balance Assessment:       Evaluation   Single Limb Stance R LE > 30 seconds  (<10 sec = HIGH FALL RISK)   Single Limb Stance L LE > 30 seconds  (<10 sec = HIGH FALL RISK)      Endurance Assessment:       Evaluation   30 sec STS  19x      30 second SL chair rise 14x on R  14x on L       Table: Population Norms for TUG    Age  Average TUG    60 - 69 years  8.1 seconds    70 - 79 years  9.2 seconds    80 - 99 years  11.3 seconds       Special Tests:  Roldan Test:   L knee flexion: knee 50 degrees - hip flexors tightness observed   R knee flexion 62 degrees - significnat hip flexor tightness observed  FADIR: R (+)               L: (+)  CHANDA: R (+)               L: (+)  Scours: R: unable to tolerate               L: unable to tolerate  Stork: L SL: left hip drop - upper trunk lean   R SL: equal  Long axis distraction: reduced pain     Limitation/Restriction for FOTO Hip Survey    Therapist reviewed FOTO scores for Kayleen Patel on 4/22/2022.   FOTO documents entered into [employer] - see Media section.    Limitation Score: 51%         TREATMENT     Total Treatment time (time-based codes) separate from Evaluation: 5 minutes      Kayleen received the treatments listed below:      therapeutic exercises to develop ROM and flexibility for 5 minutes including:  See patient instructions        PATIENT EDUCATION AND HOME EXERCISES     Education provided:   - HEP  - prognosis and plan of care    Written Home Exercises Provided: yes. Exercises  were reviewed and Kayleen was able to demonstrate them prior to the end of the session.  Kayleen demonstrated good  understanding of the education provided. See EMR under Patient Instructions for exercises provided during therapy sessions.    ASSESSMENT     Kayleen is a 51 y.o. female referred to outpatient Physical Therapy with a medical diagnosis of Pain in both thighs. Patient presents with chief complaint of chronic bilateral hip pain. Patient presents with s/s consistent with impingement and hip flexor tendinosis, at this time, with pain distribution along bilateral anterior groin and thigh. Patient presents with poor tolerance for test and measures including end range P/AROM, MMT and special testing. Patient demonstrates decreased bilateral hip range of motion (flexion and internal rotation), decreased motor control and strength of posterolateral hip and glute mm. Patient responded well to manual long axis distraction, hip flexor stretching, and decreased pain with repetitive motion such as 30 STS and 30 SLS. Pt would benefit from bilateral hip mobilizations for pain management, and a core and posterolateral hip stability program. Patient would benefit from skilled outpatient physical therapy to address motor control, strength and range of motion deficits so that she may maintain full independence with all household and personal ADL's, and improve overall functional mobility, and meet all social and recreational needs.    Patient prognosis is Good.   Patient will benefit from skilled outpatient Physical Therapy to address the deficits stated above and in the chart below, provide patient /family education, and to maximize patientt's level of independence.     Plan of care discussed with patient: Yes  Patient's spiritual, cultural and educational needs considered and patient is agreeable to the plan of care and goals as stated below:      Anticipated Barriers for therapy: none    Medical Necessity is  demonstrated by the following  History  Co-morbidities and personal factors that may impact the plan of care Co-morbidities:   HTN and L carpal tunnel surgery     Personal Factors:   no deficits     low   Examination  Body Structures and Functions, activity limitations and participation restrictions that may impact the plan of care Body Regions:   lower extremities  trunk    Body Systems:    gross symmetry  ROM  strength  transitions  motor control    Participation Restrictions:   none    Activity limitations:   Learning and applying knowledge  no deficits    General Tasks and Commands  no deficits    Communication  no deficits    Mobility  walking  running    Self care  dressing    Domestic Life  no deficits    Interactions/Relationships  no deficits    Life Areas  no deficits    Community and Social Life  no deficits         low   Clinical Presentation stable and uncomplicated low   Decision Making/ Complexity Score: low     Goals:  Short Term Goals (3 weeks)  1. Patient will be independent with home exercise program to supplement physical therapy treatment in improving functional status.  2. Pt will improve impaired lower extremity manual muscle tests to >/= 4/5 to improve dynamic hip support for closed chain tasks.  3. Patient will report decreased pain post workout to 4/10 pain to demonstrate improving tolerance for activity.       Long Term Goals (8 weeks)  1. Patient will return to 3x per week at her gym to demonstrate return to PLOF.  2. Pt report < 2/10 pain throughout 3 mile run.   3. Pt will improve impaired lower extremity manual muscle tests to >/= 4+/5 to improve dynamic hip support for closed chain tasks.  4. Patient will improve the total FOTO Knee Survey Score to </= 31% limited to demonstrate increased perceived functional mobility.  5. Patient will demonstrate independence with self mobilization of right and left hip.    PLAN   Plan of care Certification: 4/22/2022 to 7/14/2022.    Outpatient  Physical Therapy 2 times weekly for 8 weeks to include the following interventions: Aquatic Therapy, Cervical/Lumbar Traction, Electrical Stimulation TENS, Gait Training, Manual Therapy, Moist Heat/ Ice, Neuromuscular Re-ed, Orthotic Management and Training, Patient Education, Self Care, Therapeutic Activities and Therapeutic Exercise.     Elise Hernandez, PT      I CERTIFY THE NEED FOR THESE SERVICES FURNISHED UNDER THIS PLAN OF TREATMENT AND WHILE UNDER MY CARE   Physician's comments:     Physician's Signature: ___________________________________________________       Agree with PT/OT assessment and approve continuation of care. MD Peewee, MA

## 2022-04-25 NOTE — PROGRESS NOTES
[facility]   Physical Therapy Treatment Note     Name: Kayleen Patel  Clinic Number: [id number]    Therapy Diagnosis:   Encounter Diagnoses   Name Primary?    Femoral acetabular impingement Yes    Decreased strength of lower extremity      Physician: Herbert Clay MD    Visit Date: 4/25/2022    Physician Orders: PT Eval and Treat   Medical Diagnosis from Referral: M79.651,M79.652 (ICD-10-CM) - Pain in both thighs  Evaluation Date: 4/22/2022  Authorization Period Expiration: 8/11/2022  Plan of Care Expiration: 7/14/2022  Progress Note Due: 10th visit  Visit # / Visits authorized: 2/15   FOTO: 1/3     Precautions: Standard      Time In: 8:00 am  Time Out: 9:00 am  Total Appointment Time (timed & untimed codes): 60 minutes      SUBJECTIVE     Pt reports: Still experiencing increased pain in anterior thighs after recreational activity.  She was compliant with home exercise program.  Response to previous treatment: no change in symptoms   Functional change: no change in symptoms     Pain: 7/10  Location: bilateral lower legs     OBJECTIVE     Objective Measures updated at progress report unless specified.     Treatment       Kayleen received the treatments listed below:    *All exercises performed bilaterally     THERAPEUTIC EXERCISES to develop strength, ROM and flexibility for 55 minutes including :  Prone hip flexor; 30 sec x 5 times   Prone hip ext; 30 times x 5 sec hold   Prone hamstring curl; 30 times x 5 sec hold; 2 lb AW   SL hip abduction; 20 times x 5 sec hold - emphasis on maintaining knee straight   Clams; (1 x 10 reps) 3 sec hold   SLR + supine TA; (1 x 10 reps) 2 sec hold - increase in symptoms in anterior hip if supine TA is not maintained     MANUAL THERAPY TECHNIQUES including Myofacial release and Soft tissue Mobilization were applied to bilateral anterior hip for 00 minutes.  -IASTYM next visit depending on patient symptoms     NEUROMUSCULAR RE-EDUCATION  ACTIVITIES to improve Balance, Coordination and Proprioception for 00 minutes.  The following were included:     Patient Education and Home Exercises     Home Exercises Provided and Patient Education Provided     Education provided:   - Continue HEP given at initial evaluation     Written Home Exercises Provided: Patient instructed to cont prior HEP. Exercises were reviewed and Kayleen was able to demonstrate them prior to the end of the session.  Kayleen demonstrated good  understanding of the education provided. See EMR under Patient Instructions for exercises provided during therapy sessions    ASSESSMENT   Pt demonstrating poor hip abduction and hip extension strength with table exercises. Showing appropriate muscle fatigue with low reps and no weight.   Pt also only able to perform one set of SLR + supine TA due to poor abdominal control leading to increase stress on hip flexor tendons and causing an increase in symptoms. Pt showing increased pain in anterior hip with hip flexion + knee straight and internal rotation. Will incorporate IASTYM into patient program depending on symptoms.    Kayleen Is progressing well towards her goals.   Pt prognosis is Excellent.     Pt will continue to benefit from skilled outpatient physical therapy to address the deficits listed in the problem list box on initial evaluation, provide pt/family education and to maximize pt's level of independence in the home and community environment.     Pt's spiritual, cultural and educational needs considered and pt agreeable to plan of care and goals.     Anticipated barriers to physical therapy: None to note     Goals:  Short Term Goals (3 weeks)  1. Patient will be independent with home exercise program to supplement physical therapy treatment in improving functional status.  2. Pt will improve impaired lower extremity manual muscle tests to >/= 4/5 to improve dynamic hip support for closed chain tasks.  3. Patient will report  decreased pain post workout to 4/10 pain to demonstrate improving tolerance for activity.         Long Term Goals (8 weeks)  1. Patient will return to 3x per week at her gym to demonstrate return to PLOF.  2. Pt report < 2/10 pain throughout 3 mile run.   3. Pt will improve impaired lower extremity manual muscle tests to >/= 4+/5 to improve dynamic hip support for closed chain tasks.  4. Patient will improve the total FOTO Knee Survey Score to </= 31% limited to demonstrate increased perceived functional mobility.  5. Patient will demonstrate independence with self mobilization of right and left hip.    PLAN   Improve hip flexor and lumbar flexibility/glute and core strength.     Zainab Brit, PT, DPT

## 2022-04-27 ENCOUNTER — CLINICAL SUPPORT (OUTPATIENT)
Dept: REHABILITATION | Facility: HOSPITAL | Age: 52
End: 2022-04-27
Payer: OTHER GOVERNMENT

## 2022-04-27 DIAGNOSIS — R29.898 DECREASED STRENGTH OF LOWER EXTREMITY: Primary | ICD-10-CM

## 2022-04-27 PROCEDURE — 97110 THERAPEUTIC EXERCISES: CPT

## 2022-04-27 NOTE — PROGRESS NOTES
[facility]   Physical Therapy Treatment Note     Name: Kayleen Patel  Clinic Number: [id number]    Therapy Diagnosis:   Encounter Diagnosis   Name Primary?    Decreased strength of lower extremity Yes     Physician: Herbert Clay MD    Visit Date: 4/27/2022    Physician Orders: PT Eval and Treat   Medical Diagnosis from Referral: M79.651,M79.652 (ICD-10-CM) - Pain in both thighs  Evaluation Date: 4/22/2022  Authorization Period Expiration: 8/11/2022  Plan of Care Expiration: 7/14/2022  Progress Note Due: 10th visit  Visit # / Visits authorized: 3/15   FOTO: 1/3     Precautions: Standard      Time In: 0900 am   Time Out: 1000 am  Total Appointment Time (timed & untimed codes): 60 minutes (2 TE)       SUBJECTIVE     Pt reports: Going to orthopedic on Monday (Dr. Richard) after physical therapy for further assessment of hip (x-rays are showing CAM bumps); symptoms improved after last visit, pt was able to tolerate the bike with less pain at [facility]   She was compliant with home exercise program.  Response to previous treatment: improvement in symptoms   Functional change: walking improved after last visit     Pain: 5/10  Location: bilateral lower legs     OBJECTIVE     Objective Measures updated at progress report unless specified.     Treatment       Kayleen received the treatments listed below:    *All exercises performed bilaterally     THERAPEUTIC EXERCISES to develop strength, ROM and flexibility for 55 minutes including :  + Aerobic activity and endurance training for reciprocal motion of lower limbs on recumbent bike x 6 min at level 4.0 at > or equal to 50 spm w/o rest to increase mobility, blood flow and improve tissue tolerance.   Prone hip flexor; 30 sec x 5 times   Prone hip ext; 30 times x 5 sec hold   Prone hamstring curl; 30 times x 5 sec hold; 4 lb AW - emphasis on eccentric lowering   SL hip abduction; 3 x 10 reps times x 3 sec hold - emphasis on  maintaining knee straight   +Posterior pelvic tilt; 5 second hold; 3 x 10 reps - heavy verbal and tactile cueing required by PT   +Posterior pelvic tilt w/ march; 3 second hold; 2 x 10 reps; pt unable to achieve 90 degrees of hip flexion with right lower extremity, only able to flex hip 45 degrees without experiencing symptoms     Resume next visit:   Clams; (1 x 10 reps) 3 sec hold   HL bridging + GTB  SLR + supine TA; (1 x 10 reps) 2 sec hold - increase in symptoms in anterior hip if supine TA is not maintained     MANUAL THERAPY TECHNIQUES including Myofacial release and Soft tissue Mobilization were applied to bilateral anterior hip for 00 minutes.  -IASTYM next visit depending on patient symptoms     NEUROMUSCULAR RE-EDUCATION ACTIVITIES to improve Balance, Coordination and Proprioception for 00 minutes.  The following were included:     Patient Education and Home Exercises     Home Exercises Provided and Patient Education Provided     Education provided:   - Continue HEP given at initial evaluation     Written Home Exercises Provided: Patient instructed to cont prior HEP. Exercises were reviewed and Kayleen was able to demonstrate them prior to the end of the session.  Kayleen demonstrated good  understanding of the education provided. See EMR under Patient Instructions for exercises provided during therapy sessions    ASSESSMENT   Minimal posterior rotation of pelvis with posterior pelvic tilts; requiring heavy verbal and tactile cueing to sustain contraction with marching.   Pt demonstrating poor hip abduction and hip extension strength with table exercises; moderate complaints of pain and requiring frequent rest breaks. Showing appropriate muscle fatigue, able to increased reps by 10 and ankle weight by 2 pounds. Will incorporate IASTYM into patient program depending on symptoms.    Kayleen Is progressing well towards her goals.   Pt prognosis is Excellent.     Pt will continue to benefit from skilled  outpatient physical therapy to address the deficits listed in the problem list box on initial evaluation, provide pt/family education and to maximize pt's level of independence in the home and community environment.     Pt's spiritual, cultural and educational needs considered and pt agreeable to plan of care and goals.     Anticipated barriers to physical therapy: None to note     Goals:  Short Term Goals (3 weeks)  1. Patient will be independent with home exercise program to supplement physical therapy treatment in improving functional status.  2. Pt will improve impaired lower extremity manual muscle tests to >/= 4/5 to improve dynamic hip support for closed chain tasks.  3. Patient will report decreased pain post workout to 4/10 pain to demonstrate improving tolerance for activity.         Long Term Goals (8 weeks)  1. Patient will return to 3x per week at her gym to demonstrate return to PLOF.  2. Pt report < 2/10 pain throughout 3 mile run.   3. Pt will improve impaired lower extremity manual muscle tests to >/= 4+/5 to improve dynamic hip support for closed chain tasks.  4. Patient will improve the total FOTO Knee Survey Score to </= 31% limited to demonstrate increased perceived functional mobility.  5. Patient will demonstrate independence with self mobilization of right and left hip.    PLAN   Improve hip flexor and lumbar flexibility/glute and core strength.     Zainab Perea, PT, DPT

## 2022-04-30 ENCOUNTER — PATIENT OUTREACH (OUTPATIENT)
Dept: ADMINISTRATIVE | Facility: OTHER | Age: 52
End: 2022-04-30
Payer: OTHER GOVERNMENT

## 2022-05-02 ENCOUNTER — CLINICAL SUPPORT (OUTPATIENT)
Dept: REHABILITATION | Facility: HOSPITAL | Age: 52
End: 2022-05-02
Payer: OTHER GOVERNMENT

## 2022-05-02 ENCOUNTER — OFFICE VISIT (OUTPATIENT)
Dept: SPORTS MEDICINE | Facility: CLINIC | Age: 52
End: 2022-05-02
Payer: OTHER GOVERNMENT

## 2022-05-02 VITALS
BODY MASS INDEX: 24.84 KG/M2 | WEIGHT: 135 LBS | SYSTOLIC BLOOD PRESSURE: 127 MMHG | RESPIRATION RATE: 18 BRPM | DIASTOLIC BLOOD PRESSURE: 84 MMHG | HEIGHT: 62 IN | HEART RATE: 72 BPM

## 2022-05-02 DIAGNOSIS — M25.551 CHRONIC HIP PAIN, BILATERAL: Primary | ICD-10-CM

## 2022-05-02 DIAGNOSIS — G89.29 CHRONIC HIP PAIN, BILATERAL: Primary | ICD-10-CM

## 2022-05-02 DIAGNOSIS — M25.552 CHRONIC HIP PAIN, BILATERAL: Primary | ICD-10-CM

## 2022-05-02 DIAGNOSIS — M25.559 HIP PAIN: ICD-10-CM

## 2022-05-02 DIAGNOSIS — R29.898 DECREASED STRENGTH OF LOWER EXTREMITY: Primary | ICD-10-CM

## 2022-05-02 PROCEDURE — 99999 PR PBB SHADOW E&M-EST. PATIENT-LVL IV: ICD-10-PCS | Mod: PBBFAC,,, | Performed by: PHYSICIAN ASSISTANT

## 2022-05-02 PROCEDURE — 97110 THERAPEUTIC EXERCISES: CPT

## 2022-05-02 PROCEDURE — 99999 PR PBB SHADOW E&M-EST. PATIENT-LVL IV: CPT | Mod: PBBFAC,,, | Performed by: PHYSICIAN ASSISTANT

## 2022-05-02 PROCEDURE — 99214 OFFICE O/P EST MOD 30 MIN: CPT | Mod: PBBFAC | Performed by: PHYSICIAN ASSISTANT

## 2022-05-02 PROCEDURE — 99204 PR OFFICE/OUTPT VISIT, NEW, LEVL IV, 45-59 MIN: ICD-10-PCS | Mod: S$PBB,,, | Performed by: PHYSICIAN ASSISTANT

## 2022-05-02 PROCEDURE — 99204 OFFICE O/P NEW MOD 45 MIN: CPT | Mod: S$PBB,,, | Performed by: PHYSICIAN ASSISTANT

## 2022-05-02 RX ORDER — OMEPRAZOLE 20 MG/1
20 CAPSULE, DELAYED RELEASE ORAL DAILY
Qty: 30 CAPSULE | Refills: 0 | Status: SHIPPED | OUTPATIENT
Start: 2022-05-02 | End: 2022-06-07 | Stop reason: ALTCHOICE

## 2022-05-02 RX ORDER — IBUPROFEN 600 MG/1
600 TABLET ORAL EVERY 8 HOURS PRN
Qty: 30 TABLET | Refills: 0 | Status: SHIPPED | OUTPATIENT
Start: 2022-05-02 | End: 2022-07-07

## 2022-05-02 NOTE — PROGRESS NOTES
[facility]   Physical Therapy Treatment Note     Name: Kayleen Patel  Clinic Number: [id number]    Therapy Diagnosis:   Encounter Diagnosis   Name Primary?    Decreased strength of lower extremity Yes     Physician: Herbert Clay MD    Visit Date: 5/2/2022    Physician Orders: PT Eval and Treat   Medical Diagnosis from Referral: M79.651,M79.652 (ICD-10-CM) - Pain in both thighs  Evaluation Date: 4/22/2022  Authorization Period Expiration: 8/11/2022  Plan of Care Expiration: 7/14/2022  Progress Note Due: 10th visit  Visit # / Visits authorized: 4/15   FOTO: 1/3     Precautions: Standard      Time In: 0800 am   Time Out: 0900 am  Total Appointment Time (timed & untimed codes): 60 minutes       SUBJECTIVE     Pt reports: Strength continuing to improve with therapy but patient experiencing most of her pain when sleeping at night. States it wakes her up in the middle of the night and it is hard for her to comfortable. Pt states she will be going to the orthopedic today to have her hips assessed.   She was compliant with home exercise program.  Response to previous treatment: improvement in symptoms   Functional change: walking improved after last visit     Pain: 3/10  Location: bilateral lower legs     OBJECTIVE     Objective Measures updated at progress report unless specified.     Treatment       Kayleen received the treatments listed below:    *All exercises performed bilaterally     THERAPEUTIC EXERCISES to develop strength, ROM and flexibility for 55 minutes including :  Aerobic activity and endurance training for reciprocal motion of lower limbs on recumbent bike x 6 min at level 7.0 at > or equal to 50 spm w/o rest to increase mobility, blood flow and improve tissue tolerance.   Prone hip flexor; 30 sec x 5 times   Prone hip ext; 20 times x 5 sec hold    Prone hamstring curl; 3 x 10 reps; 5 lb AW - emphasis on eccentric lowering   SL hip abduction; 3 x 10 reps times  x 3 sec hold - emphasis on maintaining knee straight   Posterior pelvic tilt (GTB) + hip ABD; 5 second hold; 3 x 10 reps - heavy verbal and tactile cueing required by PT   Posterior pelvic tilt w/ march; 3 second hold; 2 x 10 reps; pt unable to achieve 90 degrees of hip flexion with right lower extremity, only able to flex hip 45 degrees without experiencing symptoms   HL bridging w/ GTB; 1 x 10 reps; 5 sec hold     Resume next visit:   Clams; (1 x 10 reps) 3 sec hold   SLR + supine TA; (1 x 10 reps) 2 sec hold - increase in symptoms in anterior hip if supine TA is not maintained     MANUAL THERAPY TECHNIQUES including Myofacial release and Soft tissue Mobilization were applied to bilateral anterior hip for 00 minutes.  -IASTYM next visit depending on patient symptoms     NEUROMUSCULAR RE-EDUCATION ACTIVITIES to improve Balance, Coordination and Proprioception for 00 minutes.  The following were included:     Patient Education and Home Exercises     Home Exercises Provided and Patient Education Provided     Education provided:   - Continue HEP given at initial evaluation     Written Home Exercises Provided: Patient instructed to cont prior HEP. Exercises were reviewed and Kayleen was able to demonstrate them prior to the end of the session.  Kayleen demonstrated good  understanding of the education provided. See EMR under Patient Instructions for exercises provided during therapy sessions    ASSESSMENT   Improvement in active posterior pelvic tilt, requiring fewer cues to maintain TA contraction. Pt requiring heavy verbal cueing during bridging to avoid hip flexor pain. Pt experiencing no pain when cued to contract TA + glute squeeze + hip ABD then initiate hip extension. Pt limited in hip extension range due to inflammation of right hip flexor.   Pt still demonstrating poor hip abduction and hip extension strength with table exercises; moderate complaints of pain and requiring frequent rest breaks. Showing  appropriate muscle fatigue, able to increased reps by 10 and ankle weight by 1 pound. Will incorporate IASTYM into patient program depending on symptoms.    Kayleen Is progressing well towards her goals.   Pt prognosis is Excellent.     Pt will continue to benefit from skilled outpatient physical therapy to address the deficits listed in the problem list box on initial evaluation, provide pt/family education and to maximize pt's level of independence in the home and community environment.     Pt's spiritual, cultural and educational needs considered and pt agreeable to plan of care and goals.     Anticipated barriers to physical therapy: None to note     Goals:  Short Term Goals (3 weeks)  1. Patient will be independent with home exercise program to supplement physical therapy treatment in improving functional status.  2. Pt will improve impaired lower extremity manual muscle tests to >/= 4/5 to improve dynamic hip support for closed chain tasks.  3. Patient will report decreased pain post workout to 4/10 pain to demonstrate improving tolerance for activity.         Long Term Goals (8 weeks)  1. Patient will return to 3x per week at her gym to demonstrate return to PLOF.  2. Pt report < 2/10 pain throughout 3 mile run.   3. Pt will improve impaired lower extremity manual muscle tests to >/= 4+/5 to improve dynamic hip support for closed chain tasks.  4. Patient will improve the total FOTO Knee Survey Score to </= 31% limited to demonstrate increased perceived functional mobility.  5. Patient will demonstrate independence with self mobilization of right and left hip.    PLAN   Improve hip flexor and lumbar flexibility/glute and core strength.     Zainab Perea, PT, DPT

## 2022-05-04 ENCOUNTER — CLINICAL SUPPORT (OUTPATIENT)
Dept: REHABILITATION | Facility: HOSPITAL | Age: 52
End: 2022-05-04
Payer: OTHER GOVERNMENT

## 2022-05-04 DIAGNOSIS — R29.898 DECREASED STRENGTH OF LOWER EXTREMITY: Primary | ICD-10-CM

## 2022-05-04 PROCEDURE — 97110 THERAPEUTIC EXERCISES: CPT

## 2022-05-04 NOTE — PROGRESS NOTES
[facility]   Physical Therapy Treatment Note     Name: Kayleen Patel  Clinic Number: [id number]    Therapy Diagnosis:   Encounter Diagnosis   Name Primary?    Decreased strength of lower extremity Yes     Physician: Herbert Clay MD    Visit Date: 5/4/2022    Physician Orders: PT Eval and Treat   Medical Diagnosis from Referral: M79.651,M79.652 (ICD-10-CM) - Pain in both thighs  Evaluation Date: 4/22/2022  Authorization Period Expiration: 8/11/2022  Plan of Care Expiration: 7/14/2022  Progress Note Due: 10th visit  Visit # / Visits authorized: 4/15   FOTO: 1/3     Precautions: Standard      Time In: 9:00 am   Time Out: 10:00 am  Total Appointment Time (timed & untimed codes): 30 minutes       SUBJECTIVE     Pt reports: she is hurting today more so than she has in a while. Patient reports she had her MD appointment. Patient reports MD thinks she has an overuse injury.   She was compliant with home exercise program.  Response to previous treatment: improvement in symptoms   Functional change: walking improved after last visit     Pain: 5/10  Location: bilateral lower legs     OBJECTIVE     Objective Measures updated at progress report unless specified.     Treatment       Kayleen received the treatments listed below:    *All exercises performed bilaterally     THERAPEUTIC EXERCISES to develop strength, ROM and flexibility for 60 minutes including :  Aerobic activity and endurance training for reciprocal motion of lower limbs on recumbent bike x 8 min at level 7.0 at > or equal to 50 spm w/o rest to increase mobility, blood flow and improve tissue tolerance.   Prone hip flexor; 30 sec x 5 times   Prone hip ext; 20 times x 5 sec hold    Prone hamstring curl; 3 x 10 reps; 5 lb AW - emphasis on eccentric lowering   SL hip abduction; 3 x 10 reps times x 3 sec hold - emphasis on maintaining knee straight   Posterior pelvic tilt w/ march; 3 second hold; 2 x 10 reps; pt unable  to achieve 90 degrees of hip flexion with right lower extremity, only able to flex hip 45 degrees without experiencing symptoms     Resume next visit:   Clams; (1 x 10 reps) 3 sec hold   SLR + supine TA; (1 x 10 reps) 2 sec hold - increase in symptoms in anterior hip if supine TA is not maintained   HL bridging w/ GTB; 1 x 10 reps; 5 sec hold   Posterior pelvic tilt (GTB) + hip ABD; 5 second hold; 3 x 10 reps - heavy verbal and tactile cueing required by PT     MANUAL THERAPY TECHNIQUES including Myofacial release and Soft tissue Mobilization were applied to bilateral anterior hip for 00 minutes.  -IASTYM next visit depending on patient symptoms     NEUROMUSCULAR RE-EDUCATION ACTIVITIES to improve Balance, Coordination and Proprioception for 00 minutes.  The following were included:     Patient Education and Home Exercises     Home Exercises Provided and Patient Education Provided     Education provided:   - Continue HEP given at initial evaluation     Written Home Exercises Provided: Patient instructed to cont prior HEP. Exercises were reviewed and Kayleen was able to demonstrate them prior to the end of the session.  Kayleen demonstrated good  understanding of the education provided. See EMR under Patient Instructions for exercises provided during therapy sessions    ASSESSMENT   Fair tolerance for today's session, however able to complete all activities throughout therapeutic exercise. Appropriate fatigue achieved following posterolateral hip and glute strengthening. Adequately challenged at this time.     Kayleen Is progressing well towards her goals.   Pt prognosis is Excellent.     Pt will continue to benefit from skilled outpatient physical therapy to address the deficits listed in the problem list box on initial evaluation, provide pt/family education and to maximize pt's level of independence in the home and community environment.     Pt's spiritual, cultural and educational needs considered and pt  agreeable to plan of care and goals.     Anticipated barriers to physical therapy: None to note     Goals:  Short Term Goals (3 weeks)  1. Patient will be independent with home exercise program to supplement physical therapy treatment in improving functional status.  2. Pt will improve impaired lower extremity manual muscle tests to >/= 4/5 to improve dynamic hip support for closed chain tasks.  3. Patient will report decreased pain post workout to 4/10 pain to demonstrate improving tolerance for activity.         Long Term Goals (8 weeks)  1. Patient will return to 3x per week at her gym to demonstrate return to PLOF.  2. Pt report < 2/10 pain throughout 3 mile run.   3. Pt will improve impaired lower extremity manual muscle tests to >/= 4+/5 to improve dynamic hip support for closed chain tasks.  4. Patient will improve the total FOTO Knee Survey Score to </= 31% limited to demonstrate increased perceived functional mobility.  5. Patient will demonstrate independence with self mobilization of right and left hip.    PLAN   Improve hip flexor and lumbar flexibility/glute and core strength.     Elise Hernandez, PT, DPT

## 2022-05-07 NOTE — PROGRESS NOTES
CC: Bilateral hip pain (referral Dr. Sanchez)    HPI:   Kayleen Patel is a pleasant 51 y.o. ([place]) who reports to clinic with bilateral hip pain. No trauma[facility]    She reports 1 year of aching anterior and lateral hip pain that is worse with activity and has been progressing over that time. Right side is equal to left side. She reports that she has muscle spasms at night. She is very active and reports standing up for work for the last 2 years. She has run all her life. She is doing PT which she feels is helping. She also does orange theory and [employer] works exercises classes.     She feels as though her hip flexors are very tight.     Today the patient rates pain at a 10/10 on visual analog scale.      Affecting ADLs and exercising      Review of Systems   Constitution: Negative. Negative for chills, fever and night sweats.   HENT: Negative for congestion and headaches.    Eyes: Negative for blurred vision, left vision loss and right vision loss.   Cardiovascular: Negative for chest pain and syncope.   Respiratory: Negative for cough and shortness of breath.    Endocrine: Negative for polydipsia, polyphagia and polyuria.   Hematologic/Lymphatic: Negative for bleeding problem. Does not bruise/bleed easily.   Skin: Negative for dry skin, itching and rash.   Musculoskeletal: Negative for falls and muscle weakness.   Gastrointestinal: Negative for abdominal pain and bowel incontinence.   Genitourinary: Negative for bladder incontinence and nocturia.   Neurological: Negative for disturbances in coordination, loss of balance and seizures.   Psychiatric/Behavioral: Negative for depression. The patient does not have insomnia.    Allergic/Immunologic: Negative for hives and persistent infections.   All other systems negative.    PAST MEDICAL HISTORY:   Past Medical History:   Diagnosis Date    Abnormal cervical Papanicolaou smear '88, '04    Colpo/Cryo    Chronic diarrhea 2/12/2015    GERD  (gastroesophageal reflux disease)     History of acute PID     Hypertension     Thrombocytosis      PAST SURGICAL HISTORY:   Past Surgical History:   Procedure Laterality Date    BREAST BIOPSY Right 2021    stereo benign    CARPAL TUNNEL RELEASE Left 2021    Procedure: RELEASE, CARPAL TUNNEL;  Surgeon: Alla Goff MD;  Location: [facility];  Service: Orthopedics;  Laterality: Left;    GANGLION CYST EXCISION      INJECTION OF STEROID Right 2021    Procedure: INJECTION, STEROID-Right carpal tunnel;  Surgeon: Alla Goff MD;  [facility];  Service: Orthopedics;  Laterality: Right;    REFRACTIVE SURGERY Bilateral     Dr. Vazquez Forebs     TONSILLECTOMY       FAMILY HISTORY:   Family History   Adopted: Yes     SOCIAL HISTORY:   Social History     Socioeconomic History    Marital status: Single   Tobacco Use    Smoking status: Former Smoker     Packs/day: 1.00     Years: 23.00     Pack years: 23.00     Quit date: 2007     Years since quittin.8    Smokeless tobacco: Never Used   Substance and Sexual Activity    Alcohol use: Yes     Comment: Social    Drug use: No    Sexual activity: Yes     Partners: Male     Birth control/protection: OCP   Social History Narrative    Retired [employer]. Currently, working for the [employer]. Criminal justice degree from [employer].          Social Determinants of Health     Financial Resource Strain: Low Risk     Difficulty of Paying Living Expenses: Not hard at all   Food Insecurity: No Food Insecurity    Worried About Running Out of Food in the Last Year: Never true    Ran Out of Food in the Last Year: Never true   Transportation Needs: No Transportation Needs    Lack of Transportation (Medical): No    Lack of Transportation (Non-Medical): No   Physical Activity: Sufficiently Active    Days of Exercise per Week: 4 days    Minutes of Exercise per Session: 50 min   Stress: No Stress Concern Present    Feeling of Stress : Not  at all   Social Connections: Unknown    Frequency of Communication with Friends and Family: More than three times a week    Frequency of Social Gatherings with Friends and Family: Twice a week    Active Member of Clubs or Organizations: No    Attends Club or Organization Meetings: Never    Marital Status:    Housing Stability: Low Risk     Unable to Pay for Housing in the Last Year: No    Number of Places Lived in the Last Year: 1    Unstable Housing in the Last Year: No       MEDICATIONS:   Current Outpatient Medications:     acetaminophen (TYLENOL) 500 MG tablet, Take 2 tablets (1,000 mg total) by mouth every 8 (eight) hours as needed for Pain., Disp: 30 tablet, Rfl: 0    CALCIUM CARBONATE (CALCIUM 500 ORAL), Take 1 capsule by mouth., Disp: , Rfl:     chlorthalidone (HYGROTEN) 25 MG Tab, Take 1 tablet (25 mg total) by mouth once daily., Disp: 90 tablet, Rfl: 3    cyanocobalamin (VITAMIN B-12) 100 MCG tablet, Take 100 mcg by mouth once daily., Disp: , Rfl:     ferrous sulfate 325 mg (65 mg iron) Tab tablet, Take 325 mg by mouth daily with breakfast., Disp: , Rfl:     OMEGA-3/DHA/EPA/FISH OIL (OMEGA-3 FISH OIL ORAL), Take 1 capsule by mouth., Disp: , Rfl:     traMADoL (ULTRAM) 50 mg tablet, Take 1 tablet (50 mg total) by mouth every 6 (six) hours., Disp: 20 tablet, Rfl: 0    valsartan (DIOVAN) 160 MG tablet, Take 1 tablet (160 mg total) by mouth once daily., Disp: 90 tablet, Rfl: 3    varicella-zoster gE-AS01B, PF, (SHINGRIX, PF,) 50 mcg/0.5 mL injection, , Disp: , Rfl:     ibuprofen (ADVIL,MOTRIN) 600 MG tablet, Take 1 tablet (600 mg total) by mouth every 8 (eight) hours as needed for Pain. Take with food., Disp: 30 tablet, Rfl: 0    omeprazole (PRILOSEC) 20 MG capsule, Take 1 capsule (20 mg total) by mouth once daily., Disp: 30 capsule, Rfl: 0  ALLERGIES:   Review of patient's allergies indicates:   Allergen Reactions    Penicillins Hives    Adhesive Rash       VITAL SIGNS: /84   " Pulse 72   Resp 18   Ht 5' 2" (1.575 m)   Wt 61.2 kg (135 lb)   LMP 02/07/2016 (Approximate)   BMI 24.69 kg/m²        PHYSICAL EXAM /  HIP  PHYSICAL EXAMINATION  General:  The patient is alert and oriented x 3.  Mood is pleasant.  Observation of ears, eyes and nose reveal no gross abnormalities.  HEENT: NCAT, sclera nonicteric  Lungs: Respirations are equal and unlabored..    Bilateral HIP EXAMINATION     OBSERVATION / INSPECTION  Gait:   Nonantalgic   Alignment:  Neutral   Scars:   None   Muscle atrophy: None   Effusion:  None   Warmth:  None   Discoloration:   None   Leg lengths:   Equal   Pelvis:   Level     TENDERNESS / CREPITUS (T/C):      T / C  Trochanteric bursa   - / -  Piriformis    - / -  SI joint    - / -  Psoas tendon   - / -  Rectus insertion  - / -  Adductor insertion  + / - bilaterally  Pubic symphysis  - / -  IT band                                   - / -  Gluteus tendons                     - / -    Proximal hip flexors              +/- bilaterally    ROM: (* = pain)  Bilaterally  Flexion:    110 degrees*  External rotation: 40 degrees  Internal rotation with axial load: 25 degrees*  Internal rotation without axial load: 35 degrees*  Abduction:  40 degrees  Adduction:   20 degrees    SPECIAL TESTS:  Pain w/ forced internal rotation (FADIR): + bilaterally    Pain w/ forced external rotation (CHANDA): +  Circumduction test:    -  Stinchfield test:    Negative   Log roll:      Negative   Snapping hip (internal):   Negative   Sit-up pain:     Negative   Resisted sit-up pain:    Negative   Resisted sit-up with adductor contraction pain:  Negative   Step-down test:    +  Trendelenburg test:    Negative  Bridge test     +     EXTREMITY NEURO-VASCULAR EXAMINATION:   Sensation:  Grossly intact to light touch all dermatomal regions.   Motor Function:  Fully intact motor function at hip, knee, foot and ankle    DTRs;  quadriceps and  achilles 2+.  No clonus and downgoing Babinski.    Vascular " "status:  DP and PT pulses 2+, brisk capillary refill, symmetric.    Skin:  intact, compartments soft.    OTHER FINDINGS:      XRAYS:  2 hip bilateral views were independently reviewed and interpreted by myself.   No fractures.  The appearance of the femoral head-neck junctions bilaterally suggest "cam bumps" and BARBRA anatomy.  Question minimal narrowing of right and left superolateral hip joint spaces and bilateral acetabular roof spurring with some spurring about the right femoral neck spurring on the right about the inferior joint space.    ASSESSMENT:    1.Bilateral hip pain, Chronic  hip abd/core weakness    She is having both intra-articular and extra-articular pain.     PLAN:  1. Continue PT for Bilateral hip abd/core strengthening and hip flexor stretching. Dry needling and modalities if needed.   2. She previously has stomach ulcers but reports that was many years ago and she is fine to take NSAIDs.  Ibuprofen 600mg BID to TID for next 2 weeks.   Take prilosec on days taking NSAIDs.     3. Long discussion about activity modifications and to stop doing things that exacerbate her hip pain and flares up her muscles.   No running.     4. Explained to her that intra-articular CSI will help some but not all her pain at this time.  5. Ice compresses.   6. RTC in 3 weeks to see if things calm down some with rest and no exacerbating activities.     All questions were answered, pt will contact us for questions or concerns in the interim.    I made the decision to obtain old records of the patient including previous notes and imaging. New imaging was ordered today of the extremity or extremities evaluated. I independently reviewed and interpreted the radiographs and/or MRIs today as well as prior imaging.      "

## 2022-05-10 NOTE — PROGRESS NOTES
[facility]   Physical Therapy Treatment Note     Name: Kayleen Patel  Clinic Number: [id number]    Therapy Diagnosis:   Encounter Diagnosis   Name Primary?    Decreased strength of lower extremity Yes     Physician: Herbert Clay MD    Visit Date: 5/11/2022    Physician Orders: PT Eval and Treat   Medical Diagnosis from Referral: M79.651,M79.652 (ICD-10-CM) - Pain in both thighs  Evaluation Date: 4/22/2022  Authorization Period Expiration: 8/11/2022  Plan of Care Expiration: 7/14/2022  Progress Note Due: 10th visit  Visit # / Visits authorized: 5/15   FOTO: 1/3     Precautions: Standard      Time In: 8:00 am   Time Out: 8:55 am  Total Appointment Time (timed & untimed codes): 55 minutes     SUBJECTIVE     Pt reports: she is doing okay today with just a little bit of pain. Pt stated she is doing the stretch on her stomach pulling her foot back and the stretch where she lets her leg hang off the edge of the bed at home for her HEP. Pt stated she is also going to the gym , went to [employer] yesterday although does not jog on the treadmill because the doctor told her not to.   She was compliant with home exercise program.  Response to previous treatment: improvement in symptoms   Functional change: walking improved after last visit     Pain: 2/10  Location: bilateral lower legs     OBJECTIVE     Objective Measures updated at progress report unless specified.     Treatment     Kayleen received the treatments listed below:    *All exercises performed bilaterally     THERAPEUTIC EXERCISES to develop strength, ROM and flexibility for 55 minutes including :  Aerobic activity and endurance training for reciprocal motion of lower limbs on recumbent bike x 7 min at level 7.0 at > or equal to 50 spm w/o rest to increase mobility, blood flow and improve tissue tolerance.   Prone hip flexor; 30 sec x 5 times   Prone hip ext; 20 times x 5 sec hold    Prone hamstring curl; 3 x 10  reps; 5 lb AW - emphasis on eccentric lowering   SL hip abduction; 3 x 10 reps times x 3 sec hold - emphasis on maintaining knee straight   Posterior pelvic tilt w/ march; 3 second hold; 2 x 10 reps; pt unable to achieve 90 degrees of hip flexion with right lower extremity, only able to flex hip 45 degrees without experiencing symptoms   SL clamshells : x 10 reps B   HL bridging w/ GTB; 1 x 15 reps; 5 sec hold   Posterior pelvic tilt (GTB) + hip ABD; 5 second hold; 3 x 10 reps - heavy verbal and tactile cueing required by PT       Resume next visit:   SLR + supine TA; (1 x 10 reps) 2 sec hold - increase in symptoms in anterior hip if supine TA is not maintained       MANUAL THERAPY TECHNIQUES including Myofacial release and Soft tissue Mobilization were applied to bilateral anterior hip for 00 minutes.  -IASTYM next visit depending on patient symptoms     NEUROMUSCULAR RE-EDUCATION ACTIVITIES to improve Balance, Coordination and Proprioception for 00 minutes.  The following were included:     Patient Education and Home Exercises     Home Exercises Provided and Patient Education Provided     Education provided:   - Continue HEP given at initial evaluation     Written Home Exercises Provided: Patient instructed to cont prior HEP. Exercises were reviewed and Kayleen was able to demonstrate them prior to the end of the session.  Kayleen demonstrated good  understanding of the education provided. See EMR under Patient Instructions for exercises provided during therapy sessions    ASSESSMENT     Pt presents with decreased pain and an improved tolerance to session today . Pt was able to tolerate re-addition of SL clamshells , PPT bridging and PPT supine clamshells which she demonstrated good form . Pt was appropriately challenged with above exercises and would benefit from continued focus on core and glute strengthening.     Kayleen Is progressing well towards her goals.   Pt prognosis is Excellent.     Pt will continue  to benefit from skilled outpatient physical therapy to address the deficits listed in the problem list box on initial evaluation, provide pt/family education and to maximize pt's level of independence in the home and community environment.   Pt's spiritual, cultural and educational needs considered and pt agreeable to plan of care and goals.     Anticipated barriers to physical therapy: None to note     Goals:  Short Term Goals (3 weeks)  1. Patient will be independent with home exercise program to supplement physical therapy treatment in improving functional status.  2. Pt will improve impaired lower extremity manual muscle tests to >/= 4/5 to improve dynamic hip support for closed chain tasks.  3. Patient will report decreased pain post workout to 4/10 pain to demonstrate improving tolerance for activity.         Long Term Goals (8 weeks)  1. Patient will return to 3x per week at her gym to demonstrate return to PLOF.  2. Pt report < 2/10 pain throughout 3 mile run.   3. Pt will improve impaired lower extremity manual muscle tests to >/= 4+/5 to improve dynamic hip support for closed chain tasks.  4. Patient will improve the total FOTO Knee Survey Score to </= 31% limited to demonstrate increased perceived functional mobility.  5. Patient will demonstrate independence with self mobilization of right and left hip.    PLAN   Improve hip flexor and lumbar flexibility/glute and core strength.     Nneka Blanco, PTA,

## 2022-05-11 ENCOUNTER — CLINICAL SUPPORT (OUTPATIENT)
Dept: REHABILITATION | Facility: HOSPITAL | Age: 52
End: 2022-05-11
Payer: OTHER GOVERNMENT

## 2022-05-11 DIAGNOSIS — R29.898 DECREASED STRENGTH OF LOWER EXTREMITY: Primary | ICD-10-CM

## 2022-05-11 PROCEDURE — 97110 THERAPEUTIC EXERCISES: CPT | Mod: CQ

## 2022-05-13 ENCOUNTER — PATIENT MESSAGE (OUTPATIENT)
Dept: OBSTETRICS AND GYNECOLOGY | Facility: CLINIC | Age: 52
End: 2022-05-13
Payer: OTHER GOVERNMENT

## 2022-05-13 DIAGNOSIS — Z12.31 SCREENING MAMMOGRAM, ENCOUNTER FOR: Primary | ICD-10-CM

## 2022-05-17 ENCOUNTER — PATIENT MESSAGE (OUTPATIENT)
Dept: OBSTETRICS AND GYNECOLOGY | Facility: CLINIC | Age: 52
End: 2022-05-17
Payer: OTHER GOVERNMENT

## 2022-05-18 ENCOUNTER — CLINICAL SUPPORT (OUTPATIENT)
Dept: REHABILITATION | Facility: HOSPITAL | Age: 52
End: 2022-05-18
Payer: OTHER GOVERNMENT

## 2022-05-18 DIAGNOSIS — R29.898 DECREASED STRENGTH OF LOWER EXTREMITY: Primary | ICD-10-CM

## 2022-05-18 PROCEDURE — 97110 THERAPEUTIC EXERCISES: CPT

## 2022-05-18 NOTE — PROGRESS NOTES
[facility]   Physical Therapy Treatment Note     Name: Kayleen Patel  Clinic Number: [id number]    Therapy Diagnosis:   Encounter Diagnosis   Name Primary?    Decreased strength of lower extremity Yes     Physician: Herbert Clay MD    Visit Date: 5/18/2022    Physician Orders: PT Eval and Treat   Medical Diagnosis from Referral: M79.651,M79.652 (ICD-10-CM) - Pain in both thighs  Evaluation Date: 4/22/2022  Authorization Period Expiration: 8/11/2022  Plan of Care Expiration: 7/14/2022  Progress Note Due: 10th visit  Visit # / Visits authorized: 5/15   FOTO: 1/3     Precautions: Standard      Time In: 8:00 am   Time Out: 8:55 am  Total Appointment Time (timed & untimed codes): 55 minutes     SUBJECTIVE     Pt reports: she is hurting today.  She was compliant with home exercise program.  Response to previous treatment: improvement in symptoms   Functional change: walking improved after last visit     Pain: 2/10  Location: bilateral lower legs     OBJECTIVE     Objective Measures updated at progress report unless specified.     Treatment     Kayleen received the treatments listed below:    *All exercises performed bilaterally     THERAPEUTIC EXERCISES to develop strength, ROM and flexibility for 55 minutes including :  Aerobic activity and endurance training for reciprocal motion of lower limbs on recumbent bike x 8 min at level 7.0 at > or equal to 50 spm w/o rest to increase mobility, blood flow and improve tissue tolerance.   Prone hip flexor; 45 sec x 5 times   Prone hip ext; 20 times x 5 sec hold  -alternate without break  Prone hamstring curl; 3 x 10 reps; 5 lb AW - emphasis on eccentric lowering- not today  SL hip abduction; 2 x 10 reps times x 3 sec hold - emphasis on maintaining knee straight - decreased sets per patient request  Posterior pelvic tilt w/ march; 3 second hold; 2 x 10 reps; - improved range tolerance  SL clamshells : 5 seconds, 2x 10 reps B   HL  bridging w/ GTB; 1 x 15 reps; 5 sec hold   Posterior pelvic tilt (GTB) + hip ABD; 5 second hold; 3 x 10 reps - heavy verbal and tactile cueing required by PT   Quadruped rocking with resistance: 15x each - cue for pelvic tilt- PT holding resistance band posteriorly    Resume next visit:   SLR + supine TA; (1 x 10 reps) 2 sec hold - increase in symptoms in anterior hip if supine TA is not maintained       MANUAL THERAPY TECHNIQUES including Myofacial release and Soft tissue Mobilization were applied to bilateral anterior hip for 00 minutes.  -IASTYM next visit depending on patient symptoms     NEUROMUSCULAR RE-EDUCATION ACTIVITIES to improve Balance, Coordination and Proprioception for 00 minutes.  The following were included:     Patient Education and Home Exercises     Home Exercises Provided and Patient Education Provided     Education provided:   - Continue HEP given at initial evaluation     Written Home Exercises Provided: Patient instructed to cont prior HEP. Exercises were reviewed and Kayleen was able to demonstrate them prior to the end of the session.  Kayleen demonstrated good  understanding of the education provided. See EMR under Patient Instructions for exercises provided during therapy sessions    ASSESSMENT     Patient responded well to resisted quadruped rocking post recumbent bike; patient noted improved tolerance for therapeutic exercise compared to previous sessions. Difficulty maintaining pelvic tilt in quadruped requiring several cues; appropriate onset of fatigue with current reps.    Kayleen Is progressing well towards her goals.   Pt prognosis is Excellent.     Pt will continue to benefit from skilled outpatient physical therapy to address the deficits listed in the problem list box on initial evaluation, provide pt/family education and to maximize pt's level of independence in the home and community environment.   Pt's spiritual, cultural and educational needs considered and pt agreeable  to plan of care and goals.     Anticipated barriers to physical therapy: None to note     Goals:  Short Term Goals (3 weeks)  1. Patient will be independent with home exercise program to supplement physical therapy treatment in improving functional status.  2. Pt will improve impaired lower extremity manual muscle tests to >/= 4/5 to improve dynamic hip support for closed chain tasks.  3. Patient will report decreased pain post workout to 4/10 pain to demonstrate improving tolerance for activity.         Long Term Goals (8 weeks)  1. Patient will return to 3x per week at her gym to demonstrate return to PLOF.  2. Pt report < 2/10 pain throughout 3 mile run.   3. Pt will improve impaired lower extremity manual muscle tests to >/= 4+/5 to improve dynamic hip support for closed chain tasks.  4. Patient will improve the total FOTO Knee Survey Score to </= 31% limited to demonstrate increased perceived functional mobility.  5. Patient will demonstrate independence with self mobilization of right and left hip.    PLAN   Improve hip flexor and lumbar flexibility/glute and core strength.     Elise Hernandez, PT,

## 2022-05-22 ENCOUNTER — NURSE TRIAGE (OUTPATIENT)
Dept: ADMINISTRATIVE | Facility: CLINIC | Age: 52
End: 2022-05-22
Payer: OTHER GOVERNMENT

## 2022-05-22 ENCOUNTER — PATIENT MESSAGE (OUTPATIENT)
Dept: INTERNAL MEDICINE | Facility: CLINIC | Age: 52
End: 2022-05-22
Payer: OTHER GOVERNMENT

## 2022-05-22 DIAGNOSIS — Z00.00 ANNUAL PHYSICAL EXAM: Primary | ICD-10-CM

## 2022-05-22 DIAGNOSIS — R73.9 ELEVATED BLOOD SUGAR: ICD-10-CM

## 2022-05-22 DIAGNOSIS — I10 HYPERTENSION, ESSENTIAL: ICD-10-CM

## 2022-05-22 NOTE — TELEPHONE ENCOUNTER
F/u call requested. Upon calling pt states already completed OAC visit & MD was able to prescribe & assist, no longer needed assistance. Pt agrees to callback if further assistance needed.     Reason for Disposition   Caller has already spoken with the PCP and has no further questions.    Protocols used: NO CONTACT OR DUPLICATE CONTACT CALL-A-

## 2022-05-23 ENCOUNTER — OFFICE VISIT (OUTPATIENT)
Dept: SPORTS MEDICINE | Facility: CLINIC | Age: 52
End: 2022-05-23
Payer: OTHER GOVERNMENT

## 2022-05-23 ENCOUNTER — PATIENT MESSAGE (OUTPATIENT)
Dept: INTERNAL MEDICINE | Facility: CLINIC | Age: 52
End: 2022-05-23
Payer: OTHER GOVERNMENT

## 2022-05-23 VITALS
WEIGHT: 130.13 LBS | DIASTOLIC BLOOD PRESSURE: 72 MMHG | HEIGHT: 62 IN | BODY MASS INDEX: 23.95 KG/M2 | SYSTOLIC BLOOD PRESSURE: 109 MMHG | HEART RATE: 73 BPM

## 2022-05-23 DIAGNOSIS — G89.29 CHRONIC HIP PAIN, BILATERAL: Primary | ICD-10-CM

## 2022-05-23 DIAGNOSIS — R42 VERTIGO: Primary | ICD-10-CM

## 2022-05-23 DIAGNOSIS — M25.552 CHRONIC HIP PAIN, BILATERAL: Primary | ICD-10-CM

## 2022-05-23 DIAGNOSIS — M25.551 CHRONIC HIP PAIN, BILATERAL: Primary | ICD-10-CM

## 2022-05-23 PROCEDURE — 99214 OFFICE O/P EST MOD 30 MIN: CPT | Mod: S$PBB,,, | Performed by: PHYSICIAN ASSISTANT

## 2022-05-23 PROCEDURE — 99999 PR PBB SHADOW E&M-EST. PATIENT-LVL III: CPT | Mod: PBBFAC,,, | Performed by: PHYSICIAN ASSISTANT

## 2022-05-23 PROCEDURE — 99999 PR PBB SHADOW E&M-EST. PATIENT-LVL III: ICD-10-PCS | Mod: PBBFAC,,, | Performed by: PHYSICIAN ASSISTANT

## 2022-05-23 PROCEDURE — 99214 PR OFFICE/OUTPT VISIT, EST, LEVL IV, 30-39 MIN: ICD-10-PCS | Mod: S$PBB,,, | Performed by: PHYSICIAN ASSISTANT

## 2022-05-23 PROCEDURE — 99213 OFFICE O/P EST LOW 20 MIN: CPT | Mod: PBBFAC | Performed by: PHYSICIAN ASSISTANT

## 2022-05-23 NOTE — TELEPHONE ENCOUNTER
Can you put pt blood work in so I can have her scheduled before she see you? If not ill let her know she can wait until then

## 2022-05-23 NOTE — PROGRESS NOTES
CC: Bilateral hip pain (referral Dr. Sanchez)    HPI:   Kayleen Patel is a pleasant 51 y.o. ([employer]) current [employer] employee, who reports to clinic with bilateral hip pain. No trauma, no [facility] sxs/instabilty.    She reports 1 year of aching anterior and lateral hip pain that is worse with activity and has been progressing over that time. Right side is worse and has been hurting longer then the left side.   I last saw her 3 weeks ago and she has since been doing PT and taking ibuprofen BID. She has stopped running and is only doing elliptical and and stationary bike. She does not have any issues with using the elliptical machine. She has still been doing orange theory with modified activity.  She reports 100% improvement on the left hip and 605 improvement on the right hip. She is now only using ibuprofen once daily.       Previously:    She reports that she has muscle spasms at night. She is very active and reports standing up for work for the last 2 years. She has run all her life. She is doing PT which she feels is helping. She also does orange theory and [employer] exercises classes.     She feels as though her hip flexors are very tight.     Today the patient rates pain at a 10/10 on visual analog scale.      Affecting ADLs and exercising      Review of Systems   Constitution: Negative. Negative for chills, fever and night sweats.   HENT: Negative for congestion and headaches.    Eyes: Negative for blurred vision, left vision loss and right vision loss.   Cardiovascular: Negative for chest pain and syncope.   Respiratory: Negative for cough and shortness of breath.    Endocrine: Negative for polydipsia, polyphagia and polyuria.   Hematologic/Lymphatic: Negative for bleeding problem. Does not bruise/bleed easily.   Skin: Negative for dry skin, itching and rash.   Musculoskeletal: Negative for falls and muscle weakness.   Gastrointestinal: Negative for abdominal pain and bowel incontinence.   Genitourinary:  Negative for bladder incontinence and nocturia.   Neurological: Negative for disturbances in coordination, loss of balance and seizures.   Psychiatric/Behavioral: Negative for depression. The patient does not have insomnia.    Allergic/Immunologic: Negative for hives and persistent infections.   All other systems negative.    PAST MEDICAL HISTORY:   Past Medical History:   Diagnosis Date    Abnormal cervical Papanicolaou smear ,     Colpo/Cryo    Chronic diarrhea 2015    GERD (gastroesophageal reflux disease)     History of acute PID     Hypertension     Thrombocytosis      PAST SURGICAL HISTORY:   Past Surgical History:   Procedure Laterality Date    BREAST BIOPSY Right 2021    stereo benign    CARPAL TUNNEL RELEASE Left 2021    Procedure: RELEASE, CARPAL TUNNEL;  Surgeon: Alla Goff MD;  Location: [facility] OR;  Service: Orthopedics;  Laterality: Left;    GANGLION CYST EXCISION      INJECTION OF STEROID Right 2021    Procedure: INJECTION, STEROID-Right carpal tunnel;  Surgeon: Alla Goff MD;  Location: [facility] OR;  Service: Orthopedics;  Laterality: Right;    REFRACTIVE SURGERY Bilateral     Dr. Vazquez Forbes     TONSILLECTOMY       FAMILY HISTORY:   Family History   Adopted: Yes     SOCIAL HISTORY:   Social History     Socioeconomic History    Marital status: Single   Tobacco Use    Smoking status: Former Smoker     Packs/day: 1.00     Years: 23.00     Pack years: 23.00     Quit date: 2007     Years since quittin.8    Smokeless tobacco: Never Used   Substance and Sexual Activity    Alcohol use: Yes     Comment: Social    Drug use: No    Sexual activity: Yes     Partners: Male     Birth control/protection: OCP   Social History Narrative    Retired [employer]. Currently, working for the [employer]. Criminal justice degree from [employer].          Social Determinants of Health     Financial Resource Strain: Low Risk     Difficulty of Paying Living  Expenses: Not hard at all   Food Insecurity: No Food Insecurity    Worried About Running Out of Food in the Last Year: Never true    Ran Out of Food in the Last Year: Never true   Transportation Needs: No Transportation Needs    Lack of Transportation (Medical): No    Lack of Transportation (Non-Medical): No   Physical Activity: Sufficiently Active    Days of Exercise per Week: 4 days    Minutes of Exercise per Session: 50 min   Stress: No Stress Concern Present    Feeling of Stress : Not at all   Social Connections: Unknown    Frequency of Communication with Friends and Family: More than three times a week    Frequency of Social Gatherings with Friends and Family: Twice a week    Active Member of Clubs or Organizations: No    Attends Club or Organization Meetings: Never    Marital Status:    Housing Stability: Low Risk     Unable to Pay for Housing in the Last Year: No    Number of Places Lived in the Last Year: 1    Unstable Housing in the Last Year: No       MEDICATIONS:   Current Outpatient Medications:     acetaminophen (TYLENOL) 500 MG tablet, Take 2 tablets (1,000 mg total) by mouth every 8 (eight) hours as needed for Pain., Disp: 30 tablet, Rfl: 0    CALCIUM CARBONATE (CALCIUM 500 ORAL), Take 1 capsule by mouth., Disp: , Rfl:     chlorthalidone (HYGROTEN) 25 MG Tab, Take 1 tablet (25 mg total) by mouth once daily., Disp: 90 tablet, Rfl: 3    cyanocobalamin (VITAMIN B-12) 100 MCG tablet, Take 100 mcg by mouth once daily., Disp: , Rfl:     ferrous sulfate 325 mg (65 mg iron) Tab tablet, Take 325 mg by mouth daily with breakfast., Disp: , Rfl:     ibuprofen (ADVIL,MOTRIN) 600 MG tablet, Take 1 tablet (600 mg total) by mouth every 8 (eight) hours as needed for Pain. Take with food., Disp: 30 tablet, Rfl: 0    OMEGA-3/DHA/EPA/FISH OIL (OMEGA-3 FISH OIL ORAL), Take 1 capsule by mouth., Disp: , Rfl:     omeprazole (PRILOSEC) 20 MG capsule, Take 1 capsule (20 mg total) by mouth once  "daily., Disp: 30 capsule, Rfl: 0    traMADoL (ULTRAM) 50 mg tablet, Take 1 tablet (50 mg total) by mouth every 6 (six) hours., Disp: 20 tablet, Rfl: 0    valsartan (DIOVAN) 160 MG tablet, Take 1 tablet (160 mg total) by mouth once daily., Disp: 90 tablet, Rfl: 3    varicella-zoster gE-AS01B, PF, (SHINGRIX, PF,) 50 mcg/0.5 mL injection, , Disp: , Rfl:   ALLERGIES:   Review of patient's allergies indicates:   Allergen Reactions    Penicillins Hives    Adhesive Rash       VITAL SIGNS: /72   Pulse 73   Ht 5' 2" (1.575 m)   Wt 59 kg (130 lb 1.6 oz)   LMP 02/07/2016 (Approximate)   BMI 23.80 kg/m²        PHYSICAL EXAM /  HIP  PHYSICAL EXAMINATION  General:  The patient is alert and oriented x 3.  Mood is pleasant.  Observation of ears, eyes and nose reveal no gross abnormalities.  HEENT: NCAT, sclera nonicteric  Lungs: Respirations are equal and unlabored..    Bilateral HIP EXAMINATION     OBSERVATION / INSPECTION  Gait:   Nonantalgic   Alignment:  Neutral   Scars:   None   Muscle atrophy: None   Effusion:  None   Warmth:  None   Discoloration:   None   Leg lengths:   Equal   Pelvis:   Level     TENDERNESS / CREPITUS (T/C):      T / C  Trochanteric bursa   - / -  Piriformis    - / -  SI joint    - / -  Psoas tendon   - / -  Rectus insertion  - / -  Adductor insertion  - (improved) / -   Pubic symphysis  - / -  IT band                                   - / -  Gluteus tendons                     - / -    Proximal hip flexors              + on right only/-    ROM: (* = pain)  Bilaterally  Flexion:    120 degrees on left and 95 on right with pain  External rotation: 40 degrees  Internal rotation with axial load: 0 degrees*  Internal rotation without axial load: 10 degrees*  Abduction:  40 degrees  Adduction:   20 degrees    SPECIAL TESTS:  Pain w/ forced internal rotation (FADIR): + bilaterally    Pain w/ forced external rotation (CHANDA): +  Circumduction test:    -  Stinchfield test:    Negative   Log roll: " "     Negative   Snapping hip (internal):   Negative   Sit-up pain:     Negative   Resisted sit-up pain:    Negative   Resisted sit-up with adductor contraction pain:  Negative   Step-down test:    +  Trendelenburg test:    Negative  Bridge test     +     EXTREMITY NEURO-VASCULAR EXAMINATION:   Sensation:  Grossly intact to light touch all dermatomal regions.   Motor Function:  Fully intact motor function at hip, knee, foot and ankle    DTRs;  quadriceps and  achilles 2+.  No clonus and downgoing Babinski.    Vascular status:  DP and PT pulses 2+, brisk capillary refill, symmetric.    Skin:  intact, compartments soft.    OTHER FINDINGS:      XRAYS:  2 hip bilateral views were independently reviewed and interpreted by myself.   No fractures.  The appearance of the femoral head-neck junctions bilaterally suggest "cam bumps" and BARBRA anatomy.  Question minimal narrowing of right and left superolateral hip joint spaces and bilateral acetabular roof spurring with some spurring about the right femoral neck spurring on the right about the inferior joint space.    ASSESSMENT:    1.Bilateral hip pain, Chronic  hip abd/core weakness    She is having both intra-articular and extra-articular pain.     PLAN:  1. Continue PT for Bilateral hip abd/core strengthening and hip flexor stretching. Dry needling and modalities if needed.   Continue HEP as she is doing. Explained to her that this may be a long process.   Explained to her that running may be hard to get back to and that I would not recommend it. She plans on doing PT and HEP for 3 months and seeing how she does weaning back into this.     2. She previously has stomach ulcers but reports that was many years ago and she is fine to take NSAIDs.  Ibuprofen 400-600mg BID to QD prn as needed  Take prilosec on days taking NSAIDs.     3. Long discussion about activity modifications and to stop doing things that exacerbate her hip pain and flares up her muscles.   No running or " rajiv    4. Explained to her that intra-articular CSI for the right hip may help give her additional pain relief. I would not do this until the hip improvement plateaus which it has not yet.  5. Ice compresses.   6. RTC prn     All questions were answered, pt will contact us for questions or concerns in the interim.    I made the decision to obtain old records of the patient including previous notes and imaging. New imaging was ordered today of the extremity or extremities evaluated. I independently reviewed and interpreted the radiographs and/or MRIs today as well as prior imaging.

## 2022-05-23 NOTE — TELEPHONE ENCOUNTER
Called and left voicemail for pt about appointment. Scheduled pt appointment and informed her to call back if it does not work for her

## 2022-05-24 ENCOUNTER — TELEPHONE (OUTPATIENT)
Dept: INTERNAL MEDICINE | Facility: CLINIC | Age: 52
End: 2022-05-24
Payer: OTHER GOVERNMENT

## 2022-05-24 ENCOUNTER — PATIENT MESSAGE (OUTPATIENT)
Dept: INTERNAL MEDICINE | Facility: CLINIC | Age: 52
End: 2022-05-24
Payer: OTHER GOVERNMENT

## 2022-05-24 NOTE — TELEPHONE ENCOUNTER
Called and spoke to pt and she stated she have an ENT appointment on Monday but have to pay out of pocket according to her insurance if she don't have a referral put in.

## 2022-05-24 NOTE — TELEPHONE ENCOUNTER
----- Message from Pito Mas sent at 5/24/2022  1:46 PM CDT -----  Contact: [phone]  Pt wants a call back about a referral to ENT. Pt saying she has an appt already on this Monday.

## 2022-05-25 ENCOUNTER — CLINICAL SUPPORT (OUTPATIENT)
Dept: REHABILITATION | Facility: HOSPITAL | Age: 52
End: 2022-05-25
Payer: OTHER GOVERNMENT

## 2022-05-25 DIAGNOSIS — R29.898 DECREASED STRENGTH OF LOWER EXTREMITY: Primary | ICD-10-CM

## 2022-05-25 PROCEDURE — 97110 THERAPEUTIC EXERCISES: CPT

## 2022-05-25 NOTE — PROGRESS NOTES
[facility]   Physical Therapy Treatment Note     Name: Kayleen Patel  Clinic Number: [id number]    Therapy Diagnosis:   Encounter Diagnosis   Name Primary?    Decreased strength of lower extremity Yes     Physician: Herbert Clay MD    Visit Date: 5/25/2022    Physician Orders: PT Eval and Treat   Medical Diagnosis from Referral: M79.651,M79.652 (ICD-10-CM) - Pain in both thighs  Evaluation Date: 4/22/2022  Authorization Period Expiration: 8/11/2022  Plan of Care Expiration: 7/14/2022  Progress Note Due: 10th visit  Visit # / Visits authorized: 7/15   FOTO: 1/3     Precautions: Standard      Time In: 8:00 am   Time Out: 8:53 am  Total Appointment Time (timed & untimed codes): 53 minutes   Total billable: 30 minutes    SUBJECTIVE     Pt reports: she had her MD appointment which went well. MD said to continue with PT  She was compliant with home exercise program.  Response to previous treatment: improvement in symptoms   Functional change: walking improved after last visit     Pain: 3/10 - right hip; 0/10 for left hip  Location: bilateral lower legs     OBJECTIVE     Objective Measures updated at progress report unless specified.     Treatment     Kayleen received the treatments listed below:    *All exercises performed bilaterally     THERAPEUTIC EXERCISES to develop strength, ROM and flexibility for 53 minutes including :  Aerobic activity and endurance training for reciprocal motion of lower limbs on recumbent bike x 8 min at level 7.0 at > or equal to 50 spm w/o rest to increase mobility, blood flow and improve tissue tolerance.   Prone hip flexor; 45 sec x 5 times   Prone hip ext; 20 times x 5 sec hold  -alternate without break  SL hip abduction; 2 x 10 reps times x 5 sec hold - emphasis on maintaining knee straight  Posterior pelvic tilt w/ march; 3 second hold; 2 x 10 reps; - improved range tolerance- red TB  SL clamshells : 5 seconds, 2x 10 reps B- red  TB  Quadruped rocking with resistance: 15x each - cue for pelvic tilt- PT holding resistance band posteriorly    Resume next visit:   SLR + supine TA; (1 x 10 reps) 2 sec hold - increase in symptoms in anterior hip if supine TA is not maintained   HL bridging w/ GTB; 1 x 15 reps; 5 sec hold   Posterior pelvic tilt (GTB) + hip ABD; 5 second hold; 3 x 10 reps - heavy verbal and tactile cueing required by PT   Prone hamstring curl; 3 x 10 reps; 5 lb AW - emphasis on eccentric lowering- not today    MANUAL THERAPY TECHNIQUES including Myofacial release and Soft tissue Mobilization were applied to bilateral anterior hip for 00 minutes.  -IASTYM next visit depending on patient symptoms     NEUROMUSCULAR RE-EDUCATION ACTIVITIES to improve Balance, Coordination and Proprioception for 00 minutes.  The following were included:     Patient Education and Home Exercises     Home Exercises Provided and Patient Education Provided     Education provided:   - Continue HEP given at initial evaluation     Written Home Exercises Provided: Patient instructed to cont prior HEP. Exercises were reviewed and Kayleen was able to demonstrate them prior to the end of the session.  Kayleen demonstrated good  understanding of the education provided. See EMR under Patient Instructions for exercises provided during therapy sessions    ASSESSMENT     Patient tolerated treatment well. Continues to be challenged with current therapeutic exercise; minimal progressions today.     Kayleen Is progressing well towards her goals.   Pt prognosis is Excellent.     Pt will continue to benefit from skilled outpatient physical therapy to address the deficits listed in the problem list box on initial evaluation, provide pt/family education and to maximize pt's level of independence in the home and community environment.   Pt's spiritual, cultural and educational needs considered and pt agreeable to plan of care and goals.     Anticipated barriers to physical  therapy: None to note     Goals:  Short Term Goals (3 weeks)  1. Patient will be independent with home exercise program to supplement physical therapy treatment in improving functional status.  2. Pt will improve impaired lower extremity manual muscle tests to >/= 4/5 to improve dynamic hip support for closed chain tasks.  3. Patient will report decreased pain post workout to 4/10 pain to demonstrate improving tolerance for activity.         Long Term Goals (8 weeks)  1. Patient will return to 3x per week at her gym to demonstrate return to PLOF.  2. Pt report < 2/10 pain throughout 3 mile run.   3. Pt will improve impaired lower extremity manual muscle tests to >/= 4+/5 to improve dynamic hip support for closed chain tasks.  4. Patient will improve the total FOTO Knee Survey Score to </= 31% limited to demonstrate increased perceived functional mobility.  5. Patient will demonstrate independence with self mobilization of right and left hip.    PLAN   Improve hip flexor and lumbar flexibility/glute and core strength.     Elise Hernandez, PT,

## 2022-05-27 ENCOUNTER — PATIENT MESSAGE (OUTPATIENT)
Dept: INTERNAL MEDICINE | Facility: CLINIC | Age: 52
End: 2022-05-27
Payer: OTHER GOVERNMENT

## 2022-05-27 ENCOUNTER — PATIENT MESSAGE (OUTPATIENT)
Dept: OTOLARYNGOLOGY | Facility: CLINIC | Age: 52
End: 2022-05-27
Payer: OTHER GOVERNMENT

## 2022-05-27 ENCOUNTER — TELEPHONE (OUTPATIENT)
Dept: OTOLARYNGOLOGY | Facility: CLINIC | Age: 52
End: 2022-05-27
Payer: OTHER GOVERNMENT

## 2022-05-27 NOTE — TELEPHONE ENCOUNTER
Called patient to reschedule appointment due to incorrect scheduling. Appointment rescheduled, patient ok with new date and time.

## 2022-05-28 ENCOUNTER — PATIENT MESSAGE (OUTPATIENT)
Dept: INTERNAL MEDICINE | Facility: CLINIC | Age: 52
End: 2022-05-28
Payer: OTHER GOVERNMENT

## 2022-05-28 RX ORDER — ONDANSETRON 4 MG/1
4 TABLET, ORALLY DISINTEGRATING ORAL 3 TIMES DAILY PRN
Qty: 30 TABLET | Refills: 0 | Status: SHIPPED | OUTPATIENT
Start: 2022-05-28

## 2022-05-28 RX ORDER — ONDANSETRON 8 MG/1
TABLET, ORALLY DISINTEGRATING ORAL
COMMUNITY
Start: 2022-05-22 | End: 2022-05-28 | Stop reason: SDUPTHER

## 2022-05-28 RX ORDER — MECLIZINE HYDROCHLORIDE 25 MG/1
25 TABLET ORAL 3 TIMES DAILY PRN
Qty: 30 TABLET | Refills: 0 | Status: SHIPPED | OUTPATIENT
Start: 2022-05-28

## 2022-05-28 RX ORDER — MECLIZINE HYDROCHLORIDE 25 MG/1
25 TABLET ORAL 3 TIMES DAILY PRN
COMMUNITY
Start: 2022-05-22 | End: 2022-05-28 | Stop reason: SDUPTHER

## 2022-05-31 ENCOUNTER — CLINICAL SUPPORT (OUTPATIENT)
Dept: REHABILITATION | Facility: HOSPITAL | Age: 52
End: 2022-05-31
Payer: OTHER GOVERNMENT

## 2022-05-31 ENCOUNTER — LAB VISIT (OUTPATIENT)
Dept: LAB | Facility: HOSPITAL | Age: 52
End: 2022-05-31
Attending: FAMILY MEDICINE
Payer: OTHER GOVERNMENT

## 2022-05-31 DIAGNOSIS — R29.898 DECREASED STRENGTH OF LOWER EXTREMITY: Primary | ICD-10-CM

## 2022-05-31 DIAGNOSIS — R73.9 ELEVATED BLOOD SUGAR: ICD-10-CM

## 2022-05-31 DIAGNOSIS — Z00.00 ANNUAL PHYSICAL EXAM: ICD-10-CM

## 2022-05-31 DIAGNOSIS — I10 HYPERTENSION, ESSENTIAL: ICD-10-CM

## 2022-05-31 LAB
ANION GAP SERPL CALC-SCNC: 9 MMOL/L (ref 8–16)
BUN SERPL-MCNC: 17 MG/DL (ref 6–20)
CALCIUM SERPL-MCNC: 9.8 MG/DL (ref 8.7–10.5)
CHLORIDE SERPL-SCNC: 102 MMOL/L (ref 95–110)
CHOLEST SERPL-MCNC: 195 MG/DL (ref 120–199)
CHOLEST/HDLC SERPL: 2.7 {RATIO} (ref 2–5)
CO2 SERPL-SCNC: 26 MMOL/L (ref 23–29)
CREAT SERPL-MCNC: 0.6 MG/DL (ref 0.5–1.4)
EST. GFR  (AFRICAN AMERICAN): >60 ML/MIN/1.73 M^2
EST. GFR  (NON AFRICAN AMERICAN): >60 ML/MIN/1.73 M^2
ESTIMATED AVG GLUCOSE: 105 MG/DL (ref 68–131)
GLUCOSE SERPL-MCNC: 111 MG/DL (ref 70–110)
HBA1C MFR BLD: 5.3 % (ref 4–5.6)
HDLC SERPL-MCNC: 73 MG/DL (ref 40–75)
HDLC SERPL: 37.4 % (ref 20–50)
LDLC SERPL CALC-MCNC: 100.4 MG/DL (ref 63–159)
NONHDLC SERPL-MCNC: 122 MG/DL
POTASSIUM SERPL-SCNC: 3.7 MMOL/L (ref 3.5–5.1)
SODIUM SERPL-SCNC: 137 MMOL/L (ref 136–145)
TRIGL SERPL-MCNC: 108 MG/DL (ref 30–150)

## 2022-05-31 PROCEDURE — 80061 LIPID PANEL: CPT | Performed by: FAMILY MEDICINE

## 2022-05-31 PROCEDURE — 80048 BASIC METABOLIC PNL TOTAL CA: CPT | Performed by: FAMILY MEDICINE

## 2022-05-31 PROCEDURE — 83036 HEMOGLOBIN GLYCOSYLATED A1C: CPT | Performed by: FAMILY MEDICINE

## 2022-05-31 PROCEDURE — 97110 THERAPEUTIC EXERCISES: CPT

## 2022-05-31 PROCEDURE — 36415 COLL VENOUS BLD VENIPUNCTURE: CPT | Performed by: FAMILY MEDICINE

## 2022-05-31 NOTE — PROGRESS NOTES
"[facility]   Physical Therapy Treatment Note     Name: Kayleen Patel  Clinic Number: [id number]    Therapy Diagnosis:   Encounter Diagnosis   Name Primary?    Decreased strength of lower extremity Yes     Physician: Herbert Clay MD    Visit Date: 5/31/2022    Physician Orders: PT Eval and Treat   Medical Diagnosis from Referral: M79.651,M79.652 (ICD-10-CM) - Pain in both thighs  Evaluation Date: 4/22/2022  Authorization Period Expiration: 8/11/2022  Plan of Care Expiration: 7/14/2022  Progress Note Due: 10th visit  Visit # / Visits authorized: 7/15   FOTO: 1/3     Precautions: Standard      Time In: 10:05 am   Time Out: 10: 59 am  Total Appointment Time (timed & untimed codes): 54 minutes   Total billable: 54 minutes    SUBJECTIVE     Pt reports: she went to "hot bar" yesterday and feels sore. Patient notes her right hip still bothers her, however she notes she feels her left hip issues are almost resolved.   She was compliant with home exercise program.  Response to previous treatment: improvement in symptoms   Functional change: walking improved after last visit     Pain: 3/10 - right hip; 0/10 for left hip  Location: bilateral lower legs     OBJECTIVE     Objective Measures updated at progress report unless specified.     Treatment     Kayleen received the treatments listed below:    *All exercises performed bilaterally     THERAPEUTIC EXERCISES to develop strength, ROM and flexibility for 54 minutes including :  Aerobic activity and endurance training for reciprocal motion of lower limbs on recumbent bike x 8 min at level 4.0 at > or equal to 50 spm w/o rest to increase mobility, blood flow and improve tissue tolerance.   Prone hip flexor; 45 sec x 4 times   SL hip abduction; 2 x 10 reps times x 5 sec hold - emphasis on maintaining knee straight  Posterior pelvic tilt w/ march; 3 second hold; 2 x 10 reps; - improved range tolerance- red TB  SL clamshells : 5 " seconds, 2x 10 reps B- red TB  Quadruped rocking with resistance: 15x each - cue for pelvic tilt- PT holding resistance band posteriorly  HL bridging w/ GTB; 1 x 15 reps; 5 sec hold     Resume next visit:   Prone hip ext; 20 times x 5 sec hold  -alternate without break  SLR + supine TA; (1 x 10 reps) 2 sec hold - increase in symptoms in anterior hip if supine TA is not maintained   Posterior pelvic tilt (GTB) + hip ABD; 5 second hold; 3 x 10 reps - heavy verbal and tactile cueing required by PT   Prone hamstring curl; 3 x 10 reps; 5 lb AW - emphasis on eccentric lowering- not today    MANUAL THERAPY TECHNIQUES including Myofacial release and Soft tissue Mobilization were applied to bilateral anterior hip for 00 minutes.  -IASTYM next visit depending on patient symptoms     NEUROMUSCULAR RE-EDUCATION ACTIVITIES to improve Balance, Coordination and Proprioception for 00 minutes.  The following were included:     Patient Education and Home Exercises     Home Exercises Provided and Patient Education Provided     Education provided:   - Continue HEP given at initial evaluation     Written Home Exercises Provided: Patient instructed to cont prior HEP. Exercises were reviewed and Kayleen was able to demonstrate them prior to the end of the session.  Kayleen demonstrated good  understanding of the education provided. See EMR under Patient Instructions for exercises provided during therapy sessions    ASSESSMENT     Minimal progression during therapeutic exercise due to patient tolerance. Continues to demonstrate early fatigue throughout therapeutic exercise. Would continue to benefit from skilled outpatient PT to address range of motion, endurance, strength and motor control deficits.    Kayleen Is progressing well towards her goals.   Pt prognosis is Excellent.     Pt will continue to benefit from skilled outpatient physical therapy to address the deficits listed in the problem list box on initial evaluation, provide  pt/family education and to maximize pt's level of independence in the home and community environment.   Pt's spiritual, cultural and educational needs considered and pt agreeable to plan of care and goals.     Anticipated barriers to physical therapy: None to note     Goals:  Short Term Goals (3 weeks)  1. Patient will be independent with home exercise program to supplement physical therapy treatment in improving functional status.  2. Pt will improve impaired lower extremity manual muscle tests to >/= 4/5 to improve dynamic hip support for closed chain tasks.  3. Patient will report decreased pain post workout to 4/10 pain to demonstrate improving tolerance for activity.         Long Term Goals (8 weeks)  1. Patient will return to 3x per week at her gym to demonstrate return to PLOF.  2. Pt report < 2/10 pain throughout 3 mile run.   3. Pt will improve impaired lower extremity manual muscle tests to >/= 4+/5 to improve dynamic hip support for closed chain tasks.  4. Patient will improve the total FOTO Knee Survey Score to </= 31% limited to demonstrate increased perceived functional mobility.  5. Patient will demonstrate independence with self mobilization of right and left hip.    PLAN   Improve hip flexor and lumbar flexibility/glute and core strength.     Elise Hernandez, PT,

## 2022-06-07 ENCOUNTER — HOSPITAL ENCOUNTER (OUTPATIENT)
Dept: RADIOLOGY | Facility: HOSPITAL | Age: 52
Discharge: HOME | End: 2022-06-07
Attending: FAMILY MEDICINE
Payer: OTHER GOVERNMENT

## 2022-06-07 ENCOUNTER — OFFICE VISIT (OUTPATIENT)
Dept: INTERNAL MEDICINE | Facility: CLINIC | Age: 52
End: 2022-06-07
Attending: FAMILY MEDICINE
Payer: OTHER GOVERNMENT

## 2022-06-07 VITALS
HEART RATE: 72 BPM | HEIGHT: 62 IN | SYSTOLIC BLOOD PRESSURE: 122 MMHG | BODY MASS INDEX: 24.75 KG/M2 | DIASTOLIC BLOOD PRESSURE: 82 MMHG | OXYGEN SATURATION: 99 % | WEIGHT: 134.5 LBS

## 2022-06-07 DIAGNOSIS — R42 VERTIGO: ICD-10-CM

## 2022-06-07 DIAGNOSIS — R10.9 FLANK PAIN: ICD-10-CM

## 2022-06-07 DIAGNOSIS — L98.9 SKIN LESION: ICD-10-CM

## 2022-06-07 DIAGNOSIS — I10 HYPERTENSION, ESSENTIAL: ICD-10-CM

## 2022-06-07 DIAGNOSIS — I70.0 AORTIC ATHEROSCLEROSIS: ICD-10-CM

## 2022-06-07 DIAGNOSIS — Z00.00 ANNUAL PHYSICAL EXAM: Primary | ICD-10-CM

## 2022-06-07 DIAGNOSIS — I10 ESSENTIAL HYPERTENSION: ICD-10-CM

## 2022-06-07 PROCEDURE — 76700 US EXAM ABDOM COMPLETE: CPT | Mod: 26,,, | Performed by: RADIOLOGY

## 2022-06-07 PROCEDURE — 99396 PREV VISIT EST AGE 40-64: CPT | Mod: S$PBB,,, | Performed by: FAMILY MEDICINE

## 2022-06-07 PROCEDURE — 99999 PR PBB SHADOW E&M-EST. PATIENT-LVL IV: ICD-10-PCS | Mod: PBBFAC,,, | Performed by: FAMILY MEDICINE

## 2022-06-07 PROCEDURE — 99999 PR PBB SHADOW E&M-EST. PATIENT-LVL IV: CPT | Mod: PBBFAC,,, | Performed by: FAMILY MEDICINE

## 2022-06-07 PROCEDURE — 76700 US ABDOMEN COMPLETE: ICD-10-PCS | Mod: 26,,, | Performed by: RADIOLOGY

## 2022-06-07 PROCEDURE — 99214 OFFICE O/P EST MOD 30 MIN: CPT | Mod: PBBFAC | Performed by: FAMILY MEDICINE

## 2022-06-07 PROCEDURE — 99396 PR PREVENTIVE VISIT,EST,40-64: ICD-10-PCS | Mod: S$PBB,,, | Performed by: FAMILY MEDICINE

## 2022-06-07 PROCEDURE — 76700 US EXAM ABDOM COMPLETE: CPT | Mod: TC

## 2022-06-07 RX ORDER — SULFAMETHOXAZOLE AND TRIMETHOPRIM 800; 160 MG/1; MG/1
1 TABLET ORAL 2 TIMES DAILY
Qty: 10 TABLET | Refills: 0 | Status: SHIPPED | OUTPATIENT
Start: 2022-06-07 | End: 2022-06-12

## 2022-06-07 RX ORDER — MUPIROCIN 20 MG/G
OINTMENT TOPICAL 3 TIMES DAILY
Qty: 22 G | Refills: 1 | Status: SHIPPED | OUTPATIENT
Start: 2022-06-07 | End: 2022-06-14

## 2022-06-07 RX ORDER — CHLORTHALIDONE 25 MG/1
25 TABLET ORAL DAILY
Qty: 90 TABLET | Refills: 3 | Status: SHIPPED | OUTPATIENT
Start: 2022-06-07 | End: 2023-06-02

## 2022-06-07 RX ORDER — VALSARTAN 160 MG/1
160 TABLET ORAL DAILY
Qty: 90 TABLET | Refills: 3 | Status: SHIPPED | OUTPATIENT
Start: 2022-06-07 | End: 2023-06-02

## 2022-06-07 NOTE — PROGRESS NOTES
Subjective:       Patient ID: Kayleen Patel is a 52 y.o. female.    Chief Complaint: Annual Exam and Urinary Tract Infection    Established patient for an annual wellness check/physical exam and also chronic disease management. Specific complaints - see dictation and please see ROS.  P, S, Fm, Soc Hx's; Meds, allergies reviewed and reconciled.  Health maintenance file reviewed and addressed items due. Recent applicable lab, imaging and cardiovascular results reviewed.  Problem list items reviewed and modified or added entries (in the overview section) may not be transcribed into this encounter note due to note writer format.    2-3 week hx r sided LB and flank pain. No FC or other  sx.    1 day hx of R inner thigh lesion, thinks was stung yesterday.    Review of Systems   Constitutional: Negative for appetite change, chills, diaphoresis, fatigue and fever.   HENT: Negative for congestion, hearing loss, postnasal drip, rhinorrhea, sore throat, tinnitus and trouble swallowing.    Eyes: Negative for visual disturbance.   Respiratory: Negative for cough, choking, chest tightness, shortness of breath and wheezing.    Cardiovascular: Negative for chest pain and leg swelling.   Gastrointestinal: Positive for abdominal pain. Negative for abdominal distention, diarrhea, nausea and vomiting.   Genitourinary: Positive for flank pain. Negative for difficulty urinating and hematuria.   Musculoskeletal: Positive for arthralgias and back pain. Negative for myalgias.   Skin: Positive for color change and wound. Negative for rash.   Neurological: Positive for dizziness. Negative for weakness, light-headedness and headaches.   Hematological: Does not bruise/bleed easily.   Psychiatric/Behavioral: Negative for decreased concentration and dysphoric mood.       Objective:      Physical Exam  Vitals and nursing note reviewed.   Constitutional:       Appearance: She is well-developed. She is not diaphoretic.   Eyes:       General: No scleral icterus.  Neck:      Thyroid: No thyromegaly.      Vascular: No JVD.      Trachea: No tracheal deviation.   Cardiovascular:      Rate and Rhythm: Normal rate.      Heart sounds: Normal heart sounds. No murmur heard.    No friction rub. No gallop.   Pulmonary:      Effort: Pulmonary effort is normal. No respiratory distress.      Breath sounds: Normal breath sounds. No wheezing or rales.   Abdominal:      General: There is no distension or abdominal bruit.      Palpations: Abdomen is soft. There is no mass.      Tenderness: There is abdominal tenderness. There is no right CVA tenderness, left CVA tenderness, guarding or rebound.      Comments: Mild R periumbilical pain   Musculoskeletal:      Cervical back: Normal range of motion and neck supple.   Lymphadenopathy:      Cervical: No cervical adenopathy.   Skin:     General: Skin is warm and dry.      Findings: No erythema or rash.   Neurological:      Mental Status: She is alert and oriented to person, place, and time.      Cranial Nerves: No cranial nerve deficit.      Motor: No tremor.      Coordination: Coordination normal.      Gait: Gait normal.   Psychiatric:         Behavior: Behavior normal.         Thought Content: Thought content normal.         Judgment: Judgment normal.         Assessment:       1. Annual physical exam    2. Aortic atherosclerosis    3. Hypertension, essential    4. Vertigo    5. Essential hypertension    6. Flank pain    7. Skin lesion        Plan:     Medication List with Changes/Refills   New Medications    MUPIROCIN (BACTROBAN) 2 % OINTMENT    Apply topically 3 (three) times daily. for 7 days    SULFAMETHOXAZOLE-TRIMETHOPRIM 800-160MG (BACTRIM DS) 800-160 MG TAB    Take 1 tablet by mouth 2 (two) times daily. for 5 days   Current Medications    ACETAMINOPHEN (TYLENOL) 500 MG TABLET    Take 2 tablets (1,000 mg total) by mouth every 8 (eight) hours as needed for Pain.    CALCIUM CARBONATE (CALCIUM 500 ORAL)    Take 1  capsule by mouth.    CYANOCOBALAMIN (VITAMIN B-12) 100 MCG TABLET    Take 100 mcg by mouth once daily.    FERROUS SULFATE 325 MG (65 MG IRON) TAB TABLET    Take 325 mg by mouth daily with breakfast.    IBUPROFEN (ADVIL,MOTRIN) 600 MG TABLET    Take 1 tablet (600 mg total) by mouth every 8 (eight) hours as needed for Pain. Take with food.    MECLIZINE (ANTIVERT) 25 MG TABLET    Take 1 tablet (25 mg total) by mouth 3 (three) times daily as needed for Dizziness.    OMEGA-3/DHA/EPA/FISH OIL (OMEGA-3 FISH OIL ORAL)    Take 1 capsule by mouth.    ONDANSETRON (ZOFRAN-ODT) 4 MG TBDL    Take 1 tablet (4 mg total) by mouth 3 (three) times daily as needed (Nausea and vomiting).    TRAMADOL (ULTRAM) 50 MG TABLET    Take 1 tablet (50 mg total) by mouth every 6 (six) hours.   Changed and/or Refilled Medications    Modified Medication Previous Medication    CHLORTHALIDONE (HYGROTEN) 25 MG TAB chlorthalidone (HYGROTEN) 25 MG Tab       Take 1 tablet (25 mg total) by mouth once daily.    Take 1 tablet (25 mg total) by mouth once daily.    VALSARTAN (DIOVAN) 160 MG TABLET valsartan (DIOVAN) 160 MG tablet       Take 1 tablet (160 mg total) by mouth once daily.    Take 1 tablet (160 mg total) by mouth once daily.   Discontinued Medications    OMEPRAZOLE (PRILOSEC) 20 MG CAPSULE    Take 1 capsule (20 mg total) by mouth once daily.    VARICELLA-ZOSTER GE-AS01B, PF, (SHINGRIX, PF,) 50 MCG/0.5 ML INJECTION         Kayleen was seen today for annual exam and urinary tract infection.    Diagnoses and all orders for this visit:    Annual physical exam    Aortic atherosclerosis  Comments:  agaston 3, ASCVD risk score 1.4%, pat decl statin for now    Hypertension, essential    Vertigo  Comments:  ENT P    Essential hypertension  -     valsartan (DIOVAN) 160 MG tablet; Take 1 tablet (160 mg total) by mouth once daily.  -     chlorthalidone (HYGROTEN) 25 MG Tab; Take 1 tablet (25 mg total) by mouth once daily.    Flank pain  Comments:  R, 2-3  weeks  Orders:  -     Urinalysis; Future  -     Urinalysis Microscopic; Future  -     US Abdomen Complete; Future    Skin lesion  Comments:  R inner thigh, 1 day, possible bite yesterday  Orders:  -     sulfamethoxazole-trimethoprim 800-160mg (BACTRIM DS) 800-160 mg Tab; Take 1 tablet by mouth 2 (two) times daily. for 5 days  -     mupirocin (BACTROBAN) 2 % ointment; Apply topically 3 (three) times daily. for 7 days      See meds, orders, follow up, routing and instructions sections of encounter and AVS. Discussed with patient and provided on AVS.    Discussed diet and exercise and links provided on AVS for detailed information.    Lab Results   Component Value Date     05/31/2022    K 3.7 05/31/2022     05/31/2022    BUN 17 05/31/2022    CREATININE 0.6 05/31/2022     (H) 05/31/2022    HGBA1C 5.3 05/31/2022    AST 27 05/11/2021    ALT 22 05/11/2021    ALBUMIN 4.3 05/11/2021    PROT 7.6 05/11/2021    BILITOT 0.6 05/11/2021    CHOL 195 05/31/2022    HDL 73 05/31/2022    LDLCALC 100.4 05/31/2022    TRIG 108 05/31/2022    WBC 6.18 05/11/2021    HGB 13.6 05/11/2021    HCT 40.5 05/11/2021     05/11/2021    TSH 1.656 02/28/2018

## 2022-06-09 ENCOUNTER — CLINICAL SUPPORT (OUTPATIENT)
Dept: AUDIOLOGY | Facility: CLINIC | Age: 52
End: 2022-06-09
Payer: OTHER GOVERNMENT

## 2022-06-09 ENCOUNTER — OFFICE VISIT (OUTPATIENT)
Dept: OTOLARYNGOLOGY | Facility: CLINIC | Age: 52
End: 2022-06-09
Payer: OTHER GOVERNMENT

## 2022-06-09 DIAGNOSIS — R42 VERTIGO: Primary | ICD-10-CM

## 2022-06-09 DIAGNOSIS — R42 VERTIGO: ICD-10-CM

## 2022-06-09 DIAGNOSIS — H81.8X9 OTHER DISORDERS OF VESTIBULAR FUNCTION, UNSPECIFIED EAR: Primary | ICD-10-CM

## 2022-06-09 PROCEDURE — 99203 PR OFFICE/OUTPT VISIT, NEW, LEVL III, 30-44 MIN: ICD-10-PCS | Mod: S$PBB,,, | Performed by: NURSE PRACTITIONER

## 2022-06-09 PROCEDURE — 92557 COMPREHENSIVE HEARING TEST: CPT | Mod: PBBFAC | Performed by: AUDIOLOGIST

## 2022-06-09 PROCEDURE — 99999 PR PBB SHADOW E&M-EST. PATIENT-LVL I: ICD-10-PCS | Mod: PBBFAC,,,

## 2022-06-09 PROCEDURE — 99211 OFF/OP EST MAY X REQ PHY/QHP: CPT | Mod: PBBFAC

## 2022-06-09 PROCEDURE — 99999 PR PBB SHADOW E&M-EST. PATIENT-LVL III: CPT | Mod: PBBFAC,,, | Performed by: NURSE PRACTITIONER

## 2022-06-09 PROCEDURE — 99213 OFFICE O/P EST LOW 20 MIN: CPT | Mod: PBBFAC,27 | Performed by: NURSE PRACTITIONER

## 2022-06-09 PROCEDURE — 99203 OFFICE O/P NEW LOW 30 MIN: CPT | Mod: S$PBB,,, | Performed by: NURSE PRACTITIONER

## 2022-06-09 PROCEDURE — 99999 PR PBB SHADOW E&M-EST. PATIENT-LVL I: CPT | Mod: PBBFAC,,,

## 2022-06-09 PROCEDURE — 99999 PR PBB SHADOW E&M-EST. PATIENT-LVL III: ICD-10-PCS | Mod: PBBFAC,,, | Performed by: NURSE PRACTITIONER

## 2022-06-09 NOTE — PROGRESS NOTES
Kayleen Patel was seen today in the clinic for an audiologic evaluation.  Patient's main complaint was an episode of room spinning vertigo that lasted several days with vomiting and nausea.  Ms. Patel reported she was called in meclizine and felt significant relief after taking it.  She denied any previous history of vertigo.  She denied any hearing loss, tinnitus, otalgia, or fullness.    Tympanometry could not be completed due to equipment malfunction at time of testing.    Audiogram results revealed normal hearing in the right and left ear.      Speech reception thresholds were noted at 10 dB in the right ear and 10 dB in the left ear.    Speech discrimination scores were 96% in the right ear and 100% in the left ear.    Recommendations:  1. Otologic evaluation  2. Hearing protection when in noise

## 2022-06-10 NOTE — PROGRESS NOTES
Subjective:      Kayleen Patel is a 52 y.o. female who was self-referred for dizziness.    Ms. Patel reports having dizziness that began in February 20201 that lasted less than a day. She again experienced dizziness a month ago where she experiences vertigo with associated nausea and vomiting which lasted several days. She was seen in  and provided meclizine with benefit. She feels her dizziness has resolved, but is concern about having it again. She denies headache, change in vision or speech, ear pain, ear drainage or change in hearing. She denies having any illness prior to having vertigo. She does acknowledge drinking a little {3 drinks) alcohol the night before having the vertigo, but did not believe it to be in excess.   There is not a family history of hearing loss at a young age.  There is not a prior history of ear surgery.  There is not a prior history of ear infections .  She denies a history of significant noise exposure.  She does not wear hearing aids currently.  She has not had a hearing test recently.     Past Medical History  She has a past medical history of Abnormal cervical Papanicolaou smear, Chronic diarrhea, GERD (gastroesophageal reflux disease), History of acute PID, Hypertension, and Thrombocytosis.    Past Surgical History  She has a past surgical history that includes Tonsillectomy; Ganglion cyst excision; Refractive surgery (Bilateral, 2009); Carpal tunnel release (Left, 9/21/2021); Injection of steroid (Right, 9/21/2021); and Breast biopsy (Right, 06/09/2021).    Family History  Her family history is not on file. She was adopted.    Social History  She reports that she quit smoking about 14 years ago. She has a 23.00 pack-year smoking history. She has never used smokeless tobacco. She reports current alcohol use. She reports that she does not use drugs.    Allergies  She is allergic to penicillins and adhesive.    Medications  She has a current medication list which  includes the following prescription(s): acetaminophen, calcium carbonate, chlorthalidone, cyanocobalamin, ferrous sulfate, ibuprofen, meclizine, mupirocin, omega-3/dha/epa/fish oil, ondansetron, sulfamethoxazole-trimethoprim 800-160mg, tramadol, and valsartan.    Review of Systems   Constitutional: Negative for chills, fever and unexpected weight change.   HENT: Positive for hearing loss and tinnitus. Negative for congestion, ear discharge, ear pain, facial swelling, postnasal drip, sinus pressure, sore throat and trouble swallowing.    Eyes: Negative for pain and visual disturbance.   Respiratory: Negative for apnea and shortness of breath.    Cardiovascular: Negative for chest pain and palpitations.   Gastrointestinal: Negative for abdominal pain and nausea.   Endocrine: Negative for cold intolerance and heat intolerance.   Skin: Negative for color change and rash.   Neurological: Negative for dizziness, facial asymmetry and headaches.   Hematological: Negative for adenopathy. Does not bruise/bleed easily.   Psychiatric/Behavioral: Negative for agitation. The patient is not nervous/anxious.           Objective:     LMP 02/07/2016 (Approximate)      Constitutional:   Vital signs are normal. She appears well-developed and well-nourished.     Head:  Normocephalic and atraumatic.     Ears:    Right Ear: No lacerations. No drainage, swelling or tenderness. No foreign bodies. No mastoid tenderness. Tympanic membrane is not injected, not scarred, not perforated, not erythematous, not retracted and not bulging. Tympanic membrane mobility is normal. No middle ear effusion. No hemotympanum.   Left Ear: No lacerations. No drainage, swelling or tenderness. No foreign bodies. No mastoid tenderness. Tympanic membrane is not injected, not scarred, not perforated, not erythematous, not retracted and not bulging. Tympanic membrane mobility is normal.  No middle ear effusion. No hemotympanum. No decreased hearing is noted.    Nicole-hallpike test left: negative  [place]-hallpike test right: negative    Romberg test: negative    Fukuda step test: negative    Head thrust test: negative      Nose:  Nose normal including turbinates, nasal mucosa, sinuses and nasal septum.     Neck:  Neck normal without thyromegaly masses, asymmetry, normal tracheal structure, crepitus, and tenderness and no adenopathy.     Psychiatric:   She has a normal mood and affect.       Procedure    None      Data Reviewed    WBC (K/uL)   Date Value   05/11/2021 6.18     Platelets (K/uL)   Date Value   05/11/2021 320      Creatinine (mg/dL)   Date Value   05/31/2022 0.6     TSH (uIU/mL)   Date Value   02/28/2018 1.656     Glucose (mg/dL)   Date Value   05/31/2022 111 (H)     Hemoglobin A1C (%)   Date Value   05/31/2022 5.3       I independently reviewed the tracings of the complete audiometric evaluation performed today.  I reviewed the audiogram with the patient as well.  Pertinent findings include a normal study.         Assessment:     1. Vertigo         Plan:     Patient['s symptoms are most consistent with vestibular neuritis. Her symptoms are now resolved.  I recommend [place] exercises 2 times daily.  I recommend she follow up with me as needed should symptoms return.    Audiogram reveals normal hearing in both ears.  Hearing conservation recommended.  Repeat hearing test as needed.    RTC prn

## 2022-06-13 ENCOUNTER — HOSPITAL ENCOUNTER (OUTPATIENT)
Dept: RADIOLOGY | Facility: HOSPITAL | Age: 52
Discharge: HOME | End: 2022-06-13
Attending: OBSTETRICS & GYNECOLOGY
Payer: OTHER GOVERNMENT

## 2022-06-13 VITALS — BODY MASS INDEX: 24.84 KG/M2 | WEIGHT: 135 LBS | HEIGHT: 62 IN

## 2022-06-13 DIAGNOSIS — Z12.31 SCREENING MAMMOGRAM, ENCOUNTER FOR: ICD-10-CM

## 2022-06-13 PROCEDURE — 77063 BREAST TOMOSYNTHESIS BI: CPT | Mod: TC

## 2022-06-13 PROCEDURE — 77067 SCR MAMMO BI INCL CAD: CPT | Mod: 26,,, | Performed by: RADIOLOGY

## 2022-06-13 PROCEDURE — 77063 BREAST TOMOSYNTHESIS BI: CPT | Mod: 26,,, | Performed by: RADIOLOGY

## 2022-06-13 PROCEDURE — 77067 MAMMO DIGITAL SCREENING BILAT WITH TOMO: ICD-10-PCS | Mod: 26,,, | Performed by: RADIOLOGY

## 2022-06-13 PROCEDURE — 77063 MAMMO DIGITAL SCREENING BILAT WITH TOMO: ICD-10-PCS | Mod: 26,,, | Performed by: RADIOLOGY

## 2022-06-22 ENCOUNTER — CLINICAL SUPPORT (OUTPATIENT)
Dept: REHABILITATION | Facility: HOSPITAL | Age: 52
End: 2022-06-22
Payer: OTHER GOVERNMENT

## 2022-06-22 DIAGNOSIS — R29.898 DECREASED STRENGTH OF LOWER EXTREMITY: Primary | ICD-10-CM

## 2022-06-22 PROCEDURE — 97110 THERAPEUTIC EXERCISES: CPT

## 2022-06-22 NOTE — PROGRESS NOTES
[facility]   Physical Therapy Treatment Note     Name: Kayleen Patel  Clinic Number: [id number]    Therapy Diagnosis:   Encounter Diagnosis   Name Primary?    Decreased strength of lower extremity Yes     Physician: Herbert Clay MD    Visit Date: 6/22/2022    Physician Orders: PT Eval and Treat   Medical Diagnosis from Referral: M79.651,M79.652 (ICD-10-CM) - Pain in both thighs  Evaluation Date: 4/22/2022  Authorization Period Expiration: 8/11/2022  Plan of Care Expiration: 7/14/2022  Progress Note Due: 10th visit  Visit # / Visits authorized: 10 / 15   FOTO: 1/3     Precautions: Standard      Time In: 0800 am   Time Out: 0900 am  Total Appointment Time (timed & untimed codes): 54 minutes     SUBJECTIVE     Pt reports: Pt last visit being seen was on 5/31/2022. Pt states she has been sick and traveling for work which is why she has missed the last month. Pt states she finally has her inner ear issues taken care of; has been diagnosed vestibular neuritis.   She was compliant with home exercise program.  Response to previous treatment: improvement in symptoms   Functional change: walking improved after last visit     Pain: 4/10 right hip; 2/10 for left hip  Location: bilateral lower legs     OBJECTIVE     Objective Measures updated at progress report unless specified.     Treatment     Kayleen received the treatments listed below:    *All exercises performed bilaterally     THERAPEUTIC EXERCISES to develop strength, ROM and flexibility for 54 minutes including:  Aerobic activity and endurance training for reciprocal motion of lower limbs on recumbent bike x 8 min at level 5.0 at > or equal to 50 spm w/o rest to increase mobility, blood flow and improve tissue tolerance. (seat 2)   Prone hip flexor; 45 sec x 4 times   Prone hip ext; 20 times x 5 sec hold  (3 lb AW)   Prone hamstring curl; 3 x 10 reps; 3 lb AW - emphasis on eccentric lowering  SL hip abduction; 3 x 10  reps times x 3 sec hold - emphasis on maintaining knee straight  Posterior pelvic tilt w/ march; 3 second hold; 2 x 10 reps; - improved range tolerance-  Green TB  SL clamshells : 5 seconds, 2x 10 reps B- red TB - discharge to HEP   Quadruped rocking with resistance: 15x each - cue for pelvic tilt- PT holding resistance band posteriorly - NT  HL bridging w/ GTB; 2 x 10 reps; 3 sec hold   + Standing hip abduction + posterior pelvic tilt; 3 x 10 reps bilaterally     Patient Education and Home Exercises     Home Exercises Provided and Patient Education Provided     Education provided:   - Continue HEP given at initial evaluation     Written Home Exercises Provided: Patient instructed to cont prior HEP. Exercises were reviewed and Kayleen was able to demonstrate them prior to the end of the session.  Kayleen demonstrated good  understanding of the education provided. See EMR under Patient Instructions for exercises provided during therapy sessions    ASSESSMENT   Pt resuming exercises from previous visit with no change due to long break from physical therapy.  Will progress resistance and reps with exercises according to patient tolerance. Pt with slight raise in left hip pain since last visit. Pt continuing to show improvement with anterior impingement; ability to flex hip to 90 degrees activity with supine marching w/ theraband without complaints of pain. Would continue to benefit from skilled outpatient PT to address range of motion, endurance, strength and motor control deficits.    Kayleen Is progressing well towards her goals.   Pt prognosis is Excellent.     Pt will continue to benefit from skilled outpatient physical therapy to address the deficits listed in the problem list box on initial evaluation, provide pt/family education and to maximize pt's level of independence in the home and community environment.   Pt's spiritual, cultural and educational needs considered and pt agreeable to plan of care and  goals.     Anticipated barriers to physical therapy: None to note     Goals:  Short Term Goals (3 weeks)  1. Patient will be independent with home exercise program to supplement physical therapy treatment in improving functional status.  2. Pt will improve impaired lower extremity manual muscle tests to >/= 4/5 to improve dynamic hip support for closed chain tasks.  3. Patient will report decreased pain post workout to 4/10 pain to demonstrate improving tolerance for activity.         Long Term Goals (8 weeks)  1. Patient will return to 3x per week at her gym to demonstrate return to PLOF.  2. Pt report < 2/10 pain throughout 3 mile run.   3. Pt will improve impaired lower extremity manual muscle tests to >/= 4+/5 to improve dynamic hip support for closed chain tasks.  4. Patient will improve the total FOTO Knee Survey Score to </= 31% limited to demonstrate increased perceived functional mobility.  5. Patient will demonstrate independence with self mobilization of right and left hip.    PLAN   Improve hip flexor and lumbar flexibility/glute and core strength.     Zainab Brit, PT, DPT

## 2022-07-07 ENCOUNTER — OFFICE VISIT (OUTPATIENT)
Dept: OBSTETRICS AND GYNECOLOGY | Facility: CLINIC | Age: 52
End: 2022-07-07
Payer: OTHER GOVERNMENT

## 2022-07-07 VITALS
SYSTOLIC BLOOD PRESSURE: 140 MMHG | BODY MASS INDEX: 24.75 KG/M2 | HEIGHT: 62 IN | WEIGHT: 134.5 LBS | DIASTOLIC BLOOD PRESSURE: 80 MMHG

## 2022-07-07 DIAGNOSIS — Z01.419 WELL WOMAN EXAM WITH ROUTINE GYNECOLOGICAL EXAM: Primary | ICD-10-CM

## 2022-07-07 PROCEDURE — 99999 PR PBB SHADOW E&M-EST. PATIENT-LVL III: ICD-10-PCS | Mod: PBBFAC,,, | Performed by: OBSTETRICS & GYNECOLOGY

## 2022-07-07 PROCEDURE — 87624 HPV HI-RISK TYP POOLED RSLT: CPT | Performed by: OBSTETRICS & GYNECOLOGY

## 2022-07-07 PROCEDURE — 99396 PR PREVENTIVE VISIT,EST,40-64: ICD-10-PCS | Mod: S$PBB,,, | Performed by: OBSTETRICS & GYNECOLOGY

## 2022-07-07 PROCEDURE — 99999 PR PBB SHADOW E&M-EST. PATIENT-LVL III: CPT | Mod: PBBFAC,,, | Performed by: OBSTETRICS & GYNECOLOGY

## 2022-07-07 PROCEDURE — 88175 CYTOPATH C/V AUTO FLUID REDO: CPT | Performed by: OBSTETRICS & GYNECOLOGY

## 2022-07-07 PROCEDURE — 99396 PREV VISIT EST AGE 40-64: CPT | Mod: S$PBB,,, | Performed by: OBSTETRICS & GYNECOLOGY

## 2022-07-07 PROCEDURE — 99213 OFFICE O/P EST LOW 20 MIN: CPT | Mod: PBBFAC | Performed by: OBSTETRICS & GYNECOLOGY

## 2022-07-07 RX ORDER — MUPIROCIN 20 MG/G
OINTMENT TOPICAL
COMMUNITY
Start: 2022-06-07 | End: 2023-12-22

## 2022-07-07 NOTE — PROGRESS NOTES
History & Physical  Gynecology      SUBJECTIVE:     Chief Complaint: Gynecologic Exam       History of Present Illness:  Annual Exam-Postmenopausal  Patient presents for annual exam. The patient has no complaints today. Patient denies post-menopausal vaginal bleeding. The patient is not sexually active. The patient is not taking hormone replacement therapy.  The patient participates in regular exercise: yes.  She does not smoke.     GYN screening history: , pap no hpv  Mammogram history:   Colonoscopy history: , repeat 10 years  Dexa history: due age 64    FH:   Adopted    Review of patient's allergies indicates:   Allergen Reactions    Penicillins Hives    Adhesive Rash       Past Medical History:   Diagnosis Date    Abnormal cervical Papanicolaou smear ,     Colpo/Cryo    Chronic diarrhea 2015    GERD (gastroesophageal reflux disease)     History of acute PID     Hypertension     Thrombocytosis      Past Surgical History:   Procedure Laterality Date    BREAST BIOPSY Right 2021    stereo benign    CARPAL TUNNEL RELEASE Left 2021    Procedure: RELEASE, CARPAL TUNNEL;  Surgeon: Alla Goff MD;  Location: [facility] OR;  Service: Orthopedics;  Laterality: Left;    GANGLION CYST EXCISION      INJECTION OF STEROID Right 2021    Procedure: INJECTION, STEROID-Right carpal tunnel;  Surgeon: Alla Goff MD;  Location: [facility] OR;  Service: Orthopedics;  Laterality: Right;    REFRACTIVE SURGERY Bilateral     Dr. Vazquez Forbes     TONSILLECTOMY       OB History        0    Para        Term   0            AB        Living           SAB        IAB        Ectopic        Multiple        Live Births                   Family History   Adopted: Yes     Social History     Tobacco Use    Smoking status: Former Smoker     Packs/day: 1.00     Years: 23.00     Pack years: 23.00     Quit date: 2007     Years since quitting: 15.0    Smokeless tobacco: Never Used    Substance Use Topics    Alcohol use: Yes     Comment: Social    Drug use: No       Current Outpatient Medications   Medication Sig    mupirocin (BACTROBAN) 2 % ointment     CALCIUM CARBONATE (CALCIUM 500 ORAL) Take 1 capsule by mouth.    chlorthalidone (HYGROTEN) 25 MG Tab Take 1 tablet (25 mg total) by mouth once daily.    cyanocobalamin (VITAMIN B-12) 100 MCG tablet Take 100 mcg by mouth once daily.    ferrous sulfate 325 mg (65 mg iron) Tab tablet Take 325 mg by mouth daily with breakfast.    meclizine (ANTIVERT) 25 mg tablet Take 1 tablet (25 mg total) by mouth 3 (three) times daily as needed for Dizziness.    OMEGA-3/DHA/EPA/FISH OIL (OMEGA-3 FISH OIL ORAL) Take 1 capsule by mouth.    ondansetron (ZOFRAN-ODT) 4 MG TbDL Take 1 tablet (4 mg total) by mouth 3 (three) times daily as needed (Nausea and vomiting).    traMADoL (ULTRAM) 50 mg tablet Take 1 tablet (50 mg total) by mouth every 6 (six) hours.    valsartan (DIOVAN) 160 MG tablet Take 1 tablet (160 mg total) by mouth once daily.     No current facility-administered medications for this visit.       Review of Systems:  Review of Systems   Constitutional: Negative for activity change, appetite change and fever.   Respiratory: Negative for shortness of breath.    Cardiovascular: Negative for chest pain.   Gastrointestinal: Negative for abdominal pain, constipation, diarrhea, nausea and vomiting.   Genitourinary: Negative for pelvic pain, vaginal bleeding, vaginal discharge, vaginal pain and postmenopausal bleeding.   Integumentary:  Negative for breast mass.   Neurological: Negative for headaches.   Breast: Negative for lump and mass       OBJECTIVE:     Physical Exam:  Physical Exam  Vitals and nursing note reviewed.   Constitutional:       Appearance: She is well-developed.   Neck:      Thyroid: No thyromegaly.      Trachea: No tracheal deviation.   Cardiovascular:      Rate and Rhythm: Normal rate and regular rhythm.      Heart sounds: Normal  heart sounds.   Pulmonary:      Effort: Pulmonary effort is normal.      Breath sounds: Normal breath sounds.   Chest:   Breasts: Breasts are symmetrical.      Right: No inverted nipple, mass, nipple discharge, skin change or tenderness.      Left: No inverted nipple, mass, nipple discharge, skin change or tenderness.       Abdominal:      Palpations: Abdomen is soft.   Genitourinary:     General: Normal vulva.      Labia:         Right: No rash, tenderness, lesion or injury.         Left: No rash, tenderness, lesion or injury.       Urethra: No prolapse, urethral pain, urethral swelling or urethral lesion.      Vagina: Normal. No signs of injury and foreign body. No vaginal discharge, erythema, tenderness or bleeding.      Cervix: No cervical motion tenderness, discharge or friability.      Uterus: Not deviated, not enlarged, not fixed and not tender.       Adnexa:         Right: No mass, tenderness or fullness.          Left: No mass, tenderness or fullness.        Rectum: No anal fissure or external hemorrhoid.      Comments: Urethral meatus: normal size, anterior vaginal wall with no prolapse, no lesions  Bladder: no fullness, masses or tenderness  Musculoskeletal:      Cervical back: Normal range of motion and neck supple.   Neurological:      Mental Status: She is alert and oriented to person, place, and time.   Psychiatric:         Behavior: Behavior normal.         Thought Content: Thought content normal.         Judgment: Judgment normal.         Chaperoned by: Esme    ASSESSMENT:       ICD-10-CM ICD-9-CM    1. Well woman exam with routine gynecological exam  Z01.419 V72.31 Liquid-Based Pap Smear, Screening      HPV High Risk Genotypes, PCR          Plan:      Kayleen was seen today for gynecologic exam.    Diagnoses and all orders for this visit:    Well woman exam with routine gynecological exam  -     Liquid-Based Pap Smear, Screening  -     HPV High Risk Genotypes, PCR        Orders Placed This  Encounter   Procedures    HPV High Risk Genotypes, PCR       Well Woman:   - Pap smear: and hpv today  - Mammogram: up to date  - Colonoscopy: up to date  - Dexa: due age 64  - Immunizations: s/p covid  - Labs: with pcp  - Exercise recommended    Follow up in one year for annual, or prn.    Maya Staley

## 2022-07-13 ENCOUNTER — CLINICAL SUPPORT (OUTPATIENT)
Dept: REHABILITATION | Facility: HOSPITAL | Age: 52
End: 2022-07-13
Payer: OTHER GOVERNMENT

## 2022-07-13 DIAGNOSIS — R29.898 DECREASED STRENGTH OF LOWER EXTREMITY: Primary | ICD-10-CM

## 2022-07-13 LAB
FINAL PATHOLOGIC DIAGNOSIS: NORMAL
Lab: NORMAL

## 2022-07-13 PROCEDURE — 97110 THERAPEUTIC EXERCISES: CPT

## 2022-07-13 NOTE — PROGRESS NOTES
[facility]   Physical Therapy Treatment Note     Name: Kayleen Patel  Clinic Number: [id number]    Therapy Diagnosis:   Encounter Diagnosis   Name Primary?    Decreased strength of lower extremity Yes     Physician: Herbert Clay MD    Visit Date: 7/13/2022    Physician Orders: PT Eval and Treat   Medical Diagnosis from Referral: M79.651,M79.652 (ICD-10-CM) - Pain in both thighs  Evaluation Date: 4/22/2022  Authorization Period Expiration: 8/11/2022  Plan of Care Expiration: 7/14/2022  Progress Note Due: 10th visit  Visit # / Visits authorized: 10 / 15   FOTO: 1/3    Date of Last visit: 7/13/2022  Total Visits Received: 10     Precautions: Standard      Time In: 9:01 am   Time Out: 9:55 am  Total Appointment Time (timed & untimed codes): 54 minutes     SUBJECTIVE     Pt reports: she is ready for discharge and notes that she his sore from a recent work out. Denies typical hip pain at this time.   She was compliant with home exercise program.  Response to previous treatment: improvement in symptoms   Functional change: walking improved after last visit     Pain: 0/10 bilateral hips  Location: bilateral lower legs     OBJECTIVE     Objective Measures updated at progress report unless specified.     Treatment     Kayleen received the treatments listed below:    *All exercises performed bilaterally     THERAPEUTIC EXERCISES to develop strength, ROM and flexibility for 54 minutes including:  Aerobic activity and endurance training for reciprocal motion of lower limbs on recumbent bike x 8 min at level 5.0 at > or equal to 50 spm w/o rest to increase mobility, blood flow and improve tissue tolerance.   Prone hip flexor; 30 sec x 3 times   Prone hip ext; 20 times x 5 sec hold  (3 lb AW)   Prone hamstring curl; 3 x 10 reps; 3 lb AW - emphasis on eccentric lowering  SL hip abduction; 3 x 10 reps times x 5 sec hold - emphasis on maintaining knee straight  HL bridging w/ GTB; 2 x  15 reps; 10 sec hold       Patient Education and Home Exercises     Home Exercises Provided and Patient Education Provided     Education provided:   - Continue HEP given at initial evaluation     Written Home Exercises Provided: Patient instructed to cont prior HEP. Exercises were reviewed and Kayleen was able to demonstrate them prior to the end of the session.  Kayleen demonstrated good  understanding of the education provided. See EMR under Patient Instructions for exercises provided during therapy sessions    ASSESSMENT   Patient has met all functional short term and long term physical therapy goals at this time. Patient demonstrates improved functional mobility and has returned to independence in the gym. Patient has returned to full independence with all household and personal ADL's at this time. Patient will be discharged with instructions to continue HEP.     Discharge FOTO Score: 23%   Discharge reason: Patient has met all of his/her goals      Kayleen Is progressing well towards her goals.   Pt prognosis is Excellent.     Pt will continue to benefit from skilled outpatient physical therapy to address the deficits listed in the problem list box on initial evaluation, provide pt/family education and to maximize pt's level of independence in the home and community environment.   Pt's spiritual, cultural and educational needs considered and pt agreeable to plan of care and goals.     Anticipated barriers to physical therapy: None to note     Goals:  Short Term Goals (3 weeks)  1. Patient will be independent with home exercise program to supplement physical therapy treatment in improving functional status. MET  2. Pt will improve impaired lower extremity manual muscle tests to >/= 4/5 to improve dynamic hip support for closed chain tasks. MET  3. Patient will report decreased pain post workout to 4/10 pain to demonstrate improving tolerance for activity. MET        Long Term Goals (8 weeks)  1. Patient will  return to 3x per week at her gym to demonstrate return to PLOF. MET  2. Pt report < 2/10 pain throughout 3 mile run. Not MET- patient notes she has not tried running  3. Pt will improve impaired lower extremity manual muscle tests to >/= 4+/5 to improve dynamic hip support for closed chain tasks. MET  4. Patient will improve the total FOTO Knee Survey Score to </= 31% limited to demonstrate increased perceived functional mobility. MET (23%)  5. Patient will demonstrate independence with self mobilization of right and left hip. MET    PLAN    This patient is discharged from Physical Therapy    Elise Hernandez, PT, DPT

## 2022-07-14 LAB
HPV HR 12 DNA SPEC QL NAA+PROBE: NEGATIVE
HPV16 AG SPEC QL: NEGATIVE
HPV18 DNA SPEC QL NAA+PROBE: NEGATIVE

## 2022-09-01 ENCOUNTER — PATIENT MESSAGE (OUTPATIENT)
Dept: ADMINISTRATIVE | Facility: OTHER | Age: 52
End: 2022-09-01
Payer: OTHER GOVERNMENT

## 2022-10-17 ENCOUNTER — PATIENT MESSAGE (OUTPATIENT)
Dept: INTERNAL MEDICINE | Facility: CLINIC | Age: 52
End: 2022-10-17
Payer: OTHER GOVERNMENT

## 2022-10-17 ENCOUNTER — RESEARCH ENCOUNTER (OUTPATIENT)
Dept: RESEARCH | Facility: OTHER | Age: 52
End: 2022-10-17
Payer: OTHER GOVERNMENT

## 2022-10-17 NOTE — PROGRESS NOTES
Sponsor: [employer]     Study Title/IRB Number: [id number]    Principle Investigator: Dr. Nura Prince    Patient eligibility was checked prior to enrollment in the study. Patient met the following inclusion and exclusion criteria:     INCLUSION CRITERIA  Participant age is 50 years or older at the time of signing the Informed Consent form  Participant is capable of giving signed Informed Consent, which includes compliance with the requirements and restrictions in the informed consent form and the protocol    EXCLUSION CRITERIA  Participant is undergoing or referred for diagnostic evaluation due to clinical suspicion for cancer  Participant has a personal history of invasive or hematologic malignancy, diagnosed within the last 3 years prior to expected enrollment date or diagnosed greater than 3 years prior to expected enrollment date and never treated  Participant has had definitive treatment for invasive or hematologic malignancy within the 3 years prior to expected enrollment date  Participant is not able to comply with protocol procedures  Participant is not a current patient at a participating center  Participant is currently enrolled or was previously enrolled in another [employer]-sponsored study  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[employer]  Participant is currently pregnant (by participant's self-report of pregnancy status)    Prior to the Informed Consent (IC) being signed, or any study protocol required data collection, testing, procedure, or intervention being performed, the following was done and/or discussed:  Patient was given a copy of the IC for review   Purpose of the study and qualifications to participate   Study design, Follow up schedule, and tests or procedures done at each visit  Confidentiality and HIPAA Authorization for Release of Medical Records for the research trial/ subject's rights/research related injury  Risk, Benefits, Alternative Treatments, Compensation and  Costs  Participation in the research trial is voluntary and patient may withdraw at anytime  Contact information for study related questions    Patient verbalizes understanding of the above: Yes  Contact information for CRC and PI given to patient: Yes  Patient able to adequately summarize: the purpose of the study, the risks associated with the study, and all procedures, testing, and follow-ups associated with the study: Yes    Patient signed the informed consent form for the research study with an IRB approval date of 4/25/2022. Each page of the consent form was reviewed with patient and all questions answered satisfactorily.   Patient received a copy of the consent form. The original consent was scanned into electronic medical records.    Following IC being signed and prior to blood draw, patient completed all baseline/pretest questionnaires. The following specimens were collected from the pt at the time of this encounter: 40ml blood.     Blood draw time: 9:17 AM  Blood draw location: Left Arm    Patient height: 5 feet 4 inches  Patient weight: 175 lbs    Smoking History and Alcohol use     Please indicate whether the participant smoked at least 100 cigarettes in their lifetime:   Please indicate whether the participant is a current smoker:  Age participant started smoking cigarettes  Age participant stopped smoking cigarettes  During their time as a smoker, please indicate if the participant stopped smoking for a month or more:  Please indicate how many cigarettes per day did the participant smoke on average for the majority of their time as a smoker:     During the last 12 months, how often did the participant have any kind of drink containing alcohol? Count as one drink a 12 ounce can or glass of beer, a 5 ounce glass of wine, or a drink containing 1 shot of liquor.  During the last 12 months, how many drinks did the participant have on days when they drank alcohol?

## 2022-10-17 NOTE — RESEARCH
Sponsor: [employer]     Study Title/IRB Number: [id number]    Principle Investigator: Dr. Nura Prince    Patient eligibility was checked prior to enrollment in the study. Patient met the following inclusion and exclusion criteria:     INCLUSION CRITERIA  Participant age is 50 years or older at the time of signing the Informed Consent form  Participant is capable of giving signed Informed Consent, which includes compliance with the requirements and restrictions in the informed consent form and the protocol    EXCLUSION CRITERIA  Participant is undergoing or referred for diagnostic evaluation due to clinical suspicion for cancer  Participant has a personal history of invasive or hematologic malignancy, diagnosed within the last 3 years prior to expected enrollment date or diagnosed greater than 3 years prior to expected enrollment date and never treated  Participant has had definitive treatment for invasive or hematologic malignancy within the 3 years prior to expected enrollment date  Participant is not able to comply with protocol procedures  Participant is not a current patient at a participating center  Participant is currently enrolled or was previously enrolled in another [employer]-sponsored study  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[employer]  Participant is currently pregnant (by participant's self-report of pregnancy status)    Prior to the Informed Consent (IC) being signed, or any study protocol required data collection, testing, procedure, or intervention being performed, the following was done and/or discussed:  Patient was given a copy of the IC for review   Purpose of the study and qualifications to participate   Study design, Follow up schedule, and tests or procedures done at each visit  Confidentiality and HIPAA Authorization for Release of Medical Records for the research trial/ subject's rights/research related injury  Risk, Benefits, Alternative Treatments, Compensation and  "Costs  Participation in the research trial is voluntary and patient may withdraw at anytime  Contact information for study related questions    Patient verbalizes understanding of the above: Yes  Contact information for CRC and PI given to patient: Yes  Patient able to adequately summarize: the purpose of the study, the risks associated with the study, and all procedures, testing, and follow-ups associated with the study: Yes    Patient signed the informed consent form for the research study with an IRB approval date of 4/25/2022. Each page of the consent form was reviewed with patient and all questions answered satisfactorily.   Patient received a copy of the consent form. The original consent was scanned into electronic medical records.    Following IC being signed and prior to blood draw, patient completed all baseline/pretest questionnaires. The following specimens were collected from the pt at the time of this encounter: 40ml blood.     Blood draw time: 11:20 am  Blood draw location: Right Arm    Patient height: 5' 2"  Patient weight: 135    Smoking History and Alcohol use     Please indicate whether the participant smoked at least 100 cigarettes in their lifetime: YES  Please indicate whether the participant is a current smoker: NO  Age participant started smoking cigarettes: 14 years old  Age participant stopped smoking cigarettes  During their time as a smoker, please indicate if the participant stopped smoking for a month or more: Stopped at 37 years old  Please indicate how many cigarettes per day did the participant smoke on average for the majority of their time as a smoker: 20    During the last 12 months, how often did the participant have any kind of drink containing alcohol? Count as one drink a 12 ounce can or glass of beer, a 5 ounce glass of wine, or a drink containing 1 shot of liquor.  4 times a week  During the last 12 months, how many drinks did the participant have on days when they drank " alcohol? 5 or more drinks per day

## 2022-10-25 ENCOUNTER — PATIENT MESSAGE (OUTPATIENT)
Dept: INTERNAL MEDICINE | Facility: CLINIC | Age: 52
End: 2022-10-25
Payer: OTHER GOVERNMENT

## 2022-10-25 DIAGNOSIS — L50.9 HIVES: Primary | ICD-10-CM

## 2022-10-26 ENCOUNTER — PATIENT MESSAGE (OUTPATIENT)
Dept: INTERNAL MEDICINE | Facility: CLINIC | Age: 52
End: 2022-10-26
Payer: OTHER GOVERNMENT

## 2022-10-27 NOTE — TELEPHONE ENCOUNTER
Please schedule patient - same day - with any available provider (MD, PA-C, APRN).  If I have an availability in the next couple days, please book with me, thank you    Thank you.

## 2022-11-03 ENCOUNTER — RESEARCH ENCOUNTER (OUTPATIENT)
Dept: RESEARCH | Facility: OTHER | Age: 52
End: 2022-11-03
Payer: OTHER GOVERNMENT

## 2022-11-03 NOTE — PROGRESS NOTES
Víctor Renee ID: [id number]  Date:November 3rd, 2022     Patient was called and notified about test results, there was no signal detected.  Patient was reminded to not interpret cancer signal not detected test results as the absence of cancer and to continue to adhere to guideline-recommended cancer screening.  Patient was asked about completing 30 day questionnaire online and was agreeable.

## 2022-11-09 ENCOUNTER — PATIENT MESSAGE (OUTPATIENT)
Dept: OTOLARYNGOLOGY | Facility: CLINIC | Age: 52
End: 2022-11-09
Payer: OTHER GOVERNMENT

## 2022-11-09 ENCOUNTER — PATIENT MESSAGE (OUTPATIENT)
Dept: INTERNAL MEDICINE | Facility: CLINIC | Age: 52
End: 2022-11-09
Payer: OTHER GOVERNMENT

## 2022-11-09 NOTE — TELEPHONE ENCOUNTER
Called and spoke to pt. Pt states she was recently seen by ENT and told to f/u with ED or PCP. Pt states this weekend she started with vertigo and head pressure. Pt also states she has had nausea, left arm weakness and numbness. Explained to pt she should have someone take her to the ED now for an evaluation. Pt states she would prefer that I send a message to PCP because he knows her history. Explained to pt I would absolutely send a message, but I recommend pt go to ED now. Pt verbalized understanding.

## 2022-11-09 NOTE — TELEPHONE ENCOUNTER
Called pt back, relayed message again that PCP is out of the office today and covering physician does recommend going in for eval asap. Pt verbalized understanding.

## 2022-11-21 ENCOUNTER — PATIENT MESSAGE (OUTPATIENT)
Dept: INTERNAL MEDICINE | Facility: CLINIC | Age: 52
End: 2022-11-21
Payer: OTHER GOVERNMENT

## 2022-11-21 DIAGNOSIS — R20.0 HAND NUMBNESS: Primary | ICD-10-CM

## 2022-12-07 ENCOUNTER — PATIENT MESSAGE (OUTPATIENT)
Dept: INTERNAL MEDICINE | Facility: CLINIC | Age: 52
End: 2022-12-07
Payer: OTHER GOVERNMENT

## 2022-12-15 ENCOUNTER — HOSPITAL ENCOUNTER (OUTPATIENT)
Dept: RADIOLOGY | Facility: HOSPITAL | Age: 52
Discharge: HOME | End: 2022-12-15
Attending: FAMILY MEDICINE
Payer: OTHER GOVERNMENT

## 2022-12-15 DIAGNOSIS — Z87.891 PERSONAL HISTORY OF NICOTINE DEPENDENCE: ICD-10-CM

## 2022-12-15 PROCEDURE — 71271 CT THORAX LUNG CANCER SCR C-: CPT | Mod: TC

## 2022-12-15 PROCEDURE — 71271 CT THORAX LUNG CANCER SCR C-: CPT | Mod: 26,,, | Performed by: RADIOLOGY

## 2022-12-15 PROCEDURE — 71271 CT CHEST LUNG SCREENING LOW DOSE: ICD-10-PCS | Mod: 26,,, | Performed by: RADIOLOGY

## 2022-12-19 ENCOUNTER — OFFICE VISIT (OUTPATIENT)
Dept: INTERNAL MEDICINE | Facility: CLINIC | Age: 52
End: 2022-12-19
Payer: OTHER GOVERNMENT

## 2022-12-19 VITALS
WEIGHT: 142.19 LBS | DIASTOLIC BLOOD PRESSURE: 80 MMHG | OXYGEN SATURATION: 99 % | HEIGHT: 62 IN | HEART RATE: 83 BPM | BODY MASS INDEX: 26.17 KG/M2 | SYSTOLIC BLOOD PRESSURE: 137 MMHG

## 2022-12-19 DIAGNOSIS — U07.1 COVID-19 VIRUS INFECTION: Primary | ICD-10-CM

## 2022-12-19 PROCEDURE — 99203 PR OFFICE/OUTPT VISIT, NEW, LEVL III, 30-44 MIN: ICD-10-PCS | Mod: S$PBB,,, | Performed by: INTERNAL MEDICINE

## 2022-12-19 PROCEDURE — 99999 PR PBB SHADOW E&M-EST. PATIENT-LVL III: ICD-10-PCS | Mod: PBBFAC,,, | Performed by: INTERNAL MEDICINE

## 2022-12-19 PROCEDURE — 99999 PR PBB SHADOW E&M-EST. PATIENT-LVL III: CPT | Mod: PBBFAC,,, | Performed by: INTERNAL MEDICINE

## 2022-12-19 PROCEDURE — 99203 OFFICE O/P NEW LOW 30 MIN: CPT | Mod: S$PBB,,, | Performed by: INTERNAL MEDICINE

## 2022-12-19 PROCEDURE — 99213 OFFICE O/P EST LOW 20 MIN: CPT | Mod: PBBFAC | Performed by: INTERNAL MEDICINE

## 2022-12-19 RX ORDER — BENZONATATE 100 MG/1
100 CAPSULE ORAL 3 TIMES DAILY PRN
Qty: 30 CAPSULE | Refills: 0 | Status: SHIPPED | OUTPATIENT
Start: 2022-12-19 | End: 2022-12-29

## 2022-12-19 RX ORDER — AZITHROMYCIN 250 MG/1
TABLET, FILM COATED ORAL
Qty: 6 TABLET | Refills: 0 | Status: SHIPPED | OUTPATIENT
Start: 2022-12-19 | End: 2022-12-19 | Stop reason: CLARIF

## 2022-12-19 NOTE — PROGRESS NOTES
Subjective:       Patient ID: Kayleen Patel is a 52 y.o. female.    Chief Complaint: Sinus Problem    HPI    Patient presents for 3 week history of sinus pressure, nasal congestion and cough.     Started 3 weeks ago, feels like she was getting better and then woke up again with nasal congestion and cough. Has been taking OTC mucinex and dayquil.         Review of Systems   Constitutional:  Positive for fever. Negative for activity change, appetite change and chills.   HENT:  Positive for nasal congestion, postnasal drip, rhinorrhea, sinus pressure/congestion, sneezing and sore throat. Negative for ear pain.    Respiratory:  Positive for cough. Negative for shortness of breath.    Cardiovascular:  Negative for chest pain, palpitations and leg swelling.   Gastrointestinal:  Negative for abdominal distention, abdominal pain, constipation, diarrhea, nausea and vomiting.   Genitourinary:  Negative for dysuria and hematuria.   Musculoskeletal:  Negative for arthralgias, back pain and myalgias.   Neurological:  Negative for dizziness and headaches.   Psychiatric/Behavioral:  Negative for agitation. The patient is not nervous/anxious.          Past Medical History:   Diagnosis Date    Abnormal cervical Papanicolaou smear '88, '04    Colpo/Cryo    Chronic diarrhea 2/12/2015    GERD (gastroesophageal reflux disease)     History of acute PID 1988    Hypertension     Thrombocytosis      Past Surgical History:   Procedure Laterality Date    BREAST BIOPSY Right 06/09/2021    stereo benign    CARPAL TUNNEL RELEASE Left 9/21/2021    Procedure: RELEASE, CARPAL TUNNEL;  Surgeon: Alla Goff MD;  Location: [facility] OR;  Service: Orthopedics;  Laterality: Left;    GANGLION CYST EXCISION      INJECTION OF STEROID Right 9/21/2021    Procedure: INJECTION, STEROID-Right carpal tunnel;  Surgeon: Alla Goff MD;  Location: [facility] OR;  Service: Orthopedics;  Laterality: Right;    REFRACTIVE SURGERY Bilateral 2009    Dr. Shukla  Ferriday     TONSILLECTOMY        Patient Active Problem List   Diagnosis    DJD (degenerative joint disease) of knee    Calcified granuloma of lung    Hypertension, essential    Fasciculation    Chronic pain of both knees    Insomnia    Tachycardia    Overweight (BMI 25.0-29.9)    Pulmonary nodule    Personal history of nicotine dependence    Aortic atherosclerosis    Left carpal tunnel syndrome    Femoral acetabular impingement    Decreased strength of lower extremity        Objective:      Physical Exam  Constitutional:       Appearance: Normal appearance.   HENT:      Head: Normocephalic.      Right Ear: Tympanic membrane normal.      Left Ear: Tympanic membrane normal.      Nose: Nose normal.   Cardiovascular:      Rate and Rhythm: Normal rate and regular rhythm.      Pulses: Normal pulses.      Heart sounds: Normal heart sounds.   Pulmonary:      Effort: Pulmonary effort is normal.      Breath sounds: Normal breath sounds.   Abdominal:      General: Abdomen is flat. Bowel sounds are normal.      Palpations: Abdomen is soft.   Musculoskeletal:         General: Normal range of motion.      Cervical back: Normal range of motion and neck supple.   Skin:     General: Skin is warm and dry.   Neurological:      General: No focal deficit present.      Mental Status: She is alert and oriented to person, place, and time.   Psychiatric:         Mood and Affect: Mood normal.       Assessment:       Problem List Items Addressed This Visit    None  Visit Diagnoses       COVID-19 virus infection    -  Primary              Plan:         Kayleen was seen today for sinus problem.    Diagnoses and all orders for this visit:    COVID-19 virus infection  Positive for COVID in the office.   Advised to isolate for 5 days.   Start Paxlovid.   Advised on ER precautions.   -     benzonatate (TESSALON) 100 MG capsule; Take 1 capsule (100 mg total) by mouth 3 (three) times daily as needed for Cough.  -     nirmatrelvir-ritonavir 300 mg  (150 mg x 2)-100 mg copackaged tablets (EUA); Take 3 tablets by mouth 2 (two) times daily for 5 days. Each dose contains 2 nirmatrelvir (pink tablets) and 1 ritonavir (white tablet). Take all 3 tablets together                 Maria Fernanda Saavedra MD   Internal Medicine   Primary Care

## 2022-12-29 ENCOUNTER — TELEPHONE (OUTPATIENT)
Dept: INTERNAL MEDICINE | Facility: CLINIC | Age: 52
End: 2022-12-29
Payer: OTHER GOVERNMENT

## 2022-12-29 DIAGNOSIS — Z87.891 PERSONAL HISTORY OF NICOTINE DEPENDENCE: Primary | ICD-10-CM

## 2022-12-29 NOTE — TELEPHONE ENCOUNTER
Called and discussed test results and recommendations with the pt. The pt expressed understanding.     1ry scan scheduled

## 2023-01-05 ENCOUNTER — OFFICE VISIT (OUTPATIENT)
Dept: INTERNAL MEDICINE | Facility: CLINIC | Age: 53
End: 2023-01-05
Attending: FAMILY MEDICINE
Payer: OTHER GOVERNMENT

## 2023-01-05 ENCOUNTER — HOSPITAL ENCOUNTER (OUTPATIENT)
Dept: RADIOLOGY | Facility: HOSPITAL | Age: 53
Discharge: HOME | End: 2023-01-05
Attending: FAMILY MEDICINE
Payer: OTHER GOVERNMENT

## 2023-01-05 VITALS
HEIGHT: 62 IN | DIASTOLIC BLOOD PRESSURE: 78 MMHG | BODY MASS INDEX: 24.29 KG/M2 | SYSTOLIC BLOOD PRESSURE: 137 MMHG | HEART RATE: 83 BPM | WEIGHT: 132 LBS | OXYGEN SATURATION: 100 %

## 2023-01-05 DIAGNOSIS — R91.1 PULMONARY NODULE: ICD-10-CM

## 2023-01-05 DIAGNOSIS — I10 HYPERTENSION, ESSENTIAL: ICD-10-CM

## 2023-01-05 DIAGNOSIS — M53.3 SI (SACROILIAC) JOINT DYSFUNCTION: Primary | ICD-10-CM

## 2023-01-05 DIAGNOSIS — M25.859 FEMORAL ACETABULAR IMPINGEMENT: ICD-10-CM

## 2023-01-05 DIAGNOSIS — J84.10 CALCIFIED GRANULOMA OF LUNG: ICD-10-CM

## 2023-01-05 DIAGNOSIS — Z87.891 PERSONAL HISTORY OF NICOTINE DEPENDENCE: ICD-10-CM

## 2023-01-05 DIAGNOSIS — M54.50 BACK PAIN, LUMBOSACRAL: ICD-10-CM

## 2023-01-05 DIAGNOSIS — M53.3 SI (SACROILIAC) JOINT DYSFUNCTION: ICD-10-CM

## 2023-01-05 DIAGNOSIS — I70.0 AORTIC ATHEROSCLEROSIS: ICD-10-CM

## 2023-01-05 PROCEDURE — 99215 OFFICE O/P EST HI 40 MIN: CPT | Mod: PBBFAC | Performed by: FAMILY MEDICINE

## 2023-01-05 PROCEDURE — 72100 X-RAY EXAM L-S SPINE 2/3 VWS: CPT | Mod: TC

## 2023-01-05 PROCEDURE — 72100 X-RAY EXAM L-S SPINE 2/3 VWS: CPT | Mod: 26,,, | Performed by: RADIOLOGY

## 2023-01-05 PROCEDURE — 99999 PR PBB SHADOW E&M-EST. PATIENT-LVL V: CPT | Mod: PBBFAC,,, | Performed by: FAMILY MEDICINE

## 2023-01-05 PROCEDURE — 72100 XR LUMBAR SPINE AP AND LATERAL: ICD-10-PCS | Mod: 26,,, | Performed by: RADIOLOGY

## 2023-01-05 PROCEDURE — 99999 PR PBB SHADOW E&M-EST. PATIENT-LVL V: ICD-10-PCS | Mod: PBBFAC,,, | Performed by: FAMILY MEDICINE

## 2023-01-05 PROCEDURE — 99214 PR OFFICE/OUTPT VISIT, EST, LEVL IV, 30-39 MIN: ICD-10-PCS | Mod: S$PBB,,, | Performed by: FAMILY MEDICINE

## 2023-01-05 PROCEDURE — 99214 OFFICE O/P EST MOD 30 MIN: CPT | Mod: S$PBB,,, | Performed by: FAMILY MEDICINE

## 2023-01-05 RX ORDER — METHYLPREDNISOLONE 4 MG/1
TABLET ORAL
Qty: 21 EACH | Refills: 0 | Status: SHIPPED | OUTPATIENT
Start: 2023-01-05 | End: 2023-02-01

## 2023-01-05 RX ORDER — TRAMADOL HYDROCHLORIDE 50 MG/1
50 TABLET ORAL EVERY 12 HOURS PRN
Qty: 14 TABLET | Refills: 0 | Status: SHIPPED | OUTPATIENT
Start: 2023-01-05 | End: 2023-12-22

## 2023-01-05 RX ORDER — METHYLPREDNISOLONE 4 MG/1
TABLET ORAL
Qty: 21 EACH | Refills: 0 | Status: SHIPPED | OUTPATIENT
Start: 2023-01-05 | End: 2023-01-05 | Stop reason: SDUPTHER

## 2023-01-05 NOTE — PROGRESS NOTES
Answers submitted by the patient for this visit:  Back Pain Questionnaire (Submitted on 1/2/2023)  Chief Complaint: Back pain  Chronicity: recurrent  Onset: more than 1 year ago  Frequency: constantly  Progression since onset: waxing and waning  Pain location: gluteal  Pain quality: aching  Radiates to: right thigh  Pain - numeric: 7/10  Pain is: the same all the time  Aggravated by: bending, sitting, standing  Stiffness is present: all day  abdominal pain: No  bladder incontinence: No  bowel incontinence: No  chest pain: No  dysuria: No  fever: No  headaches: No  leg pain: Yes  numbness: No  paresis: No  paresthesias: No  pelvic pain: Yes  perianal numbness: No  tingling: No  weakness: Yes  weight loss: No  genital pain: No  hematuria: No  Pain severity: moderate  Improvement on treatment: no relief  Subjective:       Patient ID: Kayleen Patel is a 52 y.o. female.    Chief Complaint: Hip Pain and Back Pain    patient, same day urgent care presents - right hip pain which is located in the groin.  She had been referred to orthopedics and physical therapy.  She notes at this time that the pain seems to come from the SI area on the right.  When she presses on this area the pain improves.  Radiates down to the anterior lateral thigh and knee.  No incontinence, fever, chills, motor loss to the lower extremity.  She is curious if it might be something else.  She is requesting a steroid shot or something to that effect.  Hip x-rays had shown BARBRA in the past, was refer to orthopedics and underwent PT. This seems to be a different or more extensive entity in that it involves the SI area as well.  Worse when she stands or walks.  Going on a cruise tomorrow for at least 3 days.    Back Pain  This is a recurrent problem. The current episode started more than 1 year ago. The problem occurs constantly. The problem has been waxing and waning since onset. The pain is present in the gluteal. The quality of the pain is  described as aching. The pain radiates to the right thigh. The pain is at a severity of 7/10. The pain is moderate. The pain is The same all the time. The symptoms are aggravated by bending, sitting and standing. Stiffness is present All day. Associated symptoms include leg pain, pelvic pain and weakness. Pertinent negatives include no abdominal pain, bladder incontinence, bowel incontinence, chest pain, dysuria, fever, headaches, numbness, paresis, paresthesias, perianal numbness, tingling or weight loss. The treatment provided no relief.   Review of Systems   Constitutional:  Negative for appetite change, chills, diaphoresis, fatigue, fever and weight loss.   HENT:  Negative for congestion, postnasal drip, rhinorrhea, sore throat and trouble swallowing.    Eyes:  Negative for visual disturbance.   Respiratory:  Negative for cough, choking, chest tightness, shortness of breath and wheezing.    Cardiovascular:  Negative for chest pain and leg swelling.   Gastrointestinal:  Negative for abdominal distention, abdominal pain, bowel incontinence, diarrhea, nausea and vomiting.   Genitourinary:  Positive for pelvic pain. Negative for bladder incontinence, difficulty urinating, dysuria and hematuria.   Musculoskeletal:  Positive for back pain. Negative for arthralgias and myalgias.   Skin:  Negative for rash.   Neurological:  Positive for weakness. Negative for tingling, light-headedness, numbness, headaches and paresthesias.   Hematological:  Does not bruise/bleed easily.   Psychiatric/Behavioral:  Negative for decreased concentration and dysphoric mood.      Objective:      Physical Exam  Vitals and nursing note reviewed.   Constitutional:       General: She is not in acute distress.     Appearance: She is well-developed.   Pulmonary:      Effort: Pulmonary effort is normal.   Abdominal:      Tenderness: There is no abdominal tenderness. There is no rebound.   Musculoskeletal:      Cervical back: Neck supple.       Thoracic back: No tenderness. Normal range of motion.      Lumbar back: No spasms or tenderness. Normal range of motion.      Right hip: Normal range of motion. Normal strength.      Left hip: Normal range of motion. Normal strength.      Right lower leg: No edema.      Left lower leg: No edema.   Skin:     General: Skin is warm and dry.      Findings: No rash.   Neurological:      Mental Status: She is alert and oriented to person, place, and time.      Sensory: No sensory deficit.      Coordination: Coordination normal.      Gait: Gait normal.      Deep Tendon Reflexes:      Reflex Scores:       Patellar reflexes are 0 on the right side and 0 on the left side.       Achilles reflexes are 0 on the right side and 0 on the left side.     Comments: Negative SLR.  + CHANDA.  + FADIR.   Psychiatric:         Behavior: Behavior normal.         Thought Content: Thought content normal.         Judgment: Judgment normal.       Assessment:       1. SI (sacroiliac) joint dysfunction    2. Back pain, lumbosacral    3. Femoral acetabular impingement    4. Aortic atherosclerosis    5. Calcified granuloma of lung    6. Hypertension, essential    7. Personal history of nicotine dependence    8. Pulmonary nodule          Plan:     Medication List with Changes/Refills   New Medications    METHYLPREDNISOLONE (MEDROL DOSEPACK) 4 MG TABLET    use as directed   Current Medications    CALCIUM CARBONATE (CALCIUM 500 ORAL)    Take 1 capsule by mouth.    CHLORTHALIDONE (HYGROTEN) 25 MG TAB    Take 1 tablet (25 mg total) by mouth once daily.    CYANOCOBALAMIN (VITAMIN B-12) 100 MCG TABLET    Take 100 mcg by mouth once daily.    FERROUS SULFATE 325 MG (65 MG IRON) TAB TABLET    Take 325 mg by mouth daily with breakfast.    MECLIZINE (ANTIVERT) 25 MG TABLET    Take 1 tablet (25 mg total) by mouth 3 (three) times daily as needed for Dizziness.    MUPIROCIN (BACTROBAN) 2 % OINTMENT        OMEGA-3/DHA/EPA/FISH OIL (OMEGA-3 FISH OIL ORAL)    Take 1  capsule by mouth.    ONDANSETRON (ZOFRAN-ODT) 4 MG TBDL    Take 1 tablet (4 mg total) by mouth 3 (three) times daily as needed (Nausea and vomiting).    VALSARTAN (DIOVAN) 160 MG TABLET    Take 1 tablet (160 mg total) by mouth once daily.   Changed and/or Refilled Medications    Modified Medication Previous Medication    TRAMADOL (ULTRAM) 50 MG TABLET traMADoL (ULTRAM) 50 mg tablet       Take 1 tablet (50 mg total) by mouth every 12 (twelve) hours as needed for Pain.    Take 1 tablet (50 mg total) by mouth every 6 (six) hours.     1. SI (sacroiliac) joint dysfunction  -     X-Ray Lumbar Spine Ap And Lateral; Future; Expected date: 01/05/2023  -     Ambulatory referral/consult to [facility] & Spine Clinic; Future; Expected date: 01/12/2023  -     Ambulatory referral/consult to Physical Medicine Rehab; Future; Expected date: 01/12/2023  -     Discontinue: methylPREDNISolone (MEDROL DOSEPACK) 4 mg tablet; use as directed  Dispense: 21 each; Refill: 0  -     traMADoL (ULTRAM) 50 mg tablet; Take 1 tablet (50 mg total) by mouth every 12 (twelve) hours as needed for Pain.  Dispense: 14 tablet; Refill: 0  -     methylPREDNISolone (MEDROL DOSEPACK) 4 mg tablet; use as directed  Dispense: 21 each; Refill: 0    2. Back pain, lumbosacral  -     X-Ray Lumbar Spine Ap And Lateral; Future; Expected date: 01/05/2023  -     Ambulatory referral/consult to Back & Spine Clinic; Future; Expected date: 01/12/2023  -     Ambulatory referral/consult to Physical Medicine Rehab; Future; Expected date: 01/12/2023  -     Discontinue: methylPREDNISolone (MEDROL DOSEPACK) 4 mg tablet; use as directed  Dispense: 21 each; Refill: 0  -     traMADoL (ULTRAM) 50 mg tablet; Take 1 tablet (50 mg total) by mouth every 12 (twelve) hours as needed for Pain.  Dispense: 14 tablet; Refill: 0  -     methylPREDNISolone (MEDROL DOSEPACK) 4 mg tablet; use as directed  Dispense: 21 each; Refill: 0    3. Femoral acetabular impingement    4. Aortic  atherosclerosis  Overview:  Noted on June 2021 LDCT - subsequent Agaston score 3 (7/27/2021).      5. Calcified granuloma of lung  Overview:  -6/7/2021 LDCT      6. Hypertension, essential    7. Personal history of nicotine dependence  Overview:  -24-25 year, PPD, quit 2007      8. Pulmonary nodule  Overview:  -former smoker - noted on serial scans, stable through 12/15/2022 LDCT -saw pulm while in [place], per patient hx, not thought to represent significant problem a the time        See meds, orders, follow up, routing and instructions sections of encounter and AVS. Discussed with patient and provided on AVS.    Will order more imaging, suggest she see Dr. Ulloa.  May ultimately need MRI of SI or lower lumbar.  Side effects of prednisone Dosepak discussed.

## 2023-01-08 ENCOUNTER — TELEPHONE (OUTPATIENT)
Dept: INTERNAL MEDICINE | Facility: CLINIC | Age: 53
End: 2023-01-08
Payer: OTHER GOVERNMENT

## 2023-01-08 DIAGNOSIS — N95.9 MENOPAUSAL AND PERIMENOPAUSAL DISORDER: ICD-10-CM

## 2023-01-08 DIAGNOSIS — Q76.49 VERTEBRAL ANOMALY: Primary | ICD-10-CM

## 2023-01-09 ENCOUNTER — PATIENT MESSAGE (OUTPATIENT)
Dept: ORTHOPEDICS | Facility: CLINIC | Age: 53
End: 2023-01-09
Payer: OTHER GOVERNMENT

## 2023-01-09 DIAGNOSIS — M79.641 RIGHT HAND PAIN: Primary | ICD-10-CM

## 2023-01-09 NOTE — TELEPHONE ENCOUNTER
Called and discussed test results and recommendations with the pt. The pt expressed understanding.     DXA scan scheduled

## 2023-01-09 NOTE — TELEPHONE ENCOUNTER
Please call patient and explain that I would like to repeat some imaging. The tests show old abnormality of the lumbar verteba and mild arthritis in the spine.    Please see orders for  DXA  and please call patient to schedule soon.     Thank you.

## 2023-01-11 ENCOUNTER — HOSPITAL ENCOUNTER (OUTPATIENT)
Dept: RADIOLOGY | Facility: HOSPITAL | Age: 53
Discharge: HOME | End: 2023-01-11
Attending: ORTHOPAEDIC SURGERY
Payer: OTHER GOVERNMENT

## 2023-01-11 ENCOUNTER — OFFICE VISIT (OUTPATIENT)
Dept: ORTHOPEDICS | Facility: CLINIC | Age: 53
End: 2023-01-11
Attending: ORTHOPAEDIC SURGERY
Payer: OTHER GOVERNMENT

## 2023-01-11 ENCOUNTER — PATIENT MESSAGE (OUTPATIENT)
Dept: ORTHOPEDICS | Facility: CLINIC | Age: 53
End: 2023-01-11
Payer: OTHER GOVERNMENT

## 2023-01-11 VITALS — BODY MASS INDEX: 24.29 KG/M2 | HEIGHT: 62 IN | WEIGHT: 132 LBS

## 2023-01-11 DIAGNOSIS — M79.641 RIGHT HAND PAIN: ICD-10-CM

## 2023-01-11 DIAGNOSIS — M18.11 PRIMARY OSTEOARTHRITIS OF FIRST CARPOMETACARPAL JOINT OF RIGHT HAND: ICD-10-CM

## 2023-01-11 DIAGNOSIS — G56.01 RIGHT CARPAL TUNNEL SYNDROME: Primary | ICD-10-CM

## 2023-01-11 PROCEDURE — 73130 XR HAND COMPLETE 3 VIEW RIGHT: ICD-10-PCS | Mod: 26,RT,, | Performed by: RADIOLOGY

## 2023-01-11 PROCEDURE — 73130 X-RAY EXAM OF HAND: CPT | Mod: 26,RT,, | Performed by: RADIOLOGY

## 2023-01-11 PROCEDURE — 73130 X-RAY EXAM OF HAND: CPT | Mod: TC,PO,RT

## 2023-01-11 PROCEDURE — 99214 PR OFFICE/OUTPT VISIT, EST, LEVL IV, 30-39 MIN: ICD-10-PCS | Mod: S$GLB,,, | Performed by: ORTHOPAEDIC SURGERY

## 2023-01-11 PROCEDURE — 99999 PR PBB SHADOW E&M-EST. PATIENT-LVL IV: CPT | Mod: PBBFAC,,, | Performed by: ORTHOPAEDIC SURGERY

## 2023-01-11 PROCEDURE — 99214 OFFICE O/P EST MOD 30 MIN: CPT | Mod: S$GLB,,, | Performed by: ORTHOPAEDIC SURGERY

## 2023-01-11 PROCEDURE — 99999 PR PBB SHADOW E&M-EST. PATIENT-LVL IV: ICD-10-PCS | Mod: PBBFAC,,, | Performed by: ORTHOPAEDIC SURGERY

## 2023-01-11 RX ORDER — MELOXICAM 7.5 MG/1
15 TABLET ORAL DAILY
Qty: 30 TABLET | Refills: 1 | Status: SHIPPED | OUTPATIENT
Start: 2023-01-11 | End: 2023-02-01 | Stop reason: SDUPTHER

## 2023-01-11 RX ORDER — IBUPROFEN 800 MG/1
800 TABLET ORAL 2 TIMES DAILY PRN
Qty: 30 TABLET | Refills: 1 | Status: SHIPPED | OUTPATIENT
Start: 2023-01-11 | End: 2023-02-01

## 2023-01-12 NOTE — PROGRESS NOTES
Kayleen Patel presents for follow up evaluation of   Encounter Diagnoses   Name Primary?    Right carpal tunnel syndrome Yes    Primary osteoarthritis of first carpometacarpal joint of right hand      Overall the patient reports that her left hand is doing well. She states that she is having more numbness on the right. She reports difficulty sleeping despite night splint wear and previous steroid injection.    She is also having some tenderness to the right thumb CMC joint, especially with gripping.    PE:    AA&O x 4.  NAD  HEENT:  NCAT, sclera nonicteric  Lungs:  Respirations are equal and unlabored.  CV:  2+ bilateral upper and lower extremity pulses.  MSK:  + compression, + Tinel's, + Phalens right wrist. Tender to palpation right basilar thumb joint, + grind test. Neurovascularly intact bilaterally.  5/5 thenar and intrinsic musculature strength.  Full range of motion hands, wrists and elbows.    X-rays AP, lateral and oblique right hand taken today are independently reviewed by me and shows Eaton stage I basilar thumb arthritis.       A/P: Right carpal tunnel syndrome, right basilar thumb pain    1) We have discussed the natural history of basilar thumb arthritis and carpal tunnel including treatment options such as splinting, oral and topical anti-inflammatories, cortisone injections and surgery. I have given her mobic for pain.    We have discussed conservative vs. surgical treatment as well as risks, benefits and alternatives for right carpal tunnel syndrome.  Conservative measures have been exhausted and she would like to proceed with surgery. Surgery would include right carpal tunnel release. Consent signed today in clinic. Light use of the hand will be indicated for the first 4-6 weeks.     We have discussed risks of hand surgery which include but are not limited to blood clots in the legs that can travel to the lungs (pulmonary embolism). Pulmonary embolism can cause shortness of breath,  chest pain, and even shock. Other risks include urinary tract infection, nausea and vomiting (usually related to pain medication), chronic pain, bleeding, nerve damage, blood vessel injury, scarring and infection of the hand which can require re-operation. Furthermore, the risks of anesthesia include potential heart, lung, kidney, and liver damage.  Informed consent was obtained.  The patient understands and would like to proceed with surgery in the near future.    2) Call with any questions/concerns in the interim        Alla Goff MD    Please be aware that this note has been generated with the assistance of MModal voice-to-text.  Please excuse any spelling or grammatical errors.

## 2023-01-13 ENCOUNTER — PATIENT MESSAGE (OUTPATIENT)
Dept: INTERNAL MEDICINE | Facility: CLINIC | Age: 53
End: 2023-01-13
Payer: OTHER GOVERNMENT

## 2023-01-25 ENCOUNTER — PATIENT MESSAGE (OUTPATIENT)
Dept: ADMINISTRATIVE | Facility: OTHER | Age: 53
End: 2023-01-25
Payer: OTHER GOVERNMENT

## 2023-01-26 ENCOUNTER — APPOINTMENT (OUTPATIENT)
Dept: RADIOLOGY | Facility: CLINIC | Age: 53
End: 2023-01-26
Attending: FAMILY MEDICINE
Payer: OTHER GOVERNMENT

## 2023-01-26 DIAGNOSIS — N95.9 MENOPAUSAL AND PERIMENOPAUSAL DISORDER: ICD-10-CM

## 2023-01-26 DIAGNOSIS — Q76.49 VERTEBRAL ANOMALY: ICD-10-CM

## 2023-01-26 PROCEDURE — 77080 DEXA BONE DENSITY SPINE HIP: ICD-10-PCS | Mod: 26,,, | Performed by: INTERNAL MEDICINE

## 2023-01-26 PROCEDURE — 77080 DXA BONE DENSITY AXIAL: CPT | Mod: 26,,, | Performed by: INTERNAL MEDICINE

## 2023-01-26 PROCEDURE — 77080 DXA BONE DENSITY AXIAL: CPT | Mod: TC,PO

## 2023-01-30 ENCOUNTER — ANESTHESIA EVENT (OUTPATIENT)
Dept: SURGERY | Facility: HOSPITAL | Age: 53
End: 2023-01-30
Payer: OTHER GOVERNMENT

## 2023-01-30 NOTE — ANESTHESIA PREPROCEDURE EVALUATION
01/30/2023  Pre-operative evaluation for Procedure(s) (LRB):  RELEASE, CARPAL TUNNEL (Right)    Kayleen Patel is a 52 y.o. female     Patient Active Problem List   Diagnosis    DJD (degenerative joint disease) of knee    Calcified granuloma of lung    Hypertension, essential    Fasciculation    Chronic pain of both knees    Insomnia    Tachycardia    Overweight (BMI 25.0-29.9)    Pulmonary nodule    Personal history of nicotine dependence    Aortic atherosclerosis    Left carpal tunnel syndrome    Femoral acetabular impingement    Decreased strength of lower extremity    Vertebral anomaly       Review of patient's allergies indicates:   Allergen Reactions    Penicillins Hives    Adhesive Rash       No current facility-administered medications on file prior to encounter.     Current Outpatient Medications on File Prior to Encounter   Medication Sig Dispense Refill    CALCIUM CARBONATE (CALCIUM 500 ORAL) Take 1 capsule by mouth.      chlorthalidone (HYGROTEN) 25 MG Tab Take 1 tablet (25 mg total) by mouth once daily. 90 tablet 3    cyanocobalamin (VITAMIN B-12) 100 MCG tablet Take 100 mcg by mouth once daily.      ferrous sulfate 325 mg (65 mg iron) Tab tablet Take 325 mg by mouth daily with breakfast.      mupirocin (BACTROBAN) 2 % ointment       OMEGA-3/DHA/EPA/FISH OIL (OMEGA-3 FISH OIL ORAL) Take 1 capsule by mouth.      valsartan (DIOVAN) 160 MG tablet Take 1 tablet (160 mg total) by mouth once daily. 90 tablet 3    meclizine (ANTIVERT) 25 mg tablet Take 1 tablet (25 mg total) by mouth 3 (three) times daily as needed for Dizziness. 30 tablet 0    ondansetron (ZOFRAN-ODT) 4 MG TbDL Take 1 tablet (4 mg total) by mouth 3 (three) times daily as needed (Nausea and vomiting). 30 tablet 0    traMADoL (ULTRAM) 50 mg tablet Take 1 tablet (50 mg total) by mouth every 12  (twelve) hours as needed for Pain. 14 tablet 0       Past Surgical History:   Procedure Laterality Date    BREAST BIOPSY Right 06/09/2021    stereo benign    CARPAL TUNNEL RELEASE Left 9/21/2021    Procedure: RELEASE, CARPAL TUNNEL;  Surgeon: Alla Goff MD;  Location: [facility] OR;  Service: Orthopedics;  Laterality: Left;    GANGLION CYST EXCISION      INJECTION OF STEROID Right 9/21/2021    Procedure: INJECTION, STEROID-Right carpal tunnel;  Surgeon: Alla Goff MD;  Location: [facility] OR;  Service: Orthopedics;  Laterality: Right;    REFRACTIVE SURGERY Bilateral 2009    Dr. Vazquez Forbes     TONSILLECTOMY         Social History     Socioeconomic History    Marital status: Single   Tobacco Use    Smoking status: Former     Packs/day: 1.00     Years: 23.00     Pack years: 23.00     Types: Cigarettes     Quit date: 7/9/2007     Years since quitting: 15.5    Smokeless tobacco: Never   Substance and Sexual Activity    Alcohol use: Yes     Alcohol/week: 5.0 standard drinks     Types: 5 Glasses of wine per week     Comment: 3-4 times per week    Drug use: No    Sexual activity: Yes     Partners: Male     Birth control/protection: OCP   Social History Narrative    Retired [employer]. Currently, working for the [employer]. Criminal justice degree from [employer].          Social Determinants of Health     Financial Resource Strain: Low Risk     Difficulty of Paying Living Expenses: Not hard at all   Food Insecurity: No Food Insecurity    Worried About Running Out of Food in the Last Year: Never true    Ran Out of Food in the Last Year: Never true   Transportation Needs: No Transportation Needs    Lack of Transportation (Medical): No    Lack of Transportation (Non-Medical): No   Physical Activity: Insufficiently Active    Days of Exercise per Week: 3 days    Minutes of Exercise per Session: 30 min   Stress: No Stress Concern Present    Feeling of Stress : Not at all   Social Connections: Unknown     Frequency of Communication with Friends and Family: More than three times a week    Frequency of Social Gatherings with Friends and Family: Three times a week    Active Member of Clubs or Organizations: No    Attends Club or Organization Meetings: Never    Marital Status:    Housing Stability: Low Risk     Unable to Pay for Housing in the Last Year: No    Number of Places Lived in the Last Year: 1    Unstable Housing in the Last Year: No         CBC: No results for input(s): WBC, RBC, HGB, HCT, PLT, MCV, MCH, MCHC in the last 72 hours.    CMP: No results for input(s): NA, K, CL, CO2, BUN, CREATININE, GLU, MG, PHOS, CALCIUM, ALBUMIN, PROT, ALKPHOS, ALT, AST, BILITOT in the last 72 hours.    INR  No results for input(s): PT, INR, PROTIME, APTT in the last 72 hours.        Diagnostic Studies:      EKD Echo:  No results found for this or any previous visit.    Patient's chart and medical records reviewed.  OK to proceed to [facility].      Pre-op Assessment    I have reviewed the Patient Summary Reports.     I have reviewed the Nursing Notes. I have reviewed the NPO Status.   I have reviewed the Medications.     Review of Systems  Cardiovascular:   Hypertension    Hepatic/GI:   GERD        Physical Exam  General: Well nourished and Cooperative    Airway:  Mallampati: II   Mouth Opening: Normal  TM Distance: Normal  Tongue: Normal  Neck ROM: Normal ROM    Chest/Lungs:  Clear to auscultation, Normal Respiratory Rate    Heart:  Rate: Normal  Rhythm: Regular Rhythm  Sounds: Normal        Anesthesia Plan  Type of Anesthesia, risks & benefits discussed:    Anesthesia Type: Gen Natural Airway  Intra-op Monitoring Plan: Standard ASA Monitors  Post Op Pain Control Plan: multimodal analgesia and IV/PO Opioids PRN  Induction:  IV  Airway Plan: Direct and Video, Post-Induction  Informed Consent: Informed consent signed with the Patient and all parties understand the risks and agree with anesthesia plan.  All  questions answered.   ASA Score: 2    Ready For Surgery From Anesthesia Perspective.     .

## 2023-01-31 NOTE — PROGRESS NOTES
"Subjective:      Patient ID: Kayleen Patel is a 52 y.o. female.    Chief Complaint: Back Pain and Rectal Pain    Ms patel is a 53 yo female sent in consultation by Dr. Clay for evaluation of hip pain.  She has had the pain for 2 years.  The pain is right groin.  She did PT and she did not feel like it helped.  She was given a steroid and medrol dose ramesh and tramadol with no relief.  She feels like if she puts her thumb in her buttock the pain will go away.  She was started on mobic helps the hip pain.  The pain is worse with walking, lying down with leg straight and getting up from sitting after sitting too long, standing.  She will foam roll and it will go away.  She feels like the pain is starting on left.  She feels like sometimes she is limping.  She exercises a lot.   She does feel like the mobic helps.  She does feel better with sitting and with knee bent lying down.  She also feels better if she presses on the right glute.  Pain is 0/10, worst 8/10 at night and after work out, best 0/10 sitting and lying with legs bent.  She has not run in a year.  She did not feel like therapy was helpful    X-ray lumbar 1/5/2023  Mild DJD.  The disc spaces are well maintained.  No fracture, spondylolisthesis or bone destruction identified.  Mild anterior wedging of the L1 vertebral body noted.     Impression:     See above    X-ray hips 4/2022  No acute fractures.  The appearance of the femoral head-neck junctions bilaterally suggest "cam bumps" and BARBRA anatomy.  Question minimal narrowing of right and left superolateral hip joint spaces and bilateral acetabular roof spurring with some spurring about the right femoral neck spurring on the right about the inferior joint space.     Impression:     As above.    Past Medical History:  '88, '04: Abnormal cervical Papanicolaou smear      Comment:  Colpo/Cryo  2/12/2015: Chronic diarrhea  No date: GERD (gastroesophageal reflux disease)  1988: History of acute " PID  No date: Hypertension  No date: Thrombocytosis    Past Surgical History:  06/09/2021: BREAST BIOPSY; Right      Comment:  stereo benign  9/21/2021: CARPAL TUNNEL RELEASE; Left      Comment:  Procedure: RELEASE, CARPAL TUNNEL;  Surgeon: Alla Goff MD;  Location: [facility] OR;  Service: Orthopedics;                 Laterality: Left;  No date: GANGLION CYST EXCISION  9/21/2021: INJECTION OF STEROID; Right      Comment:  Procedure: INJECTION, STEROID-Right carpal tunnel;                 Surgeon: Alla Goff MD;  Location: [facility] OR;                 Service: Orthopedics;  Laterality: Right;  2009: REFRACTIVE SURGERY; Bilateral      Comment:  Dr. Vazquez Forbes   No date: TONSILLECTOMY    Review of patient's family history indicates:  Adopted:  Yes      Social History    Socioeconomic History      Marital status: Single    Tobacco Use      Smoking status: Former        Packs/day: 1.00        Years: 23.00        Pack years: 23        Types: Cigarettes        Quit date: 7/9/2007        Years since quitting: 15.5      Smokeless tobacco: Never    Substance and Sexual Activity      Alcohol use: Yes        Comment: Social      Drug use: No      Sexual activity: Yes        Partners: Male        Birth control/protection: OCP    Social History Narrative      Retired [employer]. Currently, working for the [employer]. Criminal justice degree from [employer].           Social Determinants of Health  Financial Resource Strain: Low Risk       Difficulty of Paying Living Expenses: Not hard at all  Food Insecurity: No Food Insecurity      Worried About Running Out of Food in the Last Year: Never true      Ran Out of Food in the Last Year: Never true  Transportation Needs: No Transportation Needs      Lack of Transportation (Medical): No      Lack of Transportation (Non-Medical): No  Physical Activity: Insufficiently Active      Days of Exercise per Week: 3 days      Minutes of Exercise per Session: 30 min  Stress: No  Stress Concern Present      Feeling of Stress : Not at all  Social Connections: Unknown      Frequency of Communication with Friends and Family: More than three times a week      Frequency of Social Gatherings with Friends and Family: Three times a week      Active Member of Clubs or Organizations: No      Attends Club or Organization Meetings: Never      Marital Status:   Housing Stability: Low Risk       Unable to Pay for Housing in the Last Year: No      Number of Places Lived in the Last Year: 1      Unstable Housing in the Last Year: No    Current Outpatient Medications:  CALCIUM CARBONATE (CALCIUM 500 ORAL), Take 1 capsule by mouth., Disp: , Rfl:   chlorthalidone (HYGROTEN) 25 MG Tab, Take 1 tablet (25 mg total) by mouth once daily., Disp: 90 tablet, Rfl: 3  cyanocobalamin (VITAMIN B-12) 100 MCG tablet, Take 100 mcg by mouth once daily., Disp: , Rfl:   ferrous sulfate 325 mg (65 mg iron) Tab tablet, Take 325 mg by mouth daily with breakfast., Disp: , Rfl:   ibuprofen (ADVIL,MOTRIN) 800 MG tablet, Take 1 tablet (800 mg total) by mouth 2 (two) times daily as needed for Pain., Disp: 30 tablet, Rfl: 1  meclizine (ANTIVERT) 25 mg tablet, Take 1 tablet (25 mg total) by mouth 3 (three) times daily as needed for Dizziness., Disp: 30 tablet, Rfl: 0  meloxicam (MOBIC) 7.5 MG tablet, Take 2 tablets (15 mg total) by mouth once daily., Disp: 30 tablet, Rfl: 1  methylPREDNISolone (MEDROL DOSEPACK) 4 mg tablet, use as directed, Disp: 21 each, Rfl: 0  mupirocin (BACTROBAN) 2 % ointment, , Disp: , Rfl:   OMEGA-3/DHA/EPA/FISH OIL (OMEGA-3 FISH OIL ORAL), Take 1 capsule by mouth., Disp: , Rfl:   ondansetron (ZOFRAN-ODT) 4 MG TbDL, Take 1 tablet (4 mg total) by mouth 3 (three) times daily as needed (Nausea and vomiting)., Disp: 30 tablet, Rfl: 0  traMADoL (ULTRAM) 50 mg tablet, Take 1 tablet (50 mg total) by mouth every 12 (twelve) hours as needed for Pain., Disp: 14 tablet, Rfl: 0  valsartan (DIOVAN) 160 MG tablet, Take  1 tablet (160 mg total) by mouth once daily., Disp: 90 tablet, Rfl: 3    No current facility-administered medications for this visit.      Review of patient's allergies indicates:   -- Penicillins -- Hives   -- Adhesive -- Rash        Review of Systems   Constitutional: Negative for weight gain and weight loss.   Cardiovascular:  Negative for chest pain.   Respiratory:  Negative for shortness of breath.    Musculoskeletal:  Positive for back pain (right glute) and joint pain (right groin). Negative for joint swelling.   Gastrointestinal:  Negative for abdominal pain, bowel incontinence, nausea and vomiting.   Genitourinary:  Negative for bladder incontinence.   Neurological:  Positive for paresthesias (right hand from CTS). Negative for numbness.       Objective:        General: Kayleen is well-developed, well-nourished, appears stated age, in no acute distress, alert and oriented to time, place and person.     General    Vitals reviewed.  Constitutional: She is oriented to person, place, and time. She appears well-developed and well-nourished.   HENT:   Head: Normocephalic and atraumatic.   Pulmonary/Chest: Effort normal.   Neurological: She is alert and oriented to person, place, and time.   Psychiatric: She has a normal mood and affect. Her behavior is normal. Judgment and thought content normal.     General Musculoskeletal Exam   Gait: antalgic     Right Ankle/Foot Exam     Tests   Heel Walk: able to perform  Tiptoe Walk: able to perform    Left Ankle/Foot Exam     Tests   Heel Walk: able to perform  Tiptoe Walk: able to perform      Right Hip Exam     Tenderness   The patient tender to palpation of the piriformis and psoas tendon.    Range of Motion   Flexion:  80   External rotation:  30   Internal rotation:  5     Comments:  Pain with ROM of the right hip and limited motion.    Left Hip Exam     Range of Motion   Flexion:  90   External rotation:  60   Internal rotation: 15     Comments:  Limited motion left  hip, not as much pain      Back (L-Spine & T-Spine) / Neck (C-Spine) Exam     Back (L-Spine & T-Spine) Range of Motion   Extension:  10 (with groin pain)   Flexion:  90 (with groin pain)   Lateral bend right:  20   Lateral bend left:  20   Rotation right:  40   Rotation left:  40     Spinal Sensation   Right Side Sensation  C-Spine Level: normal   L-Spine Level: normal  S-Spine Level: normal  Left Side Sensation  C-Spine Level: normal  L-Spine Level: normal  S-Spine Level: normal    Back (L-Spine & T-Spine) Tests   Right Side Tests  Straight leg raise:        Sitting SLR: > 70 degrees    Left Side Tests  Straight leg raise:       Sitting SLR: > 70 degrees      Other   She has no scoliosis .  Spinal Kyphosis:  Absent      Muscle Strength   Right Upper Extremity   Biceps: 5/5   Deltoid:  5/5  Triceps:  5/5  Wrist extension: 5/5   Finger Flexors:  5/5  Left Upper Extremity  Biceps: 5/5   Deltoid:  5/5  Triceps:  5/5  Wrist extension: 5/5   Finger Flexors:  5/5  Right Lower Extremity   Hip Flexion: 5/5   Quadriceps:  5/5   Anterior tibial:  5/5   EHL:  5/5  Left Lower Extremity   Hip Flexion: 5/5   Quadriceps:  5/5   Anterior tibial:  5/5   EHL:  5/5    Reflexes     Left Side  Biceps:  2+  Triceps:  2+  Brachioradialis:  2+  Achilles:  2+  Left Rivas's Sign:  Absent  Babinski Sign:  absent  Quadriceps:  2+    Right Side   Biceps:  2+  Triceps:  2+  Brachioradialis:  2+  Achilles:  2+  Right Rivas's Sign:  absent  Babinski Sign:  absent  Quadriceps:  2+    Vascular Exam     Right Pulses        Carotid:                  2+    Left Pulses        Carotid:                  2+            Assessment:       1. Hip pain, right    2. Femoroacetabular impingement of right hip    3. SI (sacroiliac) joint dysfunction    4. Back pain, lumbosacral           Plan:       Orders Placed This Encounter    X-Ray Hips Bilateral 2 View Inc AP Pelvis    meloxicam (MOBIC) 7.5 MG tablet    Procedure Order to Pain Management     We discussed  back and hip pain and the nature of back and hip pain.  We discussed the pain and whether from the hip or the back.  Her pain is recreated with hip ROM.  I think it is the hips.  She has pain with motion and very limited motion  We will repeat x-ray because limited motion and she feels motion has decreased since april  Mobic has been helpful.  She is going to continue for now.  We discussed only taking one pill at times  We discussed the benefits of therapy and exercise and continuing to move.  She is very active and continues to exercise.  She does hurt more after  We discussed inability to stretch and strengthen glutes and piriformis with limited hip motion  Injection with pain management in right hip.  We discussed relief might be temporary  RTC she will send message after injection to let us know if got relief    More than 50% of the total time  of 45 minutes was spent face to face in counseling on diagnosis and treatment options. I also counseled patient  on common and most usual side effect of prescribed medications.  I reviewed Primary care , and other specialty's notes to better coordinate patient's care. All questions were answered, and patient voiced understanding.         Follow-up: No follow-ups on file. If there are any questions prior to this, the patient was instructed to contact the office.

## 2023-02-01 ENCOUNTER — OFFICE VISIT (OUTPATIENT)
Dept: SPINE | Facility: CLINIC | Age: 53
End: 2023-02-01
Attending: PHYSICAL MEDICINE & REHABILITATION
Payer: OTHER GOVERNMENT

## 2023-02-01 ENCOUNTER — PATIENT MESSAGE (OUTPATIENT)
Dept: INTERNAL MEDICINE | Facility: CLINIC | Age: 53
End: 2023-02-01
Payer: OTHER GOVERNMENT

## 2023-02-01 VITALS
DIASTOLIC BLOOD PRESSURE: 65 MMHG | HEART RATE: 77 BPM | WEIGHT: 132 LBS | BODY MASS INDEX: 24.29 KG/M2 | SYSTOLIC BLOOD PRESSURE: 117 MMHG | HEIGHT: 62 IN

## 2023-02-01 DIAGNOSIS — M25.551 HIP PAIN, RIGHT: Primary | ICD-10-CM

## 2023-02-01 DIAGNOSIS — M54.50 BACK PAIN, LUMBOSACRAL: ICD-10-CM

## 2023-02-01 DIAGNOSIS — M53.3 SI (SACROILIAC) JOINT DYSFUNCTION: ICD-10-CM

## 2023-02-01 DIAGNOSIS — M25.851 FEMOROACETABULAR IMPINGEMENT OF RIGHT HIP: ICD-10-CM

## 2023-02-01 PROCEDURE — 99214 OFFICE O/P EST MOD 30 MIN: CPT | Mod: PBBFAC | Performed by: PHYSICAL MEDICINE & REHABILITATION

## 2023-02-01 PROCEDURE — 99204 PR OFFICE/OUTPT VISIT, NEW, LEVL IV, 45-59 MIN: ICD-10-PCS | Mod: S$PBB,,, | Performed by: PHYSICAL MEDICINE & REHABILITATION

## 2023-02-01 PROCEDURE — 99204 OFFICE O/P NEW MOD 45 MIN: CPT | Mod: S$PBB,,, | Performed by: PHYSICAL MEDICINE & REHABILITATION

## 2023-02-01 PROCEDURE — 99999 PR PBB SHADOW E&M-EST. PATIENT-LVL IV: CPT | Mod: PBBFAC,,, | Performed by: PHYSICAL MEDICINE & REHABILITATION

## 2023-02-01 PROCEDURE — 99999 PR PBB SHADOW E&M-EST. PATIENT-LVL IV: ICD-10-PCS | Mod: PBBFAC,,, | Performed by: PHYSICAL MEDICINE & REHABILITATION

## 2023-02-01 RX ORDER — MELOXICAM 7.5 MG/1
15 TABLET ORAL DAILY
Qty: 180 TABLET | Refills: 2 | Status: SHIPPED | OUTPATIENT
Start: 2023-02-01 | End: 2023-05-22

## 2023-02-02 ENCOUNTER — HOSPITAL ENCOUNTER (OUTPATIENT)
Dept: RADIOLOGY | Facility: HOSPITAL | Age: 53
Discharge: HOME | End: 2023-02-02
Attending: PHYSICAL MEDICINE & REHABILITATION
Payer: OTHER GOVERNMENT

## 2023-02-02 DIAGNOSIS — M25.851 FEMOROACETABULAR IMPINGEMENT OF RIGHT HIP: ICD-10-CM

## 2023-02-02 PROCEDURE — 73521 X-RAY EXAM HIPS BI 2 VIEWS: CPT | Mod: 26,,, | Performed by: RADIOLOGY

## 2023-02-02 PROCEDURE — 73521 XR HIPS BILATERAL 2 VIEW INCL AP PELVIS: ICD-10-PCS | Mod: 26,,, | Performed by: RADIOLOGY

## 2023-02-02 PROCEDURE — 73521 X-RAY EXAM HIPS BI 2 VIEWS: CPT | Mod: TC

## 2023-02-06 ENCOUNTER — TELEPHONE (OUTPATIENT)
Dept: ORTHOPEDICS | Facility: CLINIC | Age: 53
End: 2023-02-06
Payer: OTHER GOVERNMENT

## 2023-02-06 RX ORDER — TRAMADOL HYDROCHLORIDE 50 MG/1
50 TABLET ORAL EVERY 6 HOURS
Qty: 25 TABLET | Refills: 0 | Status: SHIPPED | OUTPATIENT
Start: 2023-02-06 | End: 2023-12-22

## 2023-02-06 NOTE — TELEPHONE ENCOUNTER
I called and spoke to the patient this morning a regarding her surgery tomorrow with Dr. Goff at the [facility], [facility]. I informed the patient that her arrival time is 5:30am.     I informed the patient not to eat or drink anything after midnight, and that she may take her blood pressure medicine with a sip of water.     I informed the patient to remove nail polish.    The patient verbalized understanding and has no further questions.

## 2023-02-07 ENCOUNTER — ANESTHESIA (OUTPATIENT)
Dept: SURGERY | Facility: HOSPITAL | Age: 53
End: 2023-02-07
Payer: OTHER GOVERNMENT

## 2023-02-07 ENCOUNTER — HOSPITAL ENCOUNTER (OUTPATIENT)
Facility: HOSPITAL | Age: 53
Discharge: HOME | End: 2023-02-07
Attending: ORTHOPAEDIC SURGERY | Admitting: ORTHOPAEDIC SURGERY
Payer: OTHER GOVERNMENT

## 2023-02-07 VITALS
TEMPERATURE: 98 F | SYSTOLIC BLOOD PRESSURE: 131 MMHG | RESPIRATION RATE: 18 BRPM | OXYGEN SATURATION: 100 % | DIASTOLIC BLOOD PRESSURE: 77 MMHG | BODY MASS INDEX: 24.84 KG/M2 | WEIGHT: 135 LBS | HEIGHT: 62 IN | HEART RATE: 62 BPM

## 2023-02-07 DIAGNOSIS — G56.01 RIGHT CARPAL TUNNEL SYNDROME: Primary | ICD-10-CM

## 2023-02-07 PROCEDURE — 27201423 OPTIME MED/SURG SUP & DEVICES STERILE SUPPLY: Performed by: ORTHOPAEDIC SURGERY

## 2023-02-07 PROCEDURE — 36000706: Performed by: ORTHOPAEDIC SURGERY

## 2023-02-07 PROCEDURE — 63600175 PHARM REV CODE 636 W HCPCS: Performed by: NURSE ANESTHETIST, CERTIFIED REGISTERED

## 2023-02-07 PROCEDURE — 64721 CARPAL TUNNEL SURGERY: CPT | Mod: RT,,, | Performed by: ORTHOPAEDIC SURGERY

## 2023-02-07 PROCEDURE — D9220A PRA ANESTHESIA: ICD-10-PCS | Mod: CRNA,,, | Performed by: NURSE ANESTHETIST, CERTIFIED REGISTERED

## 2023-02-07 PROCEDURE — 37000009 HC ANESTHESIA EA ADD 15 MINS: Performed by: ORTHOPAEDIC SURGERY

## 2023-02-07 PROCEDURE — D9220A PRA ANESTHESIA: Mod: ANES,,, | Performed by: STUDENT IN AN ORGANIZED HEALTH CARE EDUCATION/TRAINING PROGRAM

## 2023-02-07 PROCEDURE — 25000003 PHARM REV CODE 250: Performed by: ORTHOPAEDIC SURGERY

## 2023-02-07 PROCEDURE — 94761 N-INVAS EAR/PLS OXIMETRY MLT: CPT

## 2023-02-07 PROCEDURE — 99900035 HC TECH TIME PER 15 MIN (STAT)

## 2023-02-07 PROCEDURE — D9220A PRA ANESTHESIA: ICD-10-PCS | Mod: ANES,,, | Performed by: STUDENT IN AN ORGANIZED HEALTH CARE EDUCATION/TRAINING PROGRAM

## 2023-02-07 PROCEDURE — 64721 PR REVISE MEDIAN N/CARPAL TUNNEL SURG: ICD-10-PCS | Mod: RT,,, | Performed by: ORTHOPAEDIC SURGERY

## 2023-02-07 PROCEDURE — 36000707: Performed by: ORTHOPAEDIC SURGERY

## 2023-02-07 PROCEDURE — 71000033 HC RECOVERY, INTIAL HOUR: Performed by: ORTHOPAEDIC SURGERY

## 2023-02-07 PROCEDURE — 71000015 HC POSTOP RECOV 1ST HR: Performed by: ORTHOPAEDIC SURGERY

## 2023-02-07 PROCEDURE — 25000003 PHARM REV CODE 250: Performed by: NURSE ANESTHETIST, CERTIFIED REGISTERED

## 2023-02-07 PROCEDURE — 63600175 PHARM REV CODE 636 W HCPCS: Performed by: ORTHOPAEDIC SURGERY

## 2023-02-07 PROCEDURE — 37000008 HC ANESTHESIA 1ST 15 MINUTES: Performed by: ORTHOPAEDIC SURGERY

## 2023-02-07 PROCEDURE — D9220A PRA ANESTHESIA: Mod: CRNA,,, | Performed by: NURSE ANESTHETIST, CERTIFIED REGISTERED

## 2023-02-07 PROCEDURE — 25000003 PHARM REV CODE 250: Performed by: STUDENT IN AN ORGANIZED HEALTH CARE EDUCATION/TRAINING PROGRAM

## 2023-02-07 RX ORDER — MUPIROCIN 20 MG/G
OINTMENT TOPICAL 2 TIMES DAILY
Status: DISCONTINUED | OUTPATIENT
Start: 2023-02-07 | End: 2023-02-07 | Stop reason: HOSPADM

## 2023-02-07 RX ORDER — ACETAMINOPHEN 500 MG
1000 TABLET ORAL
Status: COMPLETED | OUTPATIENT
Start: 2023-02-07 | End: 2023-02-07

## 2023-02-07 RX ORDER — PROPOFOL 10 MG/ML
INJECTION, EMULSION INTRAVENOUS CONTINUOUS PRN
Status: DISCONTINUED | OUTPATIENT
Start: 2023-02-07 | End: 2023-02-07

## 2023-02-07 RX ORDER — ONDANSETRON 2 MG/ML
4 INJECTION INTRAMUSCULAR; INTRAVENOUS DAILY PRN
Status: DISCONTINUED | OUTPATIENT
Start: 2023-02-07 | End: 2023-02-07 | Stop reason: HOSPADM

## 2023-02-07 RX ORDER — BUPIVACAINE HYDROCHLORIDE 2.5 MG/ML
INJECTION, SOLUTION EPIDURAL; INFILTRATION; INTRACAUDAL
Status: DISCONTINUED | OUTPATIENT
Start: 2023-02-07 | End: 2023-02-07 | Stop reason: HOSPADM

## 2023-02-07 RX ORDER — HALOPERIDOL 5 MG/ML
0.5 INJECTION INTRAMUSCULAR EVERY 10 MIN PRN
Status: DISCONTINUED | OUTPATIENT
Start: 2023-02-07 | End: 2023-02-07 | Stop reason: HOSPADM

## 2023-02-07 RX ORDER — MUPIROCIN 20 MG/G
OINTMENT TOPICAL
Status: DISCONTINUED | OUTPATIENT
Start: 2023-02-07 | End: 2023-02-07 | Stop reason: HOSPADM

## 2023-02-07 RX ORDER — FENTANYL CITRATE 50 UG/ML
INJECTION, SOLUTION INTRAMUSCULAR; INTRAVENOUS
Status: DISCONTINUED | OUTPATIENT
Start: 2023-02-07 | End: 2023-02-07

## 2023-02-07 RX ORDER — DEXAMETHASONE SODIUM PHOSPHATE 4 MG/ML
INJECTION, SOLUTION INTRA-ARTICULAR; INTRALESIONAL; INTRAMUSCULAR; INTRAVENOUS; SOFT TISSUE
Status: DISCONTINUED | OUTPATIENT
Start: 2023-02-07 | End: 2023-02-07

## 2023-02-07 RX ORDER — BACITRACIN ZINC 500 UNIT/G
OINTMENT (GRAM) TOPICAL
Status: DISCONTINUED | OUTPATIENT
Start: 2023-02-07 | End: 2023-02-07 | Stop reason: HOSPADM

## 2023-02-07 RX ORDER — LIDOCAINE HYDROCHLORIDE 10 MG/ML
INJECTION, SOLUTION EPIDURAL; INFILTRATION; INTRACAUDAL; PERINEURAL
Status: DISCONTINUED | OUTPATIENT
Start: 2023-02-07 | End: 2023-02-07 | Stop reason: HOSPADM

## 2023-02-07 RX ORDER — LIDOCAINE HCL/PF 100 MG/5ML
SYRINGE (ML) INTRAVENOUS
Status: DISCONTINUED | OUTPATIENT
Start: 2023-02-07 | End: 2023-02-07

## 2023-02-07 RX ORDER — FAMOTIDINE 10 MG/ML
INJECTION INTRAVENOUS
Status: DISCONTINUED | OUTPATIENT
Start: 2023-02-07 | End: 2023-02-07

## 2023-02-07 RX ORDER — FENTANYL CITRATE 50 UG/ML
25 INJECTION, SOLUTION INTRAMUSCULAR; INTRAVENOUS EVERY 5 MIN PRN
Status: DISCONTINUED | OUTPATIENT
Start: 2023-02-07 | End: 2023-02-07 | Stop reason: HOSPADM

## 2023-02-07 RX ORDER — ONDANSETRON 2 MG/ML
INJECTION INTRAMUSCULAR; INTRAVENOUS
Status: DISCONTINUED | OUTPATIENT
Start: 2023-02-07 | End: 2023-02-07

## 2023-02-07 RX ORDER — HYDROCODONE BITARTRATE AND ACETAMINOPHEN 5; 325 MG/1; MG/1
1 TABLET ORAL EVERY 4 HOURS PRN
Status: DISCONTINUED | OUTPATIENT
Start: 2023-02-07 | End: 2023-02-07 | Stop reason: HOSPADM

## 2023-02-07 RX ORDER — PROPOFOL 10 MG/ML
INJECTION, EMULSION INTRAVENOUS
Status: DISCONTINUED | OUTPATIENT
Start: 2023-02-07 | End: 2023-02-07

## 2023-02-07 RX ORDER — OXYCODONE HYDROCHLORIDE 5 MG/1
5 TABLET ORAL
Status: DISCONTINUED | OUTPATIENT
Start: 2023-02-07 | End: 2023-02-07 | Stop reason: HOSPADM

## 2023-02-07 RX ORDER — MIDAZOLAM HYDROCHLORIDE 1 MG/ML
INJECTION INTRAMUSCULAR; INTRAVENOUS
Status: DISCONTINUED | OUTPATIENT
Start: 2023-02-07 | End: 2023-02-07

## 2023-02-07 RX ORDER — SODIUM CHLORIDE 9 MG/ML
INJECTION, SOLUTION INTRAVENOUS CONTINUOUS PRN
Status: DISCONTINUED | OUTPATIENT
Start: 2023-02-07 | End: 2023-02-07

## 2023-02-07 RX ORDER — HYDROMORPHONE HYDROCHLORIDE 1 MG/ML
0.2 INJECTION, SOLUTION INTRAMUSCULAR; INTRAVENOUS; SUBCUTANEOUS EVERY 5 MIN PRN
Status: DISCONTINUED | OUTPATIENT
Start: 2023-02-07 | End: 2023-02-07 | Stop reason: HOSPADM

## 2023-02-07 RX ORDER — CELECOXIB 200 MG/1
400 CAPSULE ORAL ONCE
Status: COMPLETED | OUTPATIENT
Start: 2023-02-07 | End: 2023-02-07

## 2023-02-07 RX ADMIN — PROPOFOL 75 MCG/KG/MIN: 10 INJECTION, EMULSION INTRAVENOUS at 07:02

## 2023-02-07 RX ADMIN — FENTANYL CITRATE 25 MCG: 50 INJECTION, SOLUTION INTRAMUSCULAR; INTRAVENOUS at 07:02

## 2023-02-07 RX ADMIN — SODIUM CHLORIDE: 0.9 INJECTION, SOLUTION INTRAVENOUS at 06:02

## 2023-02-07 RX ADMIN — LIDOCAINE HYDROCHLORIDE 60 MG: 20 INJECTION, SOLUTION INTRAVENOUS at 07:02

## 2023-02-07 RX ADMIN — DEXAMETHASONE SODIUM PHOSPHATE 8 MG: 4 INJECTION, SOLUTION INTRAMUSCULAR; INTRAVENOUS at 07:02

## 2023-02-07 RX ADMIN — CELECOXIB 400 MG: 200 CAPSULE ORAL at 06:02

## 2023-02-07 RX ADMIN — FAMOTIDINE 20 MG: 10 INJECTION, SOLUTION INTRAVENOUS at 07:02

## 2023-02-07 RX ADMIN — ACETAMINOPHEN 1000 MG: 500 TABLET ORAL at 06:02

## 2023-02-07 RX ADMIN — CEFAZOLIN 2 G: 2 INJECTION, POWDER, FOR SOLUTION INTRAMUSCULAR; INTRAVENOUS at 07:02

## 2023-02-07 RX ADMIN — MUPIROCIN: 20 OINTMENT TOPICAL at 06:02

## 2023-02-07 RX ADMIN — MIDAZOLAM HYDROCHLORIDE 2 MG: 1 INJECTION, SOLUTION INTRAMUSCULAR; INTRAVENOUS at 06:02

## 2023-02-07 RX ADMIN — ONDANSETRON 4 MG: 2 INJECTION INTRAMUSCULAR; INTRAVENOUS at 07:02

## 2023-02-07 RX ADMIN — PROPOFOL 40 MG: 10 INJECTION, EMULSION INTRAVENOUS at 07:02

## 2023-02-07 NOTE — BRIEF OP NOTE
[facility] - Surgery ([facility])  Surgery Department  Operative Note    SUMMARY     Date of Procedure: 2/7/2023     Procedure: Procedure(s) (LRB):  RELEASE, CARPAL TUNNEL (Right)     Surgeon(s) and Role:     * Alla Goff MD - Primary    Assisting Surgeon: None    Pre-Operative Diagnosis: Right carpal tunnel syndrome [G56.01]    Post-Operative Diagnosis: Post-Op Diagnosis Codes:     * Right carpal tunnel syndrome [G56.01]    Anesthesia: Local MAC    Estimated Blood Loss (EBL): none           Implants: * No implants in log *    Specimens:   Specimen (24h ago, onward)      None                    Condition: Good    Disposition: PACU - hemodynamically stable.    Attestation: I was present and scrubbed for the entire procedure.

## 2023-02-07 NOTE — TRANSFER OF CARE
"Anesthesia Transfer of Care Note    Patient: Kayleen Patel    Procedure(s) Performed: Procedure(s) (LRB):  RELEASE, CARPAL TUNNEL (Right)    Patient location: PACU    Anesthesia Type: general    Transport from OR: Transported from OR on 6-10 L/min O2 by face mask with adequate spontaneous ventilation    Post pain: adequate analgesia    Post assessment: no apparent anesthetic complications    Post vital signs: stable    Level of consciousness: awake    Nausea/Vomiting: no nausea/vomiting    Complications: none    Transfer of care protocol was followed      Last vitals:   Visit Vitals  /80 (BP Location: Left arm, Patient Position: Lying)   Pulse 66   Temp 36.7 °C (98 °F) (Oral)   Resp 16   Ht 5' 2" (1.575 m)   Wt 61.2 kg (135 lb)   LMP 02/07/2016 (Approximate)   SpO2 98%   Breastfeeding No   BMI 24.69 kg/m²     "

## 2023-02-07 NOTE — ANESTHESIA POSTPROCEDURE EVALUATION
Anesthesia Post Evaluation    Patient: Kayleen Patel    Procedure(s) Performed: Procedure(s) (LRB):  RELEASE, CARPAL TUNNEL (Right)    Final Anesthesia Type: general      Patient location during evaluation: PACU  Patient participation: Yes- Able to Participate  Level of consciousness: awake and alert  Post-procedure vital signs: reviewed and stable  Pain management: adequate  Airway patency: patent  CLARISA mitigation strategies: Multimodal analgesia  PONV status at discharge: No PONV  Anesthetic complications: no      Cardiovascular status: blood pressure returned to baseline and hemodynamically stable  Respiratory status: unassisted  Hydration status: euvolemic  Follow-up not needed.          Vitals Value Taken Time   /77 02/07/23 0817   Temp 36.5 °C (97.7 °F) 02/07/23 0815   Pulse 62 02/07/23 0818   Resp 18 02/07/23 0800   SpO2 100 % 02/07/23 0818   Vitals shown include unvalidated device data.      Event Time   Out of Recovery 08:20:00         Pain/Gladis Score: Pain Rating Prior to Med Admin: 0 (2/7/2023  6:13 AM)  Gladis Score: 10 (2/7/2023  7:40 AM)

## 2023-02-07 NOTE — DISCHARGE SUMMARY
[facility] - Surgery (Hospital)  Discharge Note  Short Stay    Procedure(s) (LRB):  RELEASE, CARPAL TUNNEL (Right)      OUTCOME: Patient tolerated treatment/procedure well without complication and is now ready for discharge.    DISPOSITION: Home or Self Care    FINAL DIAGNOSIS:  right carpal tunnel syndrome    FOLLOWUP: In clinic    DISCHARGE INSTRUCTIONS:    Discharge Procedure Orders   Diet general     Keep surgical extremity elevated     Ice to affected area     Lifting restrictions     No driving, operating heavy equipment or signing legal documents while taking pain medication.     Leave dressing on - Keep it clean, dry, and intact until clinic visit     Call MD for:  temperature >100.4     Call MD for:  persistent nausea and vomiting     Call MD for:  severe uncontrolled pain     Call MD for:  difficulty breathing, headache or visual disturbances     Call MD for:  redness, tenderness, or signs of infection (pain, swelling, redness, odor or green/yellow discharge around incision site)     Call MD for:  hives     Call MD for:  persistent dizziness or light-headedness     Call MD for:  extreme fatigue

## 2023-02-07 NOTE — OP NOTE
[facility])  Surgery Department  Operative Note    SUMMARY     Date of Procedure: 2/7/2023     Procedure:   1. Right carpal tunnel release, cpt 41199      Surgeon(s) and Role:     * Alla Goff MD - Primary    Assisting Surgeon: None    Pre-Operative Diagnosis: Right carpal tunnel syndrome [G56.01]    Post-Operative Diagnosis: Post-Op Diagnosis Codes:     * Right carpal tunnel syndrome [G56.01]    Anesthesia: Local MAC    Indication for Procedure: 53 yo female with right carpal tunnel syndrome confirmed by EMG/NCS and had failed conservative treatment. Risks and benefits of the procedure were discussed with the patient and informed consent was obtained.    Description of the Findings of the Procedure: The patient was seen in the preoperative holding area and the right hand was marked. The patient was taken to the OR, placed supine on the table, and a padded hand table was used. After monitored anesthesia care was admitted without difficulty, a time-out procedure was performed identifying the patient, the operative site and the procedure to be performed.  A tourniquet was placed on the arm and the upper extremity was prepped and draped in standard sterile fashion. The upper extremity was elevated and exsanguinated with an Esmarch bandage, and the tourniquet was inflated to 250 mm Hg. 10cc of 1% lidocaine plain and 0.25% bupivacaine was used for a local block of the carpal tunnel. A 2 cm incision was made over the carpal tunnel.  The palmar fascia was sharply incised.  Brianna retractors were used to expose the transverse carpal ligament, this was sharply incised.  A carpal tunnel skid was placed underneath the transverse carpal ligament proximally, and the proximal transverse carpal ligament as well as the distal forearm fascia was sharply released. The median nerve was found to be significantly compressed through the carpal tunnel. The carpal tunnel skid was replaced distally, and the transverse  carpal ligament was released under direct visualization.  The median nerve was found to be completely decompressed. The neurovascular bundles were identified and protected throughout the case. The wound was then irrigated with normal saline using a bulb syringe. 3-0 prolene was used to close the skin in a horizontal mattress fashion. Adaptic, 4x4, cast padding and coban were used to dress the hand. The tourniquet was deflated and the hand and fingers were pink and well-perfused.  All instrument, sponge and needle counts were correct. The patient tolerated the procedure well.  She was taken awake, alert and in good condition to the recovery room.    Complications: No    Estimated Blood Loss (EBL): none           Specimens: none           Condition: Good    Disposition: PACU - hemodynamically stable.    Attestation: I was present and scrubbed for the entire procedure.

## 2023-02-09 ENCOUNTER — PATIENT MESSAGE (OUTPATIENT)
Dept: ORTHOPEDICS | Facility: CLINIC | Age: 53
End: 2023-02-09
Payer: OTHER GOVERNMENT

## 2023-02-17 ENCOUNTER — CLINICAL SUPPORT (OUTPATIENT)
Dept: REHABILITATION | Facility: HOSPITAL | Age: 53
End: 2023-02-17
Attending: ORTHOPAEDIC SURGERY
Payer: OTHER GOVERNMENT

## 2023-02-17 DIAGNOSIS — M79.641 PAIN IN RIGHT HAND: ICD-10-CM

## 2023-02-17 DIAGNOSIS — G56.01 RIGHT CARPAL TUNNEL SYNDROME: ICD-10-CM

## 2023-02-17 PROCEDURE — 97110 THERAPEUTIC EXERCISES: CPT

## 2023-02-17 NOTE — PATIENT INSTRUCTIONS
[facility]  HOME EXERCISE PROGRAM            .

## 2023-03-06 ENCOUNTER — PATIENT MESSAGE (OUTPATIENT)
Dept: ADMINISTRATIVE | Facility: OTHER | Age: 53
End: 2023-03-06
Payer: OTHER GOVERNMENT

## 2023-03-09 ENCOUNTER — PATIENT MESSAGE (OUTPATIENT)
Dept: ADMINISTRATIVE | Facility: OTHER | Age: 53
End: 2023-03-09
Payer: OTHER GOVERNMENT

## 2023-03-09 ENCOUNTER — HOSPITAL ENCOUNTER (OUTPATIENT)
Facility: OTHER | Age: 53
Discharge: HOME | End: 2023-03-09
Attending: ANESTHESIOLOGY | Admitting: ANESTHESIOLOGY
Payer: OTHER GOVERNMENT

## 2023-03-09 VITALS
HEIGHT: 62 IN | DIASTOLIC BLOOD PRESSURE: 63 MMHG | TEMPERATURE: 98 F | SYSTOLIC BLOOD PRESSURE: 109 MMHG | HEART RATE: 74 BPM | OXYGEN SATURATION: 99 % | WEIGHT: 135 LBS | RESPIRATION RATE: 16 BRPM | BODY MASS INDEX: 24.84 KG/M2

## 2023-03-09 DIAGNOSIS — G89.29 CHRONIC PAIN: ICD-10-CM

## 2023-03-09 DIAGNOSIS — M16.11 PRIMARY OSTEOARTHRITIS OF RIGHT HIP: Primary | ICD-10-CM

## 2023-03-09 PROCEDURE — 20610 DRAIN/INJ JOINT/BURSA W/O US: CPT | Mod: RT,,, | Performed by: ANESTHESIOLOGY

## 2023-03-09 PROCEDURE — 77002 NEEDLE LOCALIZATION BY XRAY: CPT | Performed by: ANESTHESIOLOGY

## 2023-03-09 PROCEDURE — 63600175 PHARM REV CODE 636 W HCPCS: Performed by: ANESTHESIOLOGY

## 2023-03-09 PROCEDURE — 77002 PR FLUOROSCOPIC GUIDANCE NEEDLE PLACEMENT: ICD-10-PCS | Mod: 26,,, | Performed by: ANESTHESIOLOGY

## 2023-03-09 PROCEDURE — 77002 NEEDLE LOCALIZATION BY XRAY: CPT | Mod: 26,,, | Performed by: ANESTHESIOLOGY

## 2023-03-09 PROCEDURE — 20610 PR DRAIN/INJECT LARGE JOINT/BURSA: ICD-10-PCS | Mod: RT,,, | Performed by: ANESTHESIOLOGY

## 2023-03-09 PROCEDURE — 25000003 PHARM REV CODE 250: Performed by: ANESTHESIOLOGY

## 2023-03-09 PROCEDURE — 25500020 PHARM REV CODE 255: Performed by: ANESTHESIOLOGY

## 2023-03-09 PROCEDURE — 20610 DRAIN/INJ JOINT/BURSA W/O US: CPT | Mod: RT | Performed by: ANESTHESIOLOGY

## 2023-03-09 RX ORDER — SODIUM CHLORIDE 9 MG/ML
500 INJECTION, SOLUTION INTRAVENOUS CONTINUOUS
Status: DISCONTINUED | OUTPATIENT
Start: 2023-03-09 | End: 2023-03-09 | Stop reason: HOSPADM

## 2023-03-09 RX ORDER — TRIAMCINOLONE ACETONIDE 40 MG/ML
INJECTION, SUSPENSION INTRA-ARTICULAR; INTRAMUSCULAR
Status: DISCONTINUED | OUTPATIENT
Start: 2023-03-09 | End: 2023-03-09 | Stop reason: HOSPADM

## 2023-03-09 RX ORDER — ALPRAZOLAM 0.5 MG/1
1 TABLET ORAL ONCE
Status: COMPLETED | OUTPATIENT
Start: 2023-03-09 | End: 2023-03-09

## 2023-03-09 RX ORDER — LIDOCAINE HYDROCHLORIDE 20 MG/ML
INJECTION, SOLUTION INFILTRATION; PERINEURAL
Status: DISCONTINUED | OUTPATIENT
Start: 2023-03-09 | End: 2023-03-09 | Stop reason: HOSPADM

## 2023-03-09 RX ORDER — BUPIVACAINE HYDROCHLORIDE 2.5 MG/ML
INJECTION, SOLUTION EPIDURAL; INFILTRATION; INTRACAUDAL
Status: DISCONTINUED | OUTPATIENT
Start: 2023-03-09 | End: 2023-03-09 | Stop reason: HOSPADM

## 2023-03-09 RX ADMIN — ALPRAZOLAM 1 MG: 0.5 TABLET ORAL at 09:03

## 2023-03-09 NOTE — DISCHARGE SUMMARY
Discharge Note  Short Stay      SUMMARY     Admit Date: 3/9/2023    Attending Physician: Harleen Swan      Discharge Physician: Harleen Swan      Discharge Date: 3/9/2023 10:03 AM    Procedure(s) (LRB):  INJECTION, JOINT, RIGHT HIP  DIRECT REF (Right)    Final Diagnosis: Right hip pain [M25.551]  Femoroacetabular impingement of right hip [M25.851]    Disposition: Home or self care    Patient Instructions:   Current Discharge Medication List        CONTINUE these medications which have NOT CHANGED    Details   chlorthalidone (HYGROTEN) 25 MG Tab Take 1 tablet (25 mg total) by mouth once daily.  Qty: 90 tablet, Refills: 3    Comments: .  Associated Diagnoses: Essential hypertension      valsartan (DIOVAN) 160 MG tablet Take 1 tablet (160 mg total) by mouth once daily.  Qty: 90 tablet, Refills: 3    Comments: .  Associated Diagnoses: Essential hypertension      CALCIUM CARBONATE (CALCIUM 500 ORAL) Take 1 capsule by mouth.      cyanocobalamin (VITAMIN B-12) 100 MCG tablet Take 100 mcg by mouth once daily.      ferrous sulfate 325 mg (65 mg iron) Tab tablet Take 325 mg by mouth daily with breakfast.      meclizine (ANTIVERT) 25 mg tablet Take 1 tablet (25 mg total) by mouth 3 (three) times daily as needed for Dizziness.  Qty: 30 tablet, Refills: 0      meloxicam (MOBIC) 7.5 MG tablet Take 2 tablets (15 mg total) by mouth once daily.  Qty: 180 tablet, Refills: 2      mupirocin (BACTROBAN) 2 % ointment       OMEGA-3/DHA/EPA/FISH OIL (OMEGA-3 FISH OIL ORAL) Take 1 capsule by mouth.      ondansetron (ZOFRAN-ODT) 4 MG TbDL Take 1 tablet (4 mg total) by mouth 3 (three) times daily as needed (Nausea and vomiting).  Qty: 30 tablet, Refills: 0      !! traMADoL (ULTRAM) 50 mg tablet Take 1 tablet (50 mg total) by mouth every 12 (twelve) hours as needed for Pain.  Qty: 14 tablet, Refills: 0    Comments: Quantity prescribed more than 7 day supply? No  Associated Diagnoses: Back pain, lumbosacral; SI (sacroiliac) joint dysfunction       !! traMADoL (ULTRAM) 50 mg tablet Take 1 tablet (50 mg total) by mouth every 6 (six) hours.  Qty: 25 tablet, Refills: 0    Comments: Please deliver [facility] bedside at [facility]       !! - Potential duplicate medications found. Please discuss with provider.              Discharge Diagnosis: Right hip pain [M25.551]  Femoroacetabular impingement of right hip [M25.851]  Condition on Discharge: Stable with no complications to procedure   Diet on Discharge: Same as before.  Activity: as per instruction sheet.  Discharge to: Home with a responsible adult.  Follow up: 2-4 weeks       Please call my office or pager at [phone] if experienced any weakness or loss of sensation, fever > 101.5, pain uncontrolled with oral medications, persistent nausea/vomiting/or diarrhea, redness or drainage from the incisions, or any other worrisome concerns. If physician on call was not reached or could not communicate with our office for any reason please go to the nearest emergency department

## 2023-03-09 NOTE — OP NOTE
Hip Joint Injection under Fluoroscopic Guidance    The procedure, risks, benefits, and options were discussed with the patient. There are no contraindications to the procedure. The patent expressed understanding and agreed to the procedure. Informed written consent was obtained prior to the start of the procedure and can be found in the patient's chart.    PATIENT NAME: Kayleen Patel   MRN: [id number]     DATE OF PROCEDURE: 03/09/2023    PROCEDURE: Right Hip Joint Injection under Fluoroscopic Guidance    PRE-OP DIAGNOSIS: Right hip pain [M25.551]  Femoroacetabular impingement of right hip [M25.851]    POST-OP DIAGNOSIS: Right hip pain [M25.551]  Femoroacetabular impingement of right hip [M25.851]    PHYSICIAN: Harleen Swan MD    ASSISTANTS: None     MEDICATIONS INJECTED: Preservative-free Kenalog 40mg with 3cc of Bupivacine 0.25%     LOCAL ANESTHETIC INJECTED: Xylocaine 2%     SEDATION: None    ESTIMATED BLOOD LOSS: None    COMPLICATIONS: None    TECHNIQUE: Time-out was performed to identify the patient and procedure to be performed. With the patient laying in a supine position, the surgical area was prepped and draped in the usual sterile fashion using ChloraPrep and a fenestrated drape. The area overlying the hip joint was determined under fluoroscopy guidance. Skin anesthesia was achieved by injecting Lidocaine 2% over the injection site. A 25 gauge, 5 inch spinal quinke needle was introduced under fluoroscopy until the tip reached the greater trochanter. The tip of the needle was hinged cephalad from the greater trochanter into the joint space.  When the needle tip was in the appropriate position, and there was no blood aspiration, Contrast dye  Omnipaque (300mg/mL) was injected to confirm placement and there was no vascular runoff. 4 mL of the medication mixture listed above was injected slowly. The needles were removed and bleeding was nil. A sterile dressing was applied. No specimens collected. The patient  tolerated the procedure well.    PAIN BEFORE THE PROCEDURE: 8-10/10    PAIN AFTER THE PROCEDURE: 8/10      The patient was monitored after the procedure in the recovery area. They were given post-procedure and discharge instructions to follow at home. The patient was discharged in a stable condition.      Harleen Swan MD

## 2023-03-09 NOTE — H&P
HPI  Patient presenting for Procedure(s) (LRB):  INJECTION, JOINT, RIGHT HIP  DIRECT REF (Right)     Patient on Anti-coagulation No    No health changes since previous encounter    Past Medical History:   Diagnosis Date    Abnormal cervical Papanicolaou smear '88, '04    Colpo/Cryo    Benign paroxysmal vertigo, bilateral     Chronic diarrhea 02/12/2015    GERD (gastroesophageal reflux disease)     History of acute PID 1988    Hypertension     Thrombocytosis      Past Surgical History:   Procedure Laterality Date    BREAST BIOPSY Right 06/09/2021    stereo benign    CARPAL TUNNEL RELEASE Left 9/21/2021    Procedure: RELEASE, CARPAL TUNNEL;  Surgeon: Alla Goff MD;  Location: [facility] OR;  Service: Orthopedics;  Laterality: Left;    CARPAL TUNNEL RELEASE Right 2/7/2023    Procedure: RELEASE, CARPAL TUNNEL;  Surgeon: Alla Goff MD;  Location: [facility] OR;  Service: Orthopedics;  Laterality: Right;    GANGLION CYST EXCISION      INJECTION OF STEROID Right 9/21/2021    Procedure: INJECTION, STEROID-Right carpal tunnel;  Surgeon: Alla Goff MD;  Location: [facility] OR;  Service: Orthopedics;  Laterality: Right;    REFRACTIVE SURGERY Bilateral 2009    Dr. Vazquez Forbes     TONSILLECTOMY       Review of patient's allergies indicates:   Allergen Reactions    Penicillins Hives    Adhesive Rash      No current facility-administered medications for this encounter.       PMHx, PSHx, Allergies, Medications reviewed in epic    ROS negative except pain complaints in HPI    OBJECTIVE:    LMP 02/07/2016 (Approximate)     PHYSICAL EXAMINATION:    GENERAL: Well appearing, in no acute distress, alert and oriented x3.  PSYCH:  Mood and affect appropriate.  SKIN: Skin color, texture, turgor normal, no rashes or lesions which will impact the procedure.  CV: RRR with palpation of the radial artery.  PULM: No evidence of respiratory difficulty, symmetric chest rise. Clear to auscultation.  NEURO: Cranial nerves grossly  intact.    Plan:    Proceed with procedure as planned Procedure(s) (LRB):  INJECTION, JOINT, RIGHT HIP  DIRECT REF (Right)    Harleen Swan  03/09/2023

## 2023-03-14 ENCOUNTER — PATIENT MESSAGE (OUTPATIENT)
Dept: ADMINISTRATIVE | Facility: OTHER | Age: 53
End: 2023-03-14
Payer: OTHER GOVERNMENT

## 2023-03-15 ENCOUNTER — OFFICE VISIT (OUTPATIENT)
Dept: ORTHOPEDICS | Facility: CLINIC | Age: 53
End: 2023-03-15
Payer: OTHER GOVERNMENT

## 2023-03-15 DIAGNOSIS — G56.01 RIGHT CARPAL TUNNEL SYNDROME: Primary | ICD-10-CM

## 2023-03-15 PROCEDURE — 99024 POSTOP FOLLOW-UP VISIT: CPT | Mod: S$GLB,,, | Performed by: ORTHOPAEDIC SURGERY

## 2023-03-15 PROCEDURE — 99024 PR POST-OP FOLLOW-UP VISIT: ICD-10-PCS | Mod: S$GLB,,, | Performed by: ORTHOPAEDIC SURGERY

## 2023-03-15 PROCEDURE — 99999 PR PBB SHADOW E&M-EST. PATIENT-LVL III: CPT | Mod: PBBFAC,,, | Performed by: ORTHOPAEDIC SURGERY

## 2023-03-15 PROCEDURE — 99999 PR PBB SHADOW E&M-EST. PATIENT-LVL III: ICD-10-PCS | Mod: PBBFAC,,, | Performed by: ORTHOPAEDIC SURGERY

## 2023-03-15 NOTE — PROGRESS NOTES
Kayleen Patel presents for post-operative evaluation of   Encounter Diagnosis   Name Primary?    Right carpal tunnel syndrome Yes   The patient is now 5 weeks s/p right carpal tunnel release.  Overall the patient reports doing well.  The patient reports appropriate postoperative soreness with well controlled overall pain.      PE:    AA&O x 4.  NAD  HEENT:  NCAT, sclera nonicteric  Lungs:  Respirations are equal and unlabored.  CV:  2+ bilateral upper and lower extremity pulses.  MSK: The incision is well healed.  Full wrist and finger motion.  Neurovascularly intact and has 5/5 thenar and intrinsic musculature strength.        A/P: Status post above, doing well  1) Continue with range of motion and strengthening  2) F/U as needed  3) Call with any questions/concerns in the interim        Alla Goff MD    Please be aware that this note has been generated with the assistance of odal voice-to-text.  Please excuse any spelling or grammatical errors.

## 2023-03-24 ENCOUNTER — PATIENT MESSAGE (OUTPATIENT)
Dept: ADMINISTRATIVE | Facility: OTHER | Age: 53
End: 2023-03-24
Payer: OTHER GOVERNMENT

## 2023-03-31 PROBLEM — M79.641 PAIN IN RIGHT HAND: Status: ACTIVE | Noted: 2023-03-31

## 2023-03-31 PROBLEM — R29.898 DECREASED STRENGTH OF LOWER EXTREMITY: Status: RESOLVED | Noted: 2022-04-25 | Resolved: 2023-03-31

## 2023-03-31 NOTE — PLAN OF CARE
[facility]  Occupational Therapy Treatment Note    Date: 2/17/2023  Name: Kayleen Patel  Clinic Number: [id number]    Therapy Diagnosis:   Encounter Diagnoses   Name Primary?    Right carpal tunnel syndrome     Pain in right hand      Physician: Alla Goff MD    Physician Orders: suture removal and HEP  Medical Diagnosis: Right Carpal tunnel syndrome  Surgical Procedure and Date: 02/07/2023, Right CTR     Insurance Authorization Period Expiration: 04/17/2023  Plan of Care Expiration: 02/17/2023    Date of Return to MD: 03/15/2023  Visit # / Visits authorized: 1 / 1  FOTO: NA HEP only    Precautions:  Standard    Time In: 900am  Time Out: 930am  Total Billable Time: 30 minutes      SUBJECTIVE     Pt reports: It feels good and she can move it well.      Pain: 2/10  Location: right hand    OBJECTIVE   Objective Measures updated at progress report unless specified.    Full AROM noted    Treatment     Kayleen received the treatments listed below:   Therapeutic exercises to develop flexibility for 10 minutes, including:  AROM Wrist  Ext/flx  RD/UD   X 10 reps each    Wave, hook, straight fist, composite fist, finger spreads, finger lifts, pinky slides   X 10 reps each            Patient Education and Home Exercises      Education provided:   - suture removal instructions, HEP  - Progress towards goals     Written Home Exercises Provided: yes.  Exercises were reviewed and Kayleen was able to demonstrate them prior to the end of the session.  Kayleen demonstrated good  understanding of the HEP provided. See EMR under Patient Instructions for exercises provided during therapy sessions.      ASSESSMENT      Pt.  Demos full active fist and full active wrist ROM. No deficits. Sutures removed with minimal difficulty. She demonstrated independence with her HEP.     . Pt prognosis is Excellent.       Pt's spiritual, cultural and educational needs considered and pt agreeable to  plan of care and goals.    Anticipated barriers to occupational therapy: none    Goals:  Pt. Will be independent with HEP- MET    PLAN   DC to HEP. PT in agreement      Ermelinda Robins, OTR/L,CHT

## 2023-04-10 ENCOUNTER — OFFICE VISIT (OUTPATIENT)
Dept: PRIMARY CARE CLINIC | Facility: CLINIC | Age: 53
End: 2023-04-10
Payer: OTHER GOVERNMENT

## 2023-04-10 ENCOUNTER — LAB VISIT (OUTPATIENT)
Dept: LAB | Facility: HOSPITAL | Age: 53
End: 2023-04-10
Attending: NURSE PRACTITIONER
Payer: OTHER GOVERNMENT

## 2023-04-10 ENCOUNTER — TELEPHONE (OUTPATIENT)
Dept: OPHTHALMOLOGY | Facility: CLINIC | Age: 53
End: 2023-04-10
Payer: OTHER GOVERNMENT

## 2023-04-10 VITALS
WEIGHT: 143.31 LBS | HEART RATE: 65 BPM | BODY MASS INDEX: 26.21 KG/M2 | OXYGEN SATURATION: 96 % | DIASTOLIC BLOOD PRESSURE: 60 MMHG | SYSTOLIC BLOOD PRESSURE: 112 MMHG

## 2023-04-10 DIAGNOSIS — H53.8 BLURRY VISION, LEFT EYE: ICD-10-CM

## 2023-04-10 DIAGNOSIS — R73.01 IFG (IMPAIRED FASTING GLUCOSE): ICD-10-CM

## 2023-04-10 DIAGNOSIS — H04.123 DRY EYE SYNDROME OF BOTH EYES: ICD-10-CM

## 2023-04-10 DIAGNOSIS — H04.129 DRY EYE SYNDROME, UNSPECIFIED LATERALITY: ICD-10-CM

## 2023-04-10 DIAGNOSIS — H01.005 BLEPHARITIS OF LEFT LOWER EYELID, UNSPECIFIED TYPE: Primary | ICD-10-CM

## 2023-04-10 DIAGNOSIS — H01.005 BLEPHARITIS OF LEFT LOWER EYELID, UNSPECIFIED TYPE: ICD-10-CM

## 2023-04-10 LAB
ALBUMIN SERPL BCP-MCNC: 4.4 G/DL (ref 3.5–5.2)
ALP SERPL-CCNC: 55 U/L (ref 55–135)
ALT SERPL W/O P-5'-P-CCNC: 15 U/L (ref 10–44)
ANION GAP SERPL CALC-SCNC: 12 MMOL/L (ref 8–16)
AST SERPL-CCNC: 20 U/L (ref 10–40)
BASOPHILS # BLD AUTO: 0.06 K/UL (ref 0–0.2)
BASOPHILS NFR BLD: 0.9 % (ref 0–1.9)
BILIRUB SERPL-MCNC: 0.4 MG/DL (ref 0.1–1)
BUN SERPL-MCNC: 14 MG/DL (ref 6–20)
CALCIUM SERPL-MCNC: 10.1 MG/DL (ref 8.7–10.5)
CHLORIDE SERPL-SCNC: 97 MMOL/L (ref 95–110)
CO2 SERPL-SCNC: 26 MMOL/L (ref 23–29)
CREAT SERPL-MCNC: 0.6 MG/DL (ref 0.5–1.4)
DIFFERENTIAL METHOD: ABNORMAL
EOSINOPHIL # BLD AUTO: 0.3 K/UL (ref 0–0.5)
EOSINOPHIL NFR BLD: 3.8 % (ref 0–8)
ERYTHROCYTE [DISTWIDTH] IN BLOOD BY AUTOMATED COUNT: 12.4 % (ref 11.5–14.5)
EST. GFR  (NO RACE VARIABLE): >60 ML/MIN/1.73 M^2
GLUCOSE SERPL-MCNC: 93 MG/DL (ref 70–110)
HCT VFR BLD AUTO: 40.5 % (ref 37–48.5)
HGB BLD-MCNC: 13.2 G/DL (ref 12–16)
IMM GRANULOCYTES # BLD AUTO: 0.03 K/UL (ref 0–0.04)
IMM GRANULOCYTES NFR BLD AUTO: 0.4 % (ref 0–0.5)
LYMPHOCYTES # BLD AUTO: 2.8 K/UL (ref 1–4.8)
LYMPHOCYTES NFR BLD: 41.1 % (ref 18–48)
MCH RBC QN AUTO: 32.7 PG (ref 27–31)
MCHC RBC AUTO-ENTMCNC: 32.6 G/DL (ref 32–36)
MCV RBC AUTO: 100 FL (ref 82–98)
MONOCYTES # BLD AUTO: 0.5 K/UL (ref 0.3–1)
MONOCYTES NFR BLD: 7.5 % (ref 4–15)
NEUTROPHILS # BLD AUTO: 3.1 K/UL (ref 1.8–7.7)
NEUTROPHILS NFR BLD: 46.3 % (ref 38–73)
NRBC BLD-RTO: 0 /100 WBC
PLATELET # BLD AUTO: 418 K/UL (ref 150–450)
PMV BLD AUTO: 9.4 FL (ref 9.2–12.9)
POTASSIUM SERPL-SCNC: 3.6 MMOL/L (ref 3.5–5.1)
PROT SERPL-MCNC: 7.4 G/DL (ref 6–8.4)
RBC # BLD AUTO: 4.04 M/UL (ref 4–5.4)
SODIUM SERPL-SCNC: 135 MMOL/L (ref 136–145)
WBC # BLD AUTO: 6.77 K/UL (ref 3.9–12.7)

## 2023-04-10 PROCEDURE — 99214 OFFICE O/P EST MOD 30 MIN: CPT | Mod: S$PBB,,, | Performed by: NURSE PRACTITIONER

## 2023-04-10 PROCEDURE — 99214 PR OFFICE/OUTPT VISIT, EST, LEVL IV, 30-39 MIN: ICD-10-PCS | Mod: S$PBB,,, | Performed by: NURSE PRACTITIONER

## 2023-04-10 PROCEDURE — 85025 COMPLETE CBC W/AUTO DIFF WBC: CPT | Performed by: NURSE PRACTITIONER

## 2023-04-10 PROCEDURE — 99999 PR PBB SHADOW E&M-EST. PATIENT-LVL IV: CPT | Mod: PBBFAC,,, | Performed by: NURSE PRACTITIONER

## 2023-04-10 PROCEDURE — 83036 HEMOGLOBIN GLYCOSYLATED A1C: CPT | Performed by: NURSE PRACTITIONER

## 2023-04-10 PROCEDURE — 99214 OFFICE O/P EST MOD 30 MIN: CPT | Mod: PBBFAC | Performed by: NURSE PRACTITIONER

## 2023-04-10 PROCEDURE — 99999 PR PBB SHADOW E&M-EST. PATIENT-LVL IV: ICD-10-PCS | Mod: PBBFAC,,, | Performed by: NURSE PRACTITIONER

## 2023-04-10 PROCEDURE — 80053 COMPREHEN METABOLIC PANEL: CPT | Performed by: NURSE PRACTITIONER

## 2023-04-10 PROCEDURE — 36415 COLL VENOUS BLD VENIPUNCTURE: CPT | Performed by: NURSE PRACTITIONER

## 2023-04-10 NOTE — TELEPHONE ENCOUNTER
----- Message from Dana Arriaga sent at 4/10/2023  3:50 PM CDT -----  Dr. Vale Espinoza referred pt. I tried to schedule but was denied. Please assist with scheduling. Thanks

## 2023-04-10 NOTE — PROGRESS NOTES
[facility] Note    Chief Complaint      Chief Complaint   Patient presents with    Facial Swelling     Left eye , blurry vision     History of Present Illness      Kayleen Patel is a 52 y.o. female patient of Dr. Delatorre who is new to me and presents today for left eye swelling, blurred vision, itching-crust formation x 1 week. Pt has been using artificial tears, was diagnosed with dry eye but started having blurred vision. Was using cool and warm compresses due to the swelling. Pt reports doesn't wear contacts or glasses    Health Maintenance   Topic Date Due    High Dose Statin  Never done    Mammogram  06/13/2023    Lipid Panel  05/31/2027    TETANUS VACCINE  06/19/2030    Hepatitis C Screening  Completed       Past Medical History:   Diagnosis Date    Abnormal cervical Papanicolaou smear '88, '04    Colpo/Cryo    Benign paroxysmal vertigo, bilateral     Chronic diarrhea 02/12/2015    GERD (gastroesophageal reflux disease)     History of acute PID 1988    Hypertension     Thrombocytosis        Past Surgical History:   Procedure Laterality Date    BREAST BIOPSY Right 06/09/2021    stereo benign    CARPAL TUNNEL RELEASE Left 09/21/2021    Procedure: RELEASE, CARPAL TUNNEL;  Surgeon: Alla Goff MD;  Location: [facility] OR;  Service: Orthopedics;  Laterality: Left;    CARPAL TUNNEL RELEASE Right 02/07/2023    Procedure: RELEASE, CARPAL TUNNEL;  Surgeon: Alla Goff MD;  Location: [facility] OR;  Service: Orthopedics;  Laterality: Right;    EYE SURGERY  2010    In Record    GANGLION CYST EXCISION      INJECTION OF JOINT Right 03/09/2023    Procedure: INJECTION, JOINT, RIGHT HIP  DIRECT REF;  Surgeon: Harleen Swan MD;  Location: [facility];  Service: Pain Management;  Laterality: Right;    INJECTION OF STEROID Right 09/21/2021    Procedure: INJECTION, STEROID-Right carpal tunnel;  Surgeon: Alla Goff MD;  Location: [facility] OR;  Service: Orthopedics;  Laterality: Right;    REFRACTIVE  SURGERY Bilateral 2009    Dr. Vazquez Forbes     TONSILLECTOMY         family history includes Cancer in her father; Heart disease in her mother. She was adopted.    Social History     Tobacco Use    Smoking status: Former     Packs/day: 1.00     Years: 23.00     Pack years: 23.00     Types: Cigarettes     Start date: 6/1/1984     Quit date: 7/9/2007     Years since quitting: 15.7    Smokeless tobacco: Never   Substance Use Topics    Alcohol use: Yes     Alcohol/week: 20.0 standard drinks     Types: 6 Glasses of wine, 8 Cans of beer, 6 Drinks containing 0.5 oz of alcohol per week     Comment: 3-4 times per week    Drug use: No       Review of Systems   Constitutional:  Negative for chills, fever and malaise/fatigue.   HENT:  Negative for congestion.    Eyes:  Positive for blurred vision. Negative for double vision, photophobia, pain, discharge and redness.   Respiratory:  Negative for shortness of breath.    Cardiovascular:  Negative for chest pain.   Gastrointestinal:  Negative for nausea.   Musculoskeletal:  Negative for neck pain.   Neurological:  Negative for dizziness, tingling and headaches.      Outpatient Encounter Medications as of 4/10/2023   Medication Sig Dispense Refill    CALCIUM CARBONATE (CALCIUM 500 ORAL) Take 1 capsule by mouth.      chlorthalidone (HYGROTEN) 25 MG Tab Take 1 tablet (25 mg total) by mouth once daily. 90 tablet 3    cyanocobalamin (VITAMIN B-12) 100 MCG tablet Take 100 mcg by mouth once daily.      ferrous sulfate 325 mg (65 mg iron) Tab tablet Take 325 mg by mouth daily with breakfast.      meclizine (ANTIVERT) 25 mg tablet Take 1 tablet (25 mg total) by mouth 3 (three) times daily as needed for Dizziness. 30 tablet 0    meloxicam (MOBIC) 7.5 MG tablet Take 2 tablets (15 mg total) by mouth once daily. 180 tablet 2    mupirocin (BACTROBAN) 2 % ointment       OMEGA-3/DHA/EPA/FISH OIL (OMEGA-3 FISH OIL ORAL) Take 1 capsule by mouth.      ondansetron (ZOFRAN-ODT) 4 MG TbDL Take 1 tablet  (4 mg total) by mouth 3 (three) times daily as needed (Nausea and vomiting). 30 tablet 0    traMADoL (ULTRAM) 50 mg tablet Take 1 tablet (50 mg total) by mouth every 6 (six) hours. 25 tablet 0    valsartan (DIOVAN) 160 MG tablet Take 1 tablet (160 mg total) by mouth once daily. 90 tablet 3    traMADoL (ULTRAM) 50 mg tablet Take 1 tablet (50 mg total) by mouth every 12 (twelve) hours as needed for Pain. (Patient not taking: Reported on 4/10/2023) 14 tablet 0     No facility-administered encounter medications on file as of 4/10/2023.        Review of patient's allergies indicates:   Allergen Reactions    Penicillins Hives    Adhesive Rash       Physical Exam      Vital Signs  Pulse: 65  SpO2: 96 %  BP: 112/60  BP Location: Right arm  Patient Position: Sitting  Height and Weight  Weight: 65 kg (143 lb 4.8 oz)    Physical Exam  Vitals and nursing note reviewed.   Constitutional:       General: She is not in acute distress.     Appearance: Normal appearance. She is not ill-appearing.   HENT:      Head: Normocephalic and atraumatic.   Eyes:      Pupils: Pupils are equal, round, and reactive to light.      Comments: Slight swelling with skin discoloration noted to bottom lid of left eye, no drainage or redness noted   Cardiovascular:      Rate and Rhythm: Normal rate and regular rhythm.      Heart sounds: Normal heart sounds.   Pulmonary:      Effort: Pulmonary effort is normal. No respiratory distress.      Breath sounds: Normal breath sounds.   Skin:     General: Skin is warm and dry.   Neurological:      Mental Status: She is alert and oriented to person, place, and time.   Psychiatric:         Mood and Affect: Mood normal.         Behavior: Behavior normal.         Thought Content: Thought content normal.         Judgment: Judgment normal.        Laboratory:  CBC:  Lab Results   Component Value Date    WBC 6.77 04/10/2023    RBC 4.04 04/10/2023    HGB 13.2 04/10/2023    HCT 40.5 04/10/2023     04/10/2023    MCV  100 (H) 04/10/2023    MCH 32.7 (H) 04/10/2023    MCHC 32.6 04/10/2023    MCHC 33.6 05/11/2021    MCHC 32.8 03/12/2019     CMP:  Lab Results   Component Value Date    GLU 93 04/10/2023    CALCIUM 10.1 04/10/2023    ALBUMIN 4.4 04/10/2023    PROT 7.4 04/10/2023     (L) 04/10/2023    K 3.6 04/10/2023    CO2 26 04/10/2023    CL 97 04/10/2023    BUN 14 04/10/2023    ALKPHOS 55 04/10/2023    ALT 15 04/10/2023    AST 20 04/10/2023    BILITOT 0.4 04/10/2023    BILITOT 0.6 05/11/2021    BILITOT 0.4 03/04/2020     URINALYSIS:  Lab Results   Component Value Date    COLORU Straw 06/07/2022    SPECGRAV 1.005 06/07/2022    PHUR 6.0 06/07/2022    PROTEINUA Negative 06/07/2022    BACTERIA Moderate (A) 06/27/2013    NITRITE Negative 06/07/2022    LEUKOCYTESUR Negative 06/07/2022    UROBILINOGEN 0.2 06/27/2013      LIPIDS:  Lab Results   Component Value Date    TSH 1.656 02/28/2018    TSH 1.158 06/27/2017    TSH 1.49 07/12/2011    HDL 73 05/31/2022    HDL 91 (H) 05/11/2021    HDL 85 (H) 03/04/2020    CHOL 195 05/31/2022    CHOL 205 (H) 05/11/2021    CHOL 190 03/04/2020    TRIG 108 05/31/2022    TRIG 118 05/11/2021    TRIG 73 03/04/2020    LDLCALC 100.4 05/31/2022    LDLCALC 90.4 05/11/2021    LDLCALC 90.4 03/04/2020    CHOLHDL 37.4 05/31/2022    CHOLHDL 44.4 05/11/2021    CHOLHDL 44.7 03/04/2020    NONHDLCHOL 122 05/31/2022    NONHDLCHOL 114 05/11/2021    NONHDLCHOL 105 03/04/2020    TOTALCHOLEST 2.7 05/31/2022    TOTALCHOLEST 2.3 05/11/2021    TOTALCHOLEST 2.2 03/04/2020     TSH:  Lab Results   Component Value Date    TSH 1.656 02/28/2018    TSH 1.158 06/27/2017    TSH 1.49 07/12/2011     A1C:  Lab Results   Component Value Date    HGBA1C 5.2 04/10/2023    HGBA1C 5.3 05/31/2022    HGBA1C 5.5 05/13/2021    HGBA1C 5.2 09/22/2017         Assessment/Plan     Kayleen Lynda Patel is a 52 y.o.female with:    Blepharitis of left lower eyelid, unspecified type  -     CBC Auto Differential; Future; Expected date: 04/10/2023  -      Comprehensive Metabolic Panel; Future; Expected date: 04/10/2023  -     Hemoglobin A1C; Future; Expected date: 04/10/2023    Blurry vision, left eye  -     CBC Auto Differential; Future; Expected date: 04/10/2023  -     Comprehensive Metabolic Panel; Future; Expected date: 04/10/2023  -     Hemoglobin A1C; Future; Expected date: 04/10/2023  -     Cancel: Ambulatory referral/consult to Optometry; Future; Expected date: 04/10/2023  -     Ambulatory referral/consult to Optometry; Future; Expected date: 04/10/2023    Dry eye syndrome of both eyes  -     CBC Auto Differential; Future; Expected date: 04/10/2023  -     Comprehensive Metabolic Panel; Future; Expected date: 04/10/2023  -     Hemoglobin A1C; Future; Expected date: 04/10/2023    IFG (impaired fasting glucose)  -     CBC Auto Differential; Future; Expected date: 04/10/2023  -     Comprehensive Metabolic Panel; Future; Expected date: 04/10/2023  -     Hemoglobin A1C; Future; Expected date: 04/10/2023          Health Maintenance Due   Topic Date Due    High Dose Statin  Never done    Pneumococcal Vaccines (Age 0-64) (2 - PPSV23 if available, else PCV20) 08/14/2020    COVID-19 Vaccine (5 - Booster for Moderna series) 05/30/2022    Mammogram  06/13/2023        I spent 22 minutes on the day of this encounter for preparing for, evaluating, treating, and managing this patient.      -Continue current medications and maintain follow up with specialists.  Return to clinic as needed for any concerns.    No follow-ups on file.       JOSE PorterC  [facility]

## 2023-04-11 LAB
ESTIMATED AVG GLUCOSE: 103 MG/DL (ref 68–131)
HBA1C MFR BLD: 5.2 % (ref 4–5.6)

## 2023-04-13 ENCOUNTER — PATIENT MESSAGE (OUTPATIENT)
Dept: INTERNAL MEDICINE | Facility: CLINIC | Age: 53
End: 2023-04-13
Payer: OTHER GOVERNMENT

## 2023-04-20 ENCOUNTER — PATIENT MESSAGE (OUTPATIENT)
Dept: OBSTETRICS AND GYNECOLOGY | Facility: CLINIC | Age: 53
End: 2023-04-20
Payer: OTHER GOVERNMENT

## 2023-04-20 DIAGNOSIS — Z12.31 SCREENING MAMMOGRAM, ENCOUNTER FOR: Primary | ICD-10-CM

## 2023-04-21 ENCOUNTER — TELEPHONE (OUTPATIENT)
Dept: OPTOMETRY | Facility: CLINIC | Age: 53
End: 2023-04-21
Payer: OTHER GOVERNMENT

## 2023-04-21 NOTE — TELEPHONE ENCOUNTER
----- Message from Diya Eid LPN sent at 4/21/2023 10:59 AM CDT -----  Regarding: Scheduling Assistance  Good Morning,    I am reaching out to you regarding assistance with getting a pt of Dr. Clay scheduled in Optometry or Opthalmology. They pt complains of eye swelling accompanied by flaking, itching and redness. This has been ongoing for about over a month. Dr. Clay would like the pt to be seen sooner than her already scheduled appointment. Any assistance would be greatly appreciated.    Thanks in Advance,  KERRY Keane

## 2023-04-21 NOTE — TELEPHONE ENCOUNTER
Referral for Optometry was placed previously.  Patient's problems are ongoing and not getting better.  She has already seen urgent care once.    Please contact Optometry Department and expedite appointment, should be seen within 1-2 days, thank you

## 2023-04-26 ENCOUNTER — OFFICE VISIT (OUTPATIENT)
Dept: OPTOMETRY | Facility: CLINIC | Age: 53
End: 2023-04-26
Payer: OTHER GOVERNMENT

## 2023-04-26 DIAGNOSIS — L23.9 ALLERGIC CONTACT DERMATITIS, UNSPECIFIED TRIGGER: ICD-10-CM

## 2023-04-26 PROCEDURE — 99213 OFFICE O/P EST LOW 20 MIN: CPT | Mod: PBBFAC | Performed by: OPTOMETRIST

## 2023-04-26 PROCEDURE — 99999 PR PBB SHADOW E&M-EST. PATIENT-LVL III: ICD-10-PCS | Mod: PBBFAC,,, | Performed by: OPTOMETRIST

## 2023-04-26 PROCEDURE — 92004 PR EYE EXAM, NEW PATIENT,COMPREHESV: ICD-10-PCS | Mod: S$PBB,,, | Performed by: OPTOMETRIST

## 2023-04-26 PROCEDURE — 92004 COMPRE OPH EXAM NEW PT 1/>: CPT | Mod: S$PBB,,, | Performed by: OPTOMETRIST

## 2023-04-26 PROCEDURE — 99999 PR PBB SHADOW E&M-EST. PATIENT-LVL III: CPT | Mod: PBBFAC,,, | Performed by: OPTOMETRIST

## 2023-04-26 RX ORDER — LOTEPREDNOL ETABONATE 3.8 MG/G
1 GEL OPHTHALMIC 3 TIMES DAILY
Qty: 1 G | Refills: 0 | Status: SHIPPED | OUTPATIENT
Start: 2023-04-26 | End: 2023-05-10

## 2023-04-26 RX ORDER — AZITHROMYCIN 250 MG/1
250 TABLET, FILM COATED ORAL
COMMUNITY
Start: 2022-12-19 | End: 2023-06-29 | Stop reason: ALTCHOICE

## 2023-04-26 NOTE — PROGRESS NOTES
HPI    Pt is here today for concerns about ocular health. Pt states that she   noticed what seems like a rash on upper lids OU and was referred by   primary doctor to get eyes checked. States that it has been about a month   since it has been going on and that she thinks she just needs a cream to   help.   DLS: 7/6/2017 Dr. 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            Assessment /Plan     For exam results, see Encounter Report.    Allergic contact dermatitis, unspecified trigger  -     Ambulatory referral/consult to Optometry  -     LOTEMAX SM 0.38 % DrpG; Apply 1 drop to eye 3 (three) times daily. for 14 days  Dispense: 1 g; Refill: 0      Educated pt on possible allergic and systemic causes of ocular findings. Pt denies any changes to cosmetics, lash extension glue, diet, blood work. Demonstrated to pt Tobradex elías application in office today--apply to affected areas twice daily. Pt to also begin Lotemax SM tid OU x 2 weeks. Pt to follow up if symptoms persist or worsen. Discussed with pt the need for possible thyroid panel and/or referral to allergist if symptoms recur.

## 2023-05-12 ENCOUNTER — PATIENT MESSAGE (OUTPATIENT)
Dept: INTERNAL MEDICINE | Facility: CLINIC | Age: 53
End: 2023-05-12
Payer: OTHER GOVERNMENT

## 2023-05-12 ENCOUNTER — PATIENT MESSAGE (OUTPATIENT)
Dept: OPTOMETRY | Facility: CLINIC | Age: 53
End: 2023-05-12
Payer: OTHER GOVERNMENT

## 2023-05-12 DIAGNOSIS — L30.9 DERMATITIS: Primary | ICD-10-CM

## 2023-05-22 ENCOUNTER — OFFICE VISIT (OUTPATIENT)
Dept: PHYSICAL MEDICINE AND REHAB | Facility: CLINIC | Age: 53
End: 2023-05-22
Attending: FAMILY MEDICINE
Payer: OTHER GOVERNMENT

## 2023-05-22 VITALS
DIASTOLIC BLOOD PRESSURE: 82 MMHG | HEIGHT: 62 IN | WEIGHT: 134.06 LBS | SYSTOLIC BLOOD PRESSURE: 121 MMHG | BODY MASS INDEX: 24.67 KG/M2 | HEART RATE: 78 BPM

## 2023-05-22 DIAGNOSIS — M16.0 PRIMARY OSTEOARTHRITIS OF BOTH HIPS: ICD-10-CM

## 2023-05-22 DIAGNOSIS — M25.551 CHRONIC RIGHT HIP PAIN: Primary | ICD-10-CM

## 2023-05-22 DIAGNOSIS — M25.552 CHRONIC LEFT HIP PAIN: ICD-10-CM

## 2023-05-22 DIAGNOSIS — G89.29 CHRONIC RIGHT HIP PAIN: Primary | ICD-10-CM

## 2023-05-22 DIAGNOSIS — M25.851 FEMOROACETABULAR IMPINGEMENT OF BOTH HIPS: ICD-10-CM

## 2023-05-22 DIAGNOSIS — M25.852 FEMOROACETABULAR IMPINGEMENT OF BOTH HIPS: ICD-10-CM

## 2023-05-22 DIAGNOSIS — M47.816 SPONDYLOSIS OF LUMBAR REGION WITHOUT MYELOPATHY OR RADICULOPATHY: ICD-10-CM

## 2023-05-22 DIAGNOSIS — G89.29 CHRONIC LEFT HIP PAIN: ICD-10-CM

## 2023-05-22 PROCEDURE — 99215 OFFICE O/P EST HI 40 MIN: CPT | Mod: S$PBB,,, | Performed by: PHYSICAL MEDICINE & REHABILITATION

## 2023-05-22 PROCEDURE — 99214 OFFICE O/P EST MOD 30 MIN: CPT | Mod: PBBFAC | Performed by: PHYSICAL MEDICINE & REHABILITATION

## 2023-05-22 PROCEDURE — 99999 PR PBB SHADOW E&M-EST. PATIENT-LVL IV: ICD-10-PCS | Mod: PBBFAC,,, | Performed by: PHYSICAL MEDICINE & REHABILITATION

## 2023-05-22 PROCEDURE — 99999 PR PBB SHADOW E&M-EST. PATIENT-LVL IV: CPT | Mod: PBBFAC,,, | Performed by: PHYSICAL MEDICINE & REHABILITATION

## 2023-05-22 PROCEDURE — 99215 PR OFFICE/OUTPT VISIT, EST, LEVL V, 40-54 MIN: ICD-10-PCS | Mod: S$PBB,,, | Performed by: PHYSICAL MEDICINE & REHABILITATION

## 2023-05-22 RX ORDER — ACETAMINOPHEN 500 MG
500-1000 TABLET ORAL 3 TIMES DAILY PRN
Refills: 0 | COMMUNITY
Start: 2023-05-22 | End: 2023-12-22

## 2023-05-22 RX ORDER — CELECOXIB 200 MG/1
200 CAPSULE ORAL DAILY
Qty: 60 CAPSULE | Refills: 0 | Status: SHIPPED | OUTPATIENT
Start: 2023-05-22 | End: 2023-06-07 | Stop reason: SDUPTHER

## 2023-05-22 NOTE — PROGRESS NOTES
"Subjective:       Patient ID: Kayleen Patel is a 52 y.o. female.    Chief Complaint: patient is having pain in both of her hips (Patient states that pain hurts worst at night pain ranges from a 5-10)      HPI      Mrs. Patel is a 52-year-old female with past medical history of OA of the knees status post multiple Visco supplement injections, the latest by Sports Medicine in 12/2018 and bilateral carpal tunnel syndrome status post release few years ago.  She is presenting to the Physical Medicine Clinic for chronic bilateral hip pain, worse on the right.      The patient started complaining of right hip pain about 2 years ago.  She denies any preceding injuries.  Has been getting progressively worse.  X-rays were ordered by her Primary Care Provider and showed femoralacetabular impingement with "cam bumps" morphology bilaterally. She was evaluated by Sports Medicine on 05/06/2012.  She was maintained on p.r.n. ibuprofen and referred to physical therapy.  She had little improvement.  She started complaining similar left pain about 4 months ago although not as severe.  She was evaluated more recently by Dr. Pamela Ulloa from [facility] on 01/31/2023.  She was started on meloxicam.  She was referred to [facility].  She was seen by Dr. Swan on 03/09/2023.  She received a right intra-articular hip injection with steroids.  She had good relief but only for 1 day.  Her pain continues to get progressively worse.      Currently, her hip pain is bilateral but worse on the right.  It is a constant aching deep in the right groin.  The pain sometimes involves anterior thigh and medial thigh regions.  It is aggravated by prolonged walking, going down stairs, and sleeping with her legs stretched out.  It is better with bending her knee, deep pressure with finger in her buttock/SI region, and a foam roll on her back.  Meloxicam also helps.  Her maximum pain is 10/10 " and minimum 4/10.  Today it is 5/10.  The patient denies any low back pain.  Her x-rays of the lumbar spine that showed mild DJD.  She does have occasional bilateral foot numbness it can be painful but it is infrequent (couple times per month).    She is currently taking:  Meloxicam 7.5 mg, usually 1 or 2 tablets daily.  She takes ibuprofen when she does not take meloxicam.  She has history of stomach ulcer several years ago but she has not had any problem with the current use of NSAIDs.    Past Medical History:   Diagnosis Date    Abnormal cervical Papanicolaou smear '88, '04    Colpo/Cryo    Benign paroxysmal vertigo, bilateral     Chronic diarrhea 02/12/2015    GERD (gastroesophageal reflux disease)     History of acute PID 1988    Hypertension     Thrombocytosis         Review of patient's allergies indicates:   Allergen Reactions    Penicillins Hives    Adhesive Rash        Review of Systems   Constitutional:  Negative for chills, fatigue and fever.   Eyes:  Negative for visual disturbance.   Respiratory:  Negative for shortness of breath.    Cardiovascular:  Negative for chest pain.   Gastrointestinal:  Negative for blood in stool, constipation, nausea and vomiting.   Genitourinary:  Negative for difficulty urinating.   Musculoskeletal:  Positive for arthralgias. Negative for back pain, gait problem and neck pain.   Neurological:  Positive for numbness (feet). Negative for dizziness, weakness and headaches.   Psychiatric/Behavioral:  Positive for sleep disturbance. Negative for behavioral problems.            Objective:      Physical Exam  Vitals reviewed.   Constitutional:       Appearance: She is well-developed.   HENT:      Head: Normocephalic and atraumatic.   Eyes:      Extraocular Movements: Extraocular movements intact.   Musculoskeletal:      Cervical back: Normal range of motion. No tenderness.      Comments: BUE:  ROM:   RUE: full.   LUE: full.  Strength:    RUE: 5/5 at shoulder abduction, 5 elbow  flexion, 5 elbow extension, 5 hand .   LUE: 5/5 at shoulder abduction, 5 elbow flexion, 5 elbow extension, 5 hand .  Sensation to pinprick:   RUE: intact.   LUE: intact.  DTR:    RUE: +1 biceps, +1 triceps.   LUE:  +1 biceps, +1 triceps.    BLE:  ROM:   RLE: full.   LLE: full.  Knee crepitus:   RLE: +ve.   LLE: +ve.   Strength:    RLE: 5/5 at hip flexion, 5 knee extension, 5 ankle DF, 5 PF, 5 EHL.   LLE: 5/5 at hip flexion, 5 knee extension, 5 ankle DF, 5 PF, 5 EHL.  Sensation to pinprick:     RLE: intact.      LLE: intact.   DTR:     RLE: +2 knee, +1 ankle.    LLE: +2 knee, +1 ankle.  Clonus:    Rt ankle: -ve.    Lt ankle: -ve.  SLR:      RLE: -ve at 70 degrees.      LLE: -ve at 70 degrees.   Femoral Stretch:      RLE: +ve.      LLE: +ve.   CHANDA:     RLE: +ve.      LLE: +ve.  FADIR (flexion adduction internal rotation):     RLE: +ve (severe)      LLE: +ve.(Severe)  SI joint tenderness:     RLE: +ve.      LLE: +ve.  Gilet's test:     RLE: -ve.      LLE: -ve.      -ve tenderness over lumbar spine.  No leg length discrepancy.    Directional Preference:  Spine flexion: 90 degrees , no pain in back.  Spine extension: 30 degrees, no pain in back.  Lateral bending: no pain to Right, no pain to Left.      Heel walking: WNL.  Toe walking: WNL.  Gait: WNL     Skin:     General: Skin is warm.   Neurological:      General: No focal deficit present.      Mental Status: She is alert.   Psychiatric:         Behavior: Behavior normal.         IMAGING STUDIES:      XR HIPS BILATERAL 2 VIEW INCL AP PELVIS (2/2/23):     TECHNIQUE:  Two views of the hips were obtained, with an AP pelvis and a frogleg lateral of both hips submitted.     COMPARISON:  Comparison is made to 04/19/2022.     FINDINGS:  As previously noted, there is outward convexity of the proximal femur on both sides near the junction of the femoral head and neck, consistent with a CAM type of femoroacetabular impingement.  Both hip joint space is demonstrates  some minimal interval narrowing since 04/19/2022, particularly seen superolaterally on the right.  No conventional radiographic evidence to specifically suggest avascular necrosis of either femoral head.  No evidence of recent or healing fracture or lytic destructive process.  SI joints appear unremarkable.     Impression:     Slight interval hip joint space narrowing bilaterally since 04/19/2022 is seen, particularly evident superolaterally on the right side.  No other interval change.      XR LUMBAR SPINE AP AND LATERAL (1/5/23):      CLINICAL HISTORY:  Lumbar Back Pain;Low back pain, unspecified     TECHNIQUE:  AP, lateral and spot images were performed of the lumbar spine.     COMPARISON:  None     FINDINGS:  Mild DJD.  The disc spaces are well maintained.  No fracture, spondylolisthesis or bone destruction identified.  Mild anterior wedging of the L1 vertebral body noted.     Impression:     See above          Assessment:       1. Chronic right hip pain    2. Chronic left hip pain    3. Primary osteoarthritis of both hips    4. Femoroacetabular impingement of both hips    5. Spondylosis of lumbar region without myelopathy or radiculopathy        Plan:           - MRI Hip Without Contrast Right; Future (to to better assess for degenerative changes and labral pathology)  - MRI Lumbar Spine Without Contrast; Future (to rule out any contribution from proximal lumbar radiculopathy)  - Discontinue meloxicam   - Start celecoxib (CELEBREX) 200 MG capsule; Take 1 capsule (200 mg total) by mouth once daily. If no relief, may increase dose to twice per day.  - Start acetaminophen (TYLENOL) 500 MG tablet; Take 1-2 tablets (500-1,000 mg total) by mouth 3 (three) times daily as needed for Pain.  Referral to sports medicine may be done pending the results.  - Follow up in about 3 months (around 8/22/2023).    This was a 60 minute visit, 50% of which was spent educating the patient about the diagnosis and the treatment  plan.    This note was partly generated with [employer] voice recognition software. I apologize for any possible typographical errors.

## 2023-06-02 DIAGNOSIS — I10 ESSENTIAL HYPERTENSION: ICD-10-CM

## 2023-06-02 RX ORDER — CHLORTHALIDONE 25 MG/1
TABLET ORAL
Qty: 90 TABLET | Refills: 2 | Status: SHIPPED | OUTPATIENT
Start: 2023-06-02 | End: 2024-02-27

## 2023-06-02 RX ORDER — VALSARTAN 160 MG/1
TABLET ORAL
Qty: 90 TABLET | Refills: 2 | Status: SHIPPED | OUTPATIENT
Start: 2023-06-02 | End: 2024-02-27

## 2023-06-02 NOTE — TELEPHONE ENCOUNTER
No care due was identified.  Health [facility] Embedded Care Due Messages. Reference number: [id number].   6/02/2023 2:12:50 AM CDT

## 2023-06-02 NOTE — TELEPHONE ENCOUNTER
Refill Decision Note   Kayleen Patel  is requesting a refill authorization.  Brief Assessment and Rationale for Refill:  Approve     Medication Therapy Plan:         Comments:     Note composed:10:06 AM 06/02/2023

## 2023-06-07 DIAGNOSIS — M25.551 CHRONIC RIGHT HIP PAIN: ICD-10-CM

## 2023-06-07 DIAGNOSIS — M25.552 CHRONIC LEFT HIP PAIN: ICD-10-CM

## 2023-06-07 DIAGNOSIS — M16.0 PRIMARY OSTEOARTHRITIS OF BOTH HIPS: ICD-10-CM

## 2023-06-07 DIAGNOSIS — G89.29 CHRONIC LEFT HIP PAIN: ICD-10-CM

## 2023-06-07 DIAGNOSIS — G89.29 CHRONIC RIGHT HIP PAIN: ICD-10-CM

## 2023-06-07 RX ORDER — CELECOXIB 200 MG/1
200 CAPSULE ORAL DAILY
Qty: 60 CAPSULE | Refills: 0 | Status: SHIPPED | OUTPATIENT
Start: 2023-06-07 | End: 2023-07-11

## 2023-06-13 ENCOUNTER — HOSPITAL ENCOUNTER (OUTPATIENT)
Dept: RADIOLOGY | Facility: HOSPITAL | Age: 53
Discharge: HOME | End: 2023-06-13
Attending: PHYSICAL MEDICINE & REHABILITATION
Payer: OTHER GOVERNMENT

## 2023-06-13 DIAGNOSIS — G89.29 CHRONIC LEFT HIP PAIN: ICD-10-CM

## 2023-06-13 DIAGNOSIS — G89.29 CHRONIC RIGHT HIP PAIN: ICD-10-CM

## 2023-06-13 DIAGNOSIS — M25.552 CHRONIC LEFT HIP PAIN: ICD-10-CM

## 2023-06-13 DIAGNOSIS — M25.551 CHRONIC RIGHT HIP PAIN: ICD-10-CM

## 2023-06-13 PROCEDURE — 73721 MRI JNT OF LWR EXTRE W/O DYE: CPT | Mod: TC,RT

## 2023-06-13 PROCEDURE — 73721 MRI HIP WITHOUT CONTRAST RIGHT: ICD-10-PCS | Mod: 26,RT,, | Performed by: RADIOLOGY

## 2023-06-13 PROCEDURE — 73721 MRI JNT OF LWR EXTRE W/O DYE: CPT | Mod: 26,RT,, | Performed by: RADIOLOGY

## 2023-06-13 PROCEDURE — 72148 MRI LUMBAR SPINE W/O DYE: CPT | Mod: TC

## 2023-06-13 PROCEDURE — 72148 MRI LUMBAR SPINE WITHOUT CONTRAST: ICD-10-PCS | Mod: 26,,, | Performed by: RADIOLOGY

## 2023-06-13 PROCEDURE — 72148 MRI LUMBAR SPINE W/O DYE: CPT | Mod: 26,,, | Performed by: RADIOLOGY

## 2023-06-14 ENCOUNTER — HOSPITAL ENCOUNTER (OUTPATIENT)
Dept: RADIOLOGY | Facility: HOSPITAL | Age: 53
Discharge: HOME | End: 2023-06-14
Attending: OBSTETRICS & GYNECOLOGY
Payer: OTHER GOVERNMENT

## 2023-06-14 DIAGNOSIS — Z12.31 SCREENING MAMMOGRAM, ENCOUNTER FOR: ICD-10-CM

## 2023-06-14 PROCEDURE — 77067 MAMMO DIGITAL SCREENING BILAT WITH TOMO: ICD-10-PCS | Mod: 26,,, | Performed by: RADIOLOGY

## 2023-06-14 PROCEDURE — 77067 SCR MAMMO BI INCL CAD: CPT | Mod: TC

## 2023-06-14 PROCEDURE — 77063 BREAST TOMOSYNTHESIS BI: CPT | Mod: 26,,, | Performed by: RADIOLOGY

## 2023-06-14 PROCEDURE — 77067 SCR MAMMO BI INCL CAD: CPT | Mod: 26,,, | Performed by: RADIOLOGY

## 2023-06-14 PROCEDURE — 77063 MAMMO DIGITAL SCREENING BILAT WITH TOMO: ICD-10-PCS | Mod: 26,,, | Performed by: RADIOLOGY

## 2023-06-15 ENCOUNTER — PATIENT MESSAGE (OUTPATIENT)
Dept: PHYSICAL MEDICINE AND REHAB | Facility: CLINIC | Age: 53
End: 2023-06-15
Payer: OTHER GOVERNMENT

## 2023-06-15 DIAGNOSIS — G89.29 CHRONIC PAIN OF BOTH HIPS: Primary | ICD-10-CM

## 2023-06-15 DIAGNOSIS — M87.052 AVASCULAR NECROSIS OF HIP, LEFT: ICD-10-CM

## 2023-06-15 DIAGNOSIS — M25.551 CHRONIC PAIN OF BOTH HIPS: Primary | ICD-10-CM

## 2023-06-15 DIAGNOSIS — M87.051 AVASCULAR NECROSIS OF HIP, RIGHT: ICD-10-CM

## 2023-06-15 DIAGNOSIS — M25.552 CHRONIC PAIN OF BOTH HIPS: Primary | ICD-10-CM

## 2023-06-15 DIAGNOSIS — M16.0 PRIMARY OSTEOARTHRITIS OF BOTH HIPS: ICD-10-CM

## 2023-06-19 NOTE — TELEPHONE ENCOUNTER
I placed a referral to Orthopedics for evaluation of bilateral hip pain with positive MRI for advanced OA and bilateral AVN.

## 2023-06-29 ENCOUNTER — OFFICE VISIT (OUTPATIENT)
Dept: ALLERGY | Facility: CLINIC | Age: 53
End: 2023-06-29
Attending: FAMILY MEDICINE
Payer: OTHER GOVERNMENT

## 2023-06-29 ENCOUNTER — LAB VISIT (OUTPATIENT)
Dept: LAB | Facility: HOSPITAL | Age: 53
End: 2023-06-29
Payer: OTHER GOVERNMENT

## 2023-06-29 VITALS — HEIGHT: 62 IN | BODY MASS INDEX: 25.72 KG/M2 | WEIGHT: 139.75 LBS

## 2023-06-29 DIAGNOSIS — H57.89 EYE SWELLING: Primary | ICD-10-CM

## 2023-06-29 DIAGNOSIS — H10.423 SIMPLE CHRONIC CONJUNCTIVITIS OF BOTH EYES: ICD-10-CM

## 2023-06-29 DIAGNOSIS — L30.9 DERMATITIS: ICD-10-CM

## 2023-06-29 DIAGNOSIS — I10 HYPERTENSION, ESSENTIAL: ICD-10-CM

## 2023-06-29 DIAGNOSIS — H57.89 EYE SWELLING: ICD-10-CM

## 2023-06-29 PROCEDURE — 99999 PR PBB SHADOW E&M-EST. PATIENT-LVL IV: CPT | Mod: PBBFAC,,, | Performed by: ALLERGY & IMMUNOLOGY

## 2023-06-29 PROCEDURE — 86003 ALLG SPEC IGE CRUDE XTRC EA: CPT | Performed by: ALLERGY & IMMUNOLOGY

## 2023-06-29 PROCEDURE — 99244 OFF/OP CNSLTJ NEW/EST MOD 40: CPT | Mod: S$PBB,,, | Performed by: ALLERGY & IMMUNOLOGY

## 2023-06-29 PROCEDURE — 99244 PR OFFICE CONSULTATION,LEVEL IV: ICD-10-PCS | Mod: S$PBB,,, | Performed by: ALLERGY & IMMUNOLOGY

## 2023-06-29 PROCEDURE — 86003 ALLG SPEC IGE CRUDE XTRC EA: CPT | Mod: 59 | Performed by: ALLERGY & IMMUNOLOGY

## 2023-06-29 PROCEDURE — 36415 COLL VENOUS BLD VENIPUNCTURE: CPT | Mod: PO | Performed by: ALLERGY & IMMUNOLOGY

## 2023-06-29 PROCEDURE — 99999 PR PBB SHADOW E&M-EST. PATIENT-LVL IV: ICD-10-PCS | Mod: PBBFAC,,, | Performed by: ALLERGY & IMMUNOLOGY

## 2023-06-29 PROCEDURE — 99214 OFFICE O/P EST MOD 30 MIN: CPT | Mod: PBBFAC,PO | Performed by: ALLERGY & IMMUNOLOGY

## 2023-06-29 RX ORDER — DESONIDE 0.5 MG/G
CREAM TOPICAL 2 TIMES DAILY
Qty: 60 G | Refills: 1 | Status: SHIPPED | OUTPATIENT
Start: 2023-06-29

## 2023-06-29 NOTE — PROGRESS NOTES
Subjective:       Patient ID: Kayleen Patel is a 53 y.o. female.    Chief Complaint:  Allergies      52 yo woman presents for consult from Dr britton Clay for possible allergy. She states for 6 months she has had allergy issues. She has redness of skin and swelling of upper and lower eye lids. It does itch and will turn to dry skin which flakes and peels. She has had watery eyes as well but not very red eye. No runny nose, sneeze or congestion. Has been daily since January. Has better days and worse. Has seen eye doc and was told had allergy bumps under eye lid. Given tobradex topically to eye lid and an eye drop. This helped some but came right back. Over weekend eye lid was very swollen. Benadryl helped some. No triggers she knows. Never had this before January. No H/O asthma, no prior eczema. No known food allergy. No insect allergy. Has H/O hives to PCN as child. Has rash with adhesive and suntan lotion so avoids. She does have HTN well controlled on med.       Environmental History: see history section for home environment  Review of Systems   HENT:  Positive for facial swelling. Negative for congestion, postnasal drip, rhinorrhea, sneezing and sore throat.    Eyes:  Positive for discharge and itching.   Respiratory:  Negative for cough, chest tightness and shortness of breath.    Skin:  Positive for color change and rash.   Neurological:  Negative for headaches.      Objective:      Physical Exam  Vitals and nursing note reviewed.   Constitutional:       General: She is not in acute distress.     Appearance: Normal appearance. She is not ill-appearing.   HENT:      Nose: No rhinorrhea.   Eyes:      General:         Right eye: No discharge.         Left eye: No discharge.      Conjunctiva/sclera: Conjunctivae normal.   Pulmonary:      Effort: Pulmonary effort is normal. No respiratory distress.   Abdominal:      General: There is no distension.   Skin:     General: Skin is warm and dry.       Findings: Erythema and rash (erythematous patches on upper eye lids and below eye) present.   Neurological:      Mental Status: She is alert and oriented to person, place, and time.   Psychiatric:         Mood and Affect: Mood normal.         Behavior: Behavior normal.         Thought Content: Thought content normal.         Judgment: Judgment normal.       Laboratory:   none performed   Assessment:       1. Eye swelling    2. Dermatitis    3. Simple chronic conjunctivitis of both eyes    4. Hypertension, essential         Plan:       Eye swelling and rash - advised can be environment allergy vs contact allergy, will send immunocaps. If negative then may need patch test to look for contact allergy. Will phone review immunocaps and use desonide topically BID to eye lids. Continue levocetirizine 5 mg daily. If labs negative then refer to derm  With HTN advised to avoid decongestants  Phone review  Dr Clay notified of completed consult via Epic    I spent a total of 45 minutes on the day of the visit.  This includes face to face time and non-face to face time preparing to see the patient (eg, review of tests), obtaining and/or reviewing separately obtained history, documenting clinical information in the electronic or other health record, independently interpreting results and communicating results to the patient/family/caregiver, or care coordinator.

## 2023-07-05 ENCOUNTER — PATIENT MESSAGE (OUTPATIENT)
Dept: ALLERGY | Facility: CLINIC | Age: 53
End: 2023-07-05
Payer: OTHER GOVERNMENT

## 2023-07-05 DIAGNOSIS — L25.9 CONTACT DERMATITIS, UNSPECIFIED CONTACT DERMATITIS TYPE, UNSPECIFIED TRIGGER: Primary | ICD-10-CM

## 2023-07-05 LAB
A ALTERNATA IGE QN: <0.1 KU/L
A FUMIGATUS IGE QN: <0.1 KU/L
ALLERGEN CHAETOMIUM GLOBOSUM IGE: <0.1 KU/L
ALLERGEN WALNUT TREE IGE: <0.1 KU/L
ALLERGEN WHITE PINE TREE IGE: <0.1 KU/L
BAHIA GRASS IGE QN: <0.1 KU/L
BALD CYPRESS IGE QN: <0.1 KU/L
BERMUDA GRASS IGE QN: <0.1 KU/L
C HERBARUM IGE QN: <0.1 KU/L
C LUNATA IGE QN: <0.1 KU/L
CAT DANDER IGE QN: <0.1 KU/L
CHAETOMIUM GLOB. CLASS: NORMAL
COMMON RAGWEED IGE QN: <0.1 KU/L
COTTONWOOD IGE QN: <0.1 KU/L
D FARINAE IGE QN: 0.26 KU/L
D PTERONYSS IGE QN: 0.26 KU/L
DEPRECATED A ALTERNATA IGE RAST QL: NORMAL
DEPRECATED A FUMIGATUS IGE RAST QL: NORMAL
DEPRECATED BAHIA GRASS IGE RAST QL: NORMAL
DEPRECATED BALD CYPRESS IGE RAST QL: NORMAL
DEPRECATED BERMUDA GRASS IGE RAST QL: NORMAL
DEPRECATED C HERBARUM IGE RAST QL: NORMAL
DEPRECATED C LUNATA IGE RAST QL: NORMAL
DEPRECATED CAT DANDER IGE RAST QL: NORMAL
DEPRECATED COMMON RAGWEED IGE RAST QL: NORMAL
DEPRECATED COTTONWOOD IGE RAST QL: NORMAL
DEPRECATED D FARINAE IGE RAST QL: ABNORMAL
DEPRECATED D PTERONYSS IGE RAST QL: ABNORMAL
DEPRECATED DOG DANDER IGE RAST QL: NORMAL
DEPRECATED ELDER IGE RAST QL: NORMAL
DEPRECATED ENGL PLANTAIN IGE RAST QL: NORMAL
DEPRECATED HORSE DANDER IGE RAST QL: NORMAL
DEPRECATED JOHNSON GRASS IGE RAST QL: NORMAL
DEPRECATED LONDON PLANE IGE RAST QL: NORMAL
DEPRECATED MUGWORT IGE RAST QL: NORMAL
DEPRECATED P NOTATUM IGE RAST QL: NORMAL
DEPRECATED PECAN/HICK TREE IGE RAST QL: NORMAL
DEPRECATED ROACH IGE RAST QL: NORMAL
DEPRECATED S ROSTRATA IGE RAST QL: NORMAL
DEPRECATED SALTWORT IGE RAST QL: NORMAL
DEPRECATED SILVER BIRCH IGE RAST QL: NORMAL
DEPRECATED TIMOTHY IGE RAST QL: NORMAL
DEPRECATED WEST RAGWEED IGE RAST QL: NORMAL
DEPRECATED WHITE OAK IGE RAST QL: NORMAL
DEPRECATED WILLOW IGE RAST QL: NORMAL
DOG DANDER IGE QN: <0.1 KU/L
ELDER IGE QN: <0.1 KU/L
ENGL PLANTAIN IGE QN: <0.1 KU/L
HORSE DANDER IGE QN: <0.1 KU/L
JOHNSON GRASS IGE QN: <0.1 KU/L
LONDON PLANE IGE QN: <0.1 KU/L
MUGWORT IGE QN: <0.1 KU/L
P NOTATUM IGE QN: <0.1 KU/L
PECAN/HICK TREE IGE QN: <0.1 KU/L
ROACH IGE QN: <0.1 KU/L
S ROSTRATA IGE QN: <0.1 KU/L
SALTWORT IGE QN: <0.1 KU/L
SILVER BIRCH IGE QN: <0.1 KU/L
TIMOTHY IGE QN: <0.1 KU/L
WALNUT TREE CLASS: NORMAL
WEST RAGWEED IGE QN: <0.1 KU/L
WHITE OAK IGE QN: <0.1 KU/L
WHITE PINE CLASS: NORMAL
WILLOW IGE QN: <0.1 KU/L

## 2023-07-07 ENCOUNTER — PATIENT MESSAGE (OUTPATIENT)
Dept: ALLERGY | Facility: CLINIC | Age: 53
End: 2023-07-07
Payer: OTHER GOVERNMENT

## 2023-07-11 ENCOUNTER — OFFICE VISIT (OUTPATIENT)
Dept: INTERNAL MEDICINE | Facility: CLINIC | Age: 53
End: 2023-07-11
Attending: FAMILY MEDICINE
Payer: OTHER GOVERNMENT

## 2023-07-11 ENCOUNTER — LAB VISIT (OUTPATIENT)
Dept: LAB | Facility: HOSPITAL | Age: 53
End: 2023-07-11
Attending: FAMILY MEDICINE
Payer: OTHER GOVERNMENT

## 2023-07-11 VITALS
BODY MASS INDEX: 24.83 KG/M2 | HEIGHT: 62 IN | WEIGHT: 134.94 LBS | SYSTOLIC BLOOD PRESSURE: 123 MMHG | DIASTOLIC BLOOD PRESSURE: 69 MMHG | OXYGEN SATURATION: 94 % | HEART RATE: 73 BPM

## 2023-07-11 DIAGNOSIS — R07.9 CHEST PAIN, UNSPECIFIED TYPE: ICD-10-CM

## 2023-07-11 DIAGNOSIS — L30.9 PERIORBITAL DERMATITIS: ICD-10-CM

## 2023-07-11 DIAGNOSIS — M87.059 AVASCULAR NECROSIS OF BONE OF HIP, UNSPECIFIED LATERALITY: ICD-10-CM

## 2023-07-11 DIAGNOSIS — Z00.00 ANNUAL PHYSICAL EXAM: Primary | ICD-10-CM

## 2023-07-11 DIAGNOSIS — I10 HYPERTENSION, ESSENTIAL: ICD-10-CM

## 2023-07-11 DIAGNOSIS — Z00.00 ANNUAL PHYSICAL EXAM: ICD-10-CM

## 2023-07-11 DIAGNOSIS — I70.0 AORTIC ATHEROSCLEROSIS: ICD-10-CM

## 2023-07-11 DIAGNOSIS — Z87.891 PERSONAL HISTORY OF NICOTINE DEPENDENCE: ICD-10-CM

## 2023-07-11 DIAGNOSIS — M25.859 FEMORAL ACETABULAR IMPINGEMENT: ICD-10-CM

## 2023-07-11 LAB
ALBUMIN SERPL BCP-MCNC: 4.7 G/DL (ref 3.5–5.2)
ALP SERPL-CCNC: 57 U/L (ref 55–135)
ALT SERPL W/O P-5'-P-CCNC: 44 U/L (ref 10–44)
ANION GAP SERPL CALC-SCNC: 10 MMOL/L (ref 8–16)
AST SERPL-CCNC: 40 U/L (ref 10–40)
BILIRUB SERPL-MCNC: 0.6 MG/DL (ref 0.1–1)
BUN SERPL-MCNC: 13 MG/DL (ref 6–20)
CALCIUM SERPL-MCNC: 10.2 MG/DL (ref 8.7–10.5)
CHLORIDE SERPL-SCNC: 98 MMOL/L (ref 95–110)
CHOLEST SERPL-MCNC: 243 MG/DL (ref 120–199)
CHOLEST/HDLC SERPL: 2.5 {RATIO} (ref 2–5)
CO2 SERPL-SCNC: 29 MMOL/L (ref 23–29)
CREAT SERPL-MCNC: 0.7 MG/DL (ref 0.5–1.4)
EST. GFR  (NO RACE VARIABLE): >60 ML/MIN/1.73 M^2
ESTIMATED AVG GLUCOSE: 100 MG/DL (ref 68–131)
GLUCOSE SERPL-MCNC: 104 MG/DL (ref 70–110)
HBA1C MFR BLD: 5.1 % (ref 4–5.6)
HDLC SERPL-MCNC: 99 MG/DL (ref 40–75)
HDLC SERPL: 40.7 % (ref 20–50)
LDLC SERPL CALC-MCNC: 126.4 MG/DL (ref 63–159)
NONHDLC SERPL-MCNC: 144 MG/DL
POTASSIUM SERPL-SCNC: 3.3 MMOL/L (ref 3.5–5.1)
PROT SERPL-MCNC: 7.9 G/DL (ref 6–8.4)
SODIUM SERPL-SCNC: 137 MMOL/L (ref 136–145)
TRIGL SERPL-MCNC: 88 MG/DL (ref 30–150)
TSH SERPL DL<=0.005 MIU/L-ACNC: 1.06 UIU/ML (ref 0.4–4)

## 2023-07-11 PROCEDURE — 99999 PR PBB SHADOW E&M-EST. PATIENT-LVL V: CPT | Mod: PBBFAC,,, | Performed by: FAMILY MEDICINE

## 2023-07-11 PROCEDURE — 36415 COLL VENOUS BLD VENIPUNCTURE: CPT | Performed by: FAMILY MEDICINE

## 2023-07-11 PROCEDURE — 99396 PREV VISIT EST AGE 40-64: CPT | Mod: S$PBB,,, | Performed by: FAMILY MEDICINE

## 2023-07-11 PROCEDURE — 83036 HEMOGLOBIN GLYCOSYLATED A1C: CPT | Performed by: FAMILY MEDICINE

## 2023-07-11 PROCEDURE — 84443 ASSAY THYROID STIM HORMONE: CPT | Performed by: FAMILY MEDICINE

## 2023-07-11 PROCEDURE — 80061 LIPID PANEL: CPT | Performed by: FAMILY MEDICINE

## 2023-07-11 PROCEDURE — 80053 COMPREHEN METABOLIC PANEL: CPT | Performed by: FAMILY MEDICINE

## 2023-07-11 PROCEDURE — 99215 OFFICE O/P EST HI 40 MIN: CPT | Mod: PBBFAC | Performed by: FAMILY MEDICINE

## 2023-07-11 PROCEDURE — 99999 PR PBB SHADOW E&M-EST. PATIENT-LVL V: ICD-10-PCS | Mod: PBBFAC,,, | Performed by: FAMILY MEDICINE

## 2023-07-11 PROCEDURE — 99396 PR PREVENTIVE VISIT,EST,40-64: ICD-10-PCS | Mod: S$PBB,,, | Performed by: FAMILY MEDICINE

## 2023-07-11 NOTE — PROGRESS NOTES
Subjective:       Patient ID: Kayleen Patel is a 53 y.o. female.    Chief Complaint: Annual Exam    Established patient for an annual wellness check/physical exam and also chronic disease management. Specific complaints - see dictation, M*model entries and please see ROS.  Past, Surgical, Family, Social Histories; Medications, Allergies reviewed and reconciled.  Health maintenance file reviewed and addressed items due. Recent applicable lab, imaging and cardiovascular results reviewed.  Problem list items reviewed and modified or added entries (in the overview section) may not be transcribed into this encounter note due to note writer format.      Since seeing me last had significant hip workup.  This included MRI scan showing AVN bilateral.  Also with BARBRA.  Has upcoming appointment with sports medicine.  Currently unable to run.  Certain motions cause significant discomfort.  Meloxicam helps but we discussed that this may interact with her blood pressure medication.    Continued swelling of both eyelids.  Saw Allergy and Optometry.  Had some allergy testing that was not productive.  They suggested seeing Dermatology.    Reported an episode of chest discomfort about a week ago.  No syncope, near syncope palpitations diaphoresis.  No exertional component.  Had a low agonist and score a couple of years ago.  We got this since seeing some calcification incidentally on a CT report.  Her chest pain does not sound cardiac in nature, however will order a stress echocardiogram.  She feels she can navigate a treadmill.  Cardiology consult for review.      Review of Systems   Constitutional:  Negative for appetite change, chills, diaphoresis, fatigue and fever.   HENT:  Negative for congestion, postnasal drip, rhinorrhea, sore throat and trouble swallowing.    Eyes:  Negative for visual disturbance.   Respiratory:  Negative for cough, choking, chest tightness, shortness of breath and wheezing.    Cardiovascular:   Positive for chest pain. Negative for leg swelling.   Gastrointestinal:  Negative for abdominal distention, abdominal pain, diarrhea, nausea and vomiting.   Genitourinary:  Negative for difficulty urinating and hematuria.   Musculoskeletal:  Positive for arthralgias and gait problem. Negative for myalgias.   Skin:  Positive for rash.   Neurological:  Negative for weakness, light-headedness and headaches.   Hematological:  Does not bruise/bleed easily.   Psychiatric/Behavioral:  Negative for decreased concentration and dysphoric mood.      Objective:      Physical Exam  Vitals and nursing note reviewed.   Constitutional:       Appearance: She is well-developed. She is not diaphoretic.   Eyes:      General: No scleral icterus.  Neck:      Thyroid: No thyromegaly.      Vascular: No JVD.      Trachea: No tracheal deviation.   Cardiovascular:      Rate and Rhythm: Normal rate.      Heart sounds: Normal heart sounds. No murmur heard.    No friction rub. No gallop.   Pulmonary:      Effort: Pulmonary effort is normal. No respiratory distress.      Breath sounds: Normal breath sounds. No wheezing or rales.   Abdominal:      General: There is no distension or abdominal bruit.      Palpations: Abdomen is soft. There is no mass.      Tenderness: There is no abdominal tenderness. There is no guarding or rebound.   Musculoskeletal:      Cervical back: Normal range of motion and neck supple.   Lymphadenopathy:      Cervical: No cervical adenopathy.   Skin:     General: Skin is warm and dry.      Findings: No erythema or rash.   Neurological:      Mental Status: She is alert and oriented to person, place, and time.      Cranial Nerves: No cranial nerve deficit.      Motor: No tremor.      Coordination: Coordination normal.      Gait: Gait normal.   Psychiatric:         Behavior: Behavior normal.         Thought Content: Thought content normal.         Judgment: Judgment normal.       Assessment:       1. Annual physical exam    2.  Femoral acetabular impingement    3. Hypertension, essential    4. Avascular necrosis of bone of hip, unspecified laterality    5. Periorbital dermatitis    6. Chest pain, unspecified type    7. Personal history of nicotine dependence    8. Aortic atherosclerosis        Plan:     Medication List with Changes/Refills   Current Medications    ACETAMINOPHEN (TYLENOL) 500 MG TABLET    Take 1-2 tablets (500-1,000 mg total) by mouth 3 (three) times daily as needed for Pain.    CALCIUM CARBONATE (CALCIUM 500 ORAL)    Take 1 capsule by mouth.    CHLORTHALIDONE (HYGROTEN) 25 MG TAB    TAKE 1 TABLET DAILY    CYANOCOBALAMIN (VITAMIN B-12) 100 MCG TABLET    Take 100 mcg by mouth once daily.    DESONIDE (DESOWEN) 0.05 % CREAM    Apply topically 2 (two) times daily.    FERROUS SULFATE 325 MG (65 MG IRON) TAB TABLET    Take 325 mg by mouth daily with breakfast.    MECLIZINE (ANTIVERT) 25 MG TABLET    Take 1 tablet (25 mg total) by mouth 3 (three) times daily as needed for Dizziness.    MUPIROCIN (BACTROBAN) 2 % OINTMENT        OMEGA-3/DHA/EPA/FISH OIL (OMEGA-3 FISH OIL ORAL)    Take 1 capsule by mouth.    ONDANSETRON (ZOFRAN-ODT) 4 MG TBDL    Take 1 tablet (4 mg total) by mouth 3 (three) times daily as needed (Nausea and vomiting).    TRAMADOL (ULTRAM) 50 MG TABLET    Take 1 tablet (50 mg total) by mouth every 12 (twelve) hours as needed for Pain.    TRAMADOL (ULTRAM) 50 MG TABLET    Take 1 tablet (50 mg total) by mouth every 6 (six) hours.    VALSARTAN (DIOVAN) 160 MG TABLET    TAKE 1 TABLET DAILY   Discontinued Medications    CELECOXIB (CELEBREX) 200 MG CAPSULE    Take 1 capsule (200 mg total) by mouth once daily. In 1-2 weeks, if no relief, may increase dose to twice per day.     1. Annual physical exam  -     Comprehensive Metabolic Panel; Standing  -     Lipid Panel; Standing  -     Hemoglobin A1C; Future; Expected date: 07/11/2023  -     TSH; Future; Expected date: 07/11/2023    2. Femoral acetabular impingement    3.  Hypertension, essential    4. Avascular necrosis of bone of hip, unspecified laterality  Overview:  -MRI 6/13/2023      5. Periorbital dermatitis  -     Ambulatory referral/consult to Dermatology; Future; Expected date: 07/18/2023    6. Chest pain, unspecified type  -     EKG 12-lead; Future; Expected date: 07/11/2023  -     Stress Echo Which stress agent will be used? Treadmill Exercise; Future; Expected date: 07/11/2023    7. Personal history of nicotine dependence  Overview:  -24-25 year, PPD, quit circa 2007    Orders:  -     CT Chest Lung Screening Low Dose; Future; Expected date: 07/11/2023    8. Aortic atherosclerosis  Overview:  Noted on June 2021 LDCT - subsequent Agaston score 3 (7/27/2021).    Orders:  -     Ambulatory referral/consult to Cardiology; Future; Expected date: 07/18/2023      See meds, orders, follow up, routing and instructions sections of encounter and AVS. Discussed with patient and provided on AVS.    Discussed diet and exercise as therapeutic modalities for metabolic and other conditions. Provided patient information, which are included as links on the AVS for detailed information.    Lab Results   Component Value Date     07/11/2023    K 3.3 (L) 07/11/2023    CL 98 07/11/2023    BUN 13 07/11/2023    CREATININE 0.7 07/11/2023     07/11/2023    HGBA1C 5.1 07/11/2023    AST 40 07/11/2023    ALT 44 07/11/2023    ALBUMIN 4.7 07/11/2023    PROT 7.9 07/11/2023    BILITOT 0.6 07/11/2023    CHOL 243 (H) 07/11/2023    HDL 99 (H) 07/11/2023    LDLCALC 126.4 07/11/2023    TRIG 88 07/11/2023    WBC 6.77 04/10/2023    HGB 13.2 04/10/2023    HCT 40.5 04/10/2023     04/10/2023    TSH 1.064 07/11/2023

## 2023-07-12 ENCOUNTER — HOSPITAL ENCOUNTER (OUTPATIENT)
Dept: CARDIOLOGY | Facility: CLINIC | Age: 53
Discharge: HOME | End: 2023-07-12
Attending: FAMILY MEDICINE
Payer: OTHER GOVERNMENT

## 2023-07-12 DIAGNOSIS — R07.9 CHEST PAIN, UNSPECIFIED TYPE: ICD-10-CM

## 2023-07-12 PROCEDURE — 93010 EKG 12-LEAD: ICD-10-PCS | Mod: S$PBB,,, | Performed by: INTERNAL MEDICINE

## 2023-07-12 PROCEDURE — 93010 ELECTROCARDIOGRAM REPORT: CPT | Mod: S$PBB,,, | Performed by: INTERNAL MEDICINE

## 2023-07-12 PROCEDURE — 93005 ELECTROCARDIOGRAM TRACING: CPT | Mod: PBBFAC | Performed by: INTERNAL MEDICINE

## 2023-07-13 ENCOUNTER — PATIENT MESSAGE (OUTPATIENT)
Dept: DERMATOLOGY | Facility: CLINIC | Age: 53
End: 2023-07-13

## 2023-07-13 ENCOUNTER — OFFICE VISIT (OUTPATIENT)
Dept: DERMATOLOGY | Facility: CLINIC | Age: 53
End: 2023-07-13
Payer: OTHER GOVERNMENT

## 2023-07-13 VITALS — BODY MASS INDEX: 24.51 KG/M2 | WEIGHT: 134 LBS

## 2023-07-13 DIAGNOSIS — L30.9 PERIORBITAL DERMATITIS: ICD-10-CM

## 2023-07-13 DIAGNOSIS — L30.9 DERMATITIS: Primary | ICD-10-CM

## 2023-07-13 PROCEDURE — 99999 PR PBB SHADOW E&M-EST. PATIENT-LVL IV: ICD-10-PCS | Mod: PBBFAC,,, | Performed by: DERMATOLOGY

## 2023-07-13 PROCEDURE — 99214 OFFICE O/P EST MOD 30 MIN: CPT | Mod: PBBFAC,PO | Performed by: DERMATOLOGY

## 2023-07-13 PROCEDURE — 99214 OFFICE O/P EST MOD 30 MIN: CPT | Mod: S$PBB,,, | Performed by: DERMATOLOGY

## 2023-07-13 PROCEDURE — 99214 PR OFFICE/OUTPT VISIT, EST, LEVL IV, 30-39 MIN: ICD-10-PCS | Mod: S$PBB,,, | Performed by: DERMATOLOGY

## 2023-07-13 PROCEDURE — 99999 PR PBB SHADOW E&M-EST. PATIENT-LVL IV: CPT | Mod: PBBFAC,,, | Performed by: DERMATOLOGY

## 2023-07-13 RX ORDER — TACROLIMUS 0.3 MG/G
OINTMENT TOPICAL 2 TIMES DAILY
Qty: 30 G | Refills: 6 | Status: SHIPPED | OUTPATIENT
Start: 2023-07-13

## 2023-07-13 RX ORDER — PREDNISONE 20 MG/1
20 TABLET ORAL DAILY
Qty: 15 TABLET | Refills: 0 | Status: SHIPPED | OUTPATIENT
Start: 2023-07-13 | End: 2023-07-28

## 2023-07-13 RX ORDER — TACROLIMUS 0.3 MG/G
OINTMENT TOPICAL 2 TIMES DAILY
Qty: 30 G | Refills: 6 | Status: SHIPPED | OUTPATIENT
Start: 2023-07-13 | End: 2023-07-13 | Stop reason: SDUPTHER

## 2023-07-13 NOTE — PROGRESS NOTES
"  Subjective:      Patient ID:  Kayleen Patel is a 53 y.o. female who presents for   Chief Complaint   Patient presents with    Rash     Hx 6/29  "52 yo woman presents for consult from Dr britton Clay for possible allergy. She states for 6 months she has had allergy issues. She has redness of skin and swelling of upper and lower eye lids. It does itch and will turn to dry skin which flakes and peels. She has had watery eyes as well but not very red eye. No runny nose, sneeze or congestion. Has been daily since January. Has better days and worse. Has seen eye doc and was told had allergy bumps under eye lid. Given tobradex topically to eye lid and an eye drop. This helped some but came right back. Over weekend eye lid was very swollen. Benadryl helped some. No triggers she knows. Never had this before January. No H/O asthma, no prior eczema. No known food allergy. No insect allergy. Has H/O hives to PCN as child. Has rash with adhesive and suntan lotion so avoids. She does have HTN well controlled on med.       1. Eye swelling and rash - advised can be environment allergy vs contact allergy, will send immunocaps. If negative then may need patch test to look for contact allergy. Will phone review immunocaps and use desonide topically BID to eye lids. Continue levocetirizine 5 mg daily. If labs negative then refer to derm  2. With HTN advised to avoid decongestants  3. Phone review"      Has erythema of eyelids with edema off and on, used tobrdex ointment and desonide cream no better.  Would like patch tests.       Review of Systems   Constitutional:  Negative for fever, chills, weight loss, weight gain, fatigue and malaise.   Skin:  Positive for daily sunscreen use, activity-related sunscreen use and wears hat.   Hematologic/Lymphatic: Does not bruise/bleed easily.     Objective:   Physical Exam   Constitutional: She appears well-developed and well-nourished.   Neurological: She is alert and oriented " to person, place, and time.   Psychiatric: She has a normal mood and affect.   Skin:   Areas Examined (abnormalities noted in diagram):   Head / Face Inspection Performed          Diagram Legend     Erythematous scaling macule/papule c/w actinic keratosis       Vascular papule c/w angioma      Pigmented verrucoid papule/plaque c/w seborrheic keratosis      Yellow umbilicated papule c/w sebaceous hyperplasia      Irregularly shaped tan macule c/w lentigo     1-2 mm smooth white papules consistent with Milia      Movable subcutaneous cyst with punctum c/w epidermal inclusion cyst      Subcutaneous movable cyst c/w pilar cyst      Firm pink to brown papule c/w dermatofibroma      Pedunculated fleshy papule(s) c/w skin tag(s)      Evenly pigmented macule c/w junctional nevus     Mildly variegated pigmented, slightly irregular-bordered macule c/w mildly atypical nevus      Flesh colored to evenly pigmented papule c/w intradermal nevus       Pink pearly papule/plaque c/w basal cell carcinoma      Erythematous hyperkeratotic cursted plaque c/w SCC      Surgical scar with no sign of skin cancer recurrence      Open and closed comedones      Inflammatory papules and pustules      Verrucoid papule consistent consistent with wart     Erythematous eczematous patches and plaques     Dystrophic onycholytic nail with subungual debris c/w onychomycosis     Umbilicated papule    Erythematous-base heme-crusted tan verrucoid plaque consistent with inflamed seborrheic keratosis     Erythematous Silvery Scaling Plaque c/w Psoriasis     See annotation      Assessment / Plan:        Dermatitis, irritant vs allergic contact  Avoid fragrance products (sprays, candles, topicals)  Do not rub eyes   -     tacrolimus (PROTOPIC) 0.03 % ointment; Apply topically 2 (two) times daily.  Dispense: 30 g; Refill: 6  Schedule for patch tests    (Considered dose of prednisone but explained will hold off due to her hx of aseptic necrosis of hip)                Follow up  for patch tests.

## 2023-07-18 ENCOUNTER — TELEPHONE (OUTPATIENT)
Dept: SPORTS MEDICINE | Facility: CLINIC | Age: 53
End: 2023-07-18
Payer: OTHER GOVERNMENT

## 2023-07-18 ENCOUNTER — PATIENT MESSAGE (OUTPATIENT)
Dept: INTERNAL MEDICINE | Facility: CLINIC | Age: 53
End: 2023-07-18
Payer: OTHER GOVERNMENT

## 2023-07-18 DIAGNOSIS — I10 HYPERTENSION, ESSENTIAL: Primary | ICD-10-CM

## 2023-07-18 NOTE — PROGRESS NOTES
Please call patient and explain that tests show slightly low potassium.    I recommend follow up testing. Please see orders for repeat and please schedule 1-2 weeks.     Thank you.

## 2023-07-18 NOTE — TELEPHONE ENCOUNTER
Spoke to patient about upcoming appointment for hip pain, and explained Dr. Richard's protocol for seeing hip patients. Informed patient we will not be able to see them, and assisted them in finding an appointment with another provider. Answered any questions patient had.

## 2023-07-19 ENCOUNTER — PATIENT MESSAGE (OUTPATIENT)
Dept: CARDIOLOGY | Facility: CLINIC | Age: 53
End: 2023-07-19

## 2023-07-19 ENCOUNTER — OFFICE VISIT (OUTPATIENT)
Dept: CARDIOLOGY | Facility: CLINIC | Age: 53
End: 2023-07-19
Attending: FAMILY MEDICINE
Payer: OTHER GOVERNMENT

## 2023-07-19 VITALS
DIASTOLIC BLOOD PRESSURE: 68 MMHG | HEART RATE: 75 BPM | WEIGHT: 134.69 LBS | BODY MASS INDEX: 24.78 KG/M2 | SYSTOLIC BLOOD PRESSURE: 102 MMHG | OXYGEN SATURATION: 96 % | HEIGHT: 62 IN

## 2023-07-19 DIAGNOSIS — I10 HYPERTENSION, UNSPECIFIED TYPE: Primary | ICD-10-CM

## 2023-07-19 DIAGNOSIS — I70.0 AORTIC ATHEROSCLEROSIS: ICD-10-CM

## 2023-07-19 DIAGNOSIS — I25.10 ATHEROSCLEROSIS OF NATIVE CORONARY ARTERY OF NATIVE HEART WITHOUT ANGINA PECTORIS: ICD-10-CM

## 2023-07-19 DIAGNOSIS — E78.5 HYPERLIPIDEMIA, UNSPECIFIED HYPERLIPIDEMIA TYPE: ICD-10-CM

## 2023-07-19 PROCEDURE — 99204 OFFICE O/P NEW MOD 45 MIN: CPT | Mod: S$PBB,,, | Performed by: STUDENT IN AN ORGANIZED HEALTH CARE EDUCATION/TRAINING PROGRAM

## 2023-07-19 PROCEDURE — 99999 PR PBB SHADOW E&M-EST. PATIENT-LVL V: CPT | Mod: PBBFAC,,, | Performed by: STUDENT IN AN ORGANIZED HEALTH CARE EDUCATION/TRAINING PROGRAM

## 2023-07-19 PROCEDURE — 99215 OFFICE O/P EST HI 40 MIN: CPT | Mod: PBBFAC | Performed by: STUDENT IN AN ORGANIZED HEALTH CARE EDUCATION/TRAINING PROGRAM

## 2023-07-19 PROCEDURE — 99204 PR OFFICE/OUTPT VISIT, NEW, LEVL IV, 45-59 MIN: ICD-10-PCS | Mod: S$PBB,,, | Performed by: STUDENT IN AN ORGANIZED HEALTH CARE EDUCATION/TRAINING PROGRAM

## 2023-07-19 PROCEDURE — 99999 PR PBB SHADOW E&M-EST. PATIENT-LVL V: ICD-10-PCS | Mod: PBBFAC,,, | Performed by: STUDENT IN AN ORGANIZED HEALTH CARE EDUCATION/TRAINING PROGRAM

## 2023-07-19 RX ORDER — ATORVASTATIN CALCIUM 10 MG/1
10 TABLET, FILM COATED ORAL DAILY
Qty: 90 TABLET | Refills: 3 | Status: SHIPPED | OUTPATIENT
Start: 2023-07-19 | End: 2024-03-06

## 2023-07-19 RX ORDER — ASPIRIN 81 MG/1
81 TABLET ORAL DAILY
Qty: 90 TABLET | Refills: 3 | Status: ON HOLD | OUTPATIENT
Start: 2023-07-19 | End: 2024-01-17 | Stop reason: HOSPADM

## 2023-07-19 RX ORDER — MELOXICAM 15 MG/1
15 TABLET ORAL DAILY
COMMUNITY
End: 2023-07-27

## 2023-07-19 NOTE — PROGRESS NOTES
Cardiology Clinic Note  Reason for Visit: chest pain    HPI:   Pt is a 52yo F with pmhx of HTN, HLD, avascular necrosis of R hip, alcohol abuse who presents to clinic for chest pain    She works out 3x a week with orange [employer] with no issues.  About 1.5 weeks ago she woke up from sleep and experienced central chest pressure.  Had some SOB.  She denies anything like this.  Lasted 1.5 hours and resolved spontaneously.  Hasn't happened since. . She went to her PCP who ordered an ENEIDA.  She presents for evaluation    Former smoker of 15yrs, drinking about 4-5 drinks a day on Th/Fr/Sat/Sun.    ROS:    Constitution: Negative for fever, chills, weight loss or gain.   HENT: Negative for sore throat, rhinorrhea, or headache.  Eyes: Negative for blurred or double vision.   Cardiovascular: See above  Pulmonary: Negative for SOB   Gastrointestinal: Negative for abdominal pain, nausea, vomiting, or diarrhea.   : Negative for dysuria.   Neurological: Negative for focal weakness or sensory changes.  PMH:     Past Medical History:   Diagnosis Date    Abnormal cervical Papanicolaou smear '88, '04    Colpo/Cryo    Benign paroxysmal vertigo, bilateral     Chronic diarrhea 02/12/2015    GERD (gastroesophageal reflux disease)     History of acute PID 1988    Hypertension     Thrombocytosis     Urticaria      Past Surgical History:   Procedure Laterality Date    ADENOIDECTOMY      BREAST BIOPSY Right 06/09/2021    stereo benign    CARPAL TUNNEL RELEASE Left 09/21/2021    Procedure: RELEASE, CARPAL TUNNEL;  Surgeon: Alla Goff MD;  Location: [facility] OR;  Service: Orthopedics;  Laterality: Left;    CARPAL TUNNEL RELEASE Right 02/07/2023    Procedure: RELEASE, CARPAL TUNNEL;  Surgeon: Alla Goff MD;  Location: [facility] OR;  Service: Orthopedics;  Laterality: Right;    EYE SURGERY  2010    In Record    GANGLION CYST EXCISION      INJECTION OF JOINT Right 03/09/2023    Procedure: INJECTION, JOINT, RIGHT HIP  DIRECT REF;  Surgeon:  Harleen Swan MD;  Location: [facility] [place] [facility];  Service: Pain Management;  Laterality: Right;    INJECTION OF STEROID Right 09/21/2021    Procedure: INJECTION, STEROID-Right carpal tunnel;  Surgeon: Alla Goff MD;  Location: [facility] OR;  Service: Orthopedics;  Laterality: Right;    REFRACTIVE SURGERY Bilateral 2009    Dr. Vazquez Forbes     TONSILLECTOMY       Allergies:     Review of patient's allergies indicates:   Allergen Reactions    Penicillins Hives    Adhesive Rash     Medications:     Current Outpatient Medications on File Prior to Visit   Medication Sig Dispense Refill    acetaminophen (TYLENOL) 500 MG tablet Take 1-2 tablets (500-1,000 mg total) by mouth 3 (three) times daily as needed for Pain.  0    CALCIUM CARBONATE (CALCIUM 500 ORAL) Take 1 capsule by mouth.      chlorthalidone (HYGROTEN) 25 MG Tab TAKE 1 TABLET DAILY 90 tablet 2    cyanocobalamin (VITAMIN B-12) 100 MCG tablet Take 100 mcg by mouth once daily.      desonide (DESOWEN) 0.05 % cream Apply topically 2 (two) times daily. 60 g 1    ferrous sulfate 325 mg (65 mg iron) Tab tablet Take 325 mg by mouth daily with breakfast.      meloxicam (MOBIC) 15 MG tablet Take 15 mg by mouth once daily. Only as needed      mupirocin (BACTROBAN) 2 % ointment       OMEGA-3/DHA/EPA/FISH OIL (OMEGA-3 FISH OIL ORAL) Take 1 capsule by mouth.      ondansetron (ZOFRAN-ODT) 4 MG TbDL Take 1 tablet (4 mg total) by mouth 3 (three) times daily as needed (Nausea and vomiting). 30 tablet 0    tacrolimus (PROTOPIC) 0.03 % ointment Apply topically 2 (two) times daily. 30 g 6    traMADoL (ULTRAM) 50 mg tablet Take 1 tablet (50 mg total) by mouth every 12 (twelve) hours as needed for Pain. 14 tablet 0    traMADoL (ULTRAM) 50 mg tablet Take 1 tablet (50 mg total) by mouth every 6 (six) hours. 25 tablet 0    valsartan (DIOVAN) 160 MG tablet TAKE 1 TABLET DAILY 90 tablet 2    meclizine (ANTIVERT) 25 mg tablet Take 1 tablet (25 mg total) by mouth 3 (three) times daily as needed for  "Dizziness. (Patient not taking: Reported on 2023) 30 tablet 0    predniSONE (DELTASONE) 20 MG tablet Take 1 tablet (20 mg total) by mouth once daily. for 15 days 15 tablet 0    predniSONE (DELTASONE) 20 MG tablet Take 1 tablet (20 mg total) by mouth once daily. for 15 days 15 tablet 0     No current facility-administered medications on file prior to visit.     Social History:     Social History     Tobacco Use    Smoking status: Former     Packs/day: 1.00     Years: 23.00     Pack years: 23.00     Types: Cigarettes     Start date: 1984     Quit date: 2007     Years since quittin.0    Smokeless tobacco: Never   Substance Use Topics    Alcohol use: Yes     Alcohol/week: 20.0 standard drinks     Types: 6 Glasses of wine, 8 Cans of beer, 6 Drinks containing 0.5 oz of alcohol per week     Comment: 3-4 times per week     Family History:     Family History   Adopted: Yes   Problem Relation Age of Onset    Heart disease Mother         Adopted    Cancer Father         Adopted     Physical Exam:   /68   Pulse 75   Ht 5' 2" (1.575 m)   Wt 61.1 kg (134 lb 11.2 oz)   LMP 2016 (Approximate)   SpO2 96%   BMI 24.64 kg/m²    Wt Readings from Last 4 Encounters:   23 61.1 kg (134 lb 11.2 oz)   23 60.8 kg (134 lb)   23 61.2 kg (134 lb 14.7 oz)   23 63.4 kg (139 lb 12.4 oz)         Constitutional: No distress, obese, conversant  HEENT: Sclera anicteric, PERRLA, EOMI  Neck: No JVD, no masses, good movement  CV: RRR, S1 and S2 normal, no additional heart sounds or murmurs. Pulses 2+ and equal bilaterally in radial arteries, Oliver's normal on right. Distal pulses are 2+ and equal in the femoral, DP and PT areas bilaterally  Pulm: Clear to auscultation bilaterally with symmetrical expansion. Chest wall palpated for reproduction of pain symptoms, and no pain was able to be produced on palpation or resistance exercises  GI: Abdomen soft, non-tender, good bowel sounds  Extremities: " Both extremities intact and grossly normal, skin is warm, no edema noted  Skin: No ecchymosis, erythema, or ulcers  Psych: AOx3, appropriate affect  Neuro: CNII-XII intact, no focal deficits      Labs:     Lab Results   Component Value Date     07/11/2023    K 3.3 (L) 07/11/2023    CL 98 07/11/2023    CO2 29 07/11/2023    BUN 13 07/11/2023    CREATININE 0.7 07/11/2023    ANIONGAP 10 07/11/2023     Lab Results   Component Value Date    HGBA1C 5.1 07/11/2023     No results found for: BNP, BNPTRIAGEBLO Lab Results   Component Value Date    WBC 6.77 04/10/2023    HGB 13.2 04/10/2023    HCT 40.5 04/10/2023     04/10/2023    GRAN 3.1 04/10/2023    GRAN 46.3 04/10/2023     Lab Results   Component Value Date    CHOL 243 (H) 07/11/2023    HDL 99 (H) 07/11/2023    LDLCALC 126.4 07/11/2023    TRIG 88 07/11/2023          Imaging:         No results found for: EF      Assessment:    Pt is a 54yo F with pmhx of HTN, HLD, avascular necrosis of R hip, alcohol abuse who presents to clinic for chest pain     Plan:     #Chest pain:  #HLD:  Pt experienced what sounds like a non cardiac event however has numerous risk factors  - agree with ENEIDA, will f/u  - given her event and risk factors, will start ASA 81mg qD and atorvastatin 10mg qD    #Alcoholism:  - discussed cessation    #HTN:  - bp at goal, continue chlorthalidone        Signed:  Kvng Gonzalez MD  Cardiology Fellow  Pager - [phone]  [facility]  7/19/2023 9:27 AM

## 2023-07-26 ENCOUNTER — LAB VISIT (OUTPATIENT)
Dept: LAB | Facility: HOSPITAL | Age: 53
End: 2023-07-26
Attending: FAMILY MEDICINE
Payer: OTHER GOVERNMENT

## 2023-07-26 DIAGNOSIS — I10 HYPERTENSION, ESSENTIAL: ICD-10-CM

## 2023-07-26 LAB — POTASSIUM SERPL-SCNC: 4.4 MMOL/L (ref 3.5–5.1)

## 2023-07-26 PROCEDURE — 84132 ASSAY OF SERUM POTASSIUM: CPT | Performed by: FAMILY MEDICINE

## 2023-07-26 PROCEDURE — 36415 COLL VENOUS BLD VENIPUNCTURE: CPT | Performed by: FAMILY MEDICINE

## 2023-07-27 ENCOUNTER — OFFICE VISIT (OUTPATIENT)
Dept: ORTHOPEDICS | Facility: CLINIC | Age: 53
End: 2023-07-27
Payer: OTHER GOVERNMENT

## 2023-07-27 VITALS
SYSTOLIC BLOOD PRESSURE: 105 MMHG | HEIGHT: 62 IN | DIASTOLIC BLOOD PRESSURE: 71 MMHG | BODY MASS INDEX: 24.34 KG/M2 | WEIGHT: 132.25 LBS | HEART RATE: 67 BPM

## 2023-07-27 DIAGNOSIS — M87.052 AVASCULAR NECROSIS OF HIP, LEFT: ICD-10-CM

## 2023-07-27 DIAGNOSIS — M16.0 PRIMARY OSTEOARTHRITIS OF BOTH HIPS: ICD-10-CM

## 2023-07-27 DIAGNOSIS — M87.051 AVASCULAR NECROSIS OF HIP, RIGHT: ICD-10-CM

## 2023-07-27 PROCEDURE — 99215 OFFICE O/P EST HI 40 MIN: CPT | Mod: PBBFAC | Performed by: PHYSICIAN ASSISTANT

## 2023-07-27 PROCEDURE — 99999 PR PBB SHADOW E&M-EST. PATIENT-LVL V: ICD-10-PCS | Mod: PBBFAC,,, | Performed by: PHYSICIAN ASSISTANT

## 2023-07-27 PROCEDURE — 99999 PR PBB SHADOW E&M-EST. PATIENT-LVL V: CPT | Mod: PBBFAC,,, | Performed by: PHYSICIAN ASSISTANT

## 2023-07-27 PROCEDURE — 99214 OFFICE O/P EST MOD 30 MIN: CPT | Mod: S$PBB,,, | Performed by: PHYSICIAN ASSISTANT

## 2023-07-27 PROCEDURE — 99214 PR OFFICE/OUTPT VISIT, EST, LEVL IV, 30-39 MIN: ICD-10-PCS | Mod: S$PBB,,, | Performed by: PHYSICIAN ASSISTANT

## 2023-07-27 RX ORDER — MELOXICAM 15 MG/1
15 TABLET ORAL DAILY
Qty: 30 TABLET | Refills: 3 | Status: SHIPPED | OUTPATIENT
Start: 2023-07-27 | End: 2023-08-16

## 2023-07-27 NOTE — PROGRESS NOTES
SUBJECTIVE:     Chief Complaint & History of Present Illness:  Kayleen Patel is a New patient 53 y.o. female who is seen here today with a complaint of    Chief Complaint   Patient presents with    Left Hip - Pain    Right Hip - Pain    .  Patient is here today for evaluation treatment of bilateral hip pain has been followed by sports medicine for osteoarthritis of bilateral knees undergone steroid and cortisone injections the knees has developed pain decreasing range of motion the hips evaluated by physical medicine x-rays of bilateral hips which demonstrate inferior impingement bilaterally right more so than left subsequent MRI has demonstrated early evidence of AVN of bilateral hips left worse than right.   On a scale of 1-10, with 10 being worst pain imaginable, he rates this pain as 2 on good days and 6 on bad days.  she describes the pain as sore.      Past Medical History:   Diagnosis Date    Abnormal cervical Papanicolaou smear '88, '04    Colpo/Cryo    Benign paroxysmal vertigo, bilateral     Chronic diarrhea 02/12/2015    GERD (gastroesophageal reflux disease)     History of acute PID 1988    Hypertension     Thrombocytosis     Urticaria        Past Surgical History:   Procedure Laterality Date    ADENOIDECTOMY      BREAST BIOPSY Right 06/09/2021    stereo benign    CARPAL TUNNEL RELEASE Left 09/21/2021    Procedure: RELEASE, CARPAL TUNNEL;  Surgeon: Alla Goff MD;  Location: [facility] OR;  Service: Orthopedics;  Laterality: Left;    CARPAL TUNNEL RELEASE Right 02/07/2023    Procedure: RELEASE, CARPAL TUNNEL;  Surgeon: Alla Goff MD;  Location: [facility] OR;  Service: Orthopedics;  Laterality: Right;    EYE SURGERY  2010    In Record    GANGLION CYST EXCISION      INJECTION OF JOINT Right 03/09/2023    Procedure: INJECTION, JOINT, RIGHT HIP  DIRECT REF;  Surgeon: Harleen Swan MD;  [facility];  Service: Pain Management;  Laterality: Right;    INJECTION OF STEROID Right 09/21/2021     Procedure: INJECTION, STEROID-Right carpal tunnel;  Surgeon: Alla Goff MD;  Location: [facility];  Service: Orthopedics;  Laterality: Right;    REFRACTIVE SURGERY Bilateral 2009    Dr. Vazquez Forbes     TONSILLECTOMY         Family History   Adopted: Yes   Problem Relation Age of Onset    Heart disease Mother         Adopted    Cancer Father         Adopted       Review of patient's allergies indicates:   Allergen Reactions    Penicillins Hives    Adhesive Rash         Current Outpatient Medications:     aspirin (ECOTRIN) 81 MG EC tablet, Take 1 tablet (81 mg total) by mouth once daily., Disp: 90 tablet, Rfl: 3    atorvastatin (LIPITOR) 10 MG tablet, Take 1 tablet (10 mg total) by mouth once daily., Disp: 90 tablet, Rfl: 3    CALCIUM CARBONATE (CALCIUM 500 ORAL), Take 1 capsule by mouth., Disp: , Rfl:     chlorthalidone (HYGROTEN) 25 MG Tab, TAKE 1 TABLET DAILY, Disp: 90 tablet, Rfl: 2    cyanocobalamin (VITAMIN B-12) 100 MCG tablet, Take 100 mcg by mouth once daily., Disp: , Rfl:     desonide (DESOWEN) 0.05 % cream, Apply topically 2 (two) times daily., Disp: 60 g, Rfl: 1    ferrous sulfate 325 mg (65 mg iron) Tab tablet, Take 325 mg by mouth daily with breakfast., Disp: , Rfl:     meclizine (ANTIVERT) 25 mg tablet, Take 1 tablet (25 mg total) by mouth 3 (three) times daily as needed for Dizziness., Disp: 30 tablet, Rfl: 0    OMEGA-3/DHA/EPA/FISH OIL (OMEGA-3 FISH OIL ORAL), Take 1 capsule by mouth., Disp: , Rfl:     ondansetron (ZOFRAN-ODT) 4 MG TbDL, Take 1 tablet (4 mg total) by mouth 3 (three) times daily as needed (Nausea and vomiting)., Disp: 30 tablet, Rfl: 0    tacrolimus (PROTOPIC) 0.03 % ointment, Apply topically 2 (two) times daily., Disp: 30 g, Rfl: 6    traMADoL (ULTRAM) 50 mg tablet, Take 1 tablet (50 mg total) by mouth every 6 (six) hours., Disp: 25 tablet, Rfl: 0    valsartan (DIOVAN) 160 MG tablet, TAKE 1 TABLET DAILY, Disp: 90 tablet, Rfl: 2    acetaminophen (TYLENOL) 500 MG tablet, Take 1-2  "tablets (500-1,000 mg total) by mouth 3 (three) times daily as needed for Pain., Disp: , Rfl: 0    meloxicam (MOBIC) 15 MG tablet, Take 1 tablet (15 mg total) by mouth once daily., Disp: 30 tablet, Rfl: 3    mupirocin (BACTROBAN) 2 % ointment, , Disp: , Rfl:     predniSONE (DELTASONE) 20 MG tablet, Take 1 tablet (20 mg total) by mouth once daily. for 15 days, Disp: 15 tablet, Rfl: 0    predniSONE (DELTASONE) 20 MG tablet, Take 1 tablet (20 mg total) by mouth once daily. for 15 days, Disp: 15 tablet, Rfl: 0    traMADoL (ULTRAM) 50 mg tablet, Take 1 tablet (50 mg total) by mouth every 12 (twelve) hours as needed for Pain., Disp: 14 tablet, Rfl: 0    Review of Systems:  ROS:  Constitutional: no fever or chills, personal past history of nicotine dependence, insomnia  Eyes: no visual changes  ENT: no nasal congestion or sore throat, benign paroxymal vertigo  Respiratory: no cough or shortness of breath, calcified granuloma lung  Cardiovascular: no chest pain or palpitations, hypertension aortic atherosclerosis  Gastrointestinal: no nausea or vomiting, tolerating diet, positive for GERD  Genitourinary: no hematuria or dysuria, PID  Integument/Breast: no rash or pruritis, positive urticaria  Hematologic/Lymphatic: no easy bruising or lymphadenopathy, thrombocytosis  Musculoskeletal: no arthralgias or myalgias, degenerative joint disease bilateral knees, chronic pain in both knees, avascular necrosis of the hip  Neurological: no seizures or tremors, positive vertebral anomaly, bilateral carpal tunnel syndrome  Behavioral/Psych: no auditory or visual hallucinations  Endocrine: no heat or cold intolerance      PE:  /71 (BP Location: Left arm, Patient Position: Sitting, BP Method: Medium (Automatic))   Pulse 67   Ht 5' 2" (1.575 m)   Wt 60 kg (132 lb 4.4 oz)   LMP 02/07/2016 (Approximate)   BMI 24.19 kg/m²   General: Pleasant, cooperative, NAD   HEENT: NCAT, sclera nonicteric   Lungs: Respirations are equal and " unlabored.   Abdomen: Soft and non-tender.  CV: 2+ bilateral upper and lower extremity pulses.   Skin: Intact throughout LE with no rashes, erythema, or lesions  Extremities: No LE edema, NVI lower extremities        Hip Exam:   rightpositives: pain with rotation and negatives: no tenderness  no pain with heel impact  pulses full    125 degrees flexion  -10 degrees extension   40 degrees internal rotation  05 degrees external rotation  20 degrees abduction  0 degrees adduction   0 flexion contracture     leftpositives: pain with rotation and negatives: no pain with heel impact  pulses full    130 degrees flexion  -10 degrees extension   45 degrees internal rotation  10 degrees external rotation  20 degrees abduction  0 degrees adduction   0 flexion contracture      RADIOGRAPHS:  X-rays from previous visit reviewed by me today demonstrate mild impingement syndrome bilateral hips with well-preserved acetabular joint spaces superiorly no evidence of fracture dislocation femoral heads are well maintained    MRI from previous visit reviewed by me today demonstrates evidence of early AVN bilateral hips left more so than right without cortical disruption    ASSESSMENT/PLAN:       ICD-10-CM ICD-9-CM   1. Avascular necrosis of hip, left  M87.052 733.42   2. Avascular necrosis of hip, right  M87.051 733.42   3. Primary osteoarthritis of both hips (advanced)  M16.0 715.15       Plan: We discussed with the patient at length all the different treatment options available for arthrosis of the hip including anti-inflammatories, acetaminophen, rest, ice, lower extremity strengthening exercise, occasional cortisone injections for temporary relief, and finally total hip arthroplasty.   Meloxicam 15 mg q.d. with food  Patient advised to avoid impact exercises.  She is an avid exerciser and goes to the gym on a daily basis  Referral to adult reconstructive surgery discuss options

## 2023-08-16 RX ORDER — MELOXICAM 15 MG/1
15 TABLET ORAL DAILY
Qty: 30 TABLET | Refills: 1 | Status: SHIPPED | OUTPATIENT
Start: 2023-08-16 | End: 2023-09-27

## 2023-08-18 ENCOUNTER — PATIENT MESSAGE (OUTPATIENT)
Dept: ORTHOPEDICS | Facility: CLINIC | Age: 53
End: 2023-08-18
Payer: OTHER GOVERNMENT

## 2023-08-18 DIAGNOSIS — M87.051 AVASCULAR NECROSIS OF HIP, RIGHT: ICD-10-CM

## 2023-08-18 DIAGNOSIS — M87.052 AVASCULAR NECROSIS OF HIP, LEFT: Primary | ICD-10-CM

## 2023-08-30 ENCOUNTER — HOSPITAL ENCOUNTER (OUTPATIENT)
Dept: CARDIOLOGY | Facility: HOSPITAL | Age: 53
Discharge: HOME | End: 2023-08-30
Attending: FAMILY MEDICINE
Payer: OTHER GOVERNMENT

## 2023-08-30 DIAGNOSIS — R07.9 CHEST PAIN, UNSPECIFIED TYPE: ICD-10-CM

## 2023-08-30 PROCEDURE — 93351 STRESS ECHO (CUPID ONLY): ICD-10-PCS | Mod: 26,,, | Performed by: INTERNAL MEDICINE

## 2023-08-30 PROCEDURE — 93320 STRESS ECHO (CUPID ONLY): ICD-10-PCS | Mod: 26,,, | Performed by: INTERNAL MEDICINE

## 2023-08-30 PROCEDURE — 93325 STRESS ECHO (CUPID ONLY): ICD-10-PCS | Mod: 26,,, | Performed by: INTERNAL MEDICINE

## 2023-08-30 PROCEDURE — 93320 DOPPLER ECHO COMPLETE: CPT | Mod: 26,,, | Performed by: INTERNAL MEDICINE

## 2023-08-30 PROCEDURE — 93325 DOPPLER ECHO COLOR FLOW MAPG: CPT | Mod: 26,,, | Performed by: INTERNAL MEDICINE

## 2023-08-30 PROCEDURE — 93325 DOPPLER ECHO COLOR FLOW MAPG: CPT

## 2023-08-30 PROCEDURE — 93351 STRESS TTE COMPLETE: CPT | Mod: 26,,, | Performed by: INTERNAL MEDICINE

## 2023-08-31 LAB
ASCENDING AORTA: 2.51 CM
AV INDEX (PROSTH): 0.63
AV MEAN GRADIENT: 4 MMHG
AV PEAK GRADIENT: 8 MMHG
AV VALVE AREA BY VELOCITY RATIO: 1.95 CM²
AV VALVE AREA: 1.99 CM²
AV VELOCITY RATIO: 0.62
CV ECHO LV RWT: 0.36 CM
CV STRESS BASE HR: 66 BPM
DIASTOLIC BLOOD PRESSURE: 62 MMHG
DOP CALC AO PEAK VEL: 1.45 M/S
DOP CALC AO VTI: 31.4 CM
DOP CALC LVOT AREA: 3.1 CM2
DOP CALC LVOT DIAMETER: 2 CM
DOP CALC LVOT PEAK VEL: 0.9 M/S
DOP CALC LVOT STROKE VOLUME: 62.42 CM3
DOP CALCLVOT PEAK VEL VTI: 19.88 CM
E WAVE DECELERATION TIME: 166.02 MSEC
E/A RATIO: 1.2
E/E' RATIO: 5.45 M/S
ECHO LV POSTERIOR WALL: 0.8 CM (ref 0.6–1.1)
FRACTIONAL SHORTENING: 33 % (ref 28–44)
INTERVENTRICULAR SEPTUM: 0.8 CM (ref 0.6–1.1)
LA MAJOR: 5.16 CM
LA MINOR: 5.01 CM
LA WIDTH: 4.02 CM
LEFT ATRIUM SIZE: 3.28 CM
LEFT ATRIUM VOLUME MOD: 64.36 CM3
LEFT ATRIUM VOLUME: 56.98 CM3
LEFT INTERNAL DIMENSION IN SYSTOLE: 2.96 CM (ref 2.1–4)
LEFT VENTRICLE DIASTOLIC VOLUME: 89.66 ML
LEFT VENTRICLE SYSTOLIC VOLUME: 33.82 ML
LEFT VENTRICULAR INTERNAL DIMENSION IN DIASTOLE: 4.44 CM (ref 3.5–6)
LEFT VENTRICULAR MASS: 111.11 G
LV LATERAL E/E' RATIO: 4.62 M/S
LV SEPTAL E/E' RATIO: 6.67 M/S
MV PEAK A VEL: 0.5 M/S
MV PEAK E VEL: 0.6 M/S
MV STENOSIS PRESSURE HALF TIME: 48.15 MS
MV VALVE AREA P 1/2 METHOD: 4.57 CM2
OHS CV CPX 1 MINUTE RECOVERY HEART RATE: 126 BPM
OHS CV CPX 85 PERCENT MAX PREDICTED HEART RATE MALE: 135
OHS CV CPX ESTIMATED METS: 15
OHS CV CPX MAX PREDICTED HEART RATE: 158
OHS CV CPX PATIENT IS FEMALE: 1
OHS CV CPX PATIENT IS MALE: 0
OHS CV CPX PEAK DIASTOLIC BLOOD PRESSURE: 72 MMHG
OHS CV CPX PEAK HEAR RATE: 153 BPM
OHS CV CPX PEAK RATE PRESSURE PRODUCT: NORMAL
OHS CV CPX PEAK SYSTOLIC BLOOD PRESSURE: 169 MMHG
OHS CV CPX PERCENT MAX PREDICTED HEART RATE ACHIEVED: 97
OHS CV CPX RATE PRESSURE PRODUCT PRESENTING: 6930
PISA TR MAX VEL: 2.36 M/S
POST STRESS EJECTION FRACTION: 75 %
RA MAJOR: 4.73 CM
RA PRESSURE ESTIMATED: 8 MMHG
RA WIDTH: 3 CM
RIGHT VENTRICULAR END-DIASTOLIC DIMENSION: 3.04 CM
RV TB RVSP: 10 MMHG
SINUS: 2.97 CM
STJ: 2.36 CM
STRESS ECHO POST EXERCISE DUR MIN: 8 MINUTES
STRESS ECHO POST EXERCISE DUR SEC: 37 SECONDS
SYSTOLIC BLOOD PRESSURE: 105 MMHG
TDI LATERAL: 0.13 M/S
TDI SEPTAL: 0.09 M/S
TDI: 0.11 M/S
TR MAX PG: 22 MMHG
TRICUSPID ANNULAR PLANE SYSTOLIC EXCURSION: 1.65 CM
TV REST PULMONARY ARTERY PRESSURE: 30 MMHG

## 2023-09-04 ENCOUNTER — PATIENT MESSAGE (OUTPATIENT)
Dept: CARDIOLOGY | Facility: CLINIC | Age: 53
End: 2023-09-04
Payer: OTHER GOVERNMENT

## 2023-09-07 ENCOUNTER — OFFICE VISIT (OUTPATIENT)
Dept: ORTHOPEDICS | Facility: CLINIC | Age: 53
End: 2023-09-07
Payer: OTHER GOVERNMENT

## 2023-09-07 ENCOUNTER — HOSPITAL ENCOUNTER (OUTPATIENT)
Dept: RADIOLOGY | Facility: HOSPITAL | Age: 53
Discharge: HOME | End: 2023-09-07
Attending: ORTHOPAEDIC SURGERY
Payer: OTHER GOVERNMENT

## 2023-09-07 VITALS — WEIGHT: 134.13 LBS | HEIGHT: 62 IN | BODY MASS INDEX: 24.68 KG/M2

## 2023-09-07 DIAGNOSIS — M70.62 GREATER TROCHANTERIC BURSITIS OF LEFT HIP: ICD-10-CM

## 2023-09-07 DIAGNOSIS — M16.0 PRIMARY OSTEOARTHRITIS OF BOTH HIPS: Primary | ICD-10-CM

## 2023-09-07 DIAGNOSIS — M87.052 AVASCULAR NECROSIS OF HIP, LEFT: ICD-10-CM

## 2023-09-07 DIAGNOSIS — M87.051 AVASCULAR NECROSIS OF HIP, RIGHT: ICD-10-CM

## 2023-09-07 DIAGNOSIS — M70.61 GREATER TROCHANTERIC BURSITIS OF RIGHT HIP: ICD-10-CM

## 2023-09-07 PROCEDURE — 99999 PR PBB SHADOW E&M-EST. PATIENT-LVL III: CPT | Mod: PBBFAC,,, | Performed by: ORTHOPAEDIC SURGERY

## 2023-09-07 PROCEDURE — 99999 PR PBB SHADOW E&M-EST. PATIENT-LVL III: ICD-10-PCS | Mod: PBBFAC,,, | Performed by: ORTHOPAEDIC SURGERY

## 2023-09-07 PROCEDURE — 73521 X-RAY EXAM HIPS BI 2 VIEWS: CPT | Mod: 26,,, | Performed by: RADIOLOGY

## 2023-09-07 PROCEDURE — 73521 XR HIPS BILATERAL 2 VIEW INCL AP PELVIS: ICD-10-PCS | Mod: 26,,, | Performed by: RADIOLOGY

## 2023-09-07 PROCEDURE — 73521 X-RAY EXAM HIPS BI 2 VIEWS: CPT | Mod: TC

## 2023-09-07 PROCEDURE — 99215 OFFICE O/P EST HI 40 MIN: CPT | Mod: S$PBB,,, | Performed by: ORTHOPAEDIC SURGERY

## 2023-09-07 PROCEDURE — 99215 PR OFFICE/OUTPT VISIT, EST, LEVL V, 40-54 MIN: ICD-10-PCS | Mod: S$PBB,,, | Performed by: ORTHOPAEDIC SURGERY

## 2023-09-07 PROCEDURE — 99213 OFFICE O/P EST LOW 20 MIN: CPT | Mod: PBBFAC | Performed by: ORTHOPAEDIC SURGERY

## 2023-09-07 NOTE — PROGRESS NOTES
Subjective:     HPI:   Kayleen Patel is a 53 y.o. female who presents for eval B hip    Has previously seen sports medicine, pain clinic, TIM[facility] Ulloa, and Krishan Solorzano    Initially right greater than left and now left greater than right hip pain.  Symptoms are moderate to severe activity-related relieved with rest.  Intermittent in nature.    She says she has trouble sleeping prone at night doing deep squats.      She is had about a year and a half of right hip pain anterior hip no specific injuries.  She says she can put pressure on her glute area and pain improves    Left hips been about a month and a half to 2 months mostly lateral occasional anterior especially lying at night    Looking for an alternative to NICK    Medications: Meloxicam (15mg, daily) taking 4 days out of 7,  ASA 81mg daily (HTN, Dr. Gonzalez, negative stress test 2023),   Mobic helps, motrin and tramadol didnt    Injections: 3/9/2023 right hip iaCSI with Dr Swan 100% relief for first few hours, then by next day 0% relief  Hx of B knee euflexxa injections    Physical Therapy: Yes, completed OP-PT 2022 - 2022, The patient stated that the OP-PT was not helpful.     Assistive Devices: None.     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    Social support: The patient stated that they live at home alone. The patient stated that she has some friends in the area that may help take care of her after surgery.        ROS:  The updated medical history is in the chart and has been reviewed. A review of systems is updated and there is no reported  vision changes, ear/nose/mouth/throat complaints,  chest pain, shortness of breath, abdominal pain, urological complaints, fevers or chills, psychiatric complaints. Musculoskeletal and neurologcial symptoms are as documented. All other systems are negative.      Objective:   Exam:  There were no vitals filed for this visit.  Body mass index is 24.54 kg/m².    Physical examination included assessment of the patient's general appearance with particular attention to development, nutrition, body habitus, attention to grooming, and any evidence of distress.  Constitutional: The patient is a well-developed, well-nourished patient in no acute distress.   Cardiovascular: Vascular examination included warmth and capillary refill as well inspection for edema and assessment of pedal pulses. Pulses are palpable and regular.  Musculoskeletal: Gait was assessed as to whether it was steady, non-antalgic, and/or required the use of an assist device. The patient was also asked to walk independently and get onto the examination table.  Skin: The skin was examined for any obvious rashes or lesions in the extremity.  Neurologic: Sensation is intact to light touch in the extremity. The patient has good coordination without hyperreflexia and is alert and oriented to person, place and time and has normal mood and affect.     All of the above were examined and found to be within normal limits except for the following pertinent clinical findings:    No limp nonantalgic gait negative Trendelenburg.  She is tender palpation over both greater trochanters.  She has groin pain with active straight leg raise on the right not on the left.  Both hips 0-90 hip flexion 30 abduction 20 abduction 30 external 20 internal rotation which recreates recreates anterior and lateral hip pain.  Skin is intact the anterior hip no significant limb discrepancy supine.      At her knees full painless range of motion      Imaging:    HIP R ARTHRITIS         Indication:  Right hip pain  Exam Ordered: Radiographs include an anteroposterior pelvis, an anteroposterior and lateral view of the proximal femur including the hip joint.  Details of Examination: Exam shows evidence of joint space narrowing, osteophyte formation, and subchondral sclerosis, all consistent with degenerative arthritis of the hip.  No other significant findings are noted.  Impression:  Degenerative Arthritis, Right Hip and HIP L ARTHRITIS         Indication:  Left hip pain  Exam Ordered: Radiographs include an anteroposterior pelvis, an anteroposterior and lateral view of the proximal femur including the hip joint.  Details of Examination: Exam shows evidence of joint space narrowing, osteophyte formation, and subchondral sclerosis, all consistent with degenerative arthritis of the hip.  No other significant findings are noted.  Impression:  Degenerative Arthritis, Left Hip    B hip Tg3 hip OA R, Tg2-3 L  XR shows progressive OA changes    Underlying CAM BARBRA    Assessment:       ICD-10-CM ICD-9-CM   1. Primary osteoarthritis of both hips  M16.0 715.15   2. Greater trochanteric bursitis of right hip  M70.61 726.5   3. Greater trochanteric bursitis of left hip  M70.62 726.5      No sig PMHx     Plan:       The above findings were discussed with patient length. We discussed the risks of conservative versus surgical management of the patients hip arthritis. Conservative management consisting of anti-inflammatory medications, weight loss, physical therapy, and activity modification was discussed at length. At this point considering the patient's level of activity, pain, and radiographic findings I recommend NICK when she is ready    The patient was given a handout with treatment strategies for hip and knee joint care prior to surgery from [employer], the [employer] Association of Hip [employer] Knee [employer].   This included information regarding medications, injections, weight loss, exercise, braces, physical therapy,  and alternative therapies.     This patient has significant symptoms in their hip that are affecting their quality of life and daily activities.  They have tried non-operative treatment including analgesics, an exercise program, and activity modification, but the symptoms have persisted. I believe they make a good candidate for hip arthroplasty.     Approach: Anterior    Implants:   Company: [employer]  Cup: [place]  Stem: Actis    DVT prophylaxis: ASA 81mg BID x1 month  Dispo: home health PT    Admission status: Outpatient/23hr OBS                 Location: [facility] ant NICK when ready  1 night  NICK info provided    Does have B GTB but I think this is being driven by B hip OA  I don't have any additional non-op options for her  Rec NICK when she is ready, call to schedule    In the meantime, can f/u with non-op [facility] providers  PCP v non-op [facility] providers can manage mobic    No orders of the defined types were placed in this encounter.            Past Medical History:   Diagnosis Date    Abnormal cervical Papanicolaou smear '88, '04    Colpo/Cryo    Benign paroxysmal vertigo, bilateral     Chronic diarrhea 02/12/2015    GERD (gastroesophageal reflux disease)     History of acute PID 1988    Hypertension     Thrombocytosis     Urticaria        Past Surgical History:   Procedure Laterality Date    ADENOIDECTOMY      BREAST BIOPSY Right 06/09/2021    stereo benign    CARPAL TUNNEL RELEASE Left 09/21/2021    Procedure: RELEASE, CARPAL TUNNEL;  Surgeon: Alla Goff MD;  Location: [facility] OR;  Service: Orthopedics;  Laterality: Left;    CARPAL TUNNEL RELEASE Right 02/07/2023    Procedure: RELEASE, CARPAL TUNNEL;  Surgeon: Alla Goff MD;  Location: [facility] OR;  Service: Orthopedics;  Laterality: Right;    EYE SURGERY  2010    In Record    GANGLION CYST EXCISION      INJECTION OF JOINT Right 03/09/2023    Procedure: INJECTION, JOINT, RIGHT HIP  DIRECT REF;  Surgeon: Harleen Swan MD;  Location:  [facility];  Service: Pain Management;  Laterality: Right;    INJECTION OF STEROID Right 2021    Procedure: INJECTION, STEROID-Right carpal tunnel;  Surgeon: Alla Goff MD;  Location: [facility];  Service: Orthopedics;  Laterality: Right;    REFRACTIVE SURGERY Bilateral     Dr. Vazquez Forbes     TONSILLECTOMY         Family History   Adopted: Yes   Problem Relation Age of Onset    Heart disease Mother         Adopted    Cancer Father         Adopted       Social History     Socioeconomic History    Marital status: Single   Tobacco Use    Smoking status: Former     Current packs/day: 0.00     Average packs/day: 1 pack/day for 23.1 years (23.1 ttl pk-yrs)     Types: Cigarettes     Start date: 1984     Quit date: 2007     Years since quittin.1    Smokeless tobacco: Never   Substance and Sexual Activity    Alcohol use: Yes     Alcohol/week: 20.0 standard drinks of alcohol     Types: 6 Glasses of wine, 8 Cans of beer, 6 Drinks containing 0.5 oz of alcohol per week     Comment: 3-4 times per week    Drug use: No    Sexual activity: Not Currently     Partners: Male     Birth control/protection: None     Comment: In Menopause   Social History Narrative    Retired [employer]. Currently, working for the [employer]. Criminal justice degree from [employer].          Social Determinants of Health     Financial Resource Strain: Low Risk  (2023)    Overall Financial Resource Strain (CARDIA)     Difficulty of Paying Living Expenses: Not hard at all   Food Insecurity: No Food Insecurity (2023)    Hunger Vital Sign     Worried About Running Out of Food in the Last Year: Never true     Ran Out of Food in the Last Year: Never true   Transportation Needs: No Transportation Needs (2023)    PRAPARE - Transportation     Lack of Transportation (Medical): No     Lack of Transportation (Non-Medical): No   Physical Activity: Sufficiently Active (2023)    Exercise Vital Sign     Days of Exercise per  Week: 3 days     Minutes of Exercise per Session: 60 min   Recent Concern: Physical Activity - Insufficiently Active (6/22/2023)    Exercise Vital Sign     Days of Exercise per Week: 3 days     Minutes of Exercise per Session: 30 min   Stress: No Stress Concern Present (7/17/2023)    [employer] - Occupational Stress Questionnaire     Feeling of Stress : Not at all   Social Connections: Unknown (7/17/2023)    Social Connection and Isolation Panel [NHANES]     Frequency of Communication with Friends and Family: More than three times a week     Frequency of Social Gatherings with Friends and Family: Three times a week     Active Member of Clubs or Organizations: No     Attends Club or Organization Meetings: Never     Marital Status:    Housing Stability: Low Risk  (7/17/2023)    Housing Stability Vital Sign     Unable to Pay for Housing in the Last Year: No     Number of Places Lived in the Last Year: 1     Unstable Housing in the Last Year: No

## 2023-09-18 ENCOUNTER — CLINICAL SUPPORT (OUTPATIENT)
Dept: DERMATOLOGY | Facility: CLINIC | Age: 53
End: 2023-09-18
Payer: OTHER GOVERNMENT

## 2023-09-18 DIAGNOSIS — L25.9 CONTACT DERMATITIS, UNSPECIFIED CONTACT DERMATITIS TYPE, UNSPECIFIED TRIGGER: ICD-10-CM

## 2023-09-18 DIAGNOSIS — L30.9 DERMATITIS: ICD-10-CM

## 2023-09-18 DIAGNOSIS — L50.9 HIVES: ICD-10-CM

## 2023-09-18 PROCEDURE — 95044 PATCH/APPLICATION TESTS: CPT | Mod: PBBFAC,PO

## 2023-09-18 NOTE — PROGRESS NOTES
True test and 10 additional allergens applied to patients back. Follow up on Wednesday for 1st reading.   45 allergens applied

## 2023-09-20 ENCOUNTER — CLINICAL SUPPORT (OUTPATIENT)
Dept: DERMATOLOGY | Facility: CLINIC | Age: 53
End: 2023-09-20
Payer: OTHER GOVERNMENT

## 2023-09-20 VITALS — BODY MASS INDEX: 24.51 KG/M2 | WEIGHT: 134 LBS

## 2023-09-20 DIAGNOSIS — L30.9 DERMATITIS: Primary | ICD-10-CM

## 2023-09-20 PROCEDURE — 99212 OFFICE O/P EST SF 10 MIN: CPT | Mod: S$PBB,,, | Performed by: DERMATOLOGY

## 2023-09-20 PROCEDURE — 99212 PR OFFICE/OUTPT VISIT, EST, LEVL II, 10-19 MIN: ICD-10-PCS | Mod: S$PBB,,, | Performed by: DERMATOLOGY

## 2023-09-20 NOTE — PROGRESS NOTES
Subjective:      Patient ID:  Kayleen Patel is a 53 y.o. female who presents for   Chief Complaint   Patient presents with    Patch Testing      1st reading     48 hour patch test reading.  The protopic is helping with her dermatitis    Patch Testing - Initial  Affected locations: back      Review of Systems   Constitutional:  Negative for fever, chills, weight loss, weight gain, fatigue, night sweats and malaise.   Skin:  Negative for daily sunscreen use, activity-related sunscreen use and wears hat.   Hematologic/Lymphatic: Does not bruise/bleed easily.       Objective:   Physical Exam   Constitutional: She appears well-developed and well-nourished.   Neurological: She is alert and oriented to person, place, and time.   Psychiatric: She has a normal mood and affect.   Skin:   Areas Examined (abnormalities noted in diagram):   Head / Face Inspection Performed  Back Inspection Performed                 Diagram Legend     Erythematous scaling macule/papule c/w actinic keratosis       Vascular papule c/w angioma      Pigmented verrucoid papule/plaque c/w seborrheic keratosis      Yellow umbilicated papule c/w sebaceous hyperplasia      Irregularly shaped tan macule c/w lentigo     1-2 mm smooth white papules consistent with Milia      Movable subcutaneous cyst with punctum c/w epidermal inclusion cyst      Subcutaneous movable cyst c/w pilar cyst      Firm pink to brown papule c/w dermatofibroma      Pedunculated fleshy papule(s) c/w skin tag(s)      Evenly pigmented macule c/w junctional nevus     Mildly variegated pigmented, slightly irregular-bordered macule c/w mildly atypical nevus      Flesh colored to evenly pigmented papule c/w intradermal nevus       Pink pearly papule/plaque c/w basal cell carcinoma      Erythematous hyperkeratotic cursted plaque c/w SCC      Surgical scar with no sign of skin cancer recurrence      Open and closed comedones      Inflammatory papules and pustules       Verrucoid papule consistent consistent with wart     Erythematous eczematous patches and plaques     Dystrophic onycholytic nail with subungual debris c/w onychomycosis     Umbilicated papule    Erythematous-base heme-crusted tan verrucoid plaque consistent with inflamed seborrheic keratosis     Erythematous Silvery Scaling Plaque c/w Psoriasis     See annotation      Assessment / Plan:        Dermatitis  48 hour patch tests all negative  Continue protopic and can use vanicream ointment prn during day           Follow up in about 2 days (around 9/22/2023).

## 2023-09-22 ENCOUNTER — PATIENT MESSAGE (OUTPATIENT)
Dept: ADMINISTRATIVE | Facility: OTHER | Age: 53
End: 2023-09-22
Payer: OTHER GOVERNMENT

## 2023-09-22 ENCOUNTER — CLINICAL SUPPORT (OUTPATIENT)
Dept: DERMATOLOGY | Facility: CLINIC | Age: 53
End: 2023-09-22
Payer: OTHER GOVERNMENT

## 2023-09-22 VITALS — BODY MASS INDEX: 24.51 KG/M2 | WEIGHT: 134 LBS

## 2023-09-22 DIAGNOSIS — L30.9 DERMATITIS: Primary | ICD-10-CM

## 2023-09-22 PROCEDURE — 99212 PR OFFICE/OUTPT VISIT, EST, LEVL II, 10-19 MIN: ICD-10-PCS | Mod: S$PBB,,, | Performed by: DERMATOLOGY

## 2023-09-22 PROCEDURE — 99212 OFFICE O/P EST SF 10 MIN: CPT | Mod: S$PBB,,, | Performed by: DERMATOLOGY

## 2023-09-22 NOTE — PROGRESS NOTES
Here for 96 hour patch test reading    Subjective:      Patient ID:  Kayleen Patel is a 53 y.o. female who presents for   Chief Complaint   Patient presents with    Patch Testing     Final reading     96 hour patch test reading        Review of Systems    Objective:   Physical Exam   Skin:   Areas Examined (abnormalities noted in diagram):   Head / Face Inspection Performed  Back Inspection Performed                 Diagram Legend     Erythematous scaling macule/papule c/w actinic keratosis       Vascular papule c/w angioma      Pigmented verrucoid papule/plaque c/w seborrheic keratosis      Yellow umbilicated papule c/w sebaceous hyperplasia      Irregularly shaped tan macule c/w lentigo     1-2 mm smooth white papules consistent with Milia      Movable subcutaneous cyst with punctum c/w epidermal inclusion cyst      Subcutaneous movable cyst c/w pilar cyst      Firm pink to brown papule c/w dermatofibroma      Pedunculated fleshy papule(s) c/w skin tag(s)      Evenly pigmented macule c/w junctional nevus     Mildly variegated pigmented, slightly irregular-bordered macule c/w mildly atypical nevus      Flesh colored to evenly pigmented papule c/w intradermal nevus       Pink pearly papule/plaque c/w basal cell carcinoma      Erythematous hyperkeratotic cursted plaque c/w SCC      Surgical scar with no sign of skin cancer recurrence      Open and closed comedones      Inflammatory papules and pustules      Verrucoid papule consistent consistent with wart     Erythematous eczematous patches and plaques     Dystrophic onycholytic nail with subungual debris c/w onychomycosis     Umbilicated papule    Erythematous-base heme-crusted tan verrucoid plaque consistent with inflamed seborrheic keratosis     Erythematous Silvery Scaling Plaque c/w Psoriasis     See annotation      Assessment / Plan:        Dermatitis  All patch tests negative, likely atopic dermatitis  Protopic working well  Can use vanicream ug  for moisturizer             Follow up if symptoms worsen or fail to improve.

## 2023-09-27 RX ORDER — MELOXICAM 15 MG/1
15 TABLET ORAL
Qty: 30 TABLET | Refills: 11 | Status: ON HOLD | OUTPATIENT
Start: 2023-09-27 | End: 2024-01-17 | Stop reason: HOSPADM

## 2023-09-28 ENCOUNTER — PATIENT MESSAGE (OUTPATIENT)
Dept: INTERNAL MEDICINE | Facility: CLINIC | Age: 53
End: 2023-09-28
Payer: OTHER GOVERNMENT

## 2023-09-28 NOTE — TELEPHONE ENCOUNTER
Disregard msg about request:    Rx was filled by pt's Orthopedic Physician Provider: Krishan Solorzano PA-C on yesterday for a month supply with 11 refills and sent to [facility] mail order pharmacy. Responded to portal msg informing pt.

## 2023-10-10 ENCOUNTER — PATIENT MESSAGE (OUTPATIENT)
Dept: ORTHOPEDICS | Facility: CLINIC | Age: 53
End: 2023-10-10
Payer: OTHER GOVERNMENT

## 2023-10-26 ENCOUNTER — PATIENT MESSAGE (OUTPATIENT)
Dept: ADMINISTRATIVE | Facility: OTHER | Age: 53
End: 2023-10-26
Payer: OTHER GOVERNMENT

## 2023-10-26 ENCOUNTER — PATIENT MESSAGE (OUTPATIENT)
Dept: ORTHOPEDICS | Facility: CLINIC | Age: 53
End: 2023-10-26
Payer: OTHER GOVERNMENT

## 2023-10-26 ENCOUNTER — PATIENT MESSAGE (OUTPATIENT)
Dept: INTERNAL MEDICINE | Facility: CLINIC | Age: 53
End: 2023-10-26
Payer: OTHER GOVERNMENT

## 2023-10-26 DIAGNOSIS — M87.059 AVASCULAR NECROSIS OF BONE OF HIP, UNSPECIFIED LATERALITY: Primary | ICD-10-CM

## 2023-10-26 DIAGNOSIS — M16.11 PRIMARY OSTEOARTHRITIS OF RIGHT HIP: Primary | ICD-10-CM

## 2023-11-21 ENCOUNTER — PATIENT MESSAGE (OUTPATIENT)
Dept: ORTHOPEDICS | Facility: CLINIC | Age: 53
End: 2023-11-21
Payer: OTHER GOVERNMENT

## 2023-11-22 ENCOUNTER — PATIENT MESSAGE (OUTPATIENT)
Dept: ADMINISTRATIVE | Facility: OTHER | Age: 53
End: 2023-11-22
Payer: OTHER GOVERNMENT

## 2023-11-22 ENCOUNTER — PATIENT MESSAGE (OUTPATIENT)
Dept: INTERNAL MEDICINE | Facility: CLINIC | Age: 53
End: 2023-11-22
Payer: OTHER GOVERNMENT

## 2023-12-11 ENCOUNTER — PATIENT MESSAGE (OUTPATIENT)
Dept: ADMINISTRATIVE | Facility: OTHER | Age: 53
End: 2023-12-11
Payer: OTHER GOVERNMENT

## 2023-12-14 NOTE — ANESTHESIA PAT ROS NOTE
12/14/2023  Kayleen Patel is a 53 y.o., female.      Pre-op Assessment          Review of Systems           Anesthesia Assessment: Preoperative EQUATION    Planned Procedure: Procedure(s) (LRB):  ARTHROPLASTY, HIP, TOTAL, ANTERIOR APPROACH: RIGHT: DEPUY - ACTIS + PINNACLE (Right)  Requested Anesthesia Type:Spinal/Epidural  Surgeon: Cresencio Collins III, MD  Service: Orthopedics  Known or anticipated Date of Surgery:1/17/2024    Surgeon notes: reviewed    Electronic QUestionnaire Assessment completed via nurse interview with patient.        Triage considerations:     The patient has no apparent active cardiac condition (No unstable coronary Syndrome such as severe unstable angina or recent [<1 month] myocardial infarction, decompensated CHF, severe valvular   disease or significant arrhythmia)    Previous anesthesia records:[facility]   Patient reports no personal history of anesthesia complications.   (Patient is adopted, family hx not available)    2/7/23  RELEASE, CARPAL TUNNEL (Right: Hand)   Airway:  Mallampati: II   Mouth Opening: Normal  TM Distance: Normal  Tongue: Normal  Neck ROM: Normal ROM    Last PCP note: 6-12 months ago , within [facility]  Subspecialty notes: Ortho  Cardiology            (Dr. Gonzalez)  Dermatology         (Dr. Olivas)  Allergy                   (Dr. Kessler)  [facility] (Dr. Rodrigez)  Ophthalmology     (Dr. 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  8/30/23      Stress Echo  7/26/23      K  7/12/23      EKG  7/11/23      TSH  A1C  Lipid panel   CMP  6/29/23      Allergy testing  6/13/23      MRI Lumbar spine  4/10/23      CBC  1/5/23        X Ray Lumbar spine  12/15/22    CT Chest Lung Screening  6/7/22         US Abd complete  7/27/21      CT Cardiac Scoring  3/13/20      CV Ultrasound Tung Doppler Carotid             Instructions given. (See in Nurse's note)    Optimization:  Anesthesia Preop Clinic Assessment  Indicated Will schedule POC.    Medical Opinion Indicated       Sub-specialist consult indicated:   [facility] NP.       Plan:    Testing:  CBC, CMP, EKG, and PT/INR   Pre-anesthesia  visit       Visit focus: possible regional anesthesia and/or nerve block      Consultation: . [facility] NP for medical and anesthesia optimization.     Patient  has previously scheduled Medical Appointment:  12/22    Navigation: Tests Scheduled.              Consults scheduled.             Results will be tracked by Preop Clinic.     12/28/23  Patient is optimized for surgery.         Atif Barraza, GREG  Perioperative Medicine  [facility]

## 2023-12-14 NOTE — PRE-PROCEDURE INSTRUCTIONS
Called patient and was unable to complete checking med list and reviewing 7 day hold medications, as she had to end the phone call.  Explained will call her back tomorrow.

## 2023-12-15 ENCOUNTER — HOSPITAL ENCOUNTER (OUTPATIENT)
Dept: RADIOLOGY | Facility: HOSPITAL | Age: 53
Discharge: HOME | End: 2023-12-15
Attending: FAMILY MEDICINE
Payer: OTHER GOVERNMENT

## 2023-12-15 ENCOUNTER — TELEPHONE (OUTPATIENT)
Dept: INTERNAL MEDICINE | Facility: CLINIC | Age: 53
End: 2023-12-15
Payer: OTHER GOVERNMENT

## 2023-12-15 ENCOUNTER — TELEPHONE (OUTPATIENT)
Dept: PREADMISSION TESTING | Facility: HOSPITAL | Age: 53
End: 2023-12-15

## 2023-12-15 DIAGNOSIS — R91.1 SOLITARY PULMONARY NODULE: ICD-10-CM

## 2023-12-15 DIAGNOSIS — R91.8 LUNG NODULES: ICD-10-CM

## 2023-12-15 DIAGNOSIS — Z87.891 PERSONAL HISTORY OF NICOTINE DEPENDENCE: ICD-10-CM

## 2023-12-15 DIAGNOSIS — Z87.891 PERSONAL HISTORY OF NICOTINE DEPENDENCE: Primary | ICD-10-CM

## 2023-12-15 PROCEDURE — 71271 CT THORAX LUNG CANCER SCR C-: CPT | Mod: TC

## 2023-12-15 PROCEDURE — 71271 CT CHEST LUNG SCREENING LOW DOSE: ICD-10-PCS | Mod: 26,,, | Performed by: STUDENT IN AN ORGANIZED HEALTH CARE EDUCATION/TRAINING PROGRAM

## 2023-12-15 PROCEDURE — 71271 CT THORAX LUNG CANCER SCR C-: CPT | Mod: 26,,, | Performed by: STUDENT IN AN ORGANIZED HEALTH CARE EDUCATION/TRAINING PROGRAM

## 2023-12-15 RX ORDER — ALPHA LIPOIC ACID/BIOTIN 300 MG-330
TABLET,IMMED, EXTENDED RELEASE, BIPHASIC ORAL DAILY
COMMUNITY

## 2023-12-15 RX ORDER — METHYLPREDNISOLONE 4 MG
1500 TABLET, DOSE PACK ORAL DAILY
COMMUNITY

## 2023-12-15 NOTE — TELEPHONE ENCOUNTER
----- Message from Kelsi Wellington RN sent at 12/14/2023  3:46 PM CST -----  12/22   pt will see Atif.  Pls schedule EKG and labs (CBC, CMP, PT/INR).  Thanks,  Neisha

## 2023-12-15 NOTE — PRE-PROCEDURE INSTRUCTIONS
Called patient and reviewed all medications and any 7 day holds.  Pt verbalized understanding of abv.

## 2023-12-15 NOTE — TELEPHONE ENCOUNTER
Please call patient and explain that test(s) show generally stable areas in the lungs. The 'ground glass opacities' may be a little unusual, but the scan recommends routine 1 year follow up.     Please see orders for repeat and please schedule in 1 year.     ALSO    I would like to refer to the pulmonary department for further evaluation and treatment. Please schedule.    Thank you.

## 2023-12-18 ENCOUNTER — PATIENT MESSAGE (OUTPATIENT)
Dept: ADMINISTRATIVE | Facility: OTHER | Age: 53
End: 2023-12-18
Payer: OTHER GOVERNMENT

## 2023-12-18 DIAGNOSIS — M16.11 PRIMARY OSTEOARTHRITIS OF RIGHT HIP: ICD-10-CM

## 2023-12-18 DIAGNOSIS — Z96.641 STATUS POST RIGHT HIP REPLACEMENT: ICD-10-CM

## 2023-12-18 DIAGNOSIS — M16.0 PRIMARY OSTEOARTHRITIS OF BOTH HIPS: Primary | ICD-10-CM

## 2023-12-18 NOTE — TELEPHONE ENCOUNTER
Called and spoke to pt, reviewed results note from PCP. Pt verbalized understanding. Pt states she is scheduled for a consult with pulmonology.

## 2023-12-21 ENCOUNTER — TELEPHONE (OUTPATIENT)
Dept: INTERNAL MEDICINE | Facility: CLINIC | Age: 53
End: 2023-12-21
Payer: OTHER GOVERNMENT

## 2023-12-21 PROBLEM — K21.9 GASTROESOPHAGEAL REFLUX DISEASE: Status: ACTIVE | Noted: 2023-12-21

## 2023-12-21 PROBLEM — M16.11 PRIMARY OSTEOARTHRITIS OF RIGHT HIP: Status: ACTIVE | Noted: 2023-12-21

## 2023-12-21 NOTE — OUTPATIENT SUBJECTIVE & OBJECTIVE
Outpatient Subjective & Objective      Chief Complaint: Preoperative evaulation, perioperative medical management, and complication reduction plan.     Functional Capacity: works out at [employer] on Tues/Thurs- denies CP/SOB- does Cardio- without CP/SOB.       Anesthesia issues: None    Difficulty mouth opening: No    Steroid use in the last 12 months:  Yes Cortisone for bursitis    Dental Issues: None    Family anesthesia difficulty: Adopted       Family Hx of Thrombosis: Adopted    Past Medical History:   Diagnosis Date    Abnormal cervical Papanicolaou smear '88, '04    Colpo/Cryo    Benign paroxysmal vertigo, bilateral     Chronic diarrhea 02/12/2015    GERD (gastroesophageal reflux disease)     History of acute PID 1988    Hypertension     Thrombocytosis     Urticaria          Past Medical History Pertinent Negatives:   Diagnosis Date Noted    Atypical hyperplasia of breast 06/14/2023    BRCA1 negative 06/14/2023    BRCA1 positive 06/14/2023    BRCA2 negative 06/14/2023    BRCA2 positive 06/14/2023    Breast cancer 06/14/2023    Colon cancer 06/14/2023    Endometrial cancer 06/14/2023    Lobular carcinoma in situ 06/14/2023    Ovarian cancer 06/14/2023    Usual hyperplasia of lactiferous duct 06/14/2023         Past Surgical History:   Procedure Laterality Date    ADENOIDECTOMY      BREAST BIOPSY Right 06/09/2021    stereo benign    CARPAL TUNNEL RELEASE Left 09/21/2021    Procedure: RELEASE, CARPAL TUNNEL;  Surgeon: Alla Goff MD;  Location: [facility] OR;  Service: Orthopedics;  Laterality: Left;    CARPAL TUNNEL RELEASE Right 02/07/2023    Procedure: RELEASE, CARPAL TUNNEL;  Surgeon: Alla Goff MD;  Location: [facility] OR;  Service: Orthopedics;  Laterality: Right;    EYE SURGERY  2010    In Record    GANGLION CYST EXCISION      INJECTION OF JOINT Right 03/09/2023    Procedure: INJECTION, JOINT, RIGHT HIP  DIRECT REF;  Surgeon: Harleen Swan MD;  Location: [facility];  Service: Pain Management;   "Laterality: Right;    INJECTION OF STEROID Right 09/21/2021    Procedure: INJECTION, STEROID-Right carpal tunnel;  Surgeon: Alla Goff MD;  Location: [facility];  Service: Orthopedics;  Laterality: Right;    REFRACTIVE SURGERY Bilateral 2009    Dr. Vazquez Forbes     TONSILLECTOMY         Review of Systems   Constitutional:  Negative for chills, fatigue, fever and unexpected weight change.   HENT:  Negative for congestion, hearing loss, rhinorrhea, sore throat, tinnitus and trouble swallowing.         Hoarseness- Covid test negative- home   Eyes:  Negative for visual disturbance.   Respiratory:  Negative for cough, chest tightness, shortness of breath and wheezing.    Cardiovascular:  Negative for chest pain, palpitations and leg swelling.   Gastrointestinal:  Negative for constipation and diarrhea.        Denies Fatty liver, Hepatitis   Genitourinary:  Negative for decreased urine volume, difficulty urinating, dysuria, frequency, hematuria and urgency.        Nocturia   Musculoskeletal:  Positive for arthralgias (right hip). Negative for back pain, neck pain and neck stiffness.   Skin:  Negative for rash and wound.   Neurological:  Positive for dizziness (vertigo). Negative for syncope, weakness, numbness and headaches.   Hematological:  Bruises/bleeds easily.   Psychiatric/Behavioral:  Negative for sleep disturbance and suicidal ideas.             VITALS  Visit Vitals  /79 (BP Location: Left arm, Patient Position: Sitting)   Pulse 76   Temp 98.3 °F (36.8 °C) (Oral)   Ht 5' 2" (1.575 m)   Wt 62 kg (136 lb 11 oz)   LMP 02/07/2016 (Approximate)   SpO2 100%   BMI 25.00 kg/m²          Physical Exam  Vitals reviewed.   Constitutional:       General: She is not in acute distress.     Appearance: She is well-developed.   HENT:      Head: Normocephalic.      Nose: Nose normal.      Mouth/Throat:      Pharynx: No oropharyngeal exudate.   Eyes:      General:         Right eye: No discharge.         Left eye: No " discharge.      Conjunctiva/sclera: Conjunctivae normal.      Pupils: Pupils are equal, round, and reactive to light.   Neck:      Thyroid: No thyromegaly.      Vascular: No carotid bruit or JVD.      Trachea: No tracheal deviation.   Cardiovascular:      Rate and Rhythm: Normal rate and regular rhythm.      Pulses:           Carotid pulses are 2+ on the right side and 2+ on the left side.       Dorsalis pedis pulses are 2+ on the right side and 2+ on the left side.        Posterior tibial pulses are 2+ on the right side and 2+ on the left side.      Heart sounds: Normal heart sounds. No murmur heard.  Pulmonary:      Effort: Pulmonary effort is normal. No respiratory distress.      Breath sounds: Normal breath sounds. No stridor. No wheezing, rhonchi or rales.   Abdominal:      General: Bowel sounds are normal. There is no distension.      Palpations: Abdomen is soft.      Tenderness: There is no abdominal tenderness. There is no guarding.   Musculoskeletal:      Cervical back: Normal range of motion. No pain with movement.      Right lower leg: No edema.      Left lower leg: No edema.   Lymphadenopathy:      Cervical: No cervical adenopathy.   Skin:     General: Skin is warm and dry.      Capillary Refill: Capillary refill takes less than 2 seconds.      Findings: No erythema or rash.   Neurological:      Mental Status: She is alert and oriented to person, place, and time.   Psychiatric:         Behavior: Behavior normal.          Significant Labs:  Lab Results   Component Value Date    WBC 4.74 12/22/2023    HGB 12.1 12/22/2023    HCT 35.1 (L) 12/22/2023     12/22/2023    CHOL 243 (H) 07/11/2023    TRIG 88 07/11/2023    HDL 99 (H) 07/11/2023    ALT 76 (H) 12/22/2023    AST 76 (H) 12/22/2023     12/22/2023    K 3.8 12/22/2023    CL 97 12/22/2023    CREATININE 0.6 12/22/2023    BUN 10 12/22/2023    CO2 28 12/22/2023    TSH 1.064 07/11/2023    INR 0.9 12/22/2023    GLUF 103 09/22/2017    HGBA1C 5.1  07/11/2023       Diagnostic Studies: No relevant studies.    EKG:   Results for orders placed or performed during the hospital encounter of 12/22/23   EKG 12-lead    Collection Time: 12/22/23 10:57 AM    Narrative    Test Reason : Z01.818,    Vent. Rate : 081 BPM     Atrial Rate : 081 BPM     P-R Int : 142 ms          QRS Dur : 082 ms      QT Int : 400 ms       P-R-T Axes : 068 062 056 degrees     QTc Int : 464 ms    Normal sinus rhythm  Normal ECG  When compared with ECG of 30-AUG-2023 13:32,  No significant change was found  Confirmed by Brooke Zamudio MD ([id number]) on 12/22/2023 2:52:21 PM    Referred By: EULOGIO BUTT           Confirmed By:Brooke Zamudio MD       Stress Echo 8/30/23   Left Ventricle: The left ventricle is normal in size. Normal wall thickness. Normal wall motion. There is normal systolic function with a visually estimated ejection fraction of 60 - 65%. There is normal diastolic function.    Right Ventricle: Normal right ventricular cavity size. Systolic function is normal.    Pulmonary Artery: The estimated pulmonary artery systolic pressure is 30 mmHg.    IVC/SVC: Intermediate venous pressure at 8 mmHg.    Stress Protocol: The patient exercised for 8 minutes 37 seconds on a high ramp protocol, corresponding to a functional capacity of 15 METS, achieving a peak heart rate of 153 bpm, which is 97 % of the age predicted maximum heart rate. Their exercise capacity was excellent. The patient reported no symptoms during the stress test.    Post-stress Impression: The study is negative with no echocardiographic evidence of stress induced ischemia.    Post-stress Echo: The left ventricle systolic function is hyperdynamic with an EF of 75 %.    Stress ECG: There are no ST segment deviation identified during the protocol. There are no arrhythmias during stress. There is normal blood pressure response with stress.        CT Calcium Score 7/27/21  FINDINGS:  Quantitative assessment of coronary artery calcium  was performed with dedicated technique including full field of view and targeted reformatted images.     Agatston calcium score equals 3.     In this 51-year old female this score translates to the 50th percentile for coronary calcium load according to age and gender. Standard interpretation for a calcium score of 3 follows:     This indicates Minimal plaque burden. Low cardiovascular disease risk.     Clinical interpretation: Significant coronary artery disease very unlikely.     Gender and age issues: Applicable to men and women over 40, but note that the presence of chest pain, multiple risk factors, younger age subjects, or female gender (especially pre-menopausal) should encourage a more aggressive approach to therapy/management.     Recommended clinical action: Discuss general public health guidelines for primary cardiovascular disease prevention.     Additional findings:     Stable calcified granulomas within the lateral aspect of the right lower lobe.  Multiple small ill-defined pulmonary nodules are present, some of which are ground-glass. These are all unchanged when compared to 06/07/2021. Overall, there are less pulmonary nodules when compared to that exam.  No new nodules.     The accompanying full field of view images, acquired from the pulmonary valve through the posterior wall the left ventricle, reveal no convincing evidence of pulmonary disease, pleural disease, lymph node enlargement, cardiac decompensation, pneumothorax, pneumomediastinum, pneumoperitoneum, or subcutaneous emphysema.     Impression:     Agatston calcium score equals 3, which translates to the 50th percentile for coronary calcium load based on age and sex.  Additional findings and recommendations above.     Redemonstration of multiple ill-defined pulmonary nodules, all of which are unchanged when compared to 06/07/2021.  Recommend follow-up for 6 months from the prior exam based on Lung-RADS recommendations (December 2021).      Electronically signed by resident: Benigno Funes  Date:                                            07/27/2021  Time:                                           09:22        Imaging   MRI L-Spine 6/13/23  FINDINGS:  Lumbar spine alignment is within normal limits. The vertebral body heights are well maintained.  Diffuse heterogeneous signal of the bone marrow, likely related to red marrow reconversion.  No focal infiltrative process.     Mild desiccation at L2-L3 and L5-S1 with mild height loss at L2-L3. No endplate edema.     The cauda equina appears within normal limits without findings to suggest arachnoiditis. The conus is normal in appearance, and terminates at the L1 level.     The adjacent soft tissue structures show no significant abnormalities. Paraspinal muscle bulk is maintained.     The proximal sacrum is unremarkable.  The sacroiliac joints are symmetric     There are findings of lumbar spondylosis, as below.     T12-L1: No spinal canal stenosis or neural foraminal narrowing.     L1-L2: No spinal canal stenosis or neural foraminal narrowing.     L2-L3: Circumferential disc bulge.  No spinal canal stenosis or neural foraminal narrowing.     L3-L4: Left subarticular disc protrusion causes mass effect on the left lateral recess.  Mild-to-moderate left lateral recess stenosis with possible abutment of the left descending L4 nerve root.No neural foraminal narrowing.     L4-L5:  No spinal canal stenosis or neural foraminal narrowing.     L5-S1:  No spinal canal stenosis or neural foraminal narrowing.     Impression:     1. Left subarticular disc protrusion at L3-L4 causing mild to moderate left lateral recess stenosis with possible abutment of the left L4 descending nerve root.     Electronically signed by resident: Jt Ann  Date:                                            06/13/2023  Time:                                           16:14     Electronically signed by: Miguelito Oneal MD  Date:                                             06/13/2023  Time:                                           16:57      Carotid US 2020  There is 0-19% right Internal Carotid Stenosis.  There is 0-19% left Internal Carotid Stenosis.        Active Cardiac Conditions: None      Revised Cardiac Risk Index   High -Risk Surgery  Intraperitoneal; Intrathoracic; suprainguinal vascular Yes- + 1 No- 0   History of Ischemic Heart Disease   (Hx of MI/positive exercise test/current chest pain due to ischemia/use of nitrate therapy/EKG with pathological Q waves) Yes- + 1 No- 0   History of CHF  (Pulmonary edema/bilateral rales or S3 gallop/PND/CXR showing pulmonary vascular redistribution) Yes- + 1 No- 0   History of CVA   (Prior stroke or TIA) Yes- + 1 No- 0   Pre-operative treatment with insulin Yes- + 1 No- 0   Pre-operative creatinine > 2mg/dl Yes- + 1 No- 0   Total: 0      Risk Status:  Estimated risk of cardiac complications after non-cardiac surgery using the Revised Cardiac Risk Index for Preoperative risk is 3.9 %      ARISCAT (Canet) risk index: Low: 1.6% risk of post-op pulmonary complications; Intermediate: 13.3% risk of post-op pulmonary complications if surgery is > 3 hours.     American Society of Anesthesiologists Physical Status classification (ASA): 2           No further cardiac workup needed prior to surgery.    Outpatient Subjective & Objective

## 2023-12-21 NOTE — ASSESSMENT & PLAN NOTE
Not currently treated; no longer having symptoms.   History of gastric ulcers- attributed to smoking

## 2023-12-21 NOTE — ASSESSMENT & PLAN NOTE
Right lung   Denies CP/SOB/cough/congestion/wheezing  Pulmonology appt. March 2024     CT chest 12/15/23  FINDINGS:  Lines and tubes: None     Lungs and airways: Mild upper lobe predominant centrilobular emphysema. Right lung calcified granuloma.  Several bilateral mostly ground-glass nodules.  Previously described largest opacity in the right lung appears part solid and measures 7 mm (previously 9 mm) on series 4, image 121.  Additional right-sided dominant ground-glass nodule measuring 7 mm (series 4, image 162), unchanged.  The largest opacity in the left lung appears ground-glass and measures 6 mm on series 4, image 75.  No new nodules.     Pleural: The pleural spaces are clear     Base of neck, mediastinum and heart: The thyroid gland is normal. No mediastinal, hilar or axillary lymphadenopathy is seen. The heart and pericardium are within normal limits. Minimal calcified atheromatous disease is seen in the aorta. The coronary arteries show minimal calcified atheromatous disease.     Soft tissues: Unremarkable     Abdomen: The study was performed without contrast and with lower than standard dose. These factors reduce the sensitivity of small lesions in the upper abdomen.  Given the significant limitations, no focal lesions is seen within the visualize upper abdomen.     Bones: The visualized osseous thorax is within normal limits     Explanation of the Lung-RADS categories can be found at: [email or web address]     Impression:     Lung-RADS Category: 2 - Benign Appearance or Behavior - continue annual screening with LDCT in 12 months.     Clinically or potentially clinically significant non lung cancer finding: None.

## 2023-12-21 NOTE — HPI
This is a 53 y.o. female  who presents today for a preoperative evaluation in preparation for right hip arthroplasty.   Surgery is indicated for osteoarthritis of right hip .   Patient is new to me.  The history has been obtained by speaking with the patient and reviewing the electronic medical record and/or outside health information. Significant health conditions for the perioperative period are discussed below in assessment and plan.   Patient reports current health status to be Good.  Denies any new symptoms before surgery.

## 2023-12-21 NOTE — ASSESSMENT & PLAN NOTE
Current BP  controlled today.    Taking: chlorthalidone/valsartan    Lifestyle changes to reduce systolic BP:  exercise 30 minutes per day,  5 days per week or 150 minutes weekly; sodium reduction and avoidance of high salt foods such as processed meats, frozen meals and  fast foods.   Keeping a healthy weight/BMI can help with better BP control    BP acceptable for surgery. I recommend monitoring BP during perioperative period as uncontrolled pain can elevate blood pressure.

## 2023-12-22 ENCOUNTER — HOSPITAL ENCOUNTER (OUTPATIENT)
Dept: RADIOLOGY | Facility: HOSPITAL | Age: 53
Discharge: HOME | End: 2023-12-22
Attending: ORTHOPAEDIC SURGERY
Payer: OTHER GOVERNMENT

## 2023-12-22 ENCOUNTER — OFFICE VISIT (OUTPATIENT)
Dept: ORTHOPEDICS | Facility: CLINIC | Age: 53
End: 2023-12-22
Payer: OTHER GOVERNMENT

## 2023-12-22 ENCOUNTER — OFFICE VISIT (OUTPATIENT)
Dept: INTERNAL MEDICINE | Facility: CLINIC | Age: 53
End: 2023-12-22
Payer: OTHER GOVERNMENT

## 2023-12-22 ENCOUNTER — HOSPITAL ENCOUNTER (OUTPATIENT)
Dept: CARDIOLOGY | Facility: CLINIC | Age: 53
Discharge: HOME | End: 2023-12-22
Payer: OTHER GOVERNMENT

## 2023-12-22 ENCOUNTER — PATIENT MESSAGE (OUTPATIENT)
Dept: INTERNAL MEDICINE | Facility: CLINIC | Age: 53
End: 2023-12-22
Payer: OTHER GOVERNMENT

## 2023-12-22 VITALS
HEART RATE: 76 BPM | HEIGHT: 62 IN | WEIGHT: 136.69 LBS | DIASTOLIC BLOOD PRESSURE: 79 MMHG | OXYGEN SATURATION: 100 % | BODY MASS INDEX: 25.15 KG/M2 | TEMPERATURE: 98 F | SYSTOLIC BLOOD PRESSURE: 123 MMHG

## 2023-12-22 VITALS — WEIGHT: 137.25 LBS | BODY MASS INDEX: 25.26 KG/M2 | HEIGHT: 62 IN

## 2023-12-22 DIAGNOSIS — J84.10 CALCIFIED GRANULOMA OF LUNG: ICD-10-CM

## 2023-12-22 DIAGNOSIS — Z78.9 ALCOHOL CONSUMPTION OF MORE THAN FOUR DRINKS PER WEEK: ICD-10-CM

## 2023-12-22 DIAGNOSIS — R42 VERTIGO: ICD-10-CM

## 2023-12-22 DIAGNOSIS — I10 HYPERTENSION, ESSENTIAL: ICD-10-CM

## 2023-12-22 DIAGNOSIS — Z96.649 STATUS POST HIP REPLACEMENT, UNSPECIFIED LATERALITY: ICD-10-CM

## 2023-12-22 DIAGNOSIS — M16.11 PRIMARY OSTEOARTHRITIS OF RIGHT HIP: ICD-10-CM

## 2023-12-22 DIAGNOSIS — R94.5 ABNORMAL RESULTS OF LIVER FUNCTION STUDIES: Primary | ICD-10-CM

## 2023-12-22 DIAGNOSIS — Z01.818 PRE-OP TESTING: ICD-10-CM

## 2023-12-22 DIAGNOSIS — E78.5 HYPERLIPIDEMIA, UNSPECIFIED HYPERLIPIDEMIA TYPE: ICD-10-CM

## 2023-12-22 DIAGNOSIS — M16.11 PRIMARY OSTEOARTHRITIS OF RIGHT HIP: Primary | ICD-10-CM

## 2023-12-22 DIAGNOSIS — D64.9 NORMOCYTIC ANEMIA: ICD-10-CM

## 2023-12-22 DIAGNOSIS — K21.9 GASTROESOPHAGEAL REFLUX DISEASE, UNSPECIFIED WHETHER ESOPHAGITIS PRESENT: ICD-10-CM

## 2023-12-22 DIAGNOSIS — Z01.818 PREOPERATIVE EXAMINATION: Primary | ICD-10-CM

## 2023-12-22 DIAGNOSIS — R74.8 ELEVATED LIVER ENZYMES: ICD-10-CM

## 2023-12-22 DIAGNOSIS — I70.0 AORTIC ATHEROSCLEROSIS: ICD-10-CM

## 2023-12-22 PROCEDURE — 99999 PR PBB SHADOW E&M-EST. PATIENT-LVL III: ICD-10-PCS | Mod: PBBFAC,,, | Performed by: NURSE PRACTITIONER

## 2023-12-22 PROCEDURE — 99499 NO LOS: ICD-10-PCS | Mod: 25,S$PBB,, | Performed by: ORTHOPAEDIC SURGERY

## 2023-12-22 PROCEDURE — 99999 PR PBB SHADOW E&M-EST. PATIENT-LVL IV: CPT | Mod: PBBFAC,,, | Performed by: ORTHOPAEDIC SURGERY

## 2023-12-22 PROCEDURE — 99213 OFFICE O/P EST LOW 20 MIN: CPT | Mod: PBBFAC,27 | Performed by: NURSE PRACTITIONER

## 2023-12-22 PROCEDURE — 20610 LARGE JOINT ASPIRATION/INJECTION: L GREATER TROCHANTERIC BURSA: ICD-10-PCS | Mod: S$PBB,LT,, | Performed by: ORTHOPAEDIC SURGERY

## 2023-12-22 PROCEDURE — 20610 DRAIN/INJ JOINT/BURSA W/O US: CPT | Mod: PBBFAC | Performed by: ORTHOPAEDIC SURGERY

## 2023-12-22 PROCEDURE — 93010 EKG 12-LEAD: ICD-10-PCS | Mod: S$PBB,,, | Performed by: INTERNAL MEDICINE

## 2023-12-22 PROCEDURE — 99499 UNLISTED E&M SERVICE: CPT | Mod: 25,S$PBB,, | Performed by: ORTHOPAEDIC SURGERY

## 2023-12-22 PROCEDURE — 99214 OFFICE O/P EST MOD 30 MIN: CPT | Mod: S$PBB,,, | Performed by: NURSE PRACTITIONER

## 2023-12-22 PROCEDURE — 99214 PR OFFICE/OUTPT VISIT, EST, LEVL IV, 30-39 MIN: ICD-10-PCS | Mod: S$PBB,,, | Performed by: NURSE PRACTITIONER

## 2023-12-22 PROCEDURE — 99999 PR PBB SHADOW E&M-EST. PATIENT-LVL V: ICD-10-PCS | Mod: PBBFAC,,, | Performed by: NURSE PRACTITIONER

## 2023-12-22 PROCEDURE — 99215 OFFICE O/P EST HI 40 MIN: CPT | Mod: PBBFAC,27 | Performed by: NURSE PRACTITIONER

## 2023-12-22 PROCEDURE — 72170 X-RAY EXAM OF PELVIS: CPT | Mod: 26,,, | Performed by: RADIOLOGY

## 2023-12-22 PROCEDURE — 99999 PR PBB SHADOW E&M-EST. PATIENT-LVL III: CPT | Mod: PBBFAC,,, | Performed by: NURSE PRACTITIONER

## 2023-12-22 PROCEDURE — 99999 PR PBB SHADOW E&M-EST. PATIENT-LVL V: CPT | Mod: PBBFAC,,, | Performed by: NURSE PRACTITIONER

## 2023-12-22 PROCEDURE — 93010 ELECTROCARDIOGRAM REPORT: CPT | Mod: S$PBB,,, | Performed by: INTERNAL MEDICINE

## 2023-12-22 PROCEDURE — 99999 PR PBB SHADOW E&M-EST. PATIENT-LVL IV: ICD-10-PCS | Mod: PBBFAC,,, | Performed by: ORTHOPAEDIC SURGERY

## 2023-12-22 PROCEDURE — 99215 PR OFFICE/OUTPT VISIT, EST, LEVL V, 40-54 MIN: ICD-10-PCS | Mod: S$PBB,,, | Performed by: NURSE PRACTITIONER

## 2023-12-22 PROCEDURE — 99999PBSHW PR PBB SHADOW TECHNICAL ONLY FILED TO HB: ICD-10-PCS | Mod: PBBFAC,,,

## 2023-12-22 PROCEDURE — 72170 X-RAY EXAM OF PELVIS: CPT | Mod: TC

## 2023-12-22 PROCEDURE — 93005 ELECTROCARDIOGRAM TRACING: CPT | Mod: PBBFAC | Performed by: INTERNAL MEDICINE

## 2023-12-22 PROCEDURE — 72170 XR PELVIS ROUTINE AP: ICD-10-PCS | Mod: 26,,, | Performed by: RADIOLOGY

## 2023-12-22 PROCEDURE — 99214 OFFICE O/P EST MOD 30 MIN: CPT | Mod: PBBFAC,25 | Performed by: ORTHOPAEDIC SURGERY

## 2023-12-22 PROCEDURE — 99999PBSHW PR PBB SHADOW TECHNICAL ONLY FILED TO HB: Mod: PBBFAC,,,

## 2023-12-22 PROCEDURE — 99215 OFFICE O/P EST HI 40 MIN: CPT | Mod: S$PBB,,, | Performed by: NURSE PRACTITIONER

## 2023-12-22 RX ORDER — OXYCODONE HYDROCHLORIDE 5 MG/1
5 TABLET ORAL
Status: CANCELLED | OUTPATIENT
Start: 2023-12-22

## 2023-12-22 RX ORDER — PROCHLORPERAZINE EDISYLATE 5 MG/ML
5 INJECTION INTRAMUSCULAR; INTRAVENOUS EVERY 6 HOURS PRN
Status: CANCELLED | OUTPATIENT
Start: 2023-12-22

## 2023-12-22 RX ORDER — ASPIRIN 81 MG/1
81 TABLET ORAL 2 TIMES DAILY
Status: CANCELLED | OUTPATIENT
Start: 2023-12-22

## 2023-12-22 RX ORDER — CELECOXIB 200 MG/1
200 CAPSULE ORAL DAILY
Status: CANCELLED | OUTPATIENT
Start: 2023-12-22

## 2023-12-22 RX ORDER — CELECOXIB 200 MG/1
400 CAPSULE ORAL ONCE
Status: CANCELLED | OUTPATIENT
Start: 2023-12-22 | End: 2023-12-22

## 2023-12-22 RX ORDER — MUPIROCIN 20 MG/G
1 OINTMENT TOPICAL
Status: CANCELLED | OUTPATIENT
Start: 2023-12-22

## 2023-12-22 RX ORDER — POLYETHYLENE GLYCOL 3350 17 G/17G
17 POWDER, FOR SOLUTION ORAL DAILY
Status: CANCELLED | OUTPATIENT
Start: 2023-12-22

## 2023-12-22 RX ORDER — NALOXONE HCL 0.4 MG/ML
0.02 VIAL (ML) INJECTION
Status: CANCELLED | OUTPATIENT
Start: 2023-12-22

## 2023-12-22 RX ORDER — FENTANYL CITRATE 50 UG/ML
25 INJECTION, SOLUTION INTRAMUSCULAR; INTRAVENOUS EVERY 5 MIN PRN
Status: CANCELLED | OUTPATIENT
Start: 2023-12-22

## 2023-12-22 RX ORDER — ACETAMINOPHEN 500 MG
1000 TABLET ORAL
Status: CANCELLED | OUTPATIENT
Start: 2023-12-22

## 2023-12-22 RX ORDER — SODIUM CHLORIDE 9 MG/ML
INJECTION, SOLUTION INTRAVENOUS
Status: CANCELLED | OUTPATIENT
Start: 2023-12-22

## 2023-12-22 RX ORDER — METHOCARBAMOL 750 MG/1
750 TABLET, FILM COATED ORAL 3 TIMES DAILY
Status: CANCELLED | OUTPATIENT
Start: 2023-12-22

## 2023-12-22 RX ORDER — FAMOTIDINE 20 MG/1
20 TABLET, FILM COATED ORAL 2 TIMES DAILY
Status: CANCELLED | OUTPATIENT
Start: 2023-12-22

## 2023-12-22 RX ORDER — SODIUM CHLORIDE 9 MG/ML
INJECTION, SOLUTION INTRAVENOUS CONTINUOUS
Status: CANCELLED | OUTPATIENT
Start: 2023-12-22 | End: 2023-12-23

## 2023-12-22 RX ORDER — MORPHINE SULFATE 2 MG/ML
2 INJECTION, SOLUTION INTRAMUSCULAR; INTRAVENOUS
Status: CANCELLED | OUTPATIENT
Start: 2023-12-22

## 2023-12-22 RX ORDER — MIDAZOLAM HYDROCHLORIDE 1 MG/ML
4 INJECTION INTRAMUSCULAR; INTRAVENOUS
Status: CANCELLED | OUTPATIENT
Start: 2023-12-22 | End: 2023-12-23

## 2023-12-22 RX ORDER — AMOXICILLIN 250 MG
1 CAPSULE ORAL 2 TIMES DAILY
Status: CANCELLED | OUTPATIENT
Start: 2023-12-22

## 2023-12-22 RX ORDER — CEFAZOLIN SODIUM 2 G/50ML
2 SOLUTION INTRAVENOUS
Status: CANCELLED | OUTPATIENT
Start: 2023-12-22 | End: 2023-12-23

## 2023-12-22 RX ORDER — PREGABALIN 75 MG/1
75 CAPSULE ORAL
Status: CANCELLED | OUTPATIENT
Start: 2023-12-22

## 2023-12-22 RX ORDER — ONDANSETRON 2 MG/ML
4 INJECTION INTRAMUSCULAR; INTRAVENOUS EVERY 8 HOURS PRN
Status: CANCELLED | OUTPATIENT
Start: 2023-12-22

## 2023-12-22 RX ORDER — LIDOCAINE HYDROCHLORIDE 10 MG/ML
1 INJECTION, SOLUTION EPIDURAL; INFILTRATION; INTRACAUDAL; PERINEURAL
Status: CANCELLED | OUTPATIENT
Start: 2023-12-22

## 2023-12-22 RX ORDER — OXYCODONE HYDROCHLORIDE 5 MG/1
10 TABLET ORAL
Status: CANCELLED | OUTPATIENT
Start: 2023-12-22

## 2023-12-22 RX ORDER — ACETAMINOPHEN 500 MG
1000 TABLET ORAL EVERY 6 HOURS
Status: CANCELLED | OUTPATIENT
Start: 2023-12-22

## 2023-12-22 RX ORDER — TRIAMCINOLONE ACETONIDE 40 MG/ML
40 INJECTION, SUSPENSION INTRA-ARTICULAR; INTRAMUSCULAR
Status: DISCONTINUED | OUTPATIENT
Start: 2023-12-22 | End: 2023-12-22 | Stop reason: HOSPADM

## 2023-12-22 RX ORDER — PREGABALIN 75 MG/1
75 CAPSULE ORAL NIGHTLY
Status: CANCELLED | OUTPATIENT
Start: 2023-12-22

## 2023-12-22 RX ORDER — MUPIROCIN 20 MG/G
1 OINTMENT TOPICAL 2 TIMES DAILY
Status: CANCELLED | OUTPATIENT
Start: 2023-12-22 | End: 2023-12-27

## 2023-12-22 RX ORDER — FENTANYL CITRATE 50 UG/ML
100 INJECTION, SOLUTION INTRAMUSCULAR; INTRAVENOUS
Status: CANCELLED | OUTPATIENT
Start: 2023-12-22 | End: 2023-12-23

## 2023-12-22 RX ORDER — CEFAZOLIN SODIUM 2 G/50ML
2 SOLUTION INTRAVENOUS
Status: CANCELLED | OUTPATIENT
Start: 2023-12-22

## 2023-12-22 RX ADMIN — TRIAMCINOLONE ACETONIDE 40 MG: 40 INJECTION, SUSPENSION INTRA-ARTICULAR; INTRAMUSCULAR at 08:12

## 2023-12-22 NOTE — PROGRESS NOTES
Kayleen Patel is a 53 y.o. year old here today for preoperative visit in preparation for a Right total hip arthroplasty to be performed by Dr. Collins  on 1/17/2024.  she was last seen and treated in the clinic on 12/22/2023. she will be medically optimized by the pre op center. There has been no significant change in medical status since last visit. No fever, chills, malaise, or unexplained weight change.      Allergies, Medications, past medical and surgical history reviewed.    Focused examination performed.    Patient saw surgeon in clinic today. All questions answered. Patient encouraged to call with questions. Contact information given.     Pre, herman, and post operative procedures and expectations discussed. Goals of successful surgery reviewed and include manageable pain levels, surgical site free of infection, medication management, and ambulation with PT/OT assistance. Healthy weight management discussed with patient and caregiver who were receptive to eduction of healthy diet and activity. No other necessary lifestyle changes identified. Educated patient about signs and symptoms of infection, medication management, anticoagulation therapy, risk of tobacco and alcohol use, and self-care to promote healing. Surgical guide given for future reference. Hibiclens given to patient with instructions. All questions were answered.     Kayleen Patel verbalized an understanding to the education and goals. Patient has displayed readiness to engage in care and is ready to proceed with surgery.  Patient reports her friend is coming in from [place] and is able and ready to provide assistance at home after discharge.    Surgical and blood consents signed.    DME will be arranged. This walker is to be ordered for use during Hospital Admission Only -- Not to be ordered for [facility] (HME). The mobility limitation cannot be sufficiently resolved by the use of a cane. Patient's  "functional mobility deficit can be sufficiently resolved with the use of a (Rolling Walker or Walker). Patient's mobility limitation significantly impairs their ability to participate in one of more activities of daily living. The use of a (Rolling Walker or Walker) will significantly improve the patient's ability to participate in MRADLS and the patient will use it on regular basis in the home."     Kayleen Patel will contact us if there are any questions, concerns, or changes in medical status prior to surgery.       Joint class: 1/8    Patient has discussed discharge planning with surgeon. Patient will be discharged to home following surgery.   patient will be scheduled with Home Health during hospitalization.     30 minutes of time was spent on patient education, review of records, templating, H&P, , appointment scheduling and optimizing patient for surgery.      "

## 2023-12-22 NOTE — ASSESSMENT & PLAN NOTE
Current labs:  Hgb- 12.1      Hct- 35.1; RBC is 3.79     Denies weakness, fatigue, exercise intolerance or CP/SOB  Recommend monitoring during perioperative period.

## 2023-12-22 NOTE — ASSESSMENT & PLAN NOTE
Drinks 4-5 drinks (seltzers/wine) at least  3x weekly  On multivitamins  Audit-C score: 6-7 - alcohol misuse and possible liver damage.  At increased risk for alcohol withdrawal during the perioperative period.

## 2023-12-22 NOTE — PROGRESS NOTES
[facility]    HOME HEALTH ORDERS  FACE TO FACE ENCOUNTER    Patient Name: Kayleen Patel  YOB: 1970    PCP: Herbert Clay MD   PCP Address: [address] / [place] [address]  PCP Phone Number: [phone]  PCP Fax: [phone]    Encounter Date: 12/22/2023    Admit to Home Health    Diagnoses:  There are no hospital problems to display for this patient.      Future Appointments   Date Time Provider Department Center   12/22/2023 10:00 AM Atif Barraza NP [facility] PREOPC [address]   12/22/2023 11:00 AM LAB, APPOINTMENT [facility] LAB [facility]   12/22/2023 11:15 AM EKG, APPT [facility] EKG [facility] [address]   1/8/2024  9:00 AM JOINT CAMP, [facility] OCLASS [facility]   1/19/2024 11:00 AM TELEMED NURSE, [facility] ORTHO [facility] ORTHO [facility]   2/2/2024  9:20 AM Gwendolyn Alcantar NP [facility] ORTHO [facility]   3/26/2024  1:30 PM Sunny Heck MD [facility] PULMSVC [place] [address]           I have seen and examined this patient face to face today. My clinical findings that support the need for the home health skilled services and home bound status are the following:  Weakness/numbness causing balance and gait disturbance due to Joint Replacement making it taxing to leave home.  Requiring assistive device to leave home due to unsteady gait caused by Joint Replacement.    Allergies:  Review of patient's allergies indicates:   Allergen Reactions    Penicillins Hives    Adhesive Hives       Diet: regular diet    Activities: activity as tolerated    Home Health Admitting Clinician:   SN/PT to complete comprehensive assessment including routine vital signs. Instruct on disease process and s/s of complications to report to MD. Follow specific home health arthoplasty protocol. Review/verify medication list sent home with the patient at time of discharge  and instruct patient/caregiver as needed. If coumadin ordered, coumadin clinic to manage INR with INR draws 2x  per week with a goal to maintain INR between 1.8 and 2.2. Frequency may be adjusted depending on start of care date.    Notify MD if SBP > 160 or < 90; DBP > 90 or < 50; HR > 120 or < 50; Temp > 101    Home Medical Equipment:  Walker, 3-1 bedside commode, transfer tub bench    CONSULTS:    Physical Therapy may admit if patient not on coumadin, PT to perform comprehensive assessment if performing admit visit and generate therapy plan of care. Evaluate for home safety and equipment needs; Establish/upgrade home exercise program. Perform/instruct on therapeutic exercises, gait training, transfer training, and Range of Motion.      OTHER: (only select if patient needs other therapy disciplines)  Occupational Therapy to evaluate and treat. Evaluate home environment for safety and equipment needs. Perform/Instruct on transfers, ADL training, ROM, and therapeutic exercises.    WOUND CARE ORDERS:  Assess Surgical Incision/DSRG each TX  Aquacel AG drsg applied post-op leave on 14 days post op. Call MD if any drainage reaches border to border of drsg horizontally, s/s of infection, temp >101, induration, swelling or redness.  If dressing is removed per MD order, then apply island dressing, change/teach caregiver to perform daily dressing change if island dressing present.    Medications: Review discharge medications with patient and family and provide education.      Cannot display discharge medications since this is not an admission.      I certify that this patient is confined to her home and needs physical therapy and occupational therapy.

## 2023-12-22 NOTE — PROGRESS NOTES
[facility]  Progress Note    Patient Name: Kayleen Patel  MRN: [id number]  Date of Evaluation- 12/28/2023  PCP- Herbert Clay MD    Future cases for Kayleen Patel [[id number]]       Case ID Status Date Time Naeem Procedure Provider Location    [id number] [facility] 1/17/2024  9:58  ARTHROPLASTY, HIP, TOTAL, ANTERIOR APPROACH: RIGHT: DEPUY - ACTIS + PINNACLE Cresencio Collins III, MD [[id number]] [facility] OR            HPI:  This is a 53 y.o. female  who presents today for a preoperative evaluation in preparation for right hip arthroplasty.   Surgery is indicated for osteoarthritis of right hip .   Patient is new to me.  The history has been obtained by speaking with the patient and reviewing the electronic medical record and/or outside health information. Significant health conditions for the perioperative period are discussed below in assessment and plan.   Patient reports current health status to be Good.  Denies any new symptoms before surgery.       Subjective/ Objective:     Chief Complaint: Preoperative evaulation, perioperative medical management, and complication reduction plan.     Functional Capacity: works out at [employer] on Tues/Thurs- denies CP/SOB- does Cardio- without CP/SOB.       Anesthesia issues: None    Difficulty mouth opening: No    Steroid use in the last 12 months:  Yes Cortisone for bursitis    Dental Issues: None    Family anesthesia difficulty: Adopted       Family Hx of Thrombosis: Adopted    Past Medical History:   Diagnosis Date    Abnormal cervical Papanicolaou smear '88, '04    Colpo/Cryo    Benign paroxysmal vertigo, bilateral     Chronic diarrhea 02/12/2015    GERD (gastroesophageal reflux disease)     History of acute PID 1988    Hypertension     Thrombocytosis     Urticaria          Past Medical History Pertinent Negatives:   Diagnosis Date Noted    Atypical hyperplasia of breast 06/14/2023    BRCA1 negative 06/14/2023    BRCA1 positive  06/14/2023    BRCA2 negative 06/14/2023    BRCA2 positive 06/14/2023    Breast cancer 06/14/2023    Colon cancer 06/14/2023    Endometrial cancer 06/14/2023    Lobular carcinoma in situ 06/14/2023    Ovarian cancer 06/14/2023    Usual hyperplasia of lactiferous duct 06/14/2023         Past Surgical History:   Procedure Laterality Date    ADENOIDECTOMY      BREAST BIOPSY Right 06/09/2021    stereo benign    CARPAL TUNNEL RELEASE Left 09/21/2021    Procedure: RELEASE, CARPAL TUNNEL;  Surgeon: Alla Goff MD;  Location: [facility] OR;  Service: Orthopedics;  Laterality: Left;    CARPAL TUNNEL RELEASE Right 02/07/2023    Procedure: RELEASE, CARPAL TUNNEL;  Surgeon: Alla Goff MD;  Location: [facility] OR;  Service: Orthopedics;  Laterality: Right;    EYE SURGERY  2010    In Record    GANGLION CYST EXCISION      INJECTION OF JOINT Right 03/09/2023    Procedure: INJECTION, JOINT, RIGHT HIP  DIRECT REF;  Surgeon: Harleen Swan MD;  Location: [facility];  Service: Pain Management;  Laterality: Right;    INJECTION OF STEROID Right 09/21/2021    Procedure: INJECTION, STEROID-Right carpal tunnel;  Surgeon: Alla Goff MD;  Location: [facility] OR;  Service: Orthopedics;  Laterality: Right;    REFRACTIVE SURGERY Bilateral 2009    Dr. Vazquez Forbes     TONSILLECTOMY         Review of Systems   Constitutional:  Negative for chills, fatigue, fever and unexpected weight change.   HENT:  Negative for congestion, hearing loss, rhinorrhea, sore throat, tinnitus and trouble swallowing.         Hoarseness- Covid test negative- home   Eyes:  Negative for visual disturbance.   Respiratory:  Negative for cough, chest tightness, shortness of breath and wheezing.    Cardiovascular:  Negative for chest pain, palpitations and leg swelling.   Gastrointestinal:  Negative for constipation and diarrhea.        Denies Fatty liver, Hepatitis   Genitourinary:  Negative for decreased urine volume, difficulty urinating, dysuria, frequency, hematuria and  "urgency.        Nocturia   Musculoskeletal:  Positive for arthralgias (right hip). Negative for back pain, neck pain and neck stiffness.   Skin:  Negative for rash and wound.   Neurological:  Positive for dizziness (vertigo). Negative for syncope, weakness, numbness and headaches.   Hematological:  Bruises/bleeds easily.   Psychiatric/Behavioral:  Negative for sleep disturbance and suicidal ideas.             VITALS  Visit Vitals  /79 (BP Location: Left arm, Patient Position: Sitting)   Pulse 76   Temp 98.3 °F (36.8 °C) (Oral)   Ht 5' 2" (1.575 m)   Wt 62 kg (136 lb 11 oz)   LMP 02/07/2016 (Approximate)   SpO2 100%   BMI 25.00 kg/m²          Physical Exam  Vitals reviewed.   Constitutional:       General: She is not in acute distress.     Appearance: She is well-developed.   HENT:      Head: Normocephalic.      Nose: Nose normal.      Mouth/Throat:      Pharynx: No oropharyngeal exudate.   Eyes:      General:         Right eye: No discharge.         Left eye: No discharge.      Conjunctiva/sclera: Conjunctivae normal.      Pupils: Pupils are equal, round, and reactive to light.   Neck:      Thyroid: No thyromegaly.      Vascular: No carotid bruit or JVD.      Trachea: No tracheal deviation.   Cardiovascular:      Rate and Rhythm: Normal rate and regular rhythm.      Pulses:           Carotid pulses are 2+ on the right side and 2+ on the left side.       Dorsalis pedis pulses are 2+ on the right side and 2+ on the left side.        Posterior tibial pulses are 2+ on the right side and 2+ on the left side.      Heart sounds: Normal heart sounds. No murmur heard.  Pulmonary:      Effort: Pulmonary effort is normal. No respiratory distress.      Breath sounds: Normal breath sounds. No stridor. No wheezing, rhonchi or rales.   Abdominal:      General: Bowel sounds are normal. There is no distension.      Palpations: Abdomen is soft.      Tenderness: There is no abdominal tenderness. There is no guarding. "   Musculoskeletal:      Cervical back: Normal range of motion. No pain with movement.      Right lower leg: No edema.      Left lower leg: No edema.   Lymphadenopathy:      Cervical: No cervical adenopathy.   Skin:     General: Skin is warm and dry.      Capillary Refill: Capillary refill takes less than 2 seconds.      Findings: No erythema or rash.   Neurological:      Mental Status: She is alert and oriented to person, place, and time.   Psychiatric:         Behavior: Behavior normal.          Significant Labs:  Lab Results   Component Value Date    WBC 4.74 12/22/2023    HGB 12.1 12/22/2023    HCT 35.1 (L) 12/22/2023     12/22/2023    CHOL 243 (H) 07/11/2023    TRIG 88 07/11/2023    HDL 99 (H) 07/11/2023    ALT 76 (H) 12/22/2023    AST 76 (H) 12/22/2023     12/22/2023    K 3.8 12/22/2023    CL 97 12/22/2023    CREATININE 0.6 12/22/2023    BUN 10 12/22/2023    CO2 28 12/22/2023    TSH 1.064 07/11/2023    INR 0.9 12/22/2023    GLUF 103 09/22/2017    HGBA1C 5.1 07/11/2023       Diagnostic Studies: No relevant studies.    EKG:   Results for orders placed or performed during the hospital encounter of 12/22/23   EKG 12-lead    Collection Time: 12/22/23 10:57 AM    Narrative    Test Reason : Z01.818,    Vent. Rate : 081 BPM     Atrial Rate : 081 BPM     P-R Int : 142 ms          QRS Dur : 082 ms      QT Int : 400 ms       P-R-T Axes : 068 062 056 degrees     QTc Int : 464 ms    Normal sinus rhythm  Normal ECG  When compared with ECG of 30-AUG-2023 13:32,  No significant change was found  Confirmed by Brooke Zamudio MD ([id number]) on 12/22/2023 2:52:21 PM    Referred By: EULOGIO BUTT           Confirmed By:Brooke Zamudio MD       Stress Echo 8/30/23   Left Ventricle: The left ventricle is normal in size. Normal wall thickness. Normal wall motion. There is normal systolic function with a visually estimated ejection fraction of 60 - 65%. There is normal diastolic function.    Right Ventricle: Normal right  ventricular cavity size. Systolic function is normal.    Pulmonary Artery: The estimated pulmonary artery systolic pressure is 30 mmHg.    IVC/SVC: Intermediate venous pressure at 8 mmHg.    Stress Protocol: The patient exercised for 8 minutes 37 seconds on a high ramp protocol, corresponding to a functional capacity of 15 METS, achieving a peak heart rate of 153 bpm, which is 97 % of the age predicted maximum heart rate. Their exercise capacity was excellent. The patient reported no symptoms during the stress test.    Post-stress Impression: The study is negative with no echocardiographic evidence of stress induced ischemia.    Post-stress Echo: The left ventricle systolic function is hyperdynamic with an EF of 75 %.    Stress ECG: There are no ST segment deviation identified during the protocol. There are no arrhythmias during stress. There is normal blood pressure response with stress.        CT Calcium Score 7/27/21  FINDINGS:  Quantitative assessment of coronary artery calcium was performed with dedicated technique including full field of view and targeted reformatted images.     Agatston calcium score equals 3.     In this 51-year old female this score translates to the 50th percentile for coronary calcium load according to age and gender. Standard interpretation for a calcium score of 3 follows:     This indicates Minimal plaque burden. Low cardiovascular disease risk.     Clinical interpretation: Significant coronary artery disease very unlikely.     Gender and age issues: Applicable to men and women over 40, but note that the presence of chest pain, multiple risk factors, younger age subjects, or female gender (especially pre-menopausal) should encourage a more aggressive approach to therapy/management.     Recommended clinical action: Discuss general public health guidelines for primary cardiovascular disease prevention.     Additional findings:     Stable calcified granulomas within the lateral aspect of  the right lower lobe.  Multiple small ill-defined pulmonary nodules are present, some of which are ground-glass. These are all unchanged when compared to 06/07/2021. Overall, there are less pulmonary nodules when compared to that exam.  No new nodules.     The accompanying full field of view images, acquired from the pulmonary valve through the posterior wall the left ventricle, reveal no convincing evidence of pulmonary disease, pleural disease, lymph node enlargement, cardiac decompensation, pneumothorax, pneumomediastinum, pneumoperitoneum, or subcutaneous emphysema.     Impression:     Agatston calcium score equals 3, which translates to the 50th percentile for coronary calcium load based on age and sex.  Additional findings and recommendations above.     Redemonstration of multiple ill-defined pulmonary nodules, all of which are unchanged when compared to 06/07/2021.  Recommend follow-up for 6 months from the prior exam based on Lung-RADS recommendations (December 2021).     Electronically signed by resident: Benigno Funes  Date:                                            07/27/2021  Time:                                           09:22        Imaging   MRI L-Spine 6/13/23  FINDINGS:  Lumbar spine alignment is within normal limits. The vertebral body heights are well maintained.  Diffuse heterogeneous signal of the bone marrow, likely related to red marrow reconversion.  No focal infiltrative process.     Mild desiccation at L2-L3 and L5-S1 with mild height loss at L2-L3. No endplate edema.     The cauda equina appears within normal limits without findings to suggest arachnoiditis. The conus is normal in appearance, and terminates at the L1 level.     The adjacent soft tissue structures show no significant abnormalities. Paraspinal muscle bulk is maintained.     The proximal sacrum is unremarkable.  The sacroiliac joints are symmetric     There are findings of lumbar spondylosis, as below.     T12-L1: No spinal  canal stenosis or neural foraminal narrowing.     L1-L2: No spinal canal stenosis or neural foraminal narrowing.     L2-L3: Circumferential disc bulge.  No spinal canal stenosis or neural foraminal narrowing.     L3-L4: Left subarticular disc protrusion causes mass effect on the left lateral recess.  Mild-to-moderate left lateral recess stenosis with possible abutment of the left descending L4 nerve root.No neural foraminal narrowing.     L4-L5:  No spinal canal stenosis or neural foraminal narrowing.     L5-S1:  No spinal canal stenosis or neural foraminal narrowing.     Impression:     1. Left subarticular disc protrusion at L3-L4 causing mild to moderate left lateral recess stenosis with possible abutment of the left L4 descending nerve root.     Electronically signed by resident: Jt Ann  Date:                                            06/13/2023  Time:                                           16:14     Electronically signed by: Miguelito Oneal MD  Date:                                            06/13/2023  Time:                                           16:57      Carotid US 2020  There is 0-19% right Internal Carotid Stenosis.  There is 0-19% left Internal Carotid Stenosis.        Active Cardiac Conditions: None      Revised Cardiac Risk Index   High -Risk Surgery  Intraperitoneal; Intrathoracic; suprainguinal vascular Yes- + 1 No- 0   History of Ischemic Heart Disease   (Hx of MI/positive exercise test/current chest pain due to ischemia/use of nitrate therapy/EKG with pathological Q waves) Yes- + 1 No- 0   History of CHF  (Pulmonary edema/bilateral rales or S3 gallop/PND/CXR showing pulmonary vascular redistribution) Yes- + 1 No- 0   History of CVA   (Prior stroke or TIA) Yes- + 1 No- 0   Pre-operative treatment with insulin Yes- + 1 No- 0   Pre-operative creatinine > 2mg/dl Yes- + 1 No- 0   Total: 0      Risk Status:  Estimated risk of cardiac complications after non-cardiac surgery using the  Revised Cardiac Risk Index for Preoperative risk is 3.9 %      ARISCAT (Canet) risk index: Low: 1.6% risk of post-op pulmonary complications; Intermediate: 13.3% risk of post-op pulmonary complications if surgery is > 3 hours.     American Society of Anesthesiologists Physical Status classification (ASA): 2           No further cardiac workup needed prior to surgery.                   Assessment/Plan:     Primary osteoarthritis of right hip  Scheduled with Dr. Collins on 1/17/23 for right hip arthroplasty.     Calcified granuloma of lung  Right lung   Denies CP/SOB/cough/congestion/wheezing  Pulmonology appt. March 2024     CT chest 12/15/23  FINDINGS:  Lines and tubes: None     Lungs and airways: Mild upper lobe predominant centrilobular emphysema. Right lung calcified granuloma.  Several bilateral mostly ground-glass nodules.  Previously described largest opacity in the right lung appears part solid and measures 7 mm (previously 9 mm) on series 4, image 121.  Additional right-sided dominant ground-glass nodule measuring 7 mm (series 4, image 162), unchanged.  The largest opacity in the left lung appears ground-glass and measures 6 mm on series 4, image 75.  No new nodules.     Pleural: The pleural spaces are clear     Base of neck, mediastinum and heart: The thyroid gland is normal. No mediastinal, hilar or axillary lymphadenopathy is seen. The heart and pericardium are within normal limits. Minimal calcified atheromatous disease is seen in the aorta. The coronary arteries show minimal calcified atheromatous disease.     Soft tissues: Unremarkable     Abdomen: The study was performed without contrast and with lower than standard dose. These factors reduce the sensitivity of small lesions in the upper abdomen.  Given the significant limitations, no focal lesions is seen within the visualize upper abdomen.     Bones: The visualized osseous thorax is within normal limits     Explanation of the Lung-RADS categories can  be found at: [email or web address]     Impression:     Lung-RADS Category: 2 - Benign Appearance or Behavior - continue annual screening with LDCT in 12 months.     Clinically or potentially clinically significant non lung cancer finding: None.          Hypertension, essential  Current BP  controlled today.    Taking: chlorthalidone/valsartan    Lifestyle changes to reduce systolic BP:  exercise 30 minutes per day,  5 days per week or 150 minutes weekly; sodium reduction and avoidance of high salt foods such as processed meats, frozen meals and  fast foods.   Keeping a healthy weight/BMI can help with better BP control    BP acceptable for surgery. I recommend monitoring BP during perioperative period as uncontrolled pain can elevate blood pressure.         Aortic atherosclerosis  On ASA/statin  Calcium Score is 3     Gastroesophageal reflux disease  Not currently treated; no longer having symptoms.   History of gastric ulcers- attributed to smoking      Vertigo  Treated with: Antivert prn  .    Last episode: 1.5 years ago        Hyperlipidemia  Recommend weight loss, healthy diet (DASH/Mediterranean) and exercise.   Patient should exercise 30 minutes at least five times weekly once recovered from surgery. Limit alcohol.  Treated with: atorvastatin    Component      Latest Ref Rng 7/11/2023   Cholesterol Total      120 - 199 mg/dL 243 (H)    Triglycerides      30 - 150 mg/dL 88    HDL      40 - 75 mg/dL 99 (H)    LDL Cholesterol      63.0 - 159.0 mg/dL 126.4    HDL/Cholesterol Ratio      20.0 - 50.0 % 40.7    Total Cholesterol/HDL Ratio      2.0 - 5.0  2.5    Non-HDL Cholesterol      mg/dL 144       Legend:  (H) High    Alcohol consumption of more than four drinks per week  Drinks 4-5 drinks (seltzers/wine) at least  3x weekly  On multivitamins  Audit-C score: 6-7 - alcohol misuse and possible liver damage.  At increased risk for alcohol withdrawal during the  perioperative period.     Elevated liver enzymes  AST= 76  ALT= 76  INR is 0.9; platelet count is normal = 326  Likely due to alcohol use  Encouraged patient to reduce amount of alcohol to prepare for surgery- patient agreed to reduce amount by the New Year.  Recommend monitoring during perioperative period  Suggest follow-up with PCP    Normocytic anemia  Current labs:  Hgb- 12.1      Hct- 35.1; RBC is 3.79     Denies weakness, fatigue, exercise intolerance or CP/SOB  Recommend monitoring during perioperative period.       Discussion/Management of Perioperative Care    Thromboembolic prophylaxis (VTE) Care: Risk factors for thrombosis include: surgical procedure.  I recommend prophylaxis of thromboembolism with the use of compression stockings/pneumatic devices, and/or pharmacologic agents. The benefits should outweigh the risks for pharmacologic prophylaxis in the perioperative period. I also encourage early ambulation if not contraindicated during the post-operative period.    Risk factors for post-operative pulmonary complications include:HTN. To reduce the risk of pulmonary complications, prophylactic recommendations include: incentive spirometry use/deep breathing, early ambulation, and pain control.    Risk factors for renal complications include: HTN. To reduce the risk of postoperative renal complications, I recommend the patient maintain adequate fluid volume status by drinking 2 liters of water daily.  Avoid/reduce NSAIDS and GREGG-2 inhibitors use as well as IV contrast for renal protection.    I recommend the use of appropriate prophylactic antibiotics to reduce the risk of surgical site infections.      The patient is at an increased risk for urinary retention due to : possible regional anesthesia. I recommend to avoid/decrease the use of benzodiazepines, anticholinergics, and Benadryl in the perioperative period. I also recommend using opioids for the shortest period of time if possible.          This  visit was focused on Preoperative evaluation, Perioperative Medical management, complication reduction plans. I suggest that the patient follows up with primary care or relevant sub specialists for ongoing health care.    I appreciate the opportunity to be involved in this patients care. Please feel free to contact me if there were any questions about this consultation.        I spent a total of 63 minutes on the day of the visit.This includes face to face time and non-face to face time preparing to see the patient (e.g., review of tests), obtaining and/or reviewing separately obtained history, documenting clinical information in the electronic or other health record, independently interpreting results and communicating results to the patient/family/caregiver, or care coordinator.       Patient is optimized for surgery.       Atif Barraza NP  Perioperative Medicine  [facility]

## 2023-12-22 NOTE — H&P
CC:  Right hip pain    Kayleen Patel is a 53 y.o. female with Right hip pain.  Pain is worse with activity and weight bearing.  Patient has experienced interference of activities of daily living due to increased pain and decreased range of motion. Patient has failed non-operative treatment including NSAIDs, as well as greater than 3 months of activity modification. Kayleen Patel ambulates independently.     Relevant medical conditions of significance in perioperative period:  HTN- on medication managed by pcp  HLD- on medication managed by pcp      Past Medical History:   Diagnosis Date    Abnormal cervical Papanicolaou smear '88, '04    Colpo/Cryo    Benign paroxysmal vertigo, bilateral     Chronic diarrhea 02/12/2015    GERD (gastroesophageal reflux disease)     History of acute PID 1988    Hypertension     Thrombocytosis     Urticaria        Past Surgical History:   Procedure Laterality Date    ADENOIDECTOMY      BREAST BIOPSY Right 06/09/2021    stereo benign    CARPAL TUNNEL RELEASE Left 09/21/2021    Procedure: RELEASE, CARPAL TUNNEL;  Surgeon: Alla Goff MD;  Location: [facility] OR;  Service: Orthopedics;  Laterality: Left;    CARPAL TUNNEL RELEASE Right 02/07/2023    Procedure: RELEASE, CARPAL TUNNEL;  Surgeon: Alla Goff MD;  Location: [facility] OR;  Service: Orthopedics;  Laterality: Right;    EYE SURGERY  2010    In Record    GANGLION CYST EXCISION      INJECTION OF JOINT Right 03/09/2023    Procedure: INJECTION, JOINT, RIGHT HIP  DIRECT REF;  Surgeon: Harleen Swan MD;  Location: [facility] PAIN MGT;  Service: Pain Management;  Laterality: Right;    INJECTION OF STEROID Right 09/21/2021    Procedure: INJECTION, STEROID-Right carpal tunnel;  Surgeon: Alla Goff MD;  Location: [facility] OR;  Service: Orthopedics;  Laterality: Right;    REFRACTIVE SURGERY Bilateral 2009    Dr. Vazquez Forbes     TONSILLECTOMY         Family History   Adopted: Yes   Problem Relation Age of Onset    Heart  disease Mother         Adopted    Cancer Father         Adopted       Review of patient's allergies indicates:   Allergen Reactions    Penicillins Hives    Adhesive Hives         Current Outpatient Medications:     acetaminophen (TYLENOL) 500 MG tablet, Take 1-2 tablets (500-1,000 mg total) by mouth 3 (three) times daily as needed for Pain., Disp: , Rfl: 0    aspirin (ECOTRIN) 81 MG EC tablet, Take 1 tablet (81 mg total) by mouth once daily., Disp: 90 tablet, Rfl: 3    astaxanthin 12 mg Cap, Take by mouth Daily., Disp: , Rfl:     atorvastatin (LIPITOR) 10 MG tablet, Take 1 tablet (10 mg total) by mouth once daily., Disp: 90 tablet, Rfl: 3    CALCIUM CARBONATE (CALCIUM 500 ORAL), Take 1 capsule by mouth., Disp: , Rfl:     chlorthalidone (HYGROTEN) 25 MG Tab, TAKE 1 TABLET DAILY, Disp: 90 tablet, Rfl: 2    cyanocobalamin (VITAMIN B-12) 100 MCG tablet, Take 100 mcg by mouth once daily., Disp: , Rfl:     desonide (DESOWEN) 0.05 % cream, Apply topically 2 (two) times daily., Disp: 60 g, Rfl: 1    ferrous sulfate 325 mg (65 mg iron) Tab tablet, Take 325 mg by mouth daily with breakfast., Disp: , Rfl:     glucosamine sulfate (GLUCOSAMINE) 500 mg Tab, Take 1,500 mg by mouth Daily., Disp: , Rfl:     inulin (PREBIOTIC FIBER ORAL), Take 500 mg by mouth once daily. Takes 2 cap in am., Disp: , Rfl:     L.acid/L.casei/B.bif/B.bernabe/FOS (PROBIOTIC BLEND ORAL), Take 400 mg by mouth 2 (two) times a day., Disp: , Rfl:     MAGNESIUM GLYCINATE, BULK, MISC, 120 mg by Misc.(Non-Drug; Combo Route) route once daily., Disp: , Rfl:     meclizine (ANTIVERT) 25 mg tablet, Take 1 tablet (25 mg total) by mouth 3 (three) times daily as needed for Dizziness., Disp: 30 tablet, Rfl: 0    meloxicam (MOBIC) 15 MG tablet, TAKE 1 TABLET DAILY, Disp: 30 tablet, Rfl: 11    MILK THISTLE ORAL, Take 1,000 mg by mouth once daily., Disp: , Rfl:     mupirocin (BACTROBAN) 2 % ointment, , Disp: , Rfl:     OMEGA-3/DHA/EPA/FISH OIL (OMEGA-3 FISH OIL ORAL), Take 1  capsule by mouth., Disp: , Rfl:     ondansetron (ZOFRAN-ODT) 4 MG TbDL, Take 1 tablet (4 mg total) by mouth 3 (three) times daily as needed (Nausea and vomiting)., Disp: 30 tablet, Rfl: 0    tacrolimus (PROTOPIC) 0.03 % ointment, Apply topically 2 (two) times daily., Disp: 30 g, Rfl: 6    traMADoL (ULTRAM) 50 mg tablet, Take 1 tablet (50 mg total) by mouth every 12 (twelve) hours as needed for Pain., Disp: 14 tablet, Rfl: 0    traMADoL (ULTRAM) 50 mg tablet, Take 1 tablet (50 mg total) by mouth every 6 (six) hours., Disp: 25 tablet, Rfl: 0    UNABLE TO FIND, Take 1,950 mg by mouth Daily. medication name: Tumeric, Disp: , Rfl:     valsartan (DIOVAN) 160 MG tablet, TAKE 1 TABLET DAILY, Disp: 90 tablet, Rfl: 2  No current facility-administered medications for this visit.    Review of Systems:   Constitutional: no fever or chills  Eyes: no visual changes  ENT: no nasal congestion or sore throat  Respiratory: no cough or shortness of breath  Cardiovascular: no chest pain or palpitations  Gastrointestinal: no nausea or vomiting, tolerating diet  Genitourinary: no hematuria or dysuria  Integument/Breast: no rash or pruritis  Hematologic/Lymphatic: no easy bruising or lymphadenopathy  Musculoskeletal: positive for hip pain  Neurological: no seizures or tremors  Behavioral/Psych: no auditory or visual hallucinations  Endocrine: no heat or cold intolerance    PE:  LMP 02/07/2016 (Approximate)   General: Pleasant, cooperative, NAD   Gait: antalgic  HEENT: NCAT, sclera nonicteric   Lungs: Respirations are clear, equal and unlabored.   CV: S1S2; 2+ bilateral upper and lower extremity pulses.   Skin: Intact throughout LE with no rashes, erythema, or lesions  Extremities: No LE edema, NVI lower extremities    Right Hip Exam:  90 degrees flexion  0 degrees extension   20 degrees internal rotation  30 degrees external rotation  30 degrees abduction  20 degrees adduction  There is pain with passive range of motion.     Radiographs:  Radiographs reveal advanced degenerative changes including subchondral cyst formation, subchondral sclerosis, osteophyte formation, joint space narrowing.     Diagnosis: Primary osteoarthritis Right hip    Plan: Right total hip arthroplasty     Due to the serious nature of total joint infection and the high prevalence of community acquired MRSA, vancomycin will be used perioperatively.

## 2023-12-22 NOTE — PROGRESS NOTES
Pre-op R ant NICK 1/17/24    Progressive L lat hip pain, not groin  Ttp GT  Neg groin pain with aSLR  Full hip rom lat pain no groin    Did L hip GTB CSI today with immediate relief  Bursitis info given    R hip no change in exam, localizes to groin/ant hip not lat    No interval changes    This patient has significant symptoms in their hip that are affecting their quality of life and daily activities.  They have tried non-operative treatment including analgesics, an exercise program, and activity modification, but the symptoms have persisted. I believe they make a good candidate for hip arthroplasty.     The risks and benefits of hip arthroplasty have been discussed with the patient which include, but are not limited to infections (including severe sequelae), potential component failure, fracture, DVT, pulmonary embolus, nerve palsy, dislocation, leg length inequality, persistent pain, wound healing complications, transfusions, medical complications and death.     We discussed surgical options including implant type and why I believe the implant selected is a good match for the patient's needs. The patient expressed understanding and agrees to proceed with the planned surgery.     Pre-operative planning will include the following:  A pre-surgical evaluation will be arranged.  Pre-op orders will be placed.  We will make arrangements with the operating room for proper time and staffing.  We will make Social Service arrangements for the patient.    Approach: Anterior     Implants:   Company: [email or web address]  Cup: [employer] emphasys - small  Stem: Actis     DVT prophylaxis: ASA 81mg BID x1 month  Dispo: home health PT     Admission status: Outpatient/23hr OBS                 Location: [place]                                                 R ant NICK 1/17/24  Will try same day - lives alone, friend coming in from [place] for 2 weeks    Donated blood 2 weeks ago, donates every 8 weeks  Told her not to donate any more

## 2023-12-22 NOTE — ASSESSMENT & PLAN NOTE
Recommend weight loss, healthy diet (DASH/Mediterranean) and exercise.   Patient should exercise 30 minutes at least five times weekly once recovered from surgery. Limit alcohol.  Treated with: atorvastatin    Component      Latest Ref Rng 7/11/2023   Cholesterol Total      120 - 199 mg/dL 243 (H)    Triglycerides      30 - 150 mg/dL 88    HDL      40 - 75 mg/dL 99 (H)    LDL Cholesterol      63.0 - 159.0 mg/dL 126.4    HDL/Cholesterol Ratio      20.0 - 50.0 % 40.7    Total Cholesterol/HDL Ratio      2.0 - 5.0  2.5    Non-HDL Cholesterol      mg/dL 144       Legend:  (H) High

## 2023-12-22 NOTE — H&P (VIEW-ONLY)
CC:  Right hip pain    Kayleen Ptael is a 53 y.o. female with Right hip pain.  Pain is worse with activity and weight bearing.  Patient has experienced interference of activities of daily living due to increased pain and decreased range of motion. Patient has failed non-operative treatment including NSAIDs, as well as greater than 3 months of activity modification. Kayleen Patel ambulates independently.     Relevant medical conditions of significance in perioperative period:  HTN- on medication managed by pcp  HLD- on medication managed by pcp      Past Medical History:   Diagnosis Date    Abnormal cervical Papanicolaou smear '88, '04    Colpo/Cryo    Benign paroxysmal vertigo, bilateral     Chronic diarrhea 02/12/2015    GERD (gastroesophageal reflux disease)     History of acute PID 1988    Hypertension     Thrombocytosis     Urticaria        Past Surgical History:   Procedure Laterality Date    ADENOIDECTOMY      BREAST BIOPSY Right 06/09/2021    stereo benign    CARPAL TUNNEL RELEASE Left 09/21/2021    Procedure: RELEASE, CARPAL TUNNEL;  Surgeon: Alla Goff MD;  Location: [facility] OR;  Service: Orthopedics;  Laterality: Left;    CARPAL TUNNEL RELEASE Right 02/07/2023    Procedure: RELEASE, CARPAL TUNNEL;  Surgeon: Alla Goff MD;  Location: [facility] OR;  Service: Orthopedics;  Laterality: Right;    EYE SURGERY  2010    In Record    GANGLION CYST EXCISION      INJECTION OF JOINT Right 03/09/2023    Procedure: INJECTION, JOINT, RIGHT HIP  DIRECT REF;  Surgeon: Harleen Swan MD;  Location: [facility] PAIN MGT;  Service: Pain Management;  Laterality: Right;    INJECTION OF STEROID Right 09/21/2021    Procedure: INJECTION, STEROID-Right carpal tunnel;  Surgeon: Alla Goff MD;  Location: [facility] OR;  Service: Orthopedics;  Laterality: Right;    REFRACTIVE SURGERY Bilateral 2009    Dr. Vazquez Forbes     TONSILLECTOMY         Family History   Adopted: Yes   Problem Relation Age of Onset    Heart  disease Mother         Adopted    Cancer Father         Adopted       Review of patient's allergies indicates:   Allergen Reactions    Penicillins Hives    Adhesive Hives         Current Outpatient Medications:     acetaminophen (TYLENOL) 500 MG tablet, Take 1-2 tablets (500-1,000 mg total) by mouth 3 (three) times daily as needed for Pain., Disp: , Rfl: 0    aspirin (ECOTRIN) 81 MG EC tablet, Take 1 tablet (81 mg total) by mouth once daily., Disp: 90 tablet, Rfl: 3    astaxanthin 12 mg Cap, Take by mouth Daily., Disp: , Rfl:     atorvastatin (LIPITOR) 10 MG tablet, Take 1 tablet (10 mg total) by mouth once daily., Disp: 90 tablet, Rfl: 3    CALCIUM CARBONATE (CALCIUM 500 ORAL), Take 1 capsule by mouth., Disp: , Rfl:     chlorthalidone (HYGROTEN) 25 MG Tab, TAKE 1 TABLET DAILY, Disp: 90 tablet, Rfl: 2    cyanocobalamin (VITAMIN B-12) 100 MCG tablet, Take 100 mcg by mouth once daily., Disp: , Rfl:     desonide (DESOWEN) 0.05 % cream, Apply topically 2 (two) times daily., Disp: 60 g, Rfl: 1    ferrous sulfate 325 mg (65 mg iron) Tab tablet, Take 325 mg by mouth daily with breakfast., Disp: , Rfl:     glucosamine sulfate (GLUCOSAMINE) 500 mg Tab, Take 1,500 mg by mouth Daily., Disp: , Rfl:     inulin (PREBIOTIC FIBER ORAL), Take 500 mg by mouth once daily. Takes 2 cap in am., Disp: , Rfl:     L.acid/L.casei/B.bif/B.bernabe/FOS (PROBIOTIC BLEND ORAL), Take 400 mg by mouth 2 (two) times a day., Disp: , Rfl:     MAGNESIUM GLYCINATE, BULK, MISC, 120 mg by Misc.(Non-Drug; Combo Route) route once daily., Disp: , Rfl:     meclizine (ANTIVERT) 25 mg tablet, Take 1 tablet (25 mg total) by mouth 3 (three) times daily as needed for Dizziness., Disp: 30 tablet, Rfl: 0    meloxicam (MOBIC) 15 MG tablet, TAKE 1 TABLET DAILY, Disp: 30 tablet, Rfl: 11    MILK THISTLE ORAL, Take 1,000 mg by mouth once daily., Disp: , Rfl:     mupirocin (BACTROBAN) 2 % ointment, , Disp: , Rfl:     OMEGA-3/DHA/EPA/FISH OIL (OMEGA-3 FISH OIL ORAL), Take 1  capsule by mouth., Disp: , Rfl:     ondansetron (ZOFRAN-ODT) 4 MG TbDL, Take 1 tablet (4 mg total) by mouth 3 (three) times daily as needed (Nausea and vomiting)., Disp: 30 tablet, Rfl: 0    tacrolimus (PROTOPIC) 0.03 % ointment, Apply topically 2 (two) times daily., Disp: 30 g, Rfl: 6    traMADoL (ULTRAM) 50 mg tablet, Take 1 tablet (50 mg total) by mouth every 12 (twelve) hours as needed for Pain., Disp: 14 tablet, Rfl: 0    traMADoL (ULTRAM) 50 mg tablet, Take 1 tablet (50 mg total) by mouth every 6 (six) hours., Disp: 25 tablet, Rfl: 0    UNABLE TO FIND, Take 1,950 mg by mouth Daily. medication name: Tumeric, Disp: , Rfl:     valsartan (DIOVAN) 160 MG tablet, TAKE 1 TABLET DAILY, Disp: 90 tablet, Rfl: 2  No current facility-administered medications for this visit.    Review of Systems:   Constitutional: no fever or chills  Eyes: no visual changes  ENT: no nasal congestion or sore throat  Respiratory: no cough or shortness of breath  Cardiovascular: no chest pain or palpitations  Gastrointestinal: no nausea or vomiting, tolerating diet  Genitourinary: no hematuria or dysuria  Integument/Breast: no rash or pruritis  Hematologic/Lymphatic: no easy bruising or lymphadenopathy  Musculoskeletal: positive for hip pain  Neurological: no seizures or tremors  Behavioral/Psych: no auditory or visual hallucinations  Endocrine: no heat or cold intolerance    PE:  LMP 02/07/2016 (Approximate)   General: Pleasant, cooperative, NAD   Gait: antalgic  HEENT: NCAT, sclera nonicteric   Lungs: Respirations are clear, equal and unlabored.   CV: S1S2; 2+ bilateral upper and lower extremity pulses.   Skin: Intact throughout LE with no rashes, erythema, or lesions  Extremities: No LE edema, NVI lower extremities    Right Hip Exam:  90 degrees flexion  0 degrees extension   20 degrees internal rotation  30 degrees external rotation  30 degrees abduction  20 degrees adduction  There is pain with passive range of motion.     Radiographs:  Radiographs reveal advanced degenerative changes including subchondral cyst formation, subchondral sclerosis, osteophyte formation, joint space narrowing.     Diagnosis: Primary osteoarthritis Right hip    Plan: Right total hip arthroplasty     Due to the serious nature of total joint infection and the high prevalence of community acquired MRSA, vancomycin will be used perioperatively.

## 2023-12-22 NOTE — PROCEDURES
Large Joint Aspiration/Injection: L greater trochanteric bursa    Date/Time: 12/22/2023 8:40 AM    Performed by: Cresencio Collins III, MD  Authorized by: Cresencio Collins III, MD    Consent Done?:  Yes (Verbal)  Indications:  Pain  Timeout: prior to procedure the correct patient, procedure, and site was verified    Prep: patient was prepped and draped in usual sterile fashion    Anesthetic total (ml):  5      Details:  Needle Size:  21 G  Approach:  Lateral  Location:  Hip  Site:  L greater trochanteric bursa  Medications:  40 mg triamcinolone acetonide 40 mg/mL  Patient tolerance:  Patient tolerated the procedure well with no immediate complications

## 2023-12-22 NOTE — ASSESSMENT & PLAN NOTE
AST= 76  ALT= 76  INR is 0.9; platelet count is normal = 326  Likely due to alcohol use  Encouraged patient to reduce amount of alcohol to prepare for surgery- patient agreed to reduce amount by the New Year.  Recommend monitoring during perioperative period  Suggest follow-up with PCP

## 2023-12-24 ENCOUNTER — PATIENT MESSAGE (OUTPATIENT)
Dept: ADMINISTRATIVE | Facility: OTHER | Age: 53
End: 2023-12-24
Payer: OTHER GOVERNMENT

## 2023-12-24 ENCOUNTER — PATIENT MESSAGE (OUTPATIENT)
Dept: CARDIOLOGY | Facility: CLINIC | Age: 53
End: 2023-12-24
Payer: OTHER GOVERNMENT

## 2023-12-24 NOTE — TELEPHONE ENCOUNTER
See standing or future lab orders and please draw Labs ordered in this encounter -  in 2 weeks , thank you.

## 2023-12-26 ENCOUNTER — PATIENT MESSAGE (OUTPATIENT)
Dept: ADMINISTRATIVE | Facility: OTHER | Age: 53
End: 2023-12-26
Payer: OTHER GOVERNMENT

## 2024-01-08 ENCOUNTER — PATIENT MESSAGE (OUTPATIENT)
Dept: ORTHOPEDICS | Facility: CLINIC | Age: 54
End: 2024-01-08
Payer: OTHER GOVERNMENT

## 2024-01-08 ENCOUNTER — PATIENT MESSAGE (OUTPATIENT)
Dept: ADMINISTRATIVE | Facility: OTHER | Age: 54
End: 2024-01-08
Payer: OTHER GOVERNMENT

## 2024-01-08 ENCOUNTER — CLINICAL SUPPORT (OUTPATIENT)
Dept: ORTHOPEDICS | Facility: CLINIC | Age: 54
End: 2024-01-08
Payer: OTHER GOVERNMENT

## 2024-01-08 DIAGNOSIS — M16.11 PRIMARY OSTEOARTHRITIS OF RIGHT HIP: Primary | ICD-10-CM

## 2024-01-08 PROCEDURE — 99499 UNLISTED E&M SERVICE: CPT | Mod: S$GLB,,, | Performed by: ORTHOPAEDIC SURGERY

## 2024-01-09 ENCOUNTER — PATIENT MESSAGE (OUTPATIENT)
Dept: CARDIOLOGY | Facility: CLINIC | Age: 54
End: 2024-01-09
Payer: OTHER GOVERNMENT

## 2024-01-09 ENCOUNTER — LAB VISIT (OUTPATIENT)
Dept: LAB | Facility: HOSPITAL | Age: 54
End: 2024-01-09
Attending: FAMILY MEDICINE
Payer: OTHER GOVERNMENT

## 2024-01-09 DIAGNOSIS — R94.5 ABNORMAL RESULTS OF LIVER FUNCTION STUDIES: ICD-10-CM

## 2024-01-09 DIAGNOSIS — Z00.00 ANNUAL PHYSICAL EXAM: ICD-10-CM

## 2024-01-09 LAB
ALBUMIN SERPL BCP-MCNC: 4.2 G/DL (ref 3.5–5.2)
ALBUMIN SERPL BCP-MCNC: 4.2 G/DL (ref 3.5–5.2)
ALP SERPL-CCNC: 57 U/L (ref 55–135)
ALP SERPL-CCNC: 57 U/L (ref 55–135)
ALT SERPL W/O P-5'-P-CCNC: 24 U/L (ref 10–44)
ALT SERPL W/O P-5'-P-CCNC: 24 U/L (ref 10–44)
ANION GAP SERPL CALC-SCNC: 11 MMOL/L (ref 8–16)
AST SERPL-CCNC: 25 U/L (ref 10–40)
AST SERPL-CCNC: 25 U/L (ref 10–40)
BILIRUB DIRECT SERPL-MCNC: 0.2 MG/DL (ref 0.1–0.3)
BILIRUB SERPL-MCNC: 0.7 MG/DL (ref 0.1–1)
BILIRUB SERPL-MCNC: 0.7 MG/DL (ref 0.1–1)
BUN SERPL-MCNC: 15 MG/DL (ref 6–20)
CALCIUM SERPL-MCNC: 9.7 MG/DL (ref 8.7–10.5)
CHLORIDE SERPL-SCNC: 100 MMOL/L (ref 95–110)
CO2 SERPL-SCNC: 26 MMOL/L (ref 23–29)
CREAT SERPL-MCNC: 0.6 MG/DL (ref 0.5–1.4)
EST. GFR  (NO RACE VARIABLE): >60 ML/MIN/1.73 M^2
GLUCOSE SERPL-MCNC: 102 MG/DL (ref 70–110)
HAV IGM SERPL QL IA: NORMAL
HBV CORE IGM SERPL QL IA: NORMAL
HBV SURFACE AG SERPL QL IA: NORMAL
HCV AB SERPL QL IA: NORMAL
POTASSIUM SERPL-SCNC: 3.5 MMOL/L (ref 3.5–5.1)
PROT SERPL-MCNC: 7.3 G/DL (ref 6–8.4)
PROT SERPL-MCNC: 7.3 G/DL (ref 6–8.4)
SODIUM SERPL-SCNC: 137 MMOL/L (ref 136–145)

## 2024-01-09 PROCEDURE — 80053 COMPREHEN METABOLIC PANEL: CPT | Performed by: FAMILY MEDICINE

## 2024-01-09 PROCEDURE — 36415 COLL VENOUS BLD VENIPUNCTURE: CPT | Performed by: FAMILY MEDICINE

## 2024-01-09 PROCEDURE — 80074 ACUTE HEPATITIS PANEL: CPT | Performed by: FAMILY MEDICINE

## 2024-01-10 ENCOUNTER — PATIENT MESSAGE (OUTPATIENT)
Dept: INTERNAL MEDICINE | Facility: CLINIC | Age: 54
End: 2024-01-10
Payer: OTHER GOVERNMENT

## 2024-01-10 ENCOUNTER — CLINICAL SUPPORT (OUTPATIENT)
Dept: ORTHOPEDICS | Facility: CLINIC | Age: 54
End: 2024-01-10
Payer: OTHER GOVERNMENT

## 2024-01-10 DIAGNOSIS — M16.11 PRIMARY OSTEOARTHRITIS OF RIGHT HIP: Primary | ICD-10-CM

## 2024-01-10 PROCEDURE — 99499 UNLISTED E&M SERVICE: CPT | Mod: S$GLB,,, | Performed by: ORTHOPAEDIC SURGERY

## 2024-01-12 ENCOUNTER — TELEPHONE (OUTPATIENT)
Dept: ORTHOPEDICS | Facility: CLINIC | Age: 54
End: 2024-01-12
Payer: OTHER GOVERNMENT

## 2024-01-12 NOTE — TELEPHONE ENCOUNTER
I called the patient today regarding surgery on 1/17/2024 with Dr. Cresencio Collins. I informed the patient that her surgery will be at  [facility] ([facility] [place] [address]). I informed the patient they must arrive at 6:00am  and their surgery will start at 8:00am.     Per the [facility] COVID-19 Pre-Procedural Testing Policy (administered 10/26/2022), patients do not need tested for COVID-19 regardless of vaccination status.    I reminded the patient that they cannot eat or drink after midnight, the night before surgery.      I reminded the patient to be careful of their skin over the next few days to make sure they do not get any cuts, scratches or scrapes.    The patient that they have a walker, bedside commode and shower chair from a previous surgery and do not need another order for them.    The patient verbalized understanding and has no further questions.

## 2024-01-16 DIAGNOSIS — Z96.641 STATUS POST RIGHT HIP REPLACEMENT: Primary | ICD-10-CM

## 2024-01-16 RX ORDER — AMOXICILLIN 250 MG
1 CAPSULE ORAL DAILY
Qty: 30 TABLET | Refills: 0 | Status: SHIPPED | OUTPATIENT
Start: 2024-01-16

## 2024-01-16 RX ORDER — METHOCARBAMOL 750 MG/1
750 TABLET, FILM COATED ORAL 4 TIMES DAILY PRN
Qty: 40 TABLET | Refills: 0 | Status: SHIPPED | OUTPATIENT
Start: 2024-01-16 | End: 2024-01-26 | Stop reason: SDUPTHER

## 2024-01-16 RX ORDER — PANTOPRAZOLE SODIUM 40 MG/1
40 TABLET, DELAYED RELEASE ORAL DAILY
Qty: 30 TABLET | Refills: 0 | Status: SHIPPED | OUTPATIENT
Start: 2024-01-16 | End: 2024-02-16

## 2024-01-16 RX ORDER — CELECOXIB 200 MG/1
200 CAPSULE ORAL DAILY
Qty: 30 CAPSULE | Refills: 0 | Status: SHIPPED | OUTPATIENT
Start: 2024-01-16 | End: 2024-05-21

## 2024-01-16 RX ORDER — DEXTROMETHORPHAN HYDROBROMIDE, GUAIFENESIN 5; 100 MG/5ML; MG/5ML
650 LIQUID ORAL EVERY 8 HOURS
Qty: 120 TABLET | Refills: 0 | Status: SHIPPED | OUTPATIENT
Start: 2024-01-16

## 2024-01-16 RX ORDER — CEFADROXIL 500 MG/1
500 CAPSULE ORAL EVERY 12 HOURS
Qty: 14 CAPSULE | Refills: 0 | Status: SHIPPED | OUTPATIENT
Start: 2024-01-16 | End: 2024-01-24

## 2024-01-16 RX ORDER — ASPIRIN 81 MG/1
81 TABLET ORAL 2 TIMES DAILY
Qty: 60 TABLET | Refills: 0 | Status: SHIPPED | OUTPATIENT
Start: 2024-01-16 | End: 2024-02-16

## 2024-01-16 RX ORDER — OXYCODONE HYDROCHLORIDE 5 MG/1
TABLET ORAL
Qty: 30 TABLET | Refills: 0 | Status: SHIPPED | OUTPATIENT
Start: 2024-01-16

## 2024-01-17 ENCOUNTER — HOSPITAL ENCOUNTER (OUTPATIENT)
Facility: HOSPITAL | Age: 54
Discharge: HOME | End: 2024-01-17
Attending: ORTHOPAEDIC SURGERY | Admitting: ORTHOPAEDIC SURGERY
Payer: OTHER GOVERNMENT

## 2024-01-17 ENCOUNTER — ANESTHESIA EVENT (OUTPATIENT)
Dept: SURGERY | Facility: HOSPITAL | Age: 54
End: 2024-01-17
Payer: OTHER GOVERNMENT

## 2024-01-17 ENCOUNTER — ANESTHESIA (OUTPATIENT)
Dept: SURGERY | Facility: HOSPITAL | Age: 54
End: 2024-01-17
Payer: OTHER GOVERNMENT

## 2024-01-17 VITALS
HEART RATE: 76 BPM | SYSTOLIC BLOOD PRESSURE: 120 MMHG | OXYGEN SATURATION: 98 % | BODY MASS INDEX: 25.49 KG/M2 | DIASTOLIC BLOOD PRESSURE: 68 MMHG | RESPIRATION RATE: 15 BRPM | TEMPERATURE: 97 F | WEIGHT: 135 LBS | HEIGHT: 61 IN

## 2024-01-17 DIAGNOSIS — Z01.818 PRE-OP TESTING: ICD-10-CM

## 2024-01-17 DIAGNOSIS — M16.11 PRIMARY OSTEOARTHRITIS OF RIGHT HIP: ICD-10-CM

## 2024-01-17 DIAGNOSIS — M79.609 PAIN IN EXTREMITY, UNSPECIFIED EXTREMITY: ICD-10-CM

## 2024-01-17 DIAGNOSIS — Z96.642 S/P TOTAL LEFT HIP ARTHROPLASTY: ICD-10-CM

## 2024-01-17 PROCEDURE — 97116 GAIT TRAINING THERAPY: CPT

## 2024-01-17 PROCEDURE — 63600175 PHARM REV CODE 636 W HCPCS: Performed by: ANESTHESIOLOGY

## 2024-01-17 PROCEDURE — 36000711: Performed by: ORTHOPAEDIC SURGERY

## 2024-01-17 PROCEDURE — 36000710: Performed by: ORTHOPAEDIC SURGERY

## 2024-01-17 PROCEDURE — 71000016 HC POSTOP RECOV ADDL HR: Performed by: ORTHOPAEDIC SURGERY

## 2024-01-17 PROCEDURE — 37000009 HC ANESTHESIA EA ADD 15 MINS: Performed by: ORTHOPAEDIC SURGERY

## 2024-01-17 PROCEDURE — D9220A PRA ANESTHESIA: Mod: CRNA,,, | Performed by: NURSE ANESTHETIST, CERTIFIED REGISTERED

## 2024-01-17 PROCEDURE — 63600175 PHARM REV CODE 636 W HCPCS: Performed by: NURSE ANESTHETIST, CERTIFIED REGISTERED

## 2024-01-17 PROCEDURE — 25000003 PHARM REV CODE 250: Performed by: ORTHOPAEDIC SURGERY

## 2024-01-17 PROCEDURE — 71000039 HC RECOVERY, EACH ADD'L HOUR: Performed by: ORTHOPAEDIC SURGERY

## 2024-01-17 PROCEDURE — 01214 ANES OPEN PX TOT HIP ARTHRP: CPT | Performed by: ORTHOPAEDIC SURGERY

## 2024-01-17 PROCEDURE — 25000003 PHARM REV CODE 250: Performed by: NURSE ANESTHETIST, CERTIFIED REGISTERED

## 2024-01-17 PROCEDURE — 27130 TOTAL HIP ARTHROPLASTY: CPT | Mod: RT,,, | Performed by: ORTHOPAEDIC SURGERY

## 2024-01-17 PROCEDURE — 63600175 PHARM REV CODE 636 W HCPCS: Performed by: ORTHOPAEDIC SURGERY

## 2024-01-17 PROCEDURE — C1713 ANCHOR/SCREW BN/BN,TIS/BN: HCPCS | Performed by: ORTHOPAEDIC SURGERY

## 2024-01-17 PROCEDURE — 27130 TOTAL HIP ARTHROPLASTY: CPT | Mod: AS,RT,,

## 2024-01-17 PROCEDURE — 94761 N-INVAS EAR/PLS OXIMETRY MLT: CPT

## 2024-01-17 PROCEDURE — 27201423 OPTIME MED/SURG SUP & DEVICES STERILE SUPPLY: Performed by: ORTHOPAEDIC SURGERY

## 2024-01-17 PROCEDURE — C1776 JOINT DEVICE (IMPLANTABLE): HCPCS | Performed by: ORTHOPAEDIC SURGERY

## 2024-01-17 PROCEDURE — 71000015 HC POSTOP RECOV 1ST HR: Performed by: ORTHOPAEDIC SURGERY

## 2024-01-17 PROCEDURE — 99900035 HC TECH TIME PER 15 MIN (STAT)

## 2024-01-17 PROCEDURE — 97535 SELF CARE MNGMENT TRAINING: CPT

## 2024-01-17 PROCEDURE — 97165 OT EVAL LOW COMPLEX 30 MIN: CPT

## 2024-01-17 PROCEDURE — 63600175 PHARM REV CODE 636 W HCPCS: Performed by: NURSE PRACTITIONER

## 2024-01-17 PROCEDURE — 71000033 HC RECOVERY, INTIAL HOUR: Performed by: ORTHOPAEDIC SURGERY

## 2024-01-17 PROCEDURE — 97161 PT EVAL LOW COMPLEX 20 MIN: CPT

## 2024-01-17 PROCEDURE — 25000003 PHARM REV CODE 250: Performed by: NURSE PRACTITIONER

## 2024-01-17 PROCEDURE — 25000003 PHARM REV CODE 250: Performed by: PHYSICIAN ASSISTANT

## 2024-01-17 PROCEDURE — D9220A PRA ANESTHESIA: Mod: ANES,,, | Performed by: ANESTHESIOLOGY

## 2024-01-17 PROCEDURE — 37000008 HC ANESTHESIA 1ST 15 MINUTES: Performed by: ORTHOPAEDIC SURGERY

## 2024-01-17 DEVICE — HEAD FEM 12/14 TAPER 1.5X36: Type: IMPLANTABLE DEVICE | Site: HIP | Status: FUNCTIONAL

## 2024-01-17 DEVICE — IMPLANTABLE DEVICE: Type: IMPLANTABLE DEVICE | Site: HIP | Status: FUNCTIONAL

## 2024-01-17 DEVICE — SCREW PINNACLE 6.5 X 25: Type: IMPLANTABLE DEVICE | Site: HIP | Status: FUNCTIONAL

## 2024-01-17 DEVICE — SCREW PINNACLE 6.5 X 20: Type: IMPLANTABLE DEVICE | Site: HIP | Status: FUNCTIONAL

## 2024-01-17 RX ORDER — MUPIROCIN 20 MG/G
1 OINTMENT TOPICAL 2 TIMES DAILY
Status: DISCONTINUED | OUTPATIENT
Start: 2024-01-17 | End: 2024-01-17 | Stop reason: HOSPADM

## 2024-01-17 RX ORDER — ONDANSETRON HYDROCHLORIDE 2 MG/ML
INJECTION, SOLUTION INTRAVENOUS
Status: DISCONTINUED | OUTPATIENT
Start: 2024-01-17 | End: 2024-01-17

## 2024-01-17 RX ORDER — MIDAZOLAM HYDROCHLORIDE 1 MG/ML
4 INJECTION INTRAMUSCULAR; INTRAVENOUS
Status: DISCONTINUED | OUTPATIENT
Start: 2024-01-17 | End: 2024-01-17 | Stop reason: HOSPADM

## 2024-01-17 RX ORDER — LORAZEPAM 2 MG/ML
0.25 INJECTION INTRAMUSCULAR ONCE AS NEEDED
Status: DISCONTINUED | OUTPATIENT
Start: 2024-01-17 | End: 2024-01-17 | Stop reason: HOSPADM

## 2024-01-17 RX ORDER — PHENYLEPHRINE HYDROCHLORIDE 10 MG/ML
INJECTION INTRAVENOUS
Status: DISCONTINUED | OUTPATIENT
Start: 2024-01-17 | End: 2024-01-17

## 2024-01-17 RX ORDER — OXYCODONE HYDROCHLORIDE 5 MG/1
10 TABLET ORAL
Status: DISCONTINUED | OUTPATIENT
Start: 2024-01-17 | End: 2024-01-17 | Stop reason: HOSPADM

## 2024-01-17 RX ORDER — POLYETHYLENE GLYCOL 3350 17 G/17G
17 POWDER, FOR SOLUTION ORAL DAILY
Status: DISCONTINUED | OUTPATIENT
Start: 2024-01-17 | End: 2024-01-17 | Stop reason: HOSPADM

## 2024-01-17 RX ORDER — DEXAMETHASONE SODIUM PHOSPHATE 4 MG/ML
INJECTION, SOLUTION INTRA-ARTICULAR; INTRALESIONAL; INTRAMUSCULAR; INTRAVENOUS; SOFT TISSUE
Status: DISCONTINUED | OUTPATIENT
Start: 2024-01-17 | End: 2024-01-17

## 2024-01-17 RX ORDER — LIDOCAINE HYDROCHLORIDE 10 MG/ML
1 INJECTION, SOLUTION EPIDURAL; INFILTRATION; INTRACAUDAL; PERINEURAL
Status: DISCONTINUED | OUTPATIENT
Start: 2024-01-17 | End: 2024-01-17 | Stop reason: HOSPADM

## 2024-01-17 RX ORDER — PREGABALIN 75 MG/1
75 CAPSULE ORAL
Status: COMPLETED | OUTPATIENT
Start: 2024-01-17 | End: 2024-01-17

## 2024-01-17 RX ORDER — PROPOFOL 10 MG/ML
VIAL (ML) INTRAVENOUS
Status: DISCONTINUED | OUTPATIENT
Start: 2024-01-17 | End: 2024-01-17

## 2024-01-17 RX ORDER — FENTANYL CITRATE 50 UG/ML
INJECTION, SOLUTION INTRAMUSCULAR; INTRAVENOUS
Status: DISCONTINUED | OUTPATIENT
Start: 2024-01-17 | End: 2024-01-17

## 2024-01-17 RX ORDER — MUPIROCIN 20 MG/G
1 OINTMENT TOPICAL
Status: COMPLETED | OUTPATIENT
Start: 2024-01-17 | End: 2024-01-17

## 2024-01-17 RX ORDER — FENTANYL CITRATE 50 UG/ML
100 INJECTION, SOLUTION INTRAMUSCULAR; INTRAVENOUS
Status: DISCONTINUED | OUTPATIENT
Start: 2024-01-17 | End: 2024-01-17 | Stop reason: HOSPADM

## 2024-01-17 RX ORDER — LIDOCAINE HYDROCHLORIDE 20 MG/ML
INJECTION INTRAVENOUS
Status: DISCONTINUED | OUTPATIENT
Start: 2024-01-17 | End: 2024-01-17

## 2024-01-17 RX ORDER — PREGABALIN 75 MG/1
75 CAPSULE ORAL NIGHTLY
Status: DISCONTINUED | OUTPATIENT
Start: 2024-01-17 | End: 2024-01-17 | Stop reason: HOSPADM

## 2024-01-17 RX ORDER — MORPHINE SULFATE 2 MG/ML
2 INJECTION, SOLUTION INTRAMUSCULAR; INTRAVENOUS
Status: DISCONTINUED | OUTPATIENT
Start: 2024-01-17 | End: 2024-01-17 | Stop reason: HOSPADM

## 2024-01-17 RX ORDER — VANCOMYCIN HYDROCHLORIDE 1 G/20ML
INJECTION, POWDER, LYOPHILIZED, FOR SOLUTION INTRAVENOUS
Status: DISCONTINUED | OUTPATIENT
Start: 2024-01-17 | End: 2024-01-17 | Stop reason: HOSPADM

## 2024-01-17 RX ORDER — ACETAMINOPHEN 500 MG
1000 TABLET ORAL EVERY 6 HOURS
Status: DISCONTINUED | OUTPATIENT
Start: 2024-01-17 | End: 2024-01-17 | Stop reason: HOSPADM

## 2024-01-17 RX ORDER — METHOCARBAMOL 500 MG/1
1000 TABLET, FILM COATED ORAL ONCE
Status: DISCONTINUED | OUTPATIENT
Start: 2024-01-17 | End: 2024-01-17 | Stop reason: HOSPADM

## 2024-01-17 RX ORDER — SODIUM CHLORIDE 9 MG/ML
INJECTION, SOLUTION INTRAVENOUS CONTINUOUS
Status: DISCONTINUED | OUTPATIENT
Start: 2024-01-17 | End: 2024-01-17 | Stop reason: HOSPADM

## 2024-01-17 RX ORDER — CELECOXIB 200 MG/1
400 CAPSULE ORAL ONCE
Status: DISCONTINUED | OUTPATIENT
Start: 2024-01-17 | End: 2024-01-17 | Stop reason: HOSPADM

## 2024-01-17 RX ORDER — NALOXONE HCL 0.4 MG/ML
0.02 VIAL (ML) INJECTION
Status: DISCONTINUED | OUTPATIENT
Start: 2024-01-17 | End: 2024-01-17 | Stop reason: HOSPADM

## 2024-01-17 RX ORDER — PROPOFOL 10 MG/ML
VIAL (ML) INTRAVENOUS CONTINUOUS PRN
Status: DISCONTINUED | OUTPATIENT
Start: 2024-01-17 | End: 2024-01-17

## 2024-01-17 RX ORDER — LIDOCAINE HYDROCHLORIDE AND EPINEPHRINE 15; 5 MG/ML; UG/ML
INJECTION, SOLUTION EPIDURAL
Status: DISCONTINUED | OUTPATIENT
Start: 2024-01-17 | End: 2024-01-17

## 2024-01-17 RX ORDER — MIDAZOLAM HYDROCHLORIDE 1 MG/ML
INJECTION, SOLUTION INTRAMUSCULAR; INTRAVENOUS
Status: DISCONTINUED | OUTPATIENT
Start: 2024-01-17 | End: 2024-01-17

## 2024-01-17 RX ORDER — DIPHENHYDRAMINE HYDROCHLORIDE 50 MG/ML
25 INJECTION INTRAMUSCULAR; INTRAVENOUS EVERY 6 HOURS PRN
Status: DISCONTINUED | OUTPATIENT
Start: 2024-01-17 | End: 2024-01-17 | Stop reason: HOSPADM

## 2024-01-17 RX ORDER — FENTANYL CITRATE 50 UG/ML
25 INJECTION, SOLUTION INTRAMUSCULAR; INTRAVENOUS EVERY 5 MIN PRN
Status: DISCONTINUED | OUTPATIENT
Start: 2024-01-17 | End: 2024-01-17 | Stop reason: HOSPADM

## 2024-01-17 RX ORDER — METHOCARBAMOL 750 MG/1
750 TABLET, FILM COATED ORAL 3 TIMES DAILY
Status: DISCONTINUED | OUTPATIENT
Start: 2024-01-17 | End: 2024-01-17 | Stop reason: HOSPADM

## 2024-01-17 RX ORDER — ASPIRIN 81 MG/1
81 TABLET ORAL 2 TIMES DAILY
Status: DISCONTINUED | OUTPATIENT
Start: 2024-01-17 | End: 2024-01-17 | Stop reason: HOSPADM

## 2024-01-17 RX ORDER — OXYCODONE HYDROCHLORIDE 5 MG/1
5 TABLET ORAL
Status: DISCONTINUED | OUTPATIENT
Start: 2024-01-17 | End: 2024-01-17 | Stop reason: HOSPADM

## 2024-01-17 RX ORDER — HALOPERIDOL 5 MG/ML
0.5 INJECTION INTRAMUSCULAR ONCE
Status: COMPLETED | OUTPATIENT
Start: 2024-01-17 | End: 2024-01-17

## 2024-01-17 RX ORDER — CELECOXIB 200 MG/1
200 CAPSULE ORAL DAILY
Status: DISCONTINUED | OUTPATIENT
Start: 2024-01-17 | End: 2024-01-17 | Stop reason: HOSPADM

## 2024-01-17 RX ORDER — SODIUM CHLORIDE 9 MG/ML
INJECTION, SOLUTION INTRAVENOUS
Status: COMPLETED | OUTPATIENT
Start: 2024-01-17 | End: 2024-01-17

## 2024-01-17 RX ORDER — FAMOTIDINE 20 MG/1
20 TABLET, FILM COATED ORAL 2 TIMES DAILY
Status: DISCONTINUED | OUTPATIENT
Start: 2024-01-17 | End: 2024-01-17 | Stop reason: HOSPADM

## 2024-01-17 RX ORDER — ACETAMINOPHEN 500 MG
1000 TABLET ORAL
Status: COMPLETED | OUTPATIENT
Start: 2024-01-17 | End: 2024-01-17

## 2024-01-17 RX ORDER — CELECOXIB 200 MG/1
400 CAPSULE ORAL
Status: COMPLETED | OUTPATIENT
Start: 2024-01-17 | End: 2024-01-17

## 2024-01-17 RX ORDER — KETAMINE HCL IN 0.9 % NACL 50 MG/5 ML
SYRINGE (ML) INTRAVENOUS
Status: DISCONTINUED | OUTPATIENT
Start: 2024-01-17 | End: 2024-01-17

## 2024-01-17 RX ORDER — CEFAZOLIN SODIUM 1 G/3ML
INJECTION, POWDER, FOR SOLUTION INTRAMUSCULAR; INTRAVENOUS
Status: DISCONTINUED | OUTPATIENT
Start: 2024-01-17 | End: 2024-01-17

## 2024-01-17 RX ORDER — AMOXICILLIN 250 MG
1 CAPSULE ORAL 2 TIMES DAILY
Status: DISCONTINUED | OUTPATIENT
Start: 2024-01-17 | End: 2024-01-17 | Stop reason: HOSPADM

## 2024-01-17 RX ORDER — ONDANSETRON HYDROCHLORIDE 2 MG/ML
4 INJECTION, SOLUTION INTRAVENOUS EVERY 8 HOURS PRN
Status: DISCONTINUED | OUTPATIENT
Start: 2024-01-17 | End: 2024-01-17 | Stop reason: HOSPADM

## 2024-01-17 RX ORDER — VASOPRESSIN 20 [USP'U]/ML
INJECTION, SOLUTION INTRAMUSCULAR; SUBCUTANEOUS
Status: DISCONTINUED | OUTPATIENT
Start: 2024-01-17 | End: 2024-01-17

## 2024-01-17 RX ORDER — TRANEXAMIC ACID 100 MG/ML
INJECTION, SOLUTION INTRAVENOUS
Status: DISCONTINUED | OUTPATIENT
Start: 2024-01-17 | End: 2024-01-17

## 2024-01-17 RX ORDER — ROPIVACAINE/EPI/CLONIDINE/KET 2.46-0.005
SYRINGE (ML) INJECTION
Status: DISCONTINUED | OUTPATIENT
Start: 2024-01-17 | End: 2024-01-17 | Stop reason: HOSPADM

## 2024-01-17 RX ORDER — FAMOTIDINE 10 MG/ML
INJECTION INTRAVENOUS
Status: DISCONTINUED | OUTPATIENT
Start: 2024-01-17 | End: 2024-01-17

## 2024-01-17 RX ORDER — ACETAMINOPHEN 500 MG
1000 TABLET ORAL
Status: DISCONTINUED | OUTPATIENT
Start: 2024-01-17 | End: 2024-01-17 | Stop reason: HOSPADM

## 2024-01-17 RX ORDER — PROCHLORPERAZINE EDISYLATE 5 MG/ML
5 INJECTION INTRAMUSCULAR; INTRAVENOUS EVERY 6 HOURS PRN
Status: DISCONTINUED | OUTPATIENT
Start: 2024-01-17 | End: 2024-01-17 | Stop reason: HOSPADM

## 2024-01-17 RX ADMIN — SODIUM CHLORIDE, SODIUM GLUCONATE, SODIUM ACETATE, POTASSIUM CHLORIDE, MAGNESIUM CHLORIDE, SODIUM PHOSPHATE, DIBASIC, AND POTASSIUM PHOSPHATE: .53; .5; .37; .037; .03; .012; .00082 INJECTION, SOLUTION INTRAVENOUS at 10:01

## 2024-01-17 RX ADMIN — VASOPRESSIN 2 UNITS: 20 INJECTION INTRAVENOUS at 09:01

## 2024-01-17 RX ADMIN — CEFAZOLIN 2 G: 330 INJECTION, POWDER, FOR SOLUTION INTRAMUSCULAR; INTRAVENOUS at 08:01

## 2024-01-17 RX ADMIN — CELECOXIB 400 MG: 200 CAPSULE ORAL at 06:01

## 2024-01-17 RX ADMIN — Medication 20 MG: at 09:01

## 2024-01-17 RX ADMIN — TRANEXAMIC ACID 1000 MG: 100 INJECTION INTRAVENOUS at 08:01

## 2024-01-17 RX ADMIN — SODIUM CHLORIDE: 0.9 INJECTION, SOLUTION INTRAVENOUS at 08:01

## 2024-01-17 RX ADMIN — LIDOCAINE HYDROCHLORIDE 50 MG: 20 INJECTION INTRAVENOUS at 08:01

## 2024-01-17 RX ADMIN — VASOPRESSIN 2 UNITS: 20 INJECTION INTRAVENOUS at 08:01

## 2024-01-17 RX ADMIN — MUPIROCIN 1 G: 20 OINTMENT TOPICAL at 06:01

## 2024-01-17 RX ADMIN — VANCOMYCIN HYDROCHLORIDE 1000 MG: 1 INJECTION, POWDER, LYOPHILIZED, FOR SOLUTION INTRAVENOUS at 07:01

## 2024-01-17 RX ADMIN — FAMOTIDINE 20 MG: 10 INJECTION, SOLUTION INTRAVENOUS at 08:01

## 2024-01-17 RX ADMIN — ACETAMINOPHEN 1000 MG: 500 TABLET ORAL at 06:01

## 2024-01-17 RX ADMIN — MEPIVACAINE HYDROCHLORIDE 3 ML: 20 INJECTION, SOLUTION EPIDURAL; INFILTRATION at 08:01

## 2024-01-17 RX ADMIN — VASOPRESSIN 1 UNITS: 20 INJECTION INTRAVENOUS at 08:01

## 2024-01-17 RX ADMIN — DEXAMETHASONE SODIUM PHOSPHATE 8 MG: 4 INJECTION, SOLUTION INTRAMUSCULAR; INTRAVENOUS at 08:01

## 2024-01-17 RX ADMIN — ONDANSETRON 4 MG: 2 INJECTION INTRAMUSCULAR; INTRAVENOUS at 11:01

## 2024-01-17 RX ADMIN — PHENYLEPHRINE HYDROCHLORIDE 100 MCG: 10 INJECTION INTRAVENOUS at 08:01

## 2024-01-17 RX ADMIN — ONDANSETRON 4 MG: 2 INJECTION INTRAMUSCULAR; INTRAVENOUS at 08:01

## 2024-01-17 RX ADMIN — MIDAZOLAM HYDROCHLORIDE 2 MG: 1 INJECTION, SOLUTION INTRAMUSCULAR; INTRAVENOUS at 08:01

## 2024-01-17 RX ADMIN — FENTANYL CITRATE 50 MCG: 50 INJECTION, SOLUTION INTRAMUSCULAR; INTRAVENOUS at 08:01

## 2024-01-17 RX ADMIN — LIDOCAINE HYDROCHLORIDE,EPINEPHRINE BITARTRATE 3 ML: 15; .005 INJECTION, SOLUTION EPIDURAL; INFILTRATION; INTRACAUDAL; PERINEURAL at 10:01

## 2024-01-17 RX ADMIN — PREGABALIN 75 MG: 75 CAPSULE ORAL at 06:01

## 2024-01-17 RX ADMIN — PROPOFOL 50 MG: 10 INJECTION, EMULSION INTRAVENOUS at 08:01

## 2024-01-17 RX ADMIN — SODIUM CHLORIDE: 9 INJECTION, SOLUTION INTRAVENOUS at 07:01

## 2024-01-17 RX ADMIN — HALOPERIDOL LACTATE 0.5 MG: 5 INJECTION, SOLUTION INTRAMUSCULAR at 12:01

## 2024-01-17 RX ADMIN — TRANEXAMIC ACID 1000 MG: 100 INJECTION INTRAVENOUS at 09:01

## 2024-01-17 RX ADMIN — SODIUM CHLORIDE, SODIUM GLUCONATE, SODIUM ACETATE, POTASSIUM CHLORIDE, MAGNESIUM CHLORIDE, SODIUM PHOSPHATE, DIBASIC, AND POTASSIUM PHOSPHATE: .53; .5; .37; .037; .03; .012; .00082 INJECTION, SOLUTION INTRAVENOUS at 09:01

## 2024-01-17 RX ADMIN — PROPOFOL 125 MCG/KG/MIN: 10 INJECTION, EMULSION INTRAVENOUS at 08:01

## 2024-01-17 RX ADMIN — OXYCODONE HYDROCHLORIDE 5 MG: 5 TABLET ORAL at 10:01

## 2024-01-17 NOTE — DISCHARGE SUMMARY
[place] [facility] (Hospital)  Discharge Note  Short Stay    Procedure(s) (LRB):  ARTHROPLASTY, HIP, TOTAL, ANTERIOR APPROACH: RIGHT: DEPUY - ACTIS + PINNACLE: SAME DAY (Right)      OUTCOME: Patient tolerated treatment/procedure well without complication and is now ready for discharge.    DISPOSITION: Home or Self Care    FINAL DIAGNOSIS:  Primary osteoarthritis of right hip    FOLLOWUP: In clinic    DISCHARGE INSTRUCTIONS:    Discharge Procedure Orders   CBC Auto Differential   Standing Status: Future Number of Occurrences: 1 Standing Exp. Date: 02/11/25     Comprehensive Metabolic Panel   Standing Status: Future Number of Occurrences: 1 Standing Exp. Date: 02/11/25     Protime-INR   Standing Status: Future Number of Occurrences: 1 Standing Exp. Date: 02/11/25     Activity as tolerated     Sponge bath only until clinic visit     Lifting restrictions   Order Comments: No strenuous exercise or lifting of > 10 lbs     Weight bearing as tolerated     No driving, operating heavy equipment or signing legal documents while taking pain medication     Leave dressing on - Keep it clean, dry, and intact until clinic visit   Order Comments: Do not remove surgical dressing for 2 weeks post-op. This will be done only by MD at initial post-op visit. If dressing is completely saturated, replace with identical dressing - silver-impregnated hydrocolloid dressing.    Do not get dressings wet. Do not shower.    If dressing continues to be saturated or there are signs of infection, please call [facility] [phone] for further instructions and to make appt to be seen.     Call MD for:  temperature >100.4     Call MD for:  persistent nausea and vomiting     Call MD for:  severe uncontrolled pain     Call MD for:  difficulty breathing, headache or visual disturbances     Call MD for:  redness, tenderness, or signs of infection (pain, swelling, redness, odor or green/yellow discharge around incision site)     Call MD for:  hives      Call MD for:  persistent dizziness or light-headedness     Call MD for:  extreme fatigue     EKG 12-lead   Standing Status: Future Number of Occurrences: 1 Standing Exp. Date: 12/14/24        TIME SPENT ON DISCHARGE: 5 minutes

## 2024-01-17 NOTE — ANESTHESIA PROCEDURE NOTES
CSE    Patient location during procedure: OR  Start time: 1/17/2024 8:27 AM  Timeout: 1/17/2024 8:27 AM  End time: 1/17/2024 8:32 AM      Staffing  Authorizing Provider: Arturo Delgado MD  Performing Provider: Arturo Delgado MD    Staffing  Performed by: Arturo Delgado MD  Authorized by: Arturo Delgado MD    Preanesthetic Checklist  Completed: patient identified, IV checked, site marked, risks and benefits discussed, surgical consent, monitors and equipment checked, pre-op evaluation and timeout performed  CSE  Patient position: sitting  Prep: ChloraPrep  Patient monitoring: continuous pulse ox and frequent blood pressure checks  Approach: midline  Spinal Needle  Needle type: Tiffany   Needle gauge: 25 G  Needle length: 4 in  Epidural Needle  Injection technique: CECE air  Needle type: Tuohy   Needle gauge: 17 G  Needle length: 3.5 in  Needle insertion depth: 7 cm  Location: L3-4  Needle localization: anatomical landmarks   Catheter  Catheter type: springwound  Catheter at skin depth: 12 cm  Assessment  Sensory level: T6   Dermatomal levels determined by alcohol swab  Intrathecal Medications:   administered: primary anesthetic mcg of    Medications:    Medications: Intrathecal - mepivacaine 20 mg/mL (2 %) injection - Intrathecal   3 mL - 1/17/2024 8:30:00 AM           Declines

## 2024-01-17 NOTE — TRANSFER OF CARE
"Anesthesia Transfer of Care Note    Patient: Kayleen Patel    Procedure(s) Performed: Procedure(s) (LRB):  ARTHROPLASTY, HIP, TOTAL, ANTERIOR APPROACH: RIGHT: DEPUY - ACTIS + PINNACLE: SAME DAY (Right)    Patient location: PACU    Anesthesia Type: general    Transport from OR: Transported from OR on 100% O2 by closed face mask with adequate spontaneous ventilation    Post pain: adequate analgesia    Post assessment: no apparent anesthetic complications and tolerated procedure well    Post vital signs: stable    Level of consciousness: awake, alert and oriented    Nausea/Vomiting: no nausea/vomiting    Complications: none    Transfer of care protocol was followed    Last vitals: Visit Vitals  /69 (BP Location: Left arm, Patient Position: Lying)   Pulse 76   Temp 36.2 °C (97.1 °F) (Oral)   Resp 18   Ht 5' 1" (1.549 m)   Wt 61.2 kg (135 lb)   LMP 02/07/2016 (Approximate)   SpO2 100%   Breastfeeding No   BMI 25.51 kg/m²     "

## 2024-01-17 NOTE — ANESTHESIA PREPROCEDURE EVALUATION
01/17/2024  Kayleen Patel is a 53 y.o., female.      Pre-op Assessment    I have reviewed the Patient Summary Reports.     I have reviewed the Nursing Notes. I have reviewed the NPO Status.   I have reviewed the Medications.     Review of Systems  Anesthesia Hx:  No problems with previous Anesthesia   History of prior surgery of interest to airway management or planning:  Previous anesthesia: General        Denies Family Hx of Anesthesia complications.    Denies Personal Hx of Anesthesia complications.                    Social:  Former Smoker       Hematology/Oncology:  Hematology Normal   Oncology Normal                                   EENT/Dental:  EENT/Dental Normal           Cardiovascular:  Exercise tolerance: good   Hypertension                                        Pulmonary:  Pulmonary Normal                       Renal/:  Renal/ Normal                 Hepatic/GI:     GERD, well controlled             Musculoskeletal:  Arthritis               Neurological:    Neuromuscular Disease,                                   Endocrine:  Endocrine Normal            Dermatological:  Skin Normal    Psych:  Psychiatric Normal                    Physical Exam  General: Well nourished, Cooperative, Alert and Oriented    Airway:  Mallampati: III / II  Mouth Opening: Normal  TM Distance: Normal  Tongue: Normal  Neck ROM: Normal ROM    Dental:  Intact, Caps / Implants, Veneers    Chest/Lungs:  Clear to auscultation, Normal Respiratory Rate    Heart:  Rate: Normal  Rhythm: Regular Rhythm  Sounds: Normal    Abdomen:  Normal, Soft, Nontender        Anesthesia Plan  Type of Anesthesia, risks & benefits discussed:    Anesthesia Type: Gen ETT, CSE  Intra-op Monitoring Plan: Standard ASA Monitors  Post Op Pain Control Plan: multimodal analgesia  Induction:  IV  Airway Plan: Direct, Post-Induction  Informed  Consent: Informed consent signed with the Patient and all parties understand the risks and agree with anesthesia plan.  All questions answered.   ASA Score: 2    Ready For Surgery From Anesthesia Perspective.     .

## 2024-01-17 NOTE — PROGRESS NOTES
Pt with multiple questions and concerns.  Anesthesia notified. Spinal slow to resolve. Pt with Nausea and dizziness. Pt given Zofran and Haldol - see MAR.  Pt tolerated soup and ginger ale. 1330 Spinal resolved. OT in to work with pt.  Pt able to void. Will monitor.

## 2024-01-17 NOTE — PLAN OF CARE
VSS. Pain level 5/10.  Dr. Collins saw pt. Pt voided with OT, passed PT and OT. IV d/c'd with catheter tip intact. D/C'd via wheelchair escorted by RN

## 2024-01-17 NOTE — PLAN OF CARE
"Patient tolerated PT session well. Patient ambulated 80ft with RW and contact guard assistance. No LOB or SOB noted. Patient ascended/descended 4" curb step with RW and contact guard assistance. Patient educated in anterior hip precautions. Patient ready to discharge home from PT standpoint.      Problem: Physical Therapy  Goal: Physical Therapy Goal  Outcome: Met     "

## 2024-01-17 NOTE — PT/OT/SLP EVAL
"Physical Therapy Evaluation, Treatment, and Discharge Note    Patient Name:  Kayleen Patel   MRN:  [id number]    Recommendations:     Discharge Recommendations: Low Intensity Therapy  Discharge Equipment Recommendations: bedside commode, walker, rolling   Barriers to discharge: None    Assessment:     Kayleen Patel is a 53 y.o. female admitted with a medical diagnosis of Primary osteoarthritis of right hip. Patient tolerated PT session well. Patient ambulated 80ft with RW and contact guard assistance. No LOB or SOB noted. Patient ascended/descended 4" curb step with RW and contact guard assistance. Patient educated in anterior hip precautions. Patient ready to discharge home from PT standpoint.       Recent Surgery: Procedure(s) (LRB):  ARTHROPLASTY, HIP, TOTAL, ANTERIOR APPROACH: RIGHT: DEPUY - ACTIS + PINNACLE: SAME DAY (Right) Day of Surgery    Plan:     During this hospitalization, patient does not require further acute PT services.  Please re-consult if situation changes.      Subjective     Chief Complaint: Right hip pain.   Patient/Family Comments/goals: To walk without pain.   Pain/Comfort:  Pain Rating 1: 8/10  Location - Side 1: Right  Location 1: hip  Pain Addressed 1: Pre-medicate for activity, Reposition, Distraction    Patients cultural, spiritual, Uatsdin conflicts given the current situation:  n/a    Living Environment:  Patient lives in a [facility] with 1 step to enter. Prior to admission, patients level of function was independent for functional mobility. Equipment used at home: none. Upon discharge, patient will have assistance from friend for 2 weeks.    Objective:     Communicated with RN prior to session. Patient found  seated in wheelchair with  (no active lines) upon PT entry to room. Friend present during PT session.     General Precautions: Standard, fall    Orthopedic Precautions:RLE weight bearing as tolerated, RLE anterior precautions (Anterior total hip, WBAT with " "walker, no extremes of motion, hip flexion 0-90 degrees)   Braces: N/A  Respiratory Status: Room air    Exams:  Cognitive Exam:  Patient is oriented to Person, Place, Time, and Situation  Gross Motor Coordination:  WFL  RLE ROM: WFL within anterior hip precautions  RLE Strength: appears WFL but did not formally assess due to pain and recent surgery  LLE ROM: WFL  LLE Strength: WFL    Functional Mobility:  Transfers:     Sit to Stand:  contact guard assistance with rolling walker x1 from wheelchair  Gait: Patient ambulated 80ft with Rolling Walker and contact guard assistance using reciprocal gait. Patient demonstrated decreased katie and decreased step length during gait due to pain, decreased ROM, and decreased strength.  Stairs:   Patient ascended/descended 4" curb step with RW and contact guard assistance.     Treatment and Education:  Patient and friend educated in:  -PT role and POC  -anterior hip precautions   -safety with transfers including hand placement  -gait sequencing and RW management  -OOB activity to maximize recovery including ambulating at home to prevent DVT   -car transfer  -curb training  -HEP for therex at home with handout provided       AM-PAC 6 CLICK MOBILITY  Total Score:23     Patient left  seated in transport chair with all lines intact, call button in reach, RN notified, and friend present.    GOALS:   Multidisciplinary Problems       Physical Therapy Goals       Not on file              Multidisciplinary Problems (Resolved)          Problem: Physical Therapy    Goal Priority Disciplines Outcome Goal Variances Interventions   Physical Therapy Goal   (Resolved)     PT, PT/OT Met                         History:     Past Medical History:   Diagnosis Date    Abnormal cervical Papanicolaou smear '88, '04    Colpo/Cryo    Benign paroxysmal vertigo, bilateral     Chronic diarrhea 02/12/2015    GERD (gastroesophageal reflux disease)     History of acute PID 1988    Hyperlipidemia     " Hypertension     Normocytic anemia 12/22/2023    Thrombocytosis     Urticaria        Past Surgical History:   Procedure Laterality Date    ADENOIDECTOMY      BREAST BIOPSY Right 06/09/2021    stereo benign    CARPAL TUNNEL RELEASE Left 09/21/2021    Procedure: RELEASE, CARPAL TUNNEL;  Surgeon: Alla Goff MD;  Location: [facility] OR;  Service: Orthopedics;  Laterality: Left;    CARPAL TUNNEL RELEASE Right 02/07/2023    Procedure: RELEASE, CARPAL TUNNEL;  Surgeon: Alla Goff MD;  Location: [facility] OR;  Service: Orthopedics;  Laterality: Right;    EYE SURGERY  2010    In Record    GANGLION CYST EXCISION      INJECTION OF JOINT Right 03/09/2023    Procedure: INJECTION, JOINT, RIGHT HIP  DIRECT REF;  Surgeon: Harleen Swan MD;  Location: [facility] PAIN MGT;  Service: Pain Management;  Laterality: Right;    INJECTION OF STEROID Right 09/21/2021    Procedure: INJECTION, STEROID-Right carpal tunnel;  Surgeon: Alla Goff MD;  Location: [facility] OR;  Service: Orthopedics;  Laterality: Right;    REFRACTIVE SURGERY Bilateral 2009    Dr. Vazquez Forbes     TONSILLECTOMY         Time Tracking:     PT Received On: 01/17/24  PT Start Time: 1427     PT Stop Time: 1441  PT Total Time (min): 14 min     Billable Minutes: Evaluation 6 and Gait Training 8    01/17/2024

## 2024-01-17 NOTE — PLAN OF CARE
Pre op complete. Pt resting comfortably. Call light in reach. Pt friend at bedside. Belongings placed in locker. Pt needs walker for home use.

## 2024-01-17 NOTE — OPERATIVE NOTE ADDENDUM
Certification of Assistant at Surgery       Surgery Date: 1/17/2024     Participating Surgeons:  Surgeon(s) and Role:     * Cresencio Collins III, MD - Primary    Procedures:  Procedure(s) (LRB):  ARTHROPLASTY, HIP, TOTAL, ANTERIOR APPROACH: RIGHT: DEPUY - ACTIS + PINNACLE: SAME DAY (Right)    Assistant Surgeon's Certification of Necessity:  I understand that section 1842 (b) (6) (d) of the Social Security Act generally prohibits Medicare Part B reasonable charge payment for the services of assistants at surgery in teaching hospitals when qualified residents are available to furnish such services. I certify that the services for which payment is claimed were medically necessary, and that no qualified resident was available to perform the services. I further understand that these services are subject to post-payment review by the Medicare carrier.      Wolfgang Brennan PA-C    01/17/2024  10:22 AM

## 2024-01-17 NOTE — OP NOTE
Dennis MUÑIZ right hip  OPERATIVE NOTE      Date of Operation:   1/17/2024    Providers Performing:   Surgeon(s):  Cresencio Collins III, MD  Assistant: Wolfgang Brennan PA-C, I certify that the services for which payment is claimed were medically necessary, and that no qualified resident was available to perform the services.       Preoperative Diagnosis:   Right hip osteoarthritis, M16.11    Postoperative Diagnosis:   Same    Operative Procedure:   Right Total Hip Arthroplasty, Anterior Approach, CPT 14316    Anesthesia:   Spinal+catheter  LISA cocktail: KIANNA    Estimated Blood Loss:   350 cc     Specimens:   None    Complications:   None, stable to PACU.    Implants Utilized:   Depuy  Emphasys three-hole acetabular shell; Size 48  Emphasys AOX polyethylene liner; 48 OD, 36 ID, neutral  Actis size 2 HO  Biolox Delta Ceramic 12/14 taper 36mm + 1.5  Acetabular screw 20mm, 25mm    aquamantys    Implant Name Type Inv. Item Serial No.  Lot No. LRB No. Used Action   emphasys acetabular shell    [employer] [id number] Right 1 Implanted   SCREW PINNACLE 6.5 X 20 - [id number]  SCREW PINNACLE 6.5 X 20  [employer] [id number] Right 1 Implanted   SCREW PINNACLE 6.5 X 25 - [id number]  SCREW PINNACLE 6.5 X 25  [employer] [id number] Right 1 Implanted   EMPHASYS POLYETHYLENE LINER     [id number] Right 1 Implanted   FEMORAL STEM 12/14 TAPER     [id number] Right 1 Implanted   HEAD FEM 12/14 TAPER 1.5X36 - [id number]  HEAD FEM 12/14 TAPER 1.5X36  [employer] [id number] Right 1 Implanted       Indications:   The patient has longstanding right hip pain secondary to the above diagnosis.. They have failed conservative management which includes anti-inflammatory medications and activity modification. We reviewed the risks and benefits of the direct anterior hip replacement with the patient prior to surgery including risk of infection, lateral thigh numbness, blood clot, leg length inequality, dislocation, components loosening, components wearing out,  need for revision surgery, continued pain, limping, and injury to nerves and vessels.  After discussing the risks and benefits of the procedure they would like to proceed with surgery.    Operative Notes:   The patient was met in the holding area. The operative site was marked. Anesthesia administered spinal anesthesia. The patient was placed supine on a Adams table and the operative site was identified. The hip was prepped and draped in the usual fashion. IV antibiotics were administered and a timeout was performed.     I performed a direct anterior approach to the hip. Skin incision was made and the fascia of the tensor fascia jacque was identified. I utilized the interval between the tensor fascia jacque and sartorious for direct dissection to the hip joint. I identified and coagulated the ascending branch of the lateral circumflex vessel. Standard retractors over the anterior hip capsule were placed and the capsulotomy was performed. The capsule was tagged for retraction. The femoral head was identified and the femoral neck osteotomy was made in accordance with preoperative templating. The femoral head was then removed with a corkscrew.    The standard anterior, posterior, and superior retractors were placed around the acetabulum. The capsular labrum and foveal contents were removed. Sequential acetabular reamers were used to prepare the acetabulum. Once good good fit was achieved, the final acetabular cup was selected to be line to line in diameter than the last reamer used. The cup was checked for a good rim fit and a provisional impaction was performed to verify positioning on fluoroscopy. Once this was satisfactory, the impaction was completed. I checked to make sure there was no overhanging osteophytes anteriorly as well as making sure that there was not excessive acetabular cup exposed. Screws were placed in the cup to augment initial fixation. The final liner was impacted into place and I confirmed that it was  well seated.    The hydraulic hook was placed around the femur. The femur was externally rotated and the standard calcar and greater trochanter retractors were placed. A provisional posterior capsulotomy was performed. Then, gross traction was released and the leg was extended and adducted. The appropriate release was performed to allow elevation of the femur for good visualization of the femoral canal.     A box osteotome was used to open the femoral canal and a canal finder was used to sound the canal. The starter broach and sequential broaches were used until stability was appropriate. A trial reduction was performed and intraoperative fluoroscopy was used to confirm appropriate leg lengths and offset.  Once the trial femoral components appeared appropriately positioned, the hip was dislocated, the femur re-exposed, and the trial femoral components were removed. The canal was irrigated and the final femoral stem was impacted to the level of the neck cut. The final ball was impacted onto a dry trunnion and the hip was reduced checking for appropriate soft tissue tension. Final intra-operative fluoroscopy was obtained to confirm implant positioning, leg length, and offset.     While checking xray, a 0.35% betadine solution was left to soak in the wound. The wound was copiously irrigated. I checked for any residual bleeders and made sure that there was appropriate hemostasis. The local anesthetic cocktail was administered. 1 gram of vancomycin powder was placed in the wound. The wound was closed in layers using #1 vicryl at the fascia, then 2-0 vicryl, 3-0 monocryl, 4-0 monocryl and Prineo Mesh+Dermabond. A sterile dressing was placed.     There were no complications or evidence of intraoperative fracture. All counts were correct.    The first-assistant was critical to all steps of the operation, including retraction during exposure and bone preparation, as well as the deep and superficial wound closure.  Dr. Collins  performed and/or supervised the key portions of this surgical procedure, including evaluation of the bone cuts, reshaping of the bony elements, and insertion of the provisional and final components.    Post Op Plan:   The patient will be weightbearing as tolerated with a walker with no extremes of motion  ASA for DVT prophylaxis (81mg BID for one month)  24 hours of IV antibiotics.    Same day    Due patient and or surgical factors the patient will receive an Rx for cefadroxil 500mg BID x7 days after discharge per [place] data:   Jesi MM, Stacey JE, Seema HDZ, Yesika HALEY. [facility] Clinical Research Award: Extended Oral Antibiotics Prevent Periprosthetic Joint Infection in High-Risk Cases: 3855 Patients With 1-Year Follow-Up. J Arthroplasty. 2021 Jul;36(7S):S18-S25. doi: 10.1016/j.arth.2021.01.051. Epub 2021 Jan 23. PMID: [id number].        Signed by: Cresencio Collins III, MD

## 2024-01-17 NOTE — PLAN OF CARE
Problem: Occupational Therapy  Goal: Occupational Therapy Goal  Description: Goals to be met by: 1/17/24     Patient will increase functional independence with ADLs by performing:    UE Dressing with Modified [place].  LE Dressing with Modified [place] and Assistive Devices as needed.  Grooming while standing at sink with Modified [place].  Toileting from bedside commode with Modified [place] for hygiene and clothing management.   Bathing from  shower chair/bench with Modified [place].  Toilet transfer to bedside commode with Modified [place].    Outcome: Ongoing, Progressing

## 2024-01-17 NOTE — PT/OT/SLP EVAL
"Occupational Therapy Evaluation and Treatment    Name: Kayleen Patel  MRN: [id number]  Admitting Diagnosis: Primary osteoarthritis of right hip Day of Surgery  Recent Surgery: Procedure(s) (LRB):  ARTHROPLASTY, HIP, TOTAL, ANTERIOR APPROACH: RIGHT: DEPUY - ACTIS + PINNACLE: SAME DAY (Right) Day of Surgery    Recommendations:     Discharge Recommendations: Low Intensity Therapy  Level of Assistance Recommended: 24 hours supervision  Discharge Equipment Recommendations: bedside commode, walker, rolling, shower chair  Barriers to discharge: None    Assessment:     Kayleen Patel is a 53 y.o. female with a medical diagnosis of Primary osteoarthritis of right hip. She presents with performance deficits affecting function including impaired self care skills, impaired functional mobility, orthopedic precautions. Pt was able to perform supine/sit c S Sit <> Stand Transfer with supervision with rolling walker Bed <> Chair Transfer using Stand Pivot technique with supervision with rolling walker Toilet Transfer Stand Pivot technique with supervision with rolling walker and bedside commode.  Able to perform UB/LB dressing c modified independence  Educated pt on bathing, car transfers, and cryotherapy.       Rehab Prognosis: Good; patient would benefit from acute OT services to address these deficits and reach maximum level of function.    Plan:     Patient to be seen daily to address the above listed problems via self-care/home management, therapeutic activities, therapeutic exercises  Plan of Care Expires: 01/17/24  Plan of Care Reviewed with: patient, friend    Subjective     Chief Complaint: R NICK  Patient Comments/Goals: "How long before I go back to work?"  Pain/Comfort:  Pain Rating 1: 3/10    Patients cultural, spiritual, Zoroastrianism conflicts given the current situation: no    Social History:  Living Environment: Patient lives alone in a single story home with number of outside stair(s): 1 and " walk-in shower  Prior Level of Function: Prior to admission, patient was independent.  Roles and Routines: Patient was driving and working prior to admission.  Equipment Used at Home: hip kit  DME owned (not currently used):  hip kit  Assistance Upon Discharge: friend(s)    Objective:     Communicated with RN prior to session. Patient found supine with cryotherapy upon OT entry to room.    General Precautions: Standard, fall   Orthopedic Precautions: RLE weight bearing as tolerated, RLE anterior precautions   Braces: N/A    Respiratory Status: Room air    Occupational Performance    Gait belt applied - Yes    Bed Mobility:   Supine to sit from left side of bed with supervision    Functional Mobility/Transfers:  Sit <> Stand Transfer with supervision with rolling walker  Bed <> Chair Transfer using Stand Pivot technique with supervision with rolling walker  Toilet Transfer Stand Pivot technique with supervision with rolling walker and bedside commode  Functional Mobility: Pt was able to walk to bathroom c S and RW.    Activities of Daily Living:  Grooming: modified independence to wash hands while standing at sink c RW.  Upper Body Dressing: modified independence to don shirt.  Lower Body Dressing: modified independence to don underwear, pants, socks, and shoes c reacher, sock-aid, and long handled shoe horn.  Toileting: modified independence for toilet hygiene.      Physical Exam:  Upper Extremity Range of Motion:     -       Right Upper Extremity: WFL  -       Left Upper Extremity: WFL  Upper Extremity Strength:    -       Right Upper Extremity: WFL  -       Left Upper Extremity: WFL    AMPAC 6 Click ADL:  AMPAC Total Score: 22    Treatment & Education:  Educated on the importance of mobility to maximize recovery  Educated on the importance of OOB mobility within safe range in order to decrease adverse effects of prolonged bedrest  Educated on Anterior hip precautions - patient recalled 3 hip precautions  Educated  on safety with functional mobility; hand placement to ensure safe transfers to various surfaces in prep for ADLs  Educated on performing functional mobility and ADLs in adherence to orthopedic precautions  Educated on weight bearing status  Will continue to educate as needed      Patient clear to ambulate to/from bathroom with RN/PCT, assist x1 .    Patient left up in chair with all lines intact, call button in reach, and RN notified.    GOALS:   Multidisciplinary Problems       Occupational Therapy Goals          Problem: Occupational Therapy    Goal Priority Disciplines Outcome Interventions   Occupational Therapy Goal     OT, PT/OT Ongoing, Progressing    Description: Goals to be met by: 1/17/24     Patient will increase functional independence with ADLs by performing:    UE Dressing with Modified [place].  LE Dressing with Modified [place] and Assistive Devices as needed.  Grooming while standing at sink with Modified [place].  Toileting from bedside commode with Modified [place] for hygiene and clothing management.   Bathing from  shower chair/bench with Modified [place].  Toilet transfer to bedside commode with Modified [place].                         History:     Past Medical History:   Diagnosis Date    Abnormal cervical Papanicolaou smear '88, '04    Colpo/Cryo    Benign paroxysmal vertigo, bilateral     Chronic diarrhea 02/12/2015    GERD (gastroesophageal reflux disease)     History of acute PID 1988    Hyperlipidemia     Hypertension     Normocytic anemia 12/22/2023    Thrombocytosis     Urticaria          Past Surgical History:   Procedure Laterality Date    ADENOIDECTOMY      BREAST BIOPSY Right 06/09/2021    stereo benign    CARPAL TUNNEL RELEASE Left 09/21/2021    Procedure: RELEASE, CARPAL TUNNEL;  Surgeon: Alla Goff MD;  Location: [facility];  Service: Orthopedics;  Laterality: Left;    CARPAL TUNNEL RELEASE Right 02/07/2023    Procedure: RELEASE, CARPAL TUNNEL;   Surgeon: Alla Goff MD;  Location: [facility] OR;  Service: Orthopedics;  Laterality: Right;    EYE SURGERY  2010    In Record    GANGLION CYST EXCISION      INJECTION OF JOINT Right 03/09/2023    Procedure: INJECTION, JOINT, RIGHT HIP  DIRECT REF;  Surgeon: Harleen Swan MD;  Location: [facility] PAIN MGT;  Service: Pain Management;  Laterality: Right;    INJECTION OF STEROID Right 09/21/2021    Procedure: INJECTION, STEROID-Right carpal tunnel;  Surgeon: Alla Goff MD;  Location: [facility] OR;  Service: Orthopedics;  Laterality: Right;    REFRACTIVE SURGERY Bilateral 2009    Dr. Vazquez Forbes     TONSILLECTOMY         Time Tracking:     OT Date of Treatment: 01/17/24  OT Start Time: 1350  OT Stop Time: 1420  OT Total Time (min): 30 min    Billable Minutes: Evaluation 15 and Self Care/Home Management 15    1/17/2024

## 2024-01-18 ENCOUNTER — TELEPHONE (OUTPATIENT)
Dept: ORTHOPEDICS | Facility: CLINIC | Age: 54
End: 2024-01-18
Payer: OTHER GOVERNMENT

## 2024-01-18 ENCOUNTER — PATIENT MESSAGE (OUTPATIENT)
Dept: ADMINISTRATIVE | Facility: OTHER | Age: 54
End: 2024-01-18
Payer: OTHER GOVERNMENT

## 2024-01-18 NOTE — TELEPHONE ENCOUNTER
Pt called hotline stating she spoke to her insurance company and was told she only gets one HH PT visit. Informed her that she will get HH PT for 2 weeks, they may have only authorized the first visit. Informed her to reach out tomorrow if in fact HH PT tells her she can only have one visit. Informed she is scheduled for admit to  tomorrow 1/19/24. Will touch bas tomorrow during virtual visit. Pt asks about  how much she should walk. Advised to walk every hour while awake. She states she just walked for 20 min. Informed that is too long she just needs to walk to the bathroom or up and down a hallway. She can gradually increase as she feels up to it. Pt states she does think she is overdoing it, due to increased pain. Advised to stay ahead of the pain. Pt pleased and verbalized understanding.

## 2024-01-19 ENCOUNTER — PATIENT MESSAGE (OUTPATIENT)
Dept: ADMINISTRATIVE | Facility: OTHER | Age: 54
End: 2024-01-19
Payer: OTHER GOVERNMENT

## 2024-01-19 ENCOUNTER — CLINICAL SUPPORT (OUTPATIENT)
Dept: ORTHOPEDICS | Facility: CLINIC | Age: 54
End: 2024-01-19
Payer: OTHER GOVERNMENT

## 2024-01-19 DIAGNOSIS — Z96.641 STATUS POST RIGHT HIP REPLACEMENT: Primary | ICD-10-CM

## 2024-01-19 PROCEDURE — G0180 MD CERTIFICATION HHA PATIENT: HCPCS | Mod: ,,, | Performed by: NURSE PRACTITIONER

## 2024-01-19 PROCEDURE — 99499 UNLISTED E&M SERVICE: CPT | Mod: 95,,, | Performed by: ORTHOPAEDIC SURGERY

## 2024-01-19 NOTE — PROGRESS NOTES
I called the patient today regarding her surgery with Dr. Cresencio Collins III. The patient had a right anterior NICK on 1/17/24.     Pain Scale: 7 / 10    Any issues with Fever: No.    Any issues with medications (specifically DVT prophylaxis): No. ASA 81 BID  Celebrex : yes  Protonix : yes  Resume home meds : Yes    Any issues with bowel movements:  Passing westley: No.                                                                 Urination: No.                                                                 Constipation: No.    Completing at home exercises: Yes:     Any concerns regarding their dressing/bandage:  No.    Patient confirmed first HH-PT appointment:  HHPT on  1/19/24 at 1200 noon.     Any other concerns: No.    Blue Bracelet : yes    The patient was informed that if they have any urgent issues with their bandage, medications or any other health concerns regarding their surgery to call the 24/7 Orthopedic Post-op Hot Line at [phone]. The patient was reminded that if they have any chest pain or shortness of breath to call 911 or go to the ER.    The patient verbalized understanding and does not have any other questions

## 2024-01-20 ENCOUNTER — PATIENT MESSAGE (OUTPATIENT)
Dept: ADMINISTRATIVE | Facility: OTHER | Age: 54
End: 2024-01-20
Payer: OTHER GOVERNMENT

## 2024-01-21 ENCOUNTER — PATIENT MESSAGE (OUTPATIENT)
Dept: ADMINISTRATIVE | Facility: OTHER | Age: 54
End: 2024-01-21
Payer: OTHER GOVERNMENT

## 2024-01-22 ENCOUNTER — PATIENT MESSAGE (OUTPATIENT)
Dept: ADMINISTRATIVE | Facility: OTHER | Age: 54
End: 2024-01-22
Payer: OTHER GOVERNMENT

## 2024-01-22 ENCOUNTER — PATIENT MESSAGE (OUTPATIENT)
Dept: ORTHOPEDICS | Facility: CLINIC | Age: 54
End: 2024-01-22
Payer: OTHER GOVERNMENT

## 2024-01-23 ENCOUNTER — PATIENT MESSAGE (OUTPATIENT)
Dept: ADMINISTRATIVE | Facility: OTHER | Age: 54
End: 2024-01-23
Payer: OTHER GOVERNMENT

## 2024-01-23 NOTE — ANESTHESIA POSTPROCEDURE EVALUATION
Anesthesia Post Evaluation    Patient: Kayleen Patel    Procedure(s) Performed: Procedure(s) (LRB):  ARTHROPLASTY, HIP, TOTAL, ANTERIOR APPROACH: RIGHT: DEPUY - ACTIS + PINNACLE: SAME DAY (Right)    Final Anesthesia Type: CSE      Patient location during evaluation: PACU  Patient participation: Yes- Able to Participate  Level of consciousness: awake and alert and oriented  Post-procedure vital signs: reviewed and stable  Pain management: adequate  Airway patency: patent    PONV status at discharge: No PONV  Anesthetic complications: no      Cardiovascular status: hemodynamically stable  Respiratory status: unassisted, spontaneous ventilation and room air  Hydration status: euvolemic  Follow-up not needed.              Vitals Value Taken Time   /72 01/17/24 1332   Temp 36.1 °C (97 °F) 01/17/24 1020   Pulse 75 01/17/24 1342   Resp 14 01/17/24 1341   SpO2 98 % 01/17/24 1342   Vitals shown include unvalidated device data.      Event Time   Out of Recovery 13:00:00         Pain/Gladis Score: No data recorded

## 2024-01-24 ENCOUNTER — PATIENT MESSAGE (OUTPATIENT)
Dept: ADMINISTRATIVE | Facility: OTHER | Age: 54
End: 2024-01-24
Payer: OTHER GOVERNMENT

## 2024-01-25 ENCOUNTER — PATIENT MESSAGE (OUTPATIENT)
Dept: ORTHOPEDICS | Facility: CLINIC | Age: 54
End: 2024-01-25
Payer: OTHER GOVERNMENT

## 2024-01-25 ENCOUNTER — PATIENT MESSAGE (OUTPATIENT)
Dept: ADMINISTRATIVE | Facility: OTHER | Age: 54
End: 2024-01-25
Payer: OTHER GOVERNMENT

## 2024-01-26 DIAGNOSIS — Z96.641 STATUS POST RIGHT HIP REPLACEMENT: ICD-10-CM

## 2024-01-26 RX ORDER — METHOCARBAMOL 750 MG/1
750 TABLET, FILM COATED ORAL 4 TIMES DAILY PRN
Qty: 40 TABLET | Refills: 0 | Status: SHIPPED | OUTPATIENT
Start: 2024-01-26 | End: 2024-02-05

## 2024-01-26 NOTE — TELEPHONE ENCOUNTER
Spoke to pt, pt requests a refill of RObaxin. Request sent to Gwendolyn GARZA. Pt pleased and verbalized.

## 2024-01-26 NOTE — TELEPHONE ENCOUNTER
----- Message from Sunny Paez sent at 1/26/2024  2:03 PM CST -----  Regarding: FW: Missed call  Contact: [phone]    ----- Message -----  From: Adilson Boyd  Sent: 1/26/2024   1:58 PM CST  To: [facility] Staff  Subject: Missed call                                      Caller is returning a missed called from Rosina Estes RN in the  office. Please call back as soon as possible.         Spoke with Estella to update her of the upcoming concussion clinic appointment.  Estella's scheduled with Dr. James, Thursday May 19th, 10:20 am at [facility].  Estella expressed understanding.  Briana Xie RN

## 2024-01-27 ENCOUNTER — PATIENT MESSAGE (OUTPATIENT)
Dept: ADMINISTRATIVE | Facility: OTHER | Age: 54
End: 2024-01-27
Payer: OTHER GOVERNMENT

## 2024-01-28 ENCOUNTER — PATIENT MESSAGE (OUTPATIENT)
Dept: ADMINISTRATIVE | Facility: OTHER | Age: 54
End: 2024-01-28
Payer: OTHER GOVERNMENT

## 2024-01-29 ENCOUNTER — PATIENT MESSAGE (OUTPATIENT)
Dept: ADMINISTRATIVE | Facility: OTHER | Age: 54
End: 2024-01-29
Payer: OTHER GOVERNMENT

## 2024-01-29 DIAGNOSIS — R21 RASH: Primary | ICD-10-CM

## 2024-01-29 RX ORDER — PREDNISONE 10 MG/1
TABLET ORAL
Qty: 30 TABLET | Refills: 0 | Status: SHIPPED | OUTPATIENT
Start: 2024-01-29

## 2024-01-29 RX ORDER — CETIRIZINE HYDROCHLORIDE 10 MG/1
10 TABLET ORAL DAILY
Qty: 30 TABLET | Refills: 0 | Status: SHIPPED | OUTPATIENT
Start: 2024-01-29 | End: 2025-01-28

## 2024-01-29 RX ORDER — HYDROXYZINE HYDROCHLORIDE 25 MG/1
25 TABLET, FILM COATED ORAL 3 TIMES DAILY PRN
Qty: 30 TABLET | Refills: 0 | Status: SHIPPED | OUTPATIENT
Start: 2024-01-29

## 2024-01-29 NOTE — PROGRESS NOTES
Pt sent photos to the joint hotline of a red, raised rash to both arms and one knee (non operative leg). Photos uploaded to media from the phone. Protocol meds sent to [facility] and she will f/u with me on Wednesday by phone or she will come in if not better.

## 2024-01-30 ENCOUNTER — PATIENT MESSAGE (OUTPATIENT)
Dept: ADMINISTRATIVE | Facility: OTHER | Age: 54
End: 2024-01-30
Payer: OTHER GOVERNMENT

## 2024-01-31 ENCOUNTER — PATIENT MESSAGE (OUTPATIENT)
Dept: ADMINISTRATIVE | Facility: OTHER | Age: 54
End: 2024-01-31
Payer: OTHER GOVERNMENT

## 2024-02-02 ENCOUNTER — OFFICE VISIT (OUTPATIENT)
Dept: ORTHOPEDICS | Facility: CLINIC | Age: 54
End: 2024-02-02
Payer: OTHER GOVERNMENT

## 2024-02-02 DIAGNOSIS — Z96.641 S/P HIP REPLACEMENT, RIGHT: Primary | ICD-10-CM

## 2024-02-02 PROCEDURE — 99212 OFFICE O/P EST SF 10 MIN: CPT | Mod: PBBFAC | Performed by: NURSE PRACTITIONER

## 2024-02-02 PROCEDURE — 99024 POSTOP FOLLOW-UP VISIT: CPT | Mod: ,,, | Performed by: NURSE PRACTITIONER

## 2024-02-02 PROCEDURE — 99999 PR PBB SHADOW E&M-EST. PATIENT-LVL II: CPT | Mod: PBBFAC,,, | Performed by: NURSE PRACTITIONER

## 2024-02-02 NOTE — PROGRESS NOTES
Kayleen Patel presents for initial post-operative visit following a right total hip arthroplasty performed by Dr. Collins on 1/17/2024    Exam:    Patient is ambulating well with cane   Incision is clean and dry without drainage or erythema.      Initial post-operative radiographs reviewed today revealing satisfactory position of the prosthesis.    A/P  2 weeks s/p right total hip replacement    - Patient advised to keep the incision clean and dry for the next 24 hours after which she  may wash the area with antibacterial soap in the shower, but will not submerge incision until one month post op.  - Antibiotic prophylaxis reviewed  - Continue aspirin for 1 month post op  - Pain medication refill not needed  - Follow up in 4 weeks with surgeon. Pt will call clinic immediately with problems/concerns.

## 2024-02-05 ENCOUNTER — TELEPHONE (OUTPATIENT)
Dept: ORTHOPEDICS | Facility: CLINIC | Age: 54
End: 2024-02-05
Payer: OTHER GOVERNMENT

## 2024-02-05 NOTE — TELEPHONE ENCOUNTER
"Pt text to joint phone:       "Good morning Rosina.  This is Kayleen Patel.  Question about my incision.  Is it normal for it to look like this after the bandage and tape are taken off.  The first pic is when everything was first taken off and the second pic was yesterday.  I feel like the skin is starting to bubble up.  Am I supposed to be putting something on it?  I did try to put lotion on it one day.  But I think it was too soon."     Instructed pt to NOT put anything/lotion on or near the incision and keep it clean and dry and send an updated picture tomorrow morning per Gwendolyn GARZA. Message forwarded to Tairk LOPES.              "

## 2024-02-05 NOTE — PROGRESS NOTES
In-Person Joint Class: Joint education provided, teaching and education individualized for patient and needs.  Physical and occupational therapy teaching  Caregiver and home support importance discussed  DME Equipment discussed, pt will bring walker to hospital.  Total knee precautions and exercises discussed and reviewed. Elevate leg above the heart, polar ice, dressing and Nimbus pump discussed.   Total hip precautions discussed and reviewed  Discussed home safety, area rugs, cords, tripping hazards.   Discussed pain management, multimodal methods and additional therapies, such as relaxation, guided imagery.   Pt will be seen by pre-op center for clearance.  Medications delivered to bedside prior to DC.   Post-op call or Virtual day after discharge.  Joint Hotline number discussed  OP PT and HH PT  discussed  Answered questions/concerns,  pt verbalized understanding, pt will call with questions concerns.

## 2024-02-08 ENCOUNTER — PATIENT MESSAGE (OUTPATIENT)
Dept: ADMINISTRATIVE | Facility: OTHER | Age: 54
End: 2024-02-08
Payer: OTHER GOVERNMENT

## 2024-02-15 ENCOUNTER — PATIENT MESSAGE (OUTPATIENT)
Dept: ADMINISTRATIVE | Facility: OTHER | Age: 54
End: 2024-02-15
Payer: OTHER GOVERNMENT

## 2024-02-15 ENCOUNTER — PATIENT MESSAGE (OUTPATIENT)
Dept: ORTHOPEDICS | Facility: CLINIC | Age: 54
End: 2024-02-15
Payer: OTHER GOVERNMENT

## 2024-02-21 ENCOUNTER — PATIENT MESSAGE (OUTPATIENT)
Dept: ADMINISTRATIVE | Facility: OTHER | Age: 54
End: 2024-02-21
Payer: OTHER GOVERNMENT

## 2024-02-22 ENCOUNTER — EXTERNAL HOME HEALTH (OUTPATIENT)
Dept: HOME HEALTH SERVICES | Facility: HOSPITAL | Age: 54
End: 2024-02-22
Payer: OTHER GOVERNMENT

## 2024-02-27 DIAGNOSIS — I10 ESSENTIAL HYPERTENSION: ICD-10-CM

## 2024-02-27 RX ORDER — VALSARTAN 160 MG/1
TABLET ORAL
Qty: 90 TABLET | Refills: 1 | Status: SHIPPED | OUTPATIENT
Start: 2024-02-27

## 2024-02-27 RX ORDER — CHLORTHALIDONE 25 MG/1
TABLET ORAL
Qty: 90 TABLET | Refills: 1 | Status: SHIPPED | OUTPATIENT
Start: 2024-02-27

## 2024-02-27 NOTE — TELEPHONE ENCOUNTER
Refill Decision Note   Kayleen Patel  is requesting a refill authorization.  Brief Assessment and Rationale for Refill:  Approve     Medication Therapy Plan:         Comments:     Note composed:3:50 AM 02/27/2024

## 2024-02-27 NOTE — TELEPHONE ENCOUNTER
No care due was identified.  [facility] Embedded Care Due Messages. Reference number: [id number].   2/27/2024 1:28:58 AM CST

## 2024-03-01 ENCOUNTER — HOSPITAL ENCOUNTER (OUTPATIENT)
Dept: RADIOLOGY | Facility: HOSPITAL | Age: 54
Discharge: HOME | End: 2024-03-01
Attending: ORTHOPAEDIC SURGERY
Payer: OTHER GOVERNMENT

## 2024-03-01 ENCOUNTER — OFFICE VISIT (OUTPATIENT)
Dept: ORTHOPEDICS | Facility: CLINIC | Age: 54
End: 2024-03-01
Payer: OTHER GOVERNMENT

## 2024-03-01 VITALS — BODY MASS INDEX: 24.29 KG/M2 | WEIGHT: 132 LBS | HEIGHT: 62 IN

## 2024-03-01 DIAGNOSIS — Z96.641 STATUS POST RIGHT HIP REPLACEMENT: ICD-10-CM

## 2024-03-01 DIAGNOSIS — Z96.641 S/P HIP REPLACEMENT, RIGHT: Primary | ICD-10-CM

## 2024-03-01 PROCEDURE — 99214 OFFICE O/P EST MOD 30 MIN: CPT | Mod: PBBFAC,25 | Performed by: ORTHOPAEDIC SURGERY

## 2024-03-01 PROCEDURE — 73502 X-RAY EXAM HIP UNI 2-3 VIEWS: CPT | Mod: TC,RT

## 2024-03-01 PROCEDURE — 73502 X-RAY EXAM HIP UNI 2-3 VIEWS: CPT | Mod: 26,RT,, | Performed by: RADIOLOGY

## 2024-03-01 PROCEDURE — 99024 POSTOP FOLLOW-UP VISIT: CPT | Mod: ,,, | Performed by: ORTHOPAEDIC SURGERY

## 2024-03-01 PROCEDURE — 99999 PR PBB SHADOW E&M-EST. PATIENT-LVL IV: CPT | Mod: PBBFAC,,, | Performed by: ORTHOPAEDIC SURGERY

## 2024-03-01 NOTE — PROGRESS NOTES
Subjective:     HPI:   Kayleen Patel is a 53 y.o. female who presents 6 weeks out from right NICK     Date of surgery: R ant NICK 1/17/24  Rash 1/29 - stopped celebrex    History of Present Illness  The patient presents for evaluation of hip pain.    She still has pain while putting on socks. She has to hold onto something while getting into her car. She went back to work last week and started having severe pain at night, which she thinks was because she was standing, sitting, and not moving. She got a walking pad while typing or standing, which helps. At night, it does not hurt as much, but sometimes in the mornings when she wakes up, she is stiff on both sides. She takes Tylenol. She has tolerated Advil or Aleve in the past. She quit the pain medication on Saturday after surgery because it was too hard on her stomach and caused constipation. The only thing she took in recovery was Tylenol and a muscle relaxer. She denies using cane, walker, or crutches. She did home therapy. She only used a cane for 1.5 weeks and a walker. Her incision is still sore. She has been putting lotion on it.    Medications: tylenol  Of narcotics Saturday after surgery - gi upset/constipation  Reaction to celebrex    Assistive Devices: none    PT: finished  Walking pad at night    Pain ant hip with hip flexion putting on socks and getting in car  Not bending over         Objective:   Body mass index is 24.14 kg/m².  Exam:    Physical Exam  No limp, nonantalgic gait. Negative Trendelenburg. Her incisions well healed. No groin pain without straight leg raise. No groin pain with resisted hip flexion, 0 to 100. Hip flexion with some discomfort at extreme. Flexion 30, abduction 20, adduction 30, external 20, internal rotation without pain.    LLD: 1 cm supine     Imaging:  Indication:  Annual exam status post right total hip arthroplasty  Exam Ordered: Radiographs taken today include an anteroposterior pelvis, and cross table  lateral view of the proximal femur including the hip joint  Details of Examination: Today's exam shows a well fixed, well positioned total hip arthroplasty with no evidence of wear, osteolysis, or loosening.  Impression:  Status post right total hip arthroplasty, implant in good position with no abnormality     LLD - 2-3mm R long max  Results  Imaging  X-rays were reviewed with the patient.      Assessment:       ICD-10-CM ICD-9-CM   1. S/P hip replacement, right  Z96.641 V43.64        Doing well     Plan:       Patient is doing very well with the hip replacement at this time.  They will continue routine care of their hip and see me for their routine follow-up.  If there are problems in the interim they will see me back sooner. Prophylactic antibiotic protocol given and explained to patient.     Ant hip irritation  Phase 2 hip exercises  Aleve or has mobic + tylenol  Assessment & Plan  1. Hip pain.  She can do soft tissue massage. She can use a foam roller. She can submerge in water.    6 week follow up     This note was generated with the assistance of ambient listening technology. Verbal consent was obtained by the patient and accompanying visitor(s) for the recording of patient appointment to facilitate this note. I attest to having reviewed and edited the generated note for accuracy, though some syntax or spelling errors may persist. Please contact the author of this note for any clarification.      No orders of the defined types were placed in this encounter.            Past Medical History:   Diagnosis Date    Abnormal cervical Papanicolaou smear '88, '04    Colpo/Cryo    Benign paroxysmal vertigo, bilateral     Chronic diarrhea 02/12/2015    GERD (gastroesophageal reflux disease)     History of acute PID 1988    Hyperlipidemia     Hypertension     Normocytic anemia 12/22/2023    Thrombocytosis     Urticaria        Past Surgical History:   Procedure Laterality Date    ADENOIDECTOMY      ARTHROPLASTY OF HIP BY  ANTERIOR APPROACH Right 2024    Procedure: ARTHROPLASTY, HIP, TOTAL, ANTERIOR APPROACH: RIGHT: DEPUY - ACTIS + PINNACLE: SAME DAY;  Surgeon: Cresencio Collins III, MD;  Location: [facility] OR;  Service: Orthopedics;  Laterality: Right;    BREAST BIOPSY Right 2021    stereo benign    CARPAL TUNNEL RELEASE Left 2021    Procedure: RELEASE, CARPAL TUNNEL;  Surgeon: Alla Goff MD;  Location: [facility] OR;  Service: Orthopedics;  Laterality: Left;    CARPAL TUNNEL RELEASE Right 2023    Procedure: RELEASE, CARPAL TUNNEL;  Surgeon: Alla Goff MD;  Location: [facility] OR;  Service: Orthopedics;  Laterality: Right;    EYE SURGERY      In Record    GANGLION CYST EXCISION      INJECTION OF JOINT Right 2023    Procedure: INJECTION, JOINT, RIGHT HIP  DIRECT REF;  Surgeon: Harleen Swan MD;  Location: [facility] PAIN [facility]GT;  Service: Pain Management;  Laterality: Right;    INJECTION OF STEROID Right 2021    Procedure: INJECTION, STEROID-Right carpal tunnel;  Surgeon: Alla Goff MD;  Location: [facility] OR;  Service: Orthopedics;  Laterality: Right;    REFRACTIVE SURGERY Bilateral     Dr. Vazquez Forbes     TONSILLECTOMY         Family History   Adopted: Yes   Problem Relation Age of Onset    Heart disease Mother         Adopted    Cancer Father         Adopted       Social History     Socioeconomic History    Marital status: Single   Tobacco Use    Smoking status: Former     Current packs/day: 0.00     Average packs/day: 1 pack/day for 23.1 years (23.1 ttl pk-yrs)     Types: Cigarettes     Start date: 1984     Quit date: 2007     Years since quittin.6    Smokeless tobacco: Never   Substance and Sexual Activity    Alcohol use: Yes     Alcohol/week: 20.0 standard drinks of alcohol     Types: 6 Glasses of wine, 8 Cans of beer, 6 Drinks containing 0.5 oz of alcohol per week     Comment: 3-4 times per week    Drug use: No    Sexual activity: Not Currently     Partners: Male     Birth  control/protection: None     Comment: In Menopause   Social History Narrative    Retired [employer]. Currently, working for the [employer]. Criminal justice degree from [employer].          Social Determinants of Health     Financial Resource Strain: Low Risk  (12/18/2023)    Overall Financial Resource Strain (CARDIA)     Difficulty of Paying Living Expenses: Not hard at all   Food Insecurity: No Food Insecurity (12/18/2023)    Hunger Vital Sign     Worried About Running Out of Food in the Last Year: Never true     Ran Out of Food in the Last Year: Never true   Transportation Needs: No Transportation Needs (12/18/2023)    PRAPARE - Transportation     Lack of Transportation (Medical): No     Lack of Transportation (Non-Medical): No   Physical Activity: Sufficiently Active (12/18/2023)    Exercise Vital Sign     Days of Exercise per Week: 3 days     Minutes of Exercise per Session: 60 min   Stress: No Stress Concern Present (12/18/2023)    [place] citizen [place] of Occupational Health - Occupational Stress Questionnaire     Feeling of Stress : Not at all   Social Connections: Unknown (12/18/2023)    Social Connection and Isolation Panel [NHANES]     Frequency of Communication with Friends and Family: More than three times a week     Frequency of Social Gatherings with Friends and Family: Three times a week     Active Member of Clubs or Organizations: No     Attends Club or Organization Meetings: Never     Marital Status:    Housing Stability: Low Risk  (12/18/2023)    Housing Stability Vital Sign     Unable to Pay for Housing in the Last Year: No     Number of Places Lived in the Last Year: 1     Unstable Housing in the Last Year: No       8

## 2024-03-04 ENCOUNTER — TELEPHONE (OUTPATIENT)
Dept: ORTHOPEDICS | Facility: CLINIC | Age: 54
End: 2024-03-04
Payer: OTHER GOVERNMENT

## 2024-03-04 NOTE — TELEPHONE ENCOUNTER
Patient message total joint line with concerns of a spot at the proximal portion of incision that shows some erythema.  In picture, incision looks well healed with spot of erythema and possible spitting suture.  Patient was instructed to stop putting lotion on the incision and begin putting Neosporin on the spine twice daily.  She will continue to monitor that spot for any worsening and will call the total joint line if so.

## 2024-03-05 ENCOUNTER — PATIENT MESSAGE (OUTPATIENT)
Dept: INTERNAL MEDICINE | Facility: CLINIC | Age: 54
End: 2024-03-05
Payer: OTHER GOVERNMENT

## 2024-03-06 ENCOUNTER — OFFICE VISIT (OUTPATIENT)
Dept: CARDIOLOGY | Facility: CLINIC | Age: 54
End: 2024-03-06
Payer: OTHER GOVERNMENT

## 2024-03-06 VITALS
OXYGEN SATURATION: 99 % | DIASTOLIC BLOOD PRESSURE: 67 MMHG | HEIGHT: 62 IN | SYSTOLIC BLOOD PRESSURE: 98 MMHG | BODY MASS INDEX: 23.9 KG/M2 | WEIGHT: 129.88 LBS | HEART RATE: 72 BPM

## 2024-03-06 DIAGNOSIS — E78.5 HYPERLIPIDEMIA, UNSPECIFIED HYPERLIPIDEMIA TYPE: Primary | ICD-10-CM

## 2024-03-06 DIAGNOSIS — Z78.9 ALCOHOL CONSUMPTION OF MORE THAN FOUR DRINKS PER WEEK: ICD-10-CM

## 2024-03-06 DIAGNOSIS — I10 HYPERTENSION, UNSPECIFIED TYPE: ICD-10-CM

## 2024-03-06 DIAGNOSIS — I70.0 AORTIC ATHEROSCLEROSIS: ICD-10-CM

## 2024-03-06 PROCEDURE — 99214 OFFICE O/P EST MOD 30 MIN: CPT | Mod: S$PBB,,, | Performed by: STUDENT IN AN ORGANIZED HEALTH CARE EDUCATION/TRAINING PROGRAM

## 2024-03-06 PROCEDURE — 99215 OFFICE O/P EST HI 40 MIN: CPT | Mod: PBBFAC | Performed by: STUDENT IN AN ORGANIZED HEALTH CARE EDUCATION/TRAINING PROGRAM

## 2024-03-06 PROCEDURE — 99999 PR PBB SHADOW E&M-EST. PATIENT-LVL V: CPT | Mod: PBBFAC,,, | Performed by: STUDENT IN AN ORGANIZED HEALTH CARE EDUCATION/TRAINING PROGRAM

## 2024-03-06 RX ORDER — PRAVASTATIN SODIUM 10 MG/1
10 TABLET ORAL DAILY
Qty: 90 TABLET | Refills: 3 | Status: SHIPPED | OUTPATIENT
Start: 2024-03-06 | End: 2024-06-18 | Stop reason: SDUPTHER

## 2024-03-06 NOTE — PROGRESS NOTES
Cardiology Clinic Note  Reason for Visit: chest pain    HPI:   Pt is a 52yo F with pmhx of HTN, HLD, avascular necrosis of R hip, alcohol abuse who presents to clinic for chest pain    Since last visit, pt tolerated her statin initially.  However she had preop lab work done and showed elevated LFTs to the 70s.  This was thought to be due to her statin and she stopped it and her LFTs normalized.  Of note she still is drinking ETOH beverages.  She said she did stop her ETOH when she stopped her statin because she was told no drinking after her procedure.    Last visit: 7/2023   She works out 3x a week with [employer] with no issues.  About 1.5 weeks ago she woke up from sleep and experienced central chest pressure.  Had some SOB.  She denies anything like this.  Lasted 1.5 hours and resolved spontaneously.  Hasn't happened since. . She went to her PCP who ordered an ENEIDA.  She presents for evaluation.  Former smoker of 15yrs, drinking about 4-5 drinks a day on Th/Fr/Sat/Sun.    ROS:    Constitution: Negative for fever, chills, weight loss or gain.   HENT: Negative for sore throat, rhinorrhea, or headache.  Eyes: Negative for blurred or double vision.   Cardiovascular: See above  Pulmonary: Negative for SOB   Gastrointestinal: Negative for abdominal pain, nausea, vomiting, or diarrhea.   : Negative for dysuria.   Neurological: Negative for focal weakness or sensory changes.  PMH:     Past Medical History:   Diagnosis Date    Abnormal cervical Papanicolaou smear '88, '04    Colpo/Cryo    Benign paroxysmal vertigo, bilateral     Chronic diarrhea 02/12/2015    GERD (gastroesophageal reflux disease)     History of acute PID 1988    Hyperlipidemia     Hypertension     Normocytic anemia 12/22/2023    Thrombocytosis     Urticaria      Past Surgical History:   Procedure Laterality Date    ADENOIDECTOMY      ARTHROPLASTY OF HIP BY ANTERIOR APPROACH Right 1/17/2024    Procedure: ARTHROPLASTY, HIP, TOTAL, ANTERIOR  APPROACH: RIGHT: DEPUY - ACTIS + PINNACLE: SAME DAY;  Surgeon: Cresencio Collins III, MD;  Location: [facility] OR;  Service: Orthopedics;  Laterality: Right;    BREAST BIOPSY Right 06/09/2021    stereo benign    CARPAL TUNNEL RELEASE Left 09/21/2021    Procedure: RELEASE, CARPAL TUNNEL;  Surgeon: Alla Goff MD;  Location: [facility] OR;  Service: Orthopedics;  Laterality: Left;    CARPAL TUNNEL RELEASE Right 02/07/2023    Procedure: RELEASE, CARPAL TUNNEL;  Surgeon: Alla Goff MD;  Location: [facility] OR;  Service: Orthopedics;  Laterality: Right;    EYE SURGERY  2010    In Record    GANGLION CYST EXCISION      INJECTION OF JOINT Right 03/09/2023    Procedure: INJECTION, JOINT, RIGHT HIP  DIRECT REF;  Surgeon: Harleen Swan MD;  Location: [facility] PAIN [facility]GT;  Service: Pain Management;  Laterality: Right;    INJECTION OF STEROID Right 09/21/2021    Procedure: INJECTION, STEROID-Right carpal tunnel;  Surgeon: Alla Goff MD;  Location: [facility] OR;  Service: Orthopedics;  Laterality: Right;    REFRACTIVE SURGERY Bilateral 2009    Dr. Vazquez Forbes     TONSILLECTOMY       Allergies:     Review of patient's allergies indicates:   Allergen Reactions    Penicillins Hives    Adhesive Hives    Celecoxib Itching and Rash     Medications:     Current Outpatient Medications on File Prior to Visit   Medication Sig Dispense Refill    acetaminophen (TYLENOL) 650 MG TbSR Take 1 tablet (650 mg total) by mouth every 8 (eight) hours. 120 tablet 0    CALCIUM CARBONATE (CALCIUM 500 ORAL) Take 1 capsule by mouth.      cetirizine (ZYRTEC) 10 MG tablet Take 1 tablet (10 mg total) by mouth once daily. 30 tablet 0    chlorthalidone (HYGROTEN) 25 MG Tab TAKE 1 TABLET DAILY 90 tablet 1    cyanocobalamin (VITAMIN B-12) 100 MCG tablet Take 100 mcg by mouth once daily.      desonide (DESOWEN) 0.05 % cream Apply topically 2 (two) times daily. 60 g 1    ferrous sulfate 325 mg (65 mg iron) Tab tablet Take 325 mg by mouth daily with breakfast.      glucosamine  sulfate (GLUCOSAMINE) 500 mg Tab Take 1,500 mg by mouth Daily.      hydrOXYzine HCL (ATARAX) 25 MG tablet Take 1 tablet (25 mg total) by mouth 3 (three) times daily as needed for Itching. 30 tablet 0    inulin (PREBIOTIC FIBER ORAL) Take 500 mg by mouth once daily. Takes 2 cap in am.      L.acid/L.casei/B.bif/B.bernabe/FOS (PROBIOTIC BLEND ORAL) Take 400 mg by mouth 2 (two) times a day.      MAGNESIUM GLYCINATE, BULK, MISC 120 mg by Misc.(Non-Drug; Combo Route) route once daily.      meclizine (ANTIVERT) 25 mg tablet Take 1 tablet (25 mg total) by mouth 3 (three) times daily as needed for Dizziness. 30 tablet 0    MILK THISTLE ORAL Take 1,000 mg by mouth once daily.      OMEGA-3/DHA/EPA/FISH OIL (OMEGA-3 FISH OIL ORAL) Take 1 capsule by mouth.      ondansetron (ZOFRAN-ODT) 4 MG TbDL Take 1 tablet (4 mg total) by mouth 3 (three) times daily as needed (Nausea and vomiting). 30 tablet 0    senna-docusate 8.6-50 mg (SENNA WITH DOCUSATE SODIUM) 8.6-50 mg per tablet Take 1 tablet by mouth once daily. 30 tablet 0    tacrolimus (PROTOPIC) 0.03 % ointment Apply topically 2 (two) times daily. 30 g 6    UNABLE TO FIND Take 1,950 mg by mouth Daily. medication name: Tumeric      valsartan (DIOVAN) 160 MG tablet TAKE 1 TABLET DAILY 90 tablet 1    aspirin (ECOTRIN) 81 MG EC tablet Take 1 tablet (81 mg total) by mouth 2 (two) times a day. 60 tablet 0    astaxanthin 12 mg Cap Take by mouth Daily.      atorvastatin (LIPITOR) 10 MG tablet Take 1 tablet (10 mg total) by mouth once daily. (Patient not taking: Reported on 3/6/2024) 90 tablet 3    celecoxib (CELEBREX) 200 MG capsule Take 1 capsule (200 mg total) by mouth once daily. (Patient not taking: Reported on 3/6/2024) 30 capsule 0    oxyCODONE (ROXICODONE) 5 MG immediate release tablet Take 1 tablet every 4-6 hours as needed for pain (Patient not taking: Reported on 3/6/2024) 30 tablet 0    pantoprazole (PROTONIX) 40 MG tablet Take 1 tablet (40 mg total) by mouth once daily. 30 tablet  "0    predniSONE (DELTASONE) 10 MG tablet Take 6 pills a day for two days, then 5 pills on day 3, then 4 pills, then 3 pills then 2 pills then one pill then off. (Patient not taking: Reported on 3/6/2024) 30 tablet 0     No current facility-administered medications on file prior to visit.     Social History:     Social History     Tobacco Use    Smoking status: Former     Current packs/day: 0.00     Average packs/day: 1 pack/day for 23.1 years (23.1 ttl pk-yrs)     Types: Cigarettes     Start date: 1984     Quit date: 2007     Years since quittin.6    Smokeless tobacco: Never   Substance Use Topics    Alcohol use: Yes     Alcohol/week: 20.0 standard drinks of alcohol     Types: 6 Glasses of wine, 8 Cans of beer, 6 Drinks containing 0.5 oz of alcohol per week     Comment: 3-4 times per week     Family History:     Family History   Adopted: Yes   Problem Relation Age of Onset    Heart disease Mother         Adopted    Cancer Father         Adopted     Physical Exam:   BP 98/67   Pulse 72   Ht 5' 2" (1.575 m)   Wt 58.9 kg (129 lb 13.6 oz)   LMP 2016 (Approximate)   SpO2 99%   BMI 23.75 kg/m²    Wt Readings from Last 4 Encounters:   24 58.9 kg (129 lb 13.6 oz)   24 59.9 kg (132 lb)   24 61.2 kg (135 lb)   23 62 kg (136 lb 11 oz)         Constitutional: No distress, obese, conversant  HEENT: Sclera anicteric, PERRLA, EOMI  Neck: No JVD, no masses, good movement  CV: RRR, S1 and S2 normal, no additional heart sounds or murmurs. Pulses 2+ and equal bilaterally in radial arteries, Oliver's normal on right. Distal pulses are 2+ and equal in the femoral, DP and PT areas bilaterally  Pulm: Clear to auscultation bilaterally with symmetrical expansion. Chest wall palpated for reproduction of pain symptoms, and no pain was able to be produced on palpation or resistance exercises  GI: Abdomen soft, non-tender, good bowel sounds  Extremities: Both extremities intact and grossly normal, " "skin is warm, no edema noted  Skin: No ecchymosis, erythema, or ulcers  Psych: AOx3, appropriate affect  Neuro: CNII-XII intact, no focal deficits      Labs:     Lab Results   Component Value Date     01/09/2024    K 3.5 01/09/2024     01/09/2024    CO2 26 01/09/2024    BUN 15 01/09/2024    CREATININE 0.6 01/09/2024    ANIONGAP 11 01/09/2024     Lab Results   Component Value Date    HGBA1C 5.1 07/11/2023     No results found for: "BNP", "BNPTRIAGEBLO" Lab Results   Component Value Date    WBC 4.74 12/22/2023    HGB 12.1 12/22/2023    HCT 35.1 (L) 12/22/2023     12/22/2023    GRAN 2.7 12/22/2023    GRAN 56.0 12/22/2023     Lab Results   Component Value Date    CHOL 243 (H) 07/11/2023    HDL 99 (H) 07/11/2023    LDLCALC 126.4 07/11/2023    TRIG 88 07/11/2023          Imaging:         No results found for: "EF"  Stress Echo Which stress agent will be used? Treadmill Exercise    Result Date: 8/31/2023    Left Ventricle: The left ventricle is normal in size. Normal wall   thickness. Normal wall motion. There is normal systolic function with a   visually estimated ejection fraction of 60 - 65%. There is normal   diastolic function.    Right Ventricle: Normal right ventricular cavity size. Systolic   function is normal.    Pulmonary Artery: The estimated pulmonary artery systolic pressure is   30 mmHg.    IVC/SVC: Intermediate venous pressure at 8 mmHg.    Stress Protocol: The patient exercised for 8 minutes 37 seconds on a   high ramp protocol, corresponding to a functional capacity of 15 METS,   achieving a peak heart rate of 153 bpm, which is 97 % of the age predicted   maximum heart rate. Their exercise capacity was excellent. The patient   reported no symptoms during the stress test.    Post-stress       Display both the narrative and impression [both]  The study is negative with no echocardiographic   evidence of stress induced ischemia.    Post-stress Echo: The left ventricle systolic function is " hyperdynamic   with an EF of 75 %.    Stress ECG: There are no ST segment deviation identified during the   protocol. There are no arrhythmias during stress. There is normal blood   pressure response with stress.           Assessment:    Pt is a 54yo F with pmhx of HTN, HLD, avascular necrosis of R hip, alcohol abuse who presents to clinic for chest pain     Plan:     #HLD:  Her ASCVD score is low however her CT chest shows aortic calcification.  Initially didn't tolerate statin 2/2 to elevated LFTs.  Possible concomitant ETOH?  - comntinue asa 81mg qD  - will trial pravastatin 10mg qD and repeat LFTs in 6 weeks.  If continues to have elevated LFTs, will need to discuss whether ETOH cessation vs statin with pt    #Alcoholism:  - discussed cessation    #HTN:  - bp at goal, continue chlorthalidone 25mg qD and valsartan 160mg qD    Signed:  Kvng Gonzalez MD  Cardiology Fellow  Pager - [phone]  [facility]  3/6/2024 9:27 AM

## 2024-03-10 ENCOUNTER — PATIENT MESSAGE (OUTPATIENT)
Dept: ADMINISTRATIVE | Facility: OTHER | Age: 54
End: 2024-03-10
Payer: OTHER GOVERNMENT

## 2024-03-25 ENCOUNTER — PATIENT MESSAGE (OUTPATIENT)
Dept: INTERNAL MEDICINE | Facility: CLINIC | Age: 54
End: 2024-03-25
Payer: OTHER GOVERNMENT

## 2024-03-26 ENCOUNTER — OFFICE VISIT (OUTPATIENT)
Dept: PULMONOLOGY | Facility: CLINIC | Age: 54
End: 2024-03-26
Attending: FAMILY MEDICINE
Payer: OTHER GOVERNMENT

## 2024-03-26 VITALS
HEART RATE: 80 BPM | OXYGEN SATURATION: 98 % | HEIGHT: 62 IN | WEIGHT: 132.5 LBS | DIASTOLIC BLOOD PRESSURE: 76 MMHG | BODY MASS INDEX: 24.38 KG/M2 | SYSTOLIC BLOOD PRESSURE: 130 MMHG

## 2024-03-26 DIAGNOSIS — Z87.891 PERSONAL HISTORY OF NICOTINE DEPENDENCE: ICD-10-CM

## 2024-03-26 DIAGNOSIS — J84.10 CALCIFIED GRANULOMA OF LUNG: Primary | ICD-10-CM

## 2024-03-26 DIAGNOSIS — R91.1 SOLITARY PULMONARY NODULE: ICD-10-CM

## 2024-03-26 DIAGNOSIS — R91.8 LUNG NODULES: ICD-10-CM

## 2024-03-26 PROCEDURE — 99999 PR PBB SHADOW E&M-EST. PATIENT-LVL III: CPT | Mod: PBBFAC,,, | Performed by: INTERNAL MEDICINE

## 2024-03-26 PROCEDURE — 99204 OFFICE O/P NEW MOD 45 MIN: CPT | Mod: S$PBB,,, | Performed by: INTERNAL MEDICINE

## 2024-03-26 PROCEDURE — 99213 OFFICE O/P EST LOW 20 MIN: CPT | Mod: PBBFAC | Performed by: INTERNAL MEDICINE

## 2024-03-26 NOTE — ASSESSMENT & PLAN NOTE
"GG and stable. Warrants 5 years of stability given size and smoking history (throught 6/2026). Quit smoking in 2007, but latest guidelines have removed "time since quitting".     - Recommend CT Chest 12/2024  "

## 2024-03-26 NOTE — PROGRESS NOTES
Subjective:       Patient ID: Kayleen Patel is a 53 y.o. female.    Chief Complaint: No chief complaint on file.    HPI:   Kayleen Patel is a 53 y.o. female new to me who presents for evaluation of GG nodules.    Underwent a screening LDCT Chest 2021 given her previous smoking history which showed upper lobe GGNs. Serial eval showed stability.     Minimal resp sx. Occ cough a/w sinus congestion, seasonal allergies. Denies URI/LRTIs. Denies [place].     Denies chest pain.    Denies f/c/ns, weight loss, malaise, fatigue      Exposures:  Work-  w/ [employer].   21 years in the [employer].   Tobacco- 1 ppd for 23 years, quit in  w/ Chantix  Inhalational Agents- Denies  Mold- Denies  Pets- Denies  Birds- Denies  Hot tubs- 20x/yr in the fall     Childhood History of Lung Disease: Denies    Review of Systems    As above    Social History     Tobacco Use    Smoking status: Former     Current packs/day: 0.00     Average packs/day: 1 pack/day for 23.1 years (23.1 ttl pk-yrs)     Types: Cigarettes     Start date: 1984     Quit date: 2007     Years since quittin.7    Smokeless tobacco: Never   Substance Use Topics    Alcohol use: Yes     Alcohol/week: 20.0 standard drinks of alcohol     Types: 6 Glasses of wine, 8 Cans of beer, 6 Drinks containing 0.5 oz of alcohol per week     Comment: 3-4 times per week       Review of patient's allergies indicates:   Allergen Reactions    Penicillins Hives    Adhesive Hives    Celecoxib Itching and Rash     Past Medical History:   Diagnosis Date    Abnormal cervical Papanicolaou smear ,     Colpo/Cryo    Benign paroxysmal vertigo, bilateral     Chronic diarrhea 2015    GERD (gastroesophageal reflux disease)     History of acute PID     Hyperlipidemia     Hypertension     Normocytic anemia 2023    Thrombocytosis     Urticaria      Past Surgical History:   Procedure Laterality Date    ADENOIDECTOMY      ARTHROPLASTY OF HIP BY  ANTERIOR APPROACH Right 1/17/2024    Procedure: ARTHROPLASTY, HIP, TOTAL, ANTERIOR APPROACH: RIGHT: DEPUY - ACTIS + PINNACLE: SAME DAY;  Surgeon: Cresencio Collins III, MD;  Location: [facility] OR;  Service: Orthopedics;  Laterality: Right;    BREAST BIOPSY Right 06/09/2021    stereo benign    CARPAL TUNNEL RELEASE Left 09/21/2021    Procedure: RELEASE, CARPAL TUNNEL;  Surgeon: Alla Goff MD;  Location: [facility] OR;  Service: Orthopedics;  Laterality: Left;    CARPAL TUNNEL RELEASE Right 02/07/2023    Procedure: RELEASE, CARPAL TUNNEL;  Surgeon: Alla Goff MD;  Location: [facility] OR;  Service: Orthopedics;  Laterality: Right;    EYE SURGERY  2010    In Record    GANGLION CYST EXCISION      INJECTION OF JOINT Right 03/09/2023    Procedure: INJECTION, JOINT, RIGHT HIP  DIRECT REF;  Surgeon: aHrleen Swan MD;  Location: [facility] PAIN MGT;  Service: Pain Management;  Laterality: Right;    INJECTION OF STEROID Right 09/21/2021    Procedure: INJECTION, STEROID-Right carpal tunnel;  Surgeon: Alla Goff MD;  Location: [facility] OR;  Service: Orthopedics;  Laterality: Right;    REFRACTIVE SURGERY Bilateral 2009    Dr. Vazquez Forbes     TONSILLECTOMY       Current Outpatient Medications on File Prior to Visit   Medication Sig    acetaminophen (TYLENOL) 650 MG TbSR Take 1 tablet (650 mg total) by mouth every 8 (eight) hours.    aspirin (ECOTRIN) 81 MG EC tablet Take 1 tablet (81 mg total) by mouth 2 (two) times a day.    astaxanthin 12 mg Cap Take by mouth Daily.    CALCIUM CARBONATE (CALCIUM 500 ORAL) Take 1 capsule by mouth.    celecoxib (CELEBREX) 200 MG capsule Take 1 capsule (200 mg total) by mouth once daily. (Patient not taking: Reported on 3/6/2024)    cetirizine (ZYRTEC) 10 MG tablet Take 1 tablet (10 mg total) by mouth once daily.    chlorthalidone (HYGROTEN) 25 MG Tab TAKE 1 TABLET DAILY    cyanocobalamin (VITAMIN B-12) 100 MCG tablet Take 100 mcg by mouth once daily.    desonide (DESOWEN) 0.05 % cream Apply topically 2  (two) times daily.    ferrous sulfate 325 mg (65 mg iron) Tab tablet Take 325 mg by mouth daily with breakfast.    glucosamine sulfate (GLUCOSAMINE) 500 mg Tab Take 1,500 mg by mouth Daily.    hydrOXYzine HCL (ATARAX) 25 MG tablet Take 1 tablet (25 mg total) by mouth 3 (three) times daily as needed for Itching.    inulin (PREBIOTIC FIBER ORAL) Take 500 mg by mouth once daily. Takes 2 cap in am.    L.acid/L.casei/B.bif/B.bernabe/FOS (PROBIOTIC BLEND ORAL) Take 400 mg by mouth 2 (two) times a day.    MAGNESIUM GLYCINATE, BULK, MISC 120 mg by Misc.(Non-Drug; Combo Route) route once daily.    meclizine (ANTIVERT) 25 mg tablet Take 1 tablet (25 mg total) by mouth 3 (three) times daily as needed for Dizziness.    MILK THISTLE ORAL Take 1,000 mg by mouth once daily.    OMEGA-3/DHA/EPA/FISH OIL (OMEGA-3 FISH OIL ORAL) Take 1 capsule by mouth.    ondansetron (ZOFRAN-ODT) 4 MG TbDL Take 1 tablet (4 mg total) by mouth 3 (three) times daily as needed (Nausea and vomiting).    oxyCODONE (ROXICODONE) 5 MG immediate release tablet Take 1 tablet every 4-6 hours as needed for pain (Patient not taking: Reported on 3/6/2024)    pantoprazole (PROTONIX) 40 MG tablet Take 1 tablet (40 mg total) by mouth once daily.    pravastatin (PRAVACHOL) 10 MG tablet Take 1 tablet (10 mg total) by mouth once daily.    predniSONE (DELTASONE) 10 MG tablet Take 6 pills a day for two days, then 5 pills on day 3, then 4 pills, then 3 pills then 2 pills then one pill then off. (Patient not taking: Reported on 3/6/2024)    senna-docusate 8.6-50 mg (SENNA WITH DOCUSATE SODIUM) 8.6-50 mg per tablet Take 1 tablet by mouth once daily.    tacrolimus (PROTOPIC) 0.03 % ointment Apply topically 2 (two) times daily.    UNABLE TO FIND Take 1,950 mg by mouth Daily. medication name: Tumeric    valsartan (DIOVAN) 160 MG tablet TAKE 1 TABLET DAILY     No current facility-administered medications on file prior to visit.       Objective:      Vitals:    03/26/24 1324   BP:  "130/76   Pulse: 80   SpO2: 98%   Weight: 60.1 kg (132 lb 7.9 oz)   Height: 5' 2" (1.575 m)     Physical Exam   Constitutional: She appears well-developed and well-nourished.   HENT:   Nose: No mucosal edema.   Mouth/Throat: Oropharynx is clear and moist. Mallampati Score: I.   Neck: No tracheal deviation present.   Cardiovascular: Normal rate and regular rhythm.   Pulmonary/Chest: Normal expansion, symmetric chest wall expansion, effort normal and breath sounds normal. No respiratory distress. She has no wheezes. She has no rhonchi. She has no rales.   Abdominal: She exhibits no distension.   Musculoskeletal:         General: No edema.      Cervical back: Neck supple.   Lymphadenopathy: No supraclavicular adenopathy is present.     She has no cervical adenopathy.   Skin: Skin is warm and dry. No cyanosis or erythema. Nails show no clubbing.   Nursing note and vitals reviewed.      Personal Diagnostic Review    Laboratory:  12/22/23  Bicarb- 28  Eosinophils- 0.1      CT Chest 12/15/23- Images personally reviewed. I agree w/ the radiologist who notes;     Lungs and airways: Mild upper lobe predominant centrilobular emphysema. Right lung calcified granuloma.  Several bilateral mostly ground-glass nodules.  Previously described largest opacity in the right lung appears part solid and measures 7 mm (previously 9 mm) on series 4, image 121.  Additional right-sided dominant ground-glass nodule measuring 7 mm (series 4, image 162), unchanged.  The largest opacity in the left lung appears ground-glass and measures 6 mm on series 4, image 75.  No new nodules.     Stress TTE 8/30/23      Left Ventricle: The left ventricle is normal in size. Normal wall thickness. Normal wall motion. There is normal systolic function with a visually estimated ejection fraction of 60 - 65%. There is normal diastolic function.    Right Ventricle: Normal right ventricular cavity size. Systolic function is normal.    Pulmonary Artery: The estimated " "pulmonary artery systolic pressure is 30 mmHg.    IVC/SVC: Intermediate venous pressure at 8 mmHg.    Stress Protocol: The patient exercised for 8 minutes 37 seconds on a high ramp protocol, corresponding to a functional capacity of 15 METS, achieving a peak heart rate of 153 bpm, which is 97 % of the age predicted maximum heart rate. Their exercise capacity was excellent. The patient reported no symptoms during the stress test.    Post-stress Impression: The study is negative with no echocardiographic evidence of stress induced ischemia.    Post-stress Echo: The left ventricle systolic function is hyperdynamic with an EF of 75 %.    Stress ECG: There are no ST segment deviation identified during the protocol. There are no arrhythmias during stress. There is normal blood pressure response with stress.      Assessment:     Problem List Items Addressed This Visit          Pulmonary    Calcified granuloma of lung - Primary     Dense calcification in the R periphery.         Lung nodules     GG and stable. Warrants 5 years of stability given size and smoking history (throught 6/2026). Quit smoking in 2007, but latest guidelines have removed "time since quitting".     - Recommend CT Chest 12/2024            Other    Personal history of nicotine dependence     No s/s of COPD. Has minimal emphysema in the apices. Check PFTs if respiratory sx develop          Other Visit Diagnoses       Solitary pulmonary nodule                45 minutes of total time spent on the encounter, which includes face to face time and non-face to face time preparing to see the patient (eg, review of tests), Obtaining and/or reviewing separately obtained history, Documenting clinical information in the electronic or other health record, Independently interpreting results (not separately reported) and communicating results to the patient/family/caregiver, or Care coordination (not separately reported).      "

## 2024-04-02 ENCOUNTER — PATIENT OUTREACH (OUTPATIENT)
Dept: ADMINISTRATIVE | Facility: HOSPITAL | Age: 54
End: 2024-04-02
Payer: OTHER GOVERNMENT

## 2024-04-02 NOTE — PROGRESS NOTES
Health Maintenance Due   Topic Date Due    High Dose Statin  Never done    Pneumococcal Vaccines (Age 0-64) (2 of 2 - PPSV23 or PCV20) 08/14/2020    COVID-19 Vaccine (5 - 2023-24 season) 09/01/2023    Mammogram  06/14/2024       Chart reviewed and updated For campaigns outreach. Mammogram has already been scheduled.    April Hong LPN   Clinical Care Coordinator  Primary Care and Wellness

## 2024-04-12 ENCOUNTER — OFFICE VISIT (OUTPATIENT)
Dept: ORTHOPEDICS | Facility: CLINIC | Age: 54
End: 2024-04-12
Payer: OTHER GOVERNMENT

## 2024-04-12 VITALS — WEIGHT: 132 LBS | HEIGHT: 62 IN | BODY MASS INDEX: 24.29 KG/M2

## 2024-04-12 DIAGNOSIS — M70.71 ILIOPSOAS BURSITIS OF RIGHT HIP: ICD-10-CM

## 2024-04-12 DIAGNOSIS — Z96.641 S/P HIP REPLACEMENT, RIGHT: Primary | ICD-10-CM

## 2024-04-12 PROCEDURE — 99999 PR PBB SHADOW E&M-EST. PATIENT-LVL IV: CPT | Mod: PBBFAC,,, | Performed by: ORTHOPAEDIC SURGERY

## 2024-04-12 PROCEDURE — 99214 OFFICE O/P EST MOD 30 MIN: CPT | Mod: PBBFAC | Performed by: ORTHOPAEDIC SURGERY

## 2024-04-12 PROCEDURE — 99024 POSTOP FOLLOW-UP VISIT: CPT | Mod: ,,, | Performed by: ORTHOPAEDIC SURGERY

## 2024-04-12 NOTE — PROGRESS NOTES
Subjective:     HPI:   Kayleen Patel is a 53 y.o. female who presents 12 weeks out from right NICK     Date of surgery: R ant NICK 1/17/24    Hip flexion - ant, ant lat  Shoes/socks - pain, difficulty with flexion     - flexion and external rotation  - some L hip symptoms and L knee popping    History of Present Illness  The patient presents for evaluation of right hip pain. She is accompanied by an adult male.    The patient is experiencing similar pain in the contralateral extremity, albeit not examined. She is currently three months post-operative from her right hip surgery. During her last visit on 03/01/2024, she was prescribed Mobic, Tylenol, and anti-inflammatory medications, which significantly alleviated her symptoms. However, she was advised to exercise caution while using Mobic. Currently, she is not utilizing any other analgesics or anti-inflammatory medications. She experiences pain while entering a car on a slope, donning shoes, socks, or lotion, and when attempting to flex or externally rotate her hip. Despite the pain, she is able to ambulate without difficulty. However, due to severe pain in her left hip, she has begun to lean on her left knee, resulting in popping sounds in her left knee, which are not painful. The left knee appears slightly swollen compared to her knees. She describes a sensation akin to a stitch, which she manually reduces and occasionally experiences a cutting pain upon pressure. Topical foam rolling provides relief for only 30 seconds at a time. She queries about the possibility of performing squats and sit-ups, as advised during her last visit, and also mentions that planking does not exacerbate her pain.         Objective:   Body mass index is 24.14 kg/m².  Exam:      Physical Exam  The patient rises easily out of a chair and walks well, with no limp or antalgic gait. Trendelenburg test is negative. The incision on the hip is well healed. There is a 1 cm leg  length discrepancy on the right side, unchanged from prior. There is no significant discomfort during active straight leg raise, but active hip flexion results in anterior hip pain. Hip flexion ranges from 0 to 100 degrees, with some pain at terminal flexion. Hip flexion has 40 degrees, abduction 30 degrees, adduction 30 degrees, external 30 degrees, internal rotation. With flexion and internal and external rotation of the hip, there is significant anterior hip pain. There is tenderness at the mid incision and dense scar tissue. There is no fluctuance or obvious herniation. Deeper groin pain with active hip flexion is also present.    Imaging:  None today    Results        X-table lateral - no cup overhang      Assessment:       ICD-10-CM ICD-9-CM   1. S/P hip replacement, right  Z96.641 V43.64      Ant pain, jessica with flexion and ext rotation  Mechanically NICK looks ok, seems soft tissue  Ddx: IP tendonitis - pain with flex/ext rot, but more pain around incision ?scar tissue, TFL/rectus herniation  L hip: OA     Plan:       Patient is doing very well with the hip replacement at this time.  They will continue routine care of their hip and see me for their routine follow-up.  If there are problems in the interim they will see me back sooner. Prophylactic antibiotic protocol given and explained to patient.     Assessment & Plan  1. Pain in the right hip.  The patient's kidney function is satisfactory. The symptoms suggest soft tissue-related issues. The joint appears to be in good condition mechanically. The differential diagnosis includes iliopsoas tendinitis, scar tissue irritation from surgery, or muscle pushing through the TFL muscles. The patient also has arthritis in her left hip. An ultrasound-guided steroid injection of the iliopsoas tendon will be administered to ascertain the origin of the pain. The patient is advised to try squats and sit-ups, but should avoid them if they cause pain. A steroid dose pack  will be prescribed to alleviate the pain. Should the symptoms persist, an MRI of the soft tissues may be considered.    Follow-up  The patient is scheduled for a follow-up visit two weeks post-injection.    Some Sx's consistent with IP tendonitis   Some at incision     Stop planking - says she has been doing a lot of planking because I told her not to do sit ups or squats    Ref to AG for US guided IP bursa CSI  Resume mobic    F/u 2 weeks after injection    This note was generated with the assistance of ambient listening technology. Verbal consent was obtained by the patient and accompanying visitor(s) for the recording of patient appointment to facilitate this note. I attest to having reviewed and edited the generated note for accuracy, though some syntax or spelling errors may persist. Please contact the author of this note for any clarification.      No orders of the defined types were placed in this encounter.            Past Medical History:   Diagnosis Date    Abnormal cervical Papanicolaou smear '88, '04    Colpo/Cryo    Benign paroxysmal vertigo, bilateral     Chronic diarrhea 02/12/2015    GERD (gastroesophageal reflux disease)     History of acute PID 1988    Hyperlipidemia     Hypertension     Normocytic anemia 12/22/2023    Thrombocytosis     Urticaria        Past Surgical History:   Procedure Laterality Date    ADENOIDECTOMY      ARTHROPLASTY OF HIP BY ANTERIOR APPROACH Right 1/17/2024    Procedure: ARTHROPLASTY, HIP, TOTAL, ANTERIOR APPROACH: RIGHT: DEPUY - ACTIS + PINNACLE: SAME DAY;  Surgeon: Cresencio Collins III, MD;  Location: [facility] OR;  Service: Orthopedics;  Laterality: Right;    BREAST BIOPSY Right 06/09/2021    stereo benign    CARPAL TUNNEL RELEASE Left 09/21/2021    Procedure: RELEASE, CARPAL TUNNEL;  Surgeon: Alla Goff MD;  Location: [facility] OR;  Service: Orthopedics;  Laterality: Left;    CARPAL TUNNEL RELEASE Right 02/07/2023    Procedure: RELEASE, CARPAL TUNNEL;  Surgeon: Alla ALVARADO  MD [place];  Location: [facility] OR;  Service: Orthopedics;  Laterality: Right;    EYE SURGERY  2010    In Record    GANGLION CYST EXCISION      INJECTION OF JOINT Right 2023    Procedure: INJECTION, JOINT, RIGHT HIP  DIRECT REF;  Surgeon: Harleen Swan MD;  Location: [facility];  Service: Pain Management;  Laterality: Right;    INJECTION OF STEROID Right 2021    Procedure: INJECTION, STEROID-Right carpal tunnel;  Surgeon: Alla Goff MD;  Location: [facility] OR;  Service: Orthopedics;  Laterality: Right;    REFRACTIVE SURGERY Bilateral 2009    Dr. Vazquez Forbes     TONSILLECTOMY         Family History   Adopted: Yes   Problem Relation Age of Onset    Heart disease Mother         Adopted    Cancer Father         Adopted       Social History     Socioeconomic History    Marital status: Single   Tobacco Use    Smoking status: Former     Current packs/day: 0.00     Average packs/day: 1 pack/day for 23.1 years (23.1 ttl pk-yrs)     Types: Cigarettes     Start date: 1984     Quit date: 2007     Years since quittin.7    Smokeless tobacco: Never   Substance and Sexual Activity    Alcohol use: Yes     Alcohol/week: 20.0 standard drinks of alcohol     Types: 6 Glasses of wine, 8 Cans of beer, 6 Drinks containing 0.5 oz of alcohol per week     Comment: 3-4 times per week    Drug use: No    Sexual activity: Not Currently     Partners: Male     Birth control/protection: None     Comment: In Menopause   Social History Narrative    Retired [employer]. Currently, working for the [employer]. Criminal justice degree from [employer].          Social Determinants of Health     Financial Resource Strain: Low Risk  (2023)    Overall Financial Resource Strain (CARDIA)     Difficulty of Paying Living Expenses: Not hard at all   Food Insecurity: No Food Insecurity (2023)    Hunger Vital Sign     Worried About Running Out of Food in the Last Year: Never true     Ran Out of Food in the Last Year: Never true    Transportation Needs: No Transportation Needs (12/18/2023)    PRAPARE - Transportation     Lack of Transportation (Medical): No     Lack of Transportation (Non-Medical): No   Physical Activity: Sufficiently Active (12/18/2023)    Exercise Vital Sign     Days of Exercise per Week: 3 days     Minutes of Exercise per Session: 60 min   Stress: No Stress Concern Present (12/18/2023)    [place] [employer] - Occupational Stress Questionnaire     Feeling of Stress : Not at all   Social Connections: Unknown (12/18/2023)    Social Connection and Isolation Panel [NHANES]     Frequency of Communication with Friends and Family: More than three times a week     Frequency of Social Gatherings with Friends and Family: Three times a week     Active Member of Clubs or Organizations: No     Attends Club or Organization Meetings: Never     Marital Status:    Housing Stability: Low Risk  (12/18/2023)    Housing Stability Vital Sign     Unable to Pay for Housing in the Last Year: No     Number of Places Lived in the Last Year: 1     Unstable Housing in the Last Year: No

## 2024-04-15 ENCOUNTER — PATIENT MESSAGE (OUTPATIENT)
Dept: ORTHOPEDICS | Facility: CLINIC | Age: 54
End: 2024-04-15
Payer: OTHER GOVERNMENT

## 2024-04-15 DIAGNOSIS — Z96.641 HISTORY OF RIGHT HIP REPLACEMENT: Primary | ICD-10-CM

## 2024-04-15 RX ORDER — CLINDAMYCIN HYDROCHLORIDE 300 MG/1
600 CAPSULE ORAL
Qty: 4 CAPSULE | Refills: 1 | Status: SHIPPED | OUTPATIENT
Start: 2024-04-15

## 2024-04-15 NOTE — PROGRESS NOTES
Virtual Joint Class: Joint education provided, teaching and education individualized for patient and needs.  Physical and occupational therapy teaching  Caregiver and home support importance discussed  DME Equipment discussed, pt will bring walker to hospital.  Total knee precautions and exercises discussed and reviewed. Elevate leg above the heart, polar ice, dressing and Nimbus pump discussed.   Total hip precautions discussed and reviewed  Discussed home safety, area rugs, cords, tripping hazards.   Discussed pain management, multimodal methods and additional therapies, such as relaxation, guided imagery.   Pt will be seen by pre-op center for clearance.  Medications delivered to bedside prior to DC.   Post-op call or Virtual day after discharge.  Joint Hotline number discussed   OP PT and HH PT  discussed  Answered questions/concerns,  pt verbalized understanding, pt will call with questions concerns.

## 2024-04-16 ENCOUNTER — PATIENT MESSAGE (OUTPATIENT)
Dept: ADMINISTRATIVE | Facility: OTHER | Age: 54
End: 2024-04-16
Payer: OTHER GOVERNMENT

## 2024-04-21 ENCOUNTER — PATIENT MESSAGE (OUTPATIENT)
Dept: ORTHOPEDICS | Facility: CLINIC | Age: 54
End: 2024-04-21
Payer: OTHER GOVERNMENT

## 2024-04-30 DIAGNOSIS — Z96.641 HISTORY OF RIGHT HIP REPLACEMENT: Primary | ICD-10-CM

## 2024-05-01 ENCOUNTER — TELEPHONE (OUTPATIENT)
Dept: CARDIOLOGY | Facility: CLINIC | Age: 54
End: 2024-05-01
Payer: OTHER GOVERNMENT

## 2024-05-01 ENCOUNTER — LAB VISIT (OUTPATIENT)
Dept: LAB | Facility: HOSPITAL | Age: 54
End: 2024-05-01
Payer: OTHER GOVERNMENT

## 2024-05-01 DIAGNOSIS — Z78.9 ALCOHOL CONSUMPTION OF MORE THAN FOUR DRINKS PER WEEK: ICD-10-CM

## 2024-05-01 DIAGNOSIS — I70.0 AORTIC ATHEROSCLEROSIS: ICD-10-CM

## 2024-05-01 LAB
ALBUMIN SERPL BCP-MCNC: 4.3 G/DL (ref 3.5–5.2)
ALP SERPL-CCNC: 68 U/L (ref 55–135)
ALT SERPL W/O P-5'-P-CCNC: 20 U/L (ref 10–44)
AST SERPL-CCNC: 23 U/L (ref 10–40)
BILIRUB DIRECT SERPL-MCNC: 0.1 MG/DL (ref 0.1–0.3)
BILIRUB SERPL-MCNC: 0.3 MG/DL (ref 0.1–1)
PROT SERPL-MCNC: 7.4 G/DL (ref 6–8.4)

## 2024-05-01 PROCEDURE — 36415 COLL VENOUS BLD VENIPUNCTURE: CPT | Performed by: STUDENT IN AN ORGANIZED HEALTH CARE EDUCATION/TRAINING PROGRAM

## 2024-05-01 PROCEDURE — 80076 HEPATIC FUNCTION PANEL: CPT | Performed by: STUDENT IN AN ORGANIZED HEALTH CARE EDUCATION/TRAINING PROGRAM

## 2024-05-01 NOTE — TELEPHONE ENCOUNTER
Tolerating pravasatin.  Last CMP normal.  ETOH cut down    Kvng Carlos, PGY6  Cardiovascular Disease  [facility]

## 2024-05-03 NOTE — PROGRESS NOTES
HISTORY OF PRESENT ILLNESS   Kayleen Patel, a 53 y.o. female, presents today for evaluation of her right HIP.    Patient reports onset of chronic pain beginning 5 months ago. Patient reports  NICK in January 2024 . Pain is located along anterior aspect of HIP. Pain is 0/10 at present & up to 4/10 with provacative activity including ADLs, specifically any ADL's where hip flexion exceeds 90 degrees. Pain is described as sharp. Patient states pain does not radiate.     Associated symptoms include decreased ROM due to pain. Pain is aggravated by activities above & occur daily . Symptoms do not interfere with sleep. Patient reports pain & symptoms are staying the same . Patient reports  NICK  to HIP performed by Dr. Collins in January of 2024. Referred by Dr. Collins for iliopsoas tendon/bursa CSI.     Prior treatment Kayleen Patel has tried   OTC Acetaminophen - No  OTC NSAID - No   Rx NSAID - Yes - meloxicam per Dr. Collins   Rx Narcotic/Other - No   Brace - No   Injection - Cortisone - Yes - prior to hip replacement   Injection - Biologics - No   Activity Modification - Yes  Physical Therapy - Yes - post NICK, not currently in PT   Home Exercise Program - No  Assistive Device - No  Other - No    Review of systems (ROS):  A 10+ review of systems was performed with pertinent positives and negatives noted above in the history of present illness. Other systems were negative unless otherwise specified.    PHYSICAL EXAMINATION  General:  The patient is alert and oriented x 3.  Mood is pleasant.  Observation of ears, eyes and nose reveal no gross abnormalities.  HEENT: NCAT, sclera nonicteric  Lungs: Respirations are equal and unlabored.   Gait is coordinated. Patient can toe walk and heel walk without difficulty.    HIP/PELVIS EXAMINATION    Observation/Inspection  Gait:   Antalgic   Alignment:  Neutral   Scars:   None   Muscle atrophy: None   Effusion:  None   Warmth:  None   Discoloration:   None   Leg  lengths:   Equal   Pelvis:    Level     Tenderness/Crepitus (T/C):      T / C  Lateral Gluteal region  + / -  Trochanteric bursa   + / -  Piriformis    - / -  SI joint    - / -  Psoas tendon   - / -  Rectus insertion  - / -  Adductor insertion  - / -  Pubic symphysis  - / -    ROM: (* = pain)    Flexion:      120 degrees  External rotation:   40 degrees  Internal rotation with axial load:  30 degrees  Internal rotation without axial load:  40 degrees  Abduction:    45 degrees  Adduction:     20 degrees    Special Tests:  Pain w/ forced internal rotation (FADIR):  +  Pain w/ forced external rotation (CHANDA):  +   Circumduction test:     -  Stinchfield test:     -   Log roll:       -   Snapping hip (internal):    -   Sit-up pain:      NT  Resisted sit-up pain:     NT  Resisted sit-up with adductor contraction pain:  NT  Step-down test:     NT  Trendelenburg test:     NT  Bridge test      NT    Extremity Neuro-vascular Examination:   Sensation:  Grossly intact to light touch all dermatomal regions.   Motor Function:  Fully intact motor function at hip, knee, foot and ankle    DTRs;  quadriceps and  achilles 2+.  No clonus and downgoing Babinski.    Vascular status:  DP and PT pulses 2+, brisk capillary refill, symmetric.    Skin:  intact, compartments soft.    Other Findings:    ASSESSMENT & PLAN  Assessment  #1 s/p NICK 01/2024 [facility] right  W/ iliopsoas tendinosis    No evidence of neurologic pathology  No evidence of vascular pathology    Imaging studies reviewed:  X-ray pelvis and hip, right, 3/1/24     Plan  We discussed the importance of appropriate diet, weight, and regular exercise    We discussed options including    Watchful waiting / relative rest    Physical therapy    Injection therapy Iliopsoas tendon , right   Consultation    The patient chooses As above   x = prescribed  CSI = corticosteroid injection  VSI = viscosupplement injection  PRPI = platelet rich plasma injection  ia = intra articular  R =  right  L = left  B = bilateral   nfSx = surgical consultation was recommended, but patient is not interested in consultation at this time    Physical Therapy        Formal (fPT), @ [facility] facility    Formal (fPT), @ [facility] facility        Homegoing (hgPT), per concurrent fPT recommendations    Homegoing (hgPT), per prior fPT recommendations    Homegoing (hgPT), handout provided        w/  (atPT)    [blank] = not prescribed  x = prescribed  b = prescribed, and begin as indicated  t = continue as indicated  r = prescribed, and restart as indicated  p = completed prior as indicated  hs = prescribed, and with high school   col = prescribed, and with college or university   nfPT = physical therapy was recommended, but patient is not interested in PT at this time    Activity (e.g. sports, work) restrictions    [blank] = as tolerated  pt = per physical therapist  at = per   NWB = non weight bearing on affected lower extremity, with crutches assistance for ambulation    Bracing    [blank] = not prescribed  r = recommended, but not fit with at todays visit  f = prescribed and fit with at todays visit  t = continue as indicated  d = d/c  p = as needed  rare = use on rare, as-needed basis; advised against chronic use    Pain management    [blank] = No prescription necessary. A handout detailing dosing of appropriate   over-the-counter musculoskeletal analgesics was made available to the patient.   m = meloxicam x 14 days  mp = 14 day course of meloxicam prescribed prior    Follow up W/ Team Dennis in about 2wks   [blank] = as needed  [number] = in [number] weeks  CSI = for corticosteroid injection  VSI = for viscosupplement injection or injection series  PRP = for platelet rich plasma injection or injection series  MRI = after MRI imaging  ns = should surgical options be deferred (no surgery)  o = appointment offered, deferred by patient    Should symptoms worsen  or fail to resolve, consider    Revisiting the above options and / or      Vocation:  [employer]

## 2024-05-07 ENCOUNTER — OFFICE VISIT (OUTPATIENT)
Dept: SPORTS MEDICINE | Facility: CLINIC | Age: 54
End: 2024-05-07
Payer: OTHER GOVERNMENT

## 2024-05-07 VITALS
HEIGHT: 62 IN | TEMPERATURE: 98 F | HEART RATE: 71 BPM | BODY MASS INDEX: 23.93 KG/M2 | SYSTOLIC BLOOD PRESSURE: 100 MMHG | DIASTOLIC BLOOD PRESSURE: 66 MMHG | WEIGHT: 130.06 LBS

## 2024-05-07 DIAGNOSIS — Z96.641 S/P HIP REPLACEMENT, RIGHT: ICD-10-CM

## 2024-05-07 DIAGNOSIS — G89.29 CHRONIC HIP PAIN, BILATERAL: ICD-10-CM

## 2024-05-07 DIAGNOSIS — S76.811S STRAIN OF RIGHT ILIOPSOAS MUSCLE, SEQUELA: ICD-10-CM

## 2024-05-07 DIAGNOSIS — M25.551 CHRONIC HIP PAIN, BILATERAL: ICD-10-CM

## 2024-05-07 DIAGNOSIS — M25.552 CHRONIC HIP PAIN, BILATERAL: ICD-10-CM

## 2024-05-07 DIAGNOSIS — G89.29 CHRONIC RIGHT HIP PAIN: Primary | ICD-10-CM

## 2024-05-07 DIAGNOSIS — M25.551 CHRONIC RIGHT HIP PAIN: Primary | ICD-10-CM

## 2024-05-07 DIAGNOSIS — M67.80 TENDINOSIS: ICD-10-CM

## 2024-05-07 PROCEDURE — 20551 NJX 1 TENDON ORIGIN/INSJ: CPT | Mod: PBBFAC | Performed by: FAMILY MEDICINE

## 2024-05-07 PROCEDURE — 99999PBSHW PR PBB SHADOW TECHNICAL ONLY FILED TO HB: Mod: PBBFAC,,,

## 2024-05-07 PROCEDURE — 99999 PR PBB SHADOW E&M-EST. PATIENT-LVL III: CPT | Mod: PBBFAC,,, | Performed by: FAMILY MEDICINE

## 2024-05-07 PROCEDURE — 99213 OFFICE O/P EST LOW 20 MIN: CPT | Mod: PBBFAC,25 | Performed by: FAMILY MEDICINE

## 2024-05-07 PROCEDURE — 99213 OFFICE O/P EST LOW 20 MIN: CPT | Mod: S$PBB,25,, | Performed by: FAMILY MEDICINE

## 2024-05-07 PROCEDURE — 76942 ECHO GUIDE FOR BIOPSY: CPT | Mod: 26,S$PBB,, | Performed by: FAMILY MEDICINE

## 2024-05-07 RX ORDER — TRIAMCINOLONE ACETONIDE 40 MG/ML
40 INJECTION, SUSPENSION INTRA-ARTICULAR; INTRAMUSCULAR
Status: DISCONTINUED | OUTPATIENT
Start: 2024-05-07 | End: 2024-05-07 | Stop reason: HOSPADM

## 2024-05-07 RX ADMIN — TRIAMCINOLONE ACETONIDE 40 MG: 40 INJECTION, SUSPENSION INTRA-ARTICULAR; INTRAMUSCULAR at 09:05

## 2024-05-07 NOTE — PROCEDURES
Large Joint Aspiration/Injection    Date/Time: 5/7/2024 9:45 AM    Performed by: Louis Hartley MD  Authorized by: Louis Hartley MD

## 2024-05-07 NOTE — PROCEDURES
"Tendon Origin: R hip joint    Date/Time: 5/7/2024 9:45 AM    Performed by: Louis Hartley MD  Authorized by: Louis Hartley MD    Consent Done?:  Yes (Verbal)  Timeout: prior to procedure the correct patient, procedure, and site was verified    Indications:  Pain  Site marked: the procedure site was marked    Timeout: prior to procedure the correct patient, procedure, and site was verified    Location:  Hip  Site:  R hip joint  Prep: patient was prepped and draped in usual sterile fashion    Ultrasonic Guidance for Needle Placement?: Yes    Needle size:  22 G  Medications:  40 mg triamcinolone acetonide 40 mg/mL  Patient tolerance:  Patient tolerated the procedure well with no immediate complications   Approach:   Anterior lateral oblique  Needle insertion just medial to ASIS  Following a course deep to the distal inguinal ligament     Description of ultrasound utilization for needle guidance:   Ultrasound guidance was used for needle localization. Images were saved and stored for documentation. The iliopsoas muscle, tendon, and bursa were visualized. Dynamic visualization of the 22g x 3.5" needle was continuous throughout the procedure.    "

## 2024-05-14 ENCOUNTER — PATIENT MESSAGE (OUTPATIENT)
Dept: ORTHOPEDICS | Facility: CLINIC | Age: 54
End: 2024-05-14
Payer: OTHER GOVERNMENT

## 2024-05-21 ENCOUNTER — OFFICE VISIT (OUTPATIENT)
Dept: ORTHOPEDICS | Facility: CLINIC | Age: 54
End: 2024-05-21
Payer: OTHER GOVERNMENT

## 2024-05-21 VITALS — WEIGHT: 132.25 LBS | HEIGHT: 62 IN | BODY MASS INDEX: 24.34 KG/M2

## 2024-05-21 DIAGNOSIS — M16.12 PRIMARY OSTEOARTHRITIS OF LEFT HIP: ICD-10-CM

## 2024-05-21 DIAGNOSIS — Z96.641 S/P HIP REPLACEMENT, RIGHT: Primary | ICD-10-CM

## 2024-05-21 DIAGNOSIS — M70.71 ILIOPSOAS BURSITIS OF RIGHT HIP: ICD-10-CM

## 2024-05-21 PROCEDURE — 99214 OFFICE O/P EST MOD 30 MIN: CPT | Mod: PBBFAC | Performed by: ORTHOPAEDIC SURGERY

## 2024-05-21 PROCEDURE — 99213 OFFICE O/P EST LOW 20 MIN: CPT | Mod: S$PBB,,, | Performed by: ORTHOPAEDIC SURGERY

## 2024-05-21 PROCEDURE — 99999 PR PBB SHADOW E&M-EST. PATIENT-LVL IV: CPT | Mod: PBBFAC,,, | Performed by: ORTHOPAEDIC SURGERY

## 2024-05-21 NOTE — PROGRESS NOTES
Subjective:     HPI:   Kayleen Patel is a 53 y.o. female who presents 4 months out from right NICK     Date of surgery: R ant NICK 1/17/24    History of Present Illness  The patient presents for evaluation status post right hip replacement.    The patient underwent a right hip replacement surgery on 05/07/2024, performed by Dr. Clemente, which she reports to be highly successful. She is able to lift her leg laterally without experiencing any pain, however, she reports a suture protruding from the incision site. Her last visit was on 04/12/2024. Currently, she is on a regimen of meloxicam, which she reports as beneficial. She experienced severe left hip pain two days prior, which has since resolved. She identifies the current pain as distinct from her right hip pain. Despite her attempts to alleviate the pain with a foam roll, she has not experienced any pain. She suspects the presence of necrosis, as confirmed by an MRI. She expresses a desire to resume her normal activities, including pool exercises, planks, sit-ups, and squats, although squats do not cause discomfort. She occasionally perceives a leg length discrepancy. Post-injection, she reported significant improvement within four to five days. She has not engaged in any cycling, running, or walking on an incline.  F/u from 4/12/24:   Ant pain, jessica with flexion and ext rotation  Mechanically NICK looks ok, seems soft tissue  Ddx: IP tendonitis - pain with flex/ext rot, but more pain around incision ?scar tissue, TFL/rectus herniatio    Us guided IP injection with AG 5/7/24    Overall 100% better 4-5 days later and today  No meds for R hip    Mobic for L hip - some post lat pain but better today     Objective:   Body mass index is 24.19 kg/m².  Exam:    Gait: limp/antalgic/trendelenburg none    Incision: healed, one spot of monocryl dist 1/3 incision but too deep to remove, observe, return if tail sticks out    Active straight leg raise: good  strength, no discomfort    Extension: 0    Flexion: 120    Abduction: 40    Adduction: 30    External rotation: 60    Internal rotation: 30    LLD: 0.5 cm supine R long    No pain resisted hip flexion     Physical Exam      Imaging:  None today    Results          Assessment:       ICD-10-CM ICD-9-CM   1. S/P hip replacement, right  Z96.641 V43.64   2. Iliopsoas bursitis of right hip  M70.71 726.5   3. Primary osteoarthritis of left hip  M16.12 715.15        Doing well     Plan:       Patient is doing very well with the hip replacement at this time.  They will continue routine care of their hip and see me for their routine follow-up.  If there are problems in the interim they will see me back sooner. Prophylactic antibiotic protocol given and explained to patient.     Assessment & Plan  1. Postoperative status following right hip replacement.  The patient is advised to persist with the prescribed exercises for the right hip. However, for the left hip, we will closely monitor her condition. For the next few weeks, she is advised to focus on phase 2 exercises, followed by low-impact aerobic exercises such as swimming, cycling, and using an elliptical machine. She is permitted to commence running, albeit with caution for the left hip. She is encouraged to use a treadmill, perform gentle low incline, and elevate her heart rate. She is cautioned against stair climbing due to its flexing effect on the hip. She is permitted to rowing, albeit with caution, avoiding deep and high resistance exercises. Instead of lower resistance and gentle rowing, she should cease these activities if they cause discomfort.    L hip: mobic and phase 2 HEP, f/u PRN    Focus on phase 2 HEP for next few weeks, low impact aerobic     PRN if monocryl spits    8 month follow up for annual xray    This note was generated with the assistance of ambient listening technology. Verbal consent was obtained by the patient and accompanying visitor(s) for  the recording of patient appointment to facilitate this note. I attest to having reviewed and edited the generated note for accuracy, though some syntax or spelling errors may persist. Please contact the author of this note for any clarification.      No orders of the defined types were placed in this encounter.            Past Medical History:   Diagnosis Date    Abnormal cervical Papanicolaou smear '88, '04    Colpo/Cryo    Benign paroxysmal vertigo, bilateral     Chronic diarrhea 02/12/2015    GERD (gastroesophageal reflux disease)     History of acute PID 1988    Hyperlipidemia     Hypertension     Normocytic anemia 12/22/2023    Thrombocytosis     Urticaria        Past Surgical History:   Procedure Laterality Date    ADENOIDECTOMY      ARTHROPLASTY OF HIP BY ANTERIOR APPROACH Right 1/17/2024    Procedure: ARTHROPLASTY, HIP, TOTAL, ANTERIOR APPROACH: RIGHT: DEPUY - ACTIS + PINNACLE: SAME DAY;  Surgeon: Cresencio Collins III, MD;  Location: [facility] OR;  Service: Orthopedics;  Laterality: Right;    BREAST BIOPSY Right 06/09/2021    stereo benign    CARPAL TUNNEL RELEASE Left 09/21/2021    Procedure: RELEASE, CARPAL TUNNEL;  Surgeon: Alla Goff MD;  Location: [facility] OR;  Service: Orthopedics;  Laterality: Left;    CARPAL TUNNEL RELEASE Right 02/07/2023    Procedure: RELEASE, CARPAL TUNNEL;  Surgeon: Alla Goff MD;  Location: [facility] OR;  Service: Orthopedics;  Laterality: Right;    EYE SURGERY  2010    In Record    GANGLION CYST EXCISION      INJECTION OF JOINT Right 03/09/2023    Procedure: INJECTION, JOINT, RIGHT HIP  DIRECT REF;  Surgeon: Harleen Swan MD;  Location: [facility] PAIN MGT;  Service: Pain Management;  Laterality: Right;    INJECTION OF STEROID Right 09/21/2021    Procedure: INJECTION, STEROID-Right carpal tunnel;  Surgeon: Alla Goff MD;  Location: [facility] OR;  Service: Orthopedics;  Laterality: Right;    REFRACTIVE SURGERY Bilateral 2009    Dr. Vazquez Forbes     TONSILLECTOMY         Family History    Adopted: Yes   Problem Relation Name Age of Onset    Heart disease Mother Ct         Adopted    Cancer Father Gregory         Adopted       Social History     Socioeconomic History    Marital status: Single   Tobacco Use    Smoking status: Former     Current packs/day: 0.00     Average packs/day: 1 pack/day for 23.1 years (23.1 ttl pk-yrs)     Types: Cigarettes     Start date: 1984     Quit date: 2007     Years since quittin.8    Smokeless tobacco: Never   Substance and Sexual Activity    Alcohol use: Yes     Alcohol/week: 20.0 standard drinks of alcohol     Types: 6 Glasses of wine, 8 Cans of beer, 6 Drinks containing 0.5 oz of alcohol per week     Comment: 3-4 times per week    Drug use: No    Sexual activity: Not Currently     Partners: Male     Birth control/protection: None     Comment: In Menopause   Social History Narrative    Retired [employer]. Currently, working for the [employer]. Criminal justice degree from [employer].          Social Determinants of Health     Financial Resource Strain: Low Risk  (2023)    Overall Financial Resource Strain (CARDIA)     Difficulty of Paying Living Expenses: Not hard at all   Food Insecurity: No Food Insecurity (2023)    Hunger Vital Sign     Worried About Running Out of Food in the Last Year: Never true     Ran Out of Food in the Last Year: Never true   Transportation Needs: No Transportation Needs (2023)    PRAPARE - Transportation     Lack of Transportation (Medical): No     Lack of Transportation (Non-Medical): No   Physical Activity: Sufficiently Active (2023)    Exercise Vital Sign     Days of Exercise per Week: 3 days     Minutes of Exercise per Session: 60 min   Stress: No Stress Concern Present (2023)    [employer] [place] [employer] - Occupational Stress Questionnaire     Feeling of Stress : Not at all   Housing Stability: Low Risk  (2023)    Housing Stability Vital Sign     Unable to Pay for  Housing in the Last Year: No     Number of Places Lived in the Last Year: 1     Unstable Housing in the Last Year: No

## 2024-05-22 ENCOUNTER — PATIENT MESSAGE (OUTPATIENT)
Dept: SPORTS MEDICINE | Facility: CLINIC | Age: 54
End: 2024-05-22
Payer: OTHER GOVERNMENT

## 2024-06-10 ENCOUNTER — PATIENT MESSAGE (OUTPATIENT)
Dept: INTERNAL MEDICINE | Facility: CLINIC | Age: 54
End: 2024-06-10
Payer: OTHER GOVERNMENT

## 2024-06-18 ENCOUNTER — HOSPITAL ENCOUNTER (OUTPATIENT)
Dept: RADIOLOGY | Facility: HOSPITAL | Age: 54
Discharge: HOME | End: 2024-06-18
Attending: FAMILY MEDICINE
Payer: OTHER GOVERNMENT

## 2024-06-18 DIAGNOSIS — Z12.31 ENCOUNTER FOR SCREENING MAMMOGRAM FOR BREAST CANCER: ICD-10-CM

## 2024-06-18 PROCEDURE — 77067 SCR MAMMO BI INCL CAD: CPT | Mod: TC

## 2024-06-18 RX ORDER — PRAVASTATIN SODIUM 10 MG/1
10 TABLET ORAL DAILY
Qty: 30 TABLET | Refills: 0 | Status: SHIPPED | OUTPATIENT
Start: 2024-06-18 | End: 2024-06-19 | Stop reason: SDUPTHER

## 2024-06-19 ENCOUNTER — PATIENT MESSAGE (OUTPATIENT)
Dept: ORTHOPEDICS | Facility: CLINIC | Age: 54
End: 2024-06-19
Payer: OTHER GOVERNMENT

## 2024-06-19 RX ORDER — PRAVASTATIN SODIUM 10 MG/1
10 TABLET ORAL DAILY
Qty: 90 TABLET | Refills: 3 | Status: SHIPPED | OUTPATIENT
Start: 2024-06-19 | End: 2025-06-19

## 2024-06-24 ENCOUNTER — OFFICE VISIT (OUTPATIENT)
Dept: ORTHOPEDICS | Facility: CLINIC | Age: 54
End: 2024-06-24
Payer: OTHER GOVERNMENT

## 2024-06-24 DIAGNOSIS — Z48.89 SUTURE CHECK: Primary | ICD-10-CM

## 2024-06-24 PROCEDURE — 99999 PR PBB SHADOW E&M-EST. PATIENT-LVL II: CPT | Mod: PBBFAC,,, | Performed by: NURSE PRACTITIONER

## 2024-06-24 PROCEDURE — 99024 POSTOP FOLLOW-UP VISIT: CPT | Mod: ,,, | Performed by: NURSE PRACTITIONER

## 2024-06-24 PROCEDURE — 99212 OFFICE O/P EST SF 10 MIN: CPT | Mod: PBBFAC | Performed by: NURSE PRACTITIONER

## 2024-06-24 NOTE — PROGRESS NOTES
Small pitting suture to the distal incision has been bothering the patient and she has been unable to push it in or remove it due to the odd angle. She comes in today for assistance with removing the stitch as it is aggravating her. I was able to remove the small piece of suture that was pushing out. The area was cleansed with alcohol and a sterile suture set tweezers was used. I covered the area with a bandaid. She will f/u if she continues to have issues.

## 2024-06-26 DIAGNOSIS — Z96.641 STATUS POST RIGHT HIP REPLACEMENT: Primary | ICD-10-CM

## 2024-06-26 RX ORDER — MELOXICAM 15 MG/1
15 TABLET ORAL DAILY
Qty: 30 TABLET | Refills: 1 | Status: SHIPPED | OUTPATIENT
Start: 2024-06-26

## 2024-07-02 ENCOUNTER — PATIENT OUTREACH (OUTPATIENT)
Dept: ADMINISTRATIVE | Facility: HOSPITAL | Age: 54
End: 2024-07-02
Payer: OTHER GOVERNMENT

## 2024-07-02 DIAGNOSIS — Z96.641 STATUS POST RIGHT HIP REPLACEMENT: ICD-10-CM

## 2024-07-02 RX ORDER — MELOXICAM 15 MG/1
15 TABLET ORAL DAILY
Qty: 30 TABLET | Refills: 1 | Status: SHIPPED | OUTPATIENT
Start: 2024-07-02 | End: 2024-07-05 | Stop reason: SDUPTHER

## 2024-07-02 NOTE — PROGRESS NOTES
Health Maintenance Due   Topic Date Due    High Dose Statin  Never done    Pneumococcal Vaccines (Age 0-64) (2 of 2 - PPSV23 or PCV20) 08/14/2020    COVID-19 Vaccine (5 - 2023-24 season) 09/01/2023     Chart reviewed and updated. Reconciled immunizations.    April Hong LPN   Clinical Care Coordinator  [facility]ness

## 2024-07-05 DIAGNOSIS — Z96.641 STATUS POST RIGHT HIP REPLACEMENT: ICD-10-CM

## 2024-07-05 RX ORDER — MELOXICAM 15 MG/1
15 TABLET ORAL DAILY
Qty: 30 TABLET | Refills: 1 | Status: SHIPPED | OUTPATIENT
Start: 2024-07-05

## 2024-07-16 ENCOUNTER — OFFICE VISIT (OUTPATIENT)
Dept: INTERNAL MEDICINE | Facility: CLINIC | Age: 54
End: 2024-07-16
Attending: FAMILY MEDICINE
Payer: OTHER GOVERNMENT

## 2024-07-16 ENCOUNTER — LAB VISIT (OUTPATIENT)
Dept: LAB | Facility: HOSPITAL | Age: 54
End: 2024-07-16
Attending: FAMILY MEDICINE
Payer: OTHER GOVERNMENT

## 2024-07-16 VITALS
HEIGHT: 62 IN | BODY MASS INDEX: 24.09 KG/M2 | HEART RATE: 74 BPM | OXYGEN SATURATION: 98 % | WEIGHT: 130.94 LBS | SYSTOLIC BLOOD PRESSURE: 122 MMHG | DIASTOLIC BLOOD PRESSURE: 68 MMHG

## 2024-07-16 DIAGNOSIS — E78.5 HYPERLIPIDEMIA, UNSPECIFIED HYPERLIPIDEMIA TYPE: ICD-10-CM

## 2024-07-16 DIAGNOSIS — Z78.9 STATIN INTOLERANCE: ICD-10-CM

## 2024-07-16 DIAGNOSIS — R93.1 AGATSTON CAC SCORE, <100: ICD-10-CM

## 2024-07-16 DIAGNOSIS — Z00.00 ANNUAL PHYSICAL EXAM: Primary | ICD-10-CM

## 2024-07-16 DIAGNOSIS — Q76.49 VERTEBRAL ANOMALY: ICD-10-CM

## 2024-07-16 DIAGNOSIS — R91.8 LUNG NODULES: ICD-10-CM

## 2024-07-16 DIAGNOSIS — M16.10 ARTHRITIS, HIP: ICD-10-CM

## 2024-07-16 DIAGNOSIS — Z00.00 ANNUAL PHYSICAL EXAM: ICD-10-CM

## 2024-07-16 DIAGNOSIS — I10 HYPERTENSION, ESSENTIAL: ICD-10-CM

## 2024-07-16 DIAGNOSIS — M87.059 AVASCULAR NECROSIS OF BONE OF HIP, UNSPECIFIED LATERALITY: ICD-10-CM

## 2024-07-16 DIAGNOSIS — I70.0 AORTIC ATHEROSCLEROSIS: ICD-10-CM

## 2024-07-16 PROBLEM — M79.641 PAIN IN RIGHT HAND: Status: RESOLVED | Noted: 2023-03-31 | Resolved: 2024-07-16

## 2024-07-16 PROBLEM — R42 VERTIGO: Status: RESOLVED | Noted: 2023-12-22 | Resolved: 2024-07-16

## 2024-07-16 LAB
ALBUMIN SERPL BCP-MCNC: 4.1 G/DL (ref 3.5–5.2)
ALP SERPL-CCNC: 48 U/L (ref 55–135)
ALT SERPL W/O P-5'-P-CCNC: 18 U/L (ref 10–44)
ANION GAP SERPL CALC-SCNC: 9 MMOL/L (ref 8–16)
AST SERPL-CCNC: 23 U/L (ref 10–40)
BILIRUB SERPL-MCNC: 0.4 MG/DL (ref 0.1–1)
BUN SERPL-MCNC: 12 MG/DL (ref 6–20)
CALCIUM SERPL-MCNC: 9.8 MG/DL (ref 8.7–10.5)
CHLORIDE SERPL-SCNC: 100 MMOL/L (ref 95–110)
CHOLEST SERPL-MCNC: 206 MG/DL (ref 120–199)
CHOLEST/HDLC SERPL: 2.2 {RATIO} (ref 2–5)
CO2 SERPL-SCNC: 27 MMOL/L (ref 23–29)
CREAT SERPL-MCNC: 0.6 MG/DL (ref 0.5–1.4)
ERYTHROCYTE [DISTWIDTH] IN BLOOD BY AUTOMATED COUNT: 13.1 % (ref 11.5–14.5)
EST. GFR  (NO RACE VARIABLE): >60 ML/MIN/1.73 M^2
ESTIMATED AVG GLUCOSE: 108 MG/DL (ref 68–131)
GLUCOSE SERPL-MCNC: 98 MG/DL (ref 70–110)
HBA1C MFR BLD: 5.4 % (ref 4–5.6)
HCT VFR BLD AUTO: 38.3 % (ref 37–48.5)
HDLC SERPL-MCNC: 94 MG/DL (ref 40–75)
HDLC SERPL: 45.6 % (ref 20–50)
HGB BLD-MCNC: 12.3 G/DL (ref 12–16)
LDLC SERPL CALC-MCNC: 96.4 MG/DL (ref 63–159)
MCH RBC QN AUTO: 31.9 PG (ref 27–31)
MCHC RBC AUTO-ENTMCNC: 32.1 G/DL (ref 32–36)
MCV RBC AUTO: 99 FL (ref 82–98)
NONHDLC SERPL-MCNC: 112 MG/DL
PLATELET # BLD AUTO: 411 K/UL (ref 150–450)
PMV BLD AUTO: 9.4 FL (ref 9.2–12.9)
POTASSIUM SERPL-SCNC: 3.3 MMOL/L (ref 3.5–5.1)
PROT SERPL-MCNC: 7.1 G/DL (ref 6–8.4)
RBC # BLD AUTO: 3.86 M/UL (ref 4–5.4)
SODIUM SERPL-SCNC: 136 MMOL/L (ref 136–145)
TRIGL SERPL-MCNC: 78 MG/DL (ref 30–150)
WBC # BLD AUTO: 5.89 K/UL (ref 3.9–12.7)

## 2024-07-16 PROCEDURE — 85027 COMPLETE CBC AUTOMATED: CPT | Performed by: FAMILY MEDICINE

## 2024-07-16 PROCEDURE — 99215 OFFICE O/P EST HI 40 MIN: CPT | Mod: PBBFAC | Performed by: FAMILY MEDICINE

## 2024-07-16 PROCEDURE — 99999 PR PBB SHADOW E&M-EST. PATIENT-LVL V: CPT | Mod: PBBFAC,,, | Performed by: FAMILY MEDICINE

## 2024-07-16 PROCEDURE — 80061 LIPID PANEL: CPT | Performed by: FAMILY MEDICINE

## 2024-07-16 PROCEDURE — 99396 PREV VISIT EST AGE 40-64: CPT | Mod: S$PBB,,, | Performed by: FAMILY MEDICINE

## 2024-07-16 PROCEDURE — 83036 HEMOGLOBIN GLYCOSYLATED A1C: CPT | Performed by: FAMILY MEDICINE

## 2024-07-16 PROCEDURE — 36415 COLL VENOUS BLD VENIPUNCTURE: CPT | Performed by: FAMILY MEDICINE

## 2024-07-16 PROCEDURE — 80053 COMPREHEN METABOLIC PANEL: CPT | Performed by: FAMILY MEDICINE

## 2024-07-16 NOTE — PROGRESS NOTES
Subjective:       Patient ID: Kayleen Patel is a 54 y.o. female.    Chief Complaint: Annual Exam    Established patient for an annual wellness check/physical exam and also chronic disease management. Specific complaints - see dictation, M*model entries and please see ROS.  Past, Surgical, Family, Social Histories; Medications, Allergies reviewed and reconciled.  Health maintenance file reviewed and addressed items due. Recent applicable lab, imaging and cardiovascular results reviewed.  Problem list items reviewed and modified or added entries (in the overview section) may not be transcribed into this encounter note due to note writer format.      NICK    L hip pain.    Cardiology eval for ASCVD, lipitor high LFT's. No sx.        Review of Systems   Constitutional:  Negative for activity change and unexpected weight change.   HENT:  Negative for hearing loss, rhinorrhea and trouble swallowing.    Eyes:  Negative for discharge and visual disturbance.   Respiratory:  Negative for chest tightness and wheezing.    Cardiovascular:  Negative for chest pain and palpitations.   Gastrointestinal:  Positive for diarrhea. Negative for blood in stool, constipation and vomiting.   Endocrine: Negative for polydipsia and polyuria.   Genitourinary:  Negative for difficulty urinating, dysuria, hematuria and menstrual problem.   Musculoskeletal:  Positive for arthralgias and joint swelling. Negative for neck pain.   Neurological:  Negative for weakness and headaches.   Psychiatric/Behavioral:  Negative for confusion and dysphoric mood.        Objective:      Physical Exam  Vitals and nursing note reviewed.   Constitutional:       Appearance: She is well-developed. She is not diaphoretic.   Eyes:      General: No scleral icterus.  Neck:      Thyroid: No thyromegaly.      Vascular: No JVD.      Trachea: No tracheal deviation.   Cardiovascular:      Rate and Rhythm: Normal rate.      Heart sounds: Normal heart sounds. No  murmur heard.     No friction rub. No gallop.   Pulmonary:      Effort: Pulmonary effort is normal. No respiratory distress.      Breath sounds: Normal breath sounds. No wheezing or rales.   Abdominal:      General: There is no distension or abdominal bruit.      Palpations: Abdomen is soft. There is no mass.      Tenderness: There is no abdominal tenderness. There is no guarding or rebound.   Musculoskeletal:      Cervical back: Normal range of motion and neck supple.   Lymphadenopathy:      Cervical: No cervical adenopathy.   Skin:     General: Skin is warm and dry.      Findings: No erythema or rash.   Neurological:      Mental Status: She is alert and oriented to person, place, and time.      Cranial Nerves: No cranial nerve deficit.      Motor: No tremor.      Coordination: Coordination normal.      Gait: Gait normal.   Psychiatric:         Behavior: Behavior normal.         Thought Content: Thought content normal.         Judgment: Judgment normal.         Assessment:       1. Annual physical exam    2. Aortic atherosclerosis    3. Avascular necrosis of bone of hip, unspecified laterality    4. Hypertension, essential    5. Hyperlipidemia, unspecified hyperlipidemia type    6. Lung nodules    7. Vertebral anomaly    8. Statin intolerance    9. Arthritis, hip    10. Agatston CAC score, <100        Plan:     Medication List with Changes/Refills   Current Medications    ACETAMINOPHEN (TYLENOL) 650 MG TBSR    Take 1 tablet (650 mg total) by mouth every 8 (eight) hours.    ASPIRIN (ECOTRIN) 81 MG EC TABLET    Take 1 tablet (81 mg total) by mouth 2 (two) times a day.    ASTAXANTHIN 12 MG CAP    Take by mouth Daily.    CALCIUM CARBONATE (CALCIUM 500 ORAL)    Take 1 capsule by mouth.    CETIRIZINE (ZYRTEC) 10 MG TABLET    Take 1 tablet (10 mg total) by mouth once daily.    CHLORTHALIDONE (HYGROTEN) 25 MG TAB    TAKE 1 TABLET DAILY    CLINDAMYCIN (CLEOCIN) 300 MG CAPSULE    Take 2 capsules (600 mg total) by mouth On  call Procedure (take 2 tabs one hour prior to dental appt).    CYANOCOBALAMIN (VITAMIN B-12) 100 MCG TABLET    Take 100 mcg by mouth once daily.    DESONIDE (DESOWEN) 0.05 % CREAM    Apply topically 2 (two) times daily.    FERROUS SULFATE 325 MG (65 MG IRON) TAB TABLET    Take 325 mg by mouth daily with breakfast.    GLUCOSAMINE SULFATE (GLUCOSAMINE) 500 MG TAB    Take 1,500 mg by mouth Daily.    HYDROXYZINE HCL (ATARAX) 25 MG TABLET    Take 1 tablet (25 mg total) by mouth 3 (three) times daily as needed for Itching.    INULIN (PREBIOTIC FIBER ORAL)    Take 500 mg by mouth once daily. Takes 2 cap in am.    L.ACID/L.CASEI/B.BIF/B.DENIA/FOS (PROBIOTIC BLEND ORAL)    Take 400 mg by mouth 2 (two) times a day.    MAGNESIUM GLYCINATE, BULK, MISC    120 mg by Misc.(Non-Drug; Combo Route) route once daily.    MECLIZINE (ANTIVERT) 25 MG TABLET    Take 1 tablet (25 mg total) by mouth 3 (three) times daily as needed for Dizziness.    MELOXICAM (MOBIC) 15 MG TABLET    Take 1 tablet (15 mg total) by mouth once daily.    MILK THISTLE ORAL    Take 1,000 mg by mouth once daily.    OMEGA-3/DHA/EPA/FISH OIL (OMEGA-3 FISH OIL ORAL)    Take 1 capsule by mouth.    ONDANSETRON (ZOFRAN-ODT) 4 MG TBDL    Take 1 tablet (4 mg total) by mouth 3 (three) times daily as needed (Nausea and vomiting).    OXYCODONE (ROXICODONE) 5 MG IMMEDIATE RELEASE TABLET    Take 1 tablet every 4-6 hours as needed for pain    PANTOPRAZOLE (PROTONIX) 40 MG TABLET    Take 1 tablet (40 mg total) by mouth once daily.    PRAVASTATIN (PRAVACHOL) 10 MG TABLET    Take 1 tablet (10 mg total) by mouth once daily.    PRAVASTATIN (PRAVACHOL) 10 MG TABLET    Take 1 tablet (10 mg total) by mouth once daily.    PREDNISONE (DELTASONE) 10 MG TABLET    Take 6 pills a day for two days, then 5 pills on day 3, then 4 pills, then 3 pills then 2 pills then one pill then off.    SENNA-DOCUSATE 8.6-50 MG (SENNA WITH DOCUSATE SODIUM) 8.6-50 MG PER TABLET    Take 1 tablet by mouth once  "daily.    TACROLIMUS (PROTOPIC) 0.03 % OINTMENT    Apply topically 2 (two) times daily.    UNABLE TO FIND    Take 1,950 mg by mouth Daily. medication name: Tumeric    VALSARTAN (DIOVAN) 160 MG TABLET    TAKE 1 TABLET DAILY     1. Annual physical exam  -     Hemoglobin A1C; Future; Expected date: 07/16/2024  -     Lipid Panel; Future; Expected date: 07/16/2024  -     CBC Without Differential; Future; Expected date: 07/16/2024  -     Comprehensive Metabolic Panel; Future; Expected date: 07/16/2024    2. Aortic atherosclerosis  Overview:  -Noted on June 2021 LDCT - subsequent Agaston score 3 (7/27/2021).  -did not tolerate atorvastatin (LFT's) 2024  -pravastatin low dose      3. Avascular necrosis of bone of hip, unspecified laterality  Overview:  -R NICK Jan 2024    Orders:  -     Ambulatory referral/consult to Orthopedics; Future; Expected date: 07/23/2024    4. Hypertension, essential  -     CBC Without Differential; Future; Expected date: 07/16/2024  -     Comprehensive Metabolic Panel; Future; Expected date: 07/16/2024    5. Hyperlipidemia, unspecified hyperlipidemia type  Assessment & Plan:  -aortic and coronary calcifications, Agaston 3  -did not tolerate atorvastatin (LFT's) 2024  -pravastatin low dose    Orders:  -     Lipid Panel; Future; Expected date: 07/16/2024  -     CBC Without Differential; Future; Expected date: 07/16/2024  -     Comprehensive Metabolic Panel; Future; Expected date: 07/16/2024    6. Lung nodules  Overview:  -former smoker - noted on serial scans, stable through 12/15/2022 LDCT -saw pulm while in [place], per patient hx, not thought to represent significant problem a the time  -12/15/2023 LDCT - 'Lung-RADS Category: 2 - Benign Appearance or Behavior - continue annual screening with LDCT in 12 months.'  -3/26/2024 pulm consult - GG and stable. Warrants 5 years of stability given size and smoking history (throught 6/2026). Quit smoking in 2007, but latest guidelines have removed "time since " "quitting". Recommend CT Chest 12/2024      7. Vertebral anomaly  Overview:  -incidental L1 mild wedging on 1/5/2023 lumbar XR  -DXA nml      8. Statin intolerance  Overview:  -did not tolerate atorvastatin (LFT's) 2024 (aortic and coronary calcifications)  -pravastatin low dose tolerated       9. Arthritis, hip  Overview:  -R NICK Jan 2024    Assessment & Plan:  Pain on L now    Orders:  -     Ambulatory referral/consult to Orthopedics; Future; Expected date: 07/23/2024    10. Agatston CAC score, <100  Overview:  - Agaston score 3 (7/27/2021)   -tolerating low dose pravastatin        See meds, orders, follow up, routing and instructions sections of encounter and AVS. Discussed with patient and provided on AVS.    Discussed diet and exercise as therapeutic modalities for metabolic and other conditions. Provided patient information, which are included as links on the AVS for detailed information.    Lab Results   Component Value Date     01/09/2024    K 3.5 01/09/2024     01/09/2024    BUN 15 01/09/2024    CREATININE 0.6 01/09/2024     01/09/2024    HGBA1C 5.1 07/11/2023    AST 23 05/01/2024    ALT 20 05/01/2024    ALBUMIN 4.3 05/01/2024    PROT 7.4 05/01/2024    BILITOT 0.3 05/01/2024    CHOL 243 (H) 07/11/2023    HDL 99 (H) 07/11/2023    LDLCALC 126.4 07/11/2023    TRIG 88 07/11/2023    WBC 4.74 12/22/2023    HGB 12.1 12/22/2023    HCT 35.1 (L) 12/22/2023     12/22/2023    TSH 1.064 07/11/2023           "

## 2024-07-16 NOTE — ASSESSMENT & PLAN NOTE
-aortic and coronary calcifications, Agaston 3  -did not tolerate atorvastatin (LFT's) 2024  -pravastatin low dose

## 2024-07-19 ENCOUNTER — PATIENT MESSAGE (OUTPATIENT)
Dept: CARDIOLOGY | Facility: CLINIC | Age: 54
End: 2024-07-19
Payer: OTHER GOVERNMENT

## 2024-07-23 ENCOUNTER — PATIENT MESSAGE (OUTPATIENT)
Dept: ORTHOPEDICS | Facility: CLINIC | Age: 54
End: 2024-07-23
Payer: OTHER GOVERNMENT

## 2024-07-23 ENCOUNTER — PATIENT MESSAGE (OUTPATIENT)
Dept: INTERNAL MEDICINE | Facility: CLINIC | Age: 54
End: 2024-07-23
Payer: OTHER GOVERNMENT

## 2024-07-24 ENCOUNTER — OFFICE VISIT (OUTPATIENT)
Dept: OBSTETRICS AND GYNECOLOGY | Facility: CLINIC | Age: 54
End: 2024-07-24
Payer: OTHER GOVERNMENT

## 2024-07-24 VITALS
HEIGHT: 62 IN | WEIGHT: 132.06 LBS | SYSTOLIC BLOOD PRESSURE: 102 MMHG | DIASTOLIC BLOOD PRESSURE: 64 MMHG | BODY MASS INDEX: 24.3 KG/M2

## 2024-07-24 DIAGNOSIS — Z01.419 WELL WOMAN EXAM WITH ROUTINE GYNECOLOGICAL EXAM: Primary | ICD-10-CM

## 2024-07-24 PROCEDURE — 99999 PR PBB SHADOW E&M-EST. PATIENT-LVL IV: CPT | Mod: PBBFAC,,, | Performed by: OBSTETRICS & GYNECOLOGY

## 2024-07-24 PROCEDURE — 99214 OFFICE O/P EST MOD 30 MIN: CPT | Mod: PBBFAC | Performed by: OBSTETRICS & GYNECOLOGY

## 2024-07-24 PROCEDURE — 99396 PREV VISIT EST AGE 40-64: CPT | Mod: S$PBB,,, | Performed by: OBSTETRICS & GYNECOLOGY

## 2024-07-24 NOTE — PROGRESS NOTES
History & Physical  Gynecology      SUBJECTIVE:     Chief Complaint: Well Woman       History of Present Illness:  Annual Exam-Postmenopausal  Patient presents for annual exam. The patient has no complaints today. Patient denies post-menopausal vaginal bleeding. The patient is not sexually active. The patient is not taking hormone replacement therapy.  The patient participates in regular exercise: yes.  She does not smoke.     GYN screening history: 2019, pap no hpv  Mammogram history: 2022  Colonoscopy history: 2015, repeat 10 years  Dexa history: due age 64    FH:   Adopted    Review of patient's allergies indicates:   Allergen Reactions    Adhesive tape-silicones     Penicillins Hives    Adhesive Hives    Celecoxib Itching and Rash       Past Medical History:   Diagnosis Date    Abnormal cervical Papanicolaou smear '88, '04    Colpo/Cryo    Benign paroxysmal vertigo, bilateral     Chronic diarrhea 02/12/2015    GERD (gastroesophageal reflux disease)     History of acute PID 1988    Hyperlipidemia     Hypertension     Normocytic anemia 12/22/2023    Thrombocytosis     Urticaria     Vertigo 12/22/2023     Past Surgical History:   Procedure Laterality Date    ADENOIDECTOMY      ARTHROPLASTY OF HIP BY ANTERIOR APPROACH Right 1/17/2024    Procedure: ARTHROPLASTY, HIP, TOTAL, ANTERIOR APPROACH: RIGHT: DEPUY - ACTIS + PINNACLE: SAME DAY;  Surgeon: Cresencio Collins III, MD;  Location: [facility] OR;  Service: Orthopedics;  Laterality: Right;    BREAST BIOPSY Right 06/09/2021    stereo benign    CARPAL TUNNEL RELEASE Left 09/21/2021    Procedure: RELEASE, CARPAL TUNNEL;  Surgeon: Alla Goff MD;  Location: [facility] OR;  Service: Orthopedics;  Laterality: Left;    CARPAL TUNNEL RELEASE Right 02/07/2023    Procedure: RELEASE, CARPAL TUNNEL;  Surgeon: Alla Goff MD;  Location: [facility] OR;  Service: Orthopedics;  Laterality: Right;    EYE SURGERY  2010    In Record    GANGLION CYST EXCISION      INJECTION OF JOINT Right  2023    Procedure: INJECTION, JOINT, RIGHT HIP  DIRECT REF;  Surgeon: Harleen Swan MD;  Location: [facility];  Service: Pain Management;  Laterality: Right;    INJECTION OF STEROID Right 2021    Procedure: INJECTION, STEROID-Right carpal tunnel;  Surgeon: Alla Goff MD;  Location: [facility] OR;  Service: Orthopedics;  Laterality: Right;    REFRACTIVE SURGERY Bilateral     Dr. Vazquez Forbes     TONSILLECTOMY       OB History          0    Para        Term   0            AB        Living             SAB        IAB        Ectopic        Multiple        Live Births                   Family History   Adopted: Yes   Problem Relation Name Age of Onset    Heart disease Mother Ct         Adopted    Cancer Father Gregory         Adopted     Social History     Tobacco Use    Smoking status: Former     Current packs/day: 0.00     Average packs/day: 1 pack/day for 23.1 years (23.1 ttl pk-yrs)     Types: Cigarettes     Start date: 1984     Quit date: 2007     Years since quittin.0    Smokeless tobacco: Never   Substance Use Topics    Alcohol use: Yes     Alcohol/week: 20.0 standard drinks of alcohol     Types: 6 Glasses of wine, 8 Cans of beer, 6 Drinks containing 0.5 oz of alcohol per week     Comment: 3-4 times per week    Drug use: No       Current Outpatient Medications   Medication Sig    acetaminophen (TYLENOL) 650 MG TbSR Take 1 tablet (650 mg total) by mouth every 8 (eight) hours.    CALCIUM CARBONATE (CALCIUM 500 ORAL) Take 1 capsule by mouth.    cetirizine (ZYRTEC) 10 MG tablet Take 1 tablet (10 mg total) by mouth once daily.    chlorthalidone (HYGROTEN) 25 MG Tab TAKE 1 TABLET DAILY    cyanocobalamin (VITAMIN B-12) 100 MCG tablet Take 100 mcg by mouth once daily.    desonide (DESOWEN) 0.05 % cream Apply topically 2 (two) times daily.    ferrous sulfate 325 mg (65 mg iron) Tab tablet Take 325 mg by mouth daily with breakfast.    glucosamine sulfate (GLUCOSAMINE) 500 mg  Tab Take 1,500 mg by mouth Daily.    hydrOXYzine HCL (ATARAX) 25 MG tablet Take 1 tablet (25 mg total) by mouth 3 (three) times daily as needed for Itching.    inulin (PREBIOTIC FIBER ORAL) Take 500 mg by mouth once daily. Takes 2 cap in am.    L.acid/L.casei/B.bif/B.bernabe/FOS (PROBIOTIC BLEND ORAL) Take 400 mg by mouth 2 (two) times a day.    MAGNESIUM GLYCINATE, BULK, MISC 120 mg by Misc.(Non-Drug; Combo Route) route once daily.    meclizine (ANTIVERT) 25 mg tablet Take 1 tablet (25 mg total) by mouth 3 (three) times daily as needed for Dizziness.    meloxicam (MOBIC) 15 MG tablet Take 1 tablet (15 mg total) by mouth once daily.    MILK THISTLE ORAL Take 1,000 mg by mouth once daily.    OMEGA-3/DHA/EPA/FISH OIL (OMEGA-3 FISH OIL ORAL) Take 1 capsule by mouth.    ondansetron (ZOFRAN-ODT) 4 MG TbDL Take 1 tablet (4 mg total) by mouth 3 (three) times daily as needed (Nausea and vomiting).    pravastatin (PRAVACHOL) 10 MG tablet Take 1 tablet (10 mg total) by mouth once daily.    valsartan (DIOVAN) 160 MG tablet TAKE 1 TABLET DAILY    aspirin (ECOTRIN) 81 MG EC tablet Take 1 tablet (81 mg total) by mouth 2 (two) times a day.    astaxanthin 12 mg Cap Take by mouth Daily. (Patient not taking: Reported on 7/24/2024)    oxyCODONE (ROXICODONE) 5 MG immediate release tablet Take 1 tablet every 4-6 hours as needed for pain (Patient not taking: Reported on 7/24/2024)    pantoprazole (PROTONIX) 40 MG tablet Take 1 tablet (40 mg total) by mouth once daily.    predniSONE (DELTASONE) 10 MG tablet Take 6 pills a day for two days, then 5 pills on day 3, then 4 pills, then 3 pills then 2 pills then one pill then off. (Patient not taking: Reported on 3/6/2024)    senna-docusate 8.6-50 mg (SENNA WITH DOCUSATE SODIUM) 8.6-50 mg per tablet Take 1 tablet by mouth once daily. (Patient not taking: Reported on 4/12/2024)    tacrolimus (PROTOPIC) 0.03 % ointment Apply topically 2 (two) times daily. (Patient not taking: Reported on 7/16/2024)     UNABLE TO FIND Take 1,950 mg by mouth Daily. medication name: Tumeric (Patient not taking: Reported on 7/16/2024)     No current facility-administered medications for this visit.       Review of Systems:  Review of Systems   Constitutional:  Negative for activity change, appetite change and fever.   Respiratory:  Negative for shortness of breath.    Cardiovascular:  Negative for chest pain.   Gastrointestinal:  Negative for abdominal pain, constipation, diarrhea, nausea and vomiting.   Genitourinary:  Negative for pelvic pain, vaginal bleeding, vaginal discharge, vaginal pain and postmenopausal bleeding.   Integumentary:  Negative for breast mass.   Neurological:  Negative for headaches.   Breast: Negative for lump and mass       OBJECTIVE:     Physical Exam:  Physical Exam  Vitals and nursing note reviewed.   Constitutional:       Appearance: She is well-developed.   Neck:      Thyroid: No thyromegaly.      Trachea: No tracheal deviation.   Cardiovascular:      Rate and Rhythm: Normal rate and regular rhythm.      Heart sounds: Normal heart sounds.   Pulmonary:      Effort: Pulmonary effort is normal.      Breath sounds: Normal breath sounds.   Chest:   Breasts:     Breasts are symmetrical.      Right: No inverted nipple, mass, nipple discharge, skin change or tenderness.      Left: No inverted nipple, mass, nipple discharge, skin change or tenderness.   Abdominal:      Palpations: Abdomen is soft.   Genitourinary:     General: Normal vulva.      Labia:         Right: No rash, tenderness, lesion or injury.         Left: No rash, tenderness, lesion or injury.       Urethra: No prolapse, urethral pain, urethral swelling or urethral lesion.      Vagina: Normal. No signs of injury and foreign body. No vaginal discharge, erythema, tenderness or bleeding.      Cervix: No cervical motion tenderness, discharge or friability.      Uterus: Not deviated, not enlarged, not fixed and not tender.       Adnexa:         Right:  No mass, tenderness or fullness.          Left: No mass, tenderness or fullness.        Rectum: No anal fissure or external hemorrhoid.      Comments: Urethral meatus: normal size, anterior vaginal wall with no prolapse, no lesions  Bladder: no fullness, masses or tenderness  Musculoskeletal:      Cervical back: Normal range of motion and neck supple.   Neurological:      Mental Status: She is alert and oriented to person, place, and time.   Psychiatric:         Behavior: Behavior normal.         Thought Content: Thought content normal.         Judgment: Judgment normal.         Chaperoned by: n/a    ASSESSMENT:       ICD-10-CM ICD-9-CM    1. Well woman exam with routine gynecological exam  Z01.419 V72.31                Plan:      Kayleen was seen today for well woman.    Diagnoses and all orders for this visit:    Well woman exam with routine gynecological exam          No orders of the defined types were placed in this encounter.      Well Woman:   - Pap smear: and hpv up to date  - Mammogram: up to date  - Colonoscopy: up to date  - Dexa: due age 64  - Immunizations: none required  - Labs: with pcp  - Exercise recommended    Follow up in one year for annual, or prn.    Maya Staley

## 2024-07-25 ENCOUNTER — TELEPHONE (OUTPATIENT)
Dept: INTERNAL MEDICINE | Facility: CLINIC | Age: 54
End: 2024-07-25
Payer: OTHER GOVERNMENT

## 2024-07-25 DIAGNOSIS — R94.5 ABNORMAL RESULTS OF LIVER FUNCTION STUDIES: ICD-10-CM

## 2024-07-25 DIAGNOSIS — E78.5 HYPERLIPIDEMIA, UNSPECIFIED HYPERLIPIDEMIA TYPE: Primary | ICD-10-CM

## 2024-07-25 DIAGNOSIS — R79.9 ABNORMAL BLOOD CHEMISTRY: ICD-10-CM

## 2024-07-25 RX ORDER — PRAVASTATIN SODIUM 20 MG/1
20 TABLET ORAL DAILY
Qty: 90 TABLET | Refills: 0 | Status: SHIPPED | OUTPATIENT
Start: 2024-07-25

## 2024-07-26 DIAGNOSIS — Z96.641 STATUS POST RIGHT HIP REPLACEMENT: Primary | ICD-10-CM

## 2024-07-26 NOTE — TELEPHONE ENCOUNTER
Please call patient and explain that tests show low potassium. .    Will send patient message to increase pravastatin to 20 mg's and recheck labs in 6 weeks or so. Watch for any symptoms of muscle pain.    I recommend follow up testing. Please see orders for Lipids, ALT, AST, and potassium  and please schedule in 6 weeks.     Thank you.

## 2024-07-29 ENCOUNTER — HOSPITAL ENCOUNTER (OUTPATIENT)
Dept: RADIOLOGY | Facility: HOSPITAL | Age: 54
Discharge: HOME | End: 2024-07-29
Attending: NURSE PRACTITIONER
Payer: OTHER GOVERNMENT

## 2024-07-29 ENCOUNTER — OFFICE VISIT (OUTPATIENT)
Dept: ORTHOPEDICS | Facility: CLINIC | Age: 54
End: 2024-07-29
Payer: OTHER GOVERNMENT

## 2024-07-29 DIAGNOSIS — M87.059 AVASCULAR NECROSIS OF BONE OF HIP, UNSPECIFIED LATERALITY: ICD-10-CM

## 2024-07-29 DIAGNOSIS — Z96.641 STATUS POST RIGHT HIP REPLACEMENT: ICD-10-CM

## 2024-07-29 DIAGNOSIS — E78.5 HYPERLIPIDEMIA, UNSPECIFIED HYPERLIPIDEMIA TYPE: ICD-10-CM

## 2024-07-29 DIAGNOSIS — M16.10 ARTHRITIS, HIP: ICD-10-CM

## 2024-07-29 PROCEDURE — 73502 X-RAY EXAM HIP UNI 2-3 VIEWS: CPT | Mod: TC,LT

## 2024-07-29 PROCEDURE — 73502 X-RAY EXAM HIP UNI 2-3 VIEWS: CPT | Mod: 26,LT,, | Performed by: RADIOLOGY

## 2024-07-29 PROCEDURE — 99213 OFFICE O/P EST LOW 20 MIN: CPT | Mod: S$PBB,,, | Performed by: NURSE PRACTITIONER

## 2024-07-29 PROCEDURE — 99214 OFFICE O/P EST MOD 30 MIN: CPT | Mod: PBBFAC,25 | Performed by: NURSE PRACTITIONER

## 2024-07-29 PROCEDURE — 99999 PR PBB SHADOW E&M-EST. PATIENT-LVL IV: CPT | Mod: PBBFAC,,, | Performed by: NURSE PRACTITIONER

## 2024-07-29 RX ORDER — PRAVASTATIN SODIUM 20 MG/1
20 TABLET ORAL DAILY
Qty: 90 TABLET | Refills: 0 | Status: SHIPPED | OUTPATIENT
Start: 2024-07-29 | End: 2024-07-30 | Stop reason: SDUPTHER

## 2024-07-29 NOTE — TELEPHONE ENCOUNTER
No care due was identified.  [facility] Embedded Care Due Messages. Reference number: [id number].   7/29/2024 4:36:39 PM CDT

## 2024-07-29 NOTE — TELEPHONE ENCOUNTER
Refill Decision Note   Kayleen Patel  is requesting a refill authorization.  Brief Assessment and Rationale for Refill:  Approve     Medication Therapy Plan: Resent order to correct pharmacy.      Comments:     Note composed:4:37 PM 07/29/2024

## 2024-07-29 NOTE — PROGRESS NOTES
CC: Pain of the Left Hip      HPI: Pt with c/o left hip pain, located in the groin and buttock ,at night, when laying flat, for the past several weeks. The pain is only located in the groin and the buttock. She had a right hip replacement in January by Dr. Collins. She had some tendinitis after the hip replacement which was relieved by an injection by Dr. Hartley. She's hoping that she can try that on the left. That pain occurred when she tried to raise her leg up whereas this pain is when she lays down. She had an intraarticular injection in the right hip prior to hip replacement and that didn't help her right hip at all. She has been taking mobic without relief. She doesn't have pain with ambulating or standing, only at night when trying to sleep. She can elevate her leg some and that will relieve the pain, but once she moves in her sleep, the pain wakes her up. She works at a desk job and the pain hasn't affected her ability to sit at work. She doesn't want to consider left hip replacement until January due to needing to arrange for help at home and needing to get through the fiscal year end at work.  She is ambulating without assistive device. There is not a limp.    ROS  General: denies fever and chills  Resp: no c/o sob  CVS: no c/o cp  MSK: c/o left hip pain    PE  General: AAOx3, pleasant and cooperative  Resp: respirations even and unlabored  MSK: left hip exam  + stinchfield  + straight leg raise  + pain with internal rotation  + pain with external rotation  - pain over the greater trochanter    Xray:  Reviewed by me with the patient: there is joint space narrowing with degenerative changes noted to the left hip join    Assessment:  Left hip djd    Plan:  Pt would like to see Dr. Hartley for injections again since that helped last time. She understands that she won't be able to have a hip replacement until 3 months after if she has a cortisone injection. She says that she would prefer to take that  chance because she isn't able to have her surgery right now.

## 2024-08-22 NOTE — PROGRESS NOTES
HISTORY OF PRESENT ILLNESS   Kayleen Patel, a 54 y.o. female, presents today for evaluation of her left HIP.    Patient reports onset of chronic pain beginning a few months ago. Patient reports no known injury or trauma. Pain is located along anterior and posterior aspect of HIP. Pain is 8/10 at present & up to 10/10 with provacative activity including sitting and laying in bed. Pain is described as throbbing. Patient states pain does not radiate.     Associated symptoms include instability. Pain is aggravated by activities above & occur constantly and while trying to sleep. Symptoms do interfere with sleep. Patient reports pain & symptoms are getting worse . Patient reports no prior surgery to HIP. Patient is planning to get left NICK in January of 2025.     Prior treatment Kayleen Patel has tried   OTC Acetaminophen - No  OTC NSAID - No   Rx NSAID - Yes - meloxicam, provides little relief   Rx Narcotic/Other - No   Brace - No   Injection - Cortisone - No   Injection - Biologics - No   Activity Modification - No  Physical Therapy - No   Home Exercise Program - No  Assistive Device - No  Other - heat    Review of systems (ROS):  A 10+ review of systems was performed with pertinent positives and negatives noted above in the history of present illness. Other systems were negative unless otherwise specified.    PHYSICAL EXAMINATION  General:  The patient is alert and oriented x 3.  Mood is pleasant.  Observation of ears, eyes and nose reveal no gross abnormalities.  HEENT: NCAT, sclera nonicteric  Lungs: Respirations are equal and unlabored.   Gait is coordinated. Patient can toe walk and heel walk without difficulty.    HIP/PELVIS EXAMINATION    Observation/Inspection  Gait:   Antalgic   Alignment:  Neutral   Scars:   None   Muscle atrophy: None   Effusion:  None   Warmth:  None   Discoloration:   None   Leg lengths:   Equal   Pelvis:    Level     Tenderness/Crepitus (T/C):      T / C  Lateral  Gluteal region  + / -  Trochanteric bursa   + / -  Piriformis    - / -  SI joint    - / -  Psoas tendon   - / -  Rectus insertion  - / -  Adductor insertion  - / -  Pubic symphysis  - / -    ROM: (* = pain)    Flexion:      120 degrees  External rotation:   40 degrees  Internal rotation with axial load:  30 degrees  Internal rotation without axial load:  40 degrees  Abduction:    45 degrees  Adduction:     20 degrees    Special Tests:  Pain w/ forced internal rotation (FADIR):  +  Pain w/ forced external rotation (CHANDA):  +   Circumduction test:     -  Stinchfield test:     -   Log roll:       -   Snapping hip (internal):    -   Sit-up pain:      NT  Resisted sit-up pain:     NT  Resisted sit-up with adductor contraction pain:  NT  Step-down test:     NT  Trendelenburg test:     NT  Bridge test      NT    Extremity Neuro-vascular Examination:   Sensation:  Grossly intact to light touch all dermatomal regions.   Motor Function:  Fully intact motor function at hip, knee, foot and ankle    DTRs;  quadriceps and  achilles 2+.  No clonus and downgoing Babinski.    Vascular status:  DP and PT pulses 2+, brisk capillary refill, symmetric.    Skin:  intact, compartments soft.    Other Findings:    ASSESSMENT & PLAN  Assessment  #1 s/p NICK, GChimento, right  #2 Tonnis Grade III osteoarthritis of hip, left   W/ tendinosis of lateral gluteal tendons    No evidence of neurologic pathology  No evidence of vascular pathology    Imaging studies reviewed:  X-ray pelvis and hip, left, 7/29/24     Plan  We discussed the importance of appropriate diet, weight, and regular exercise    We discussed options including    Watchful waiting / relative rest    Physical therapy x   Injection therapy CSI lat glut tends left  CSI iaHip left   Consultation    The patient chooses As above   x = prescribed  CSI = corticosteroid injection  VSI = viscosupplement injection  PRPI = platelet rich plasma injection  ia = intra articular  R = right  L  = left  B = bilateral   nfSx = surgical consultation was recommended, but patient is not interested in consultation at this time    Physical Therapy        Formal (fPT), @ [facility] facility p   Formal (fPT), @ [facility] facility        Homegoing (hgPT), per concurrent fPT recommendations    Homegoing (hgPT), per prior fPT recommendations t   Homegoing (hgPT), handout provided        w/  (atPT)    [blank] = not prescribed  x = prescribed  b = prescribed, and begin as indicated  t = continue as indicated  r = prescribed, and restart as indicated  p = completed prior as indicated  hs = prescribed, and with high school   col = prescribed, and with college or university   nfPT = physical therapy was recommended, but patient is not interested in PT at this time    Activity (e.g. sports, work) restrictions    [blank] = as tolerated  pt = per physical therapist  at = per   NWB = non weight bearing on affected lower extremity, with crutches assistance for ambulation    Bracing    [blank] = not prescribed  r = recommended, but not fit with at todays visit  f = prescribed and fit with at todays visit  t = continue as indicated  d = d/c  p = as needed  rare = use on rare, as-needed basis; advised against chronic use    Pain management    [blank] = No prescription necessary. A handout detailing dosing of appropriate   over-the-counter musculoskeletal analgesics was made available to the patient.   m = meloxicam x 14 days  mp = 14 day course of meloxicam prescribed prior    Follow up Anticipate NICK w/ Team Rita   in 01/2025   [blank] = as needed  [number] = in [number] weeks  CSI = for corticosteroid injection  VSI = for viscosupplement injection or injection series  PRP = for platelet rich plasma injection or injection series  MRI = after MRI imaging  ns = should surgical options be deferred (no surgery)  o = appointment offered, deferred by patient    Should  symptoms worsen or fail to resolve, consider    Revisiting the above options and / or      Vocation:   [employer]    with [employer]

## 2024-08-26 DIAGNOSIS — I10 ESSENTIAL HYPERTENSION: ICD-10-CM

## 2024-08-26 RX ORDER — CHLORTHALIDONE 25 MG/1
TABLET ORAL
Qty: 90 TABLET | Refills: 3 | Status: SHIPPED | OUTPATIENT
Start: 2024-08-26

## 2024-08-26 RX ORDER — VALSARTAN 160 MG/1
TABLET ORAL
Qty: 90 TABLET | Refills: 3 | Status: SHIPPED | OUTPATIENT
Start: 2024-08-26

## 2024-08-26 NOTE — TELEPHONE ENCOUNTER
No care due was identified.  [facility] Embedded Care Due Messages. Reference number: [id number].   8/26/2024 2:41:55 AM CDT

## 2024-08-26 NOTE — TELEPHONE ENCOUNTER
Refill Routing Note   Medication(s) are not appropriate for processing by [facility] for the following reason(s):        Required labs abnormal    [facility] action(s):  Defer  Approve             Appointments  past 12m or future 3m with PCP    Date Provider   Last Visit   7/16/2024 Herbert Clay MD   Next Visit   Visit date not found Herbert Clay MD   ED visits in past 90 days: 0        Note composed:7:18 AM 08/26/2024

## 2024-08-28 ENCOUNTER — OFFICE VISIT (OUTPATIENT)
Dept: SPORTS MEDICINE | Facility: CLINIC | Age: 54
End: 2024-08-28
Payer: OTHER GOVERNMENT

## 2024-08-28 VITALS — HEART RATE: 66 BPM | SYSTOLIC BLOOD PRESSURE: 114 MMHG | TEMPERATURE: 98 F | DIASTOLIC BLOOD PRESSURE: 74 MMHG

## 2024-08-28 DIAGNOSIS — G89.29 CHRONIC LEFT HIP PAIN: Primary | ICD-10-CM

## 2024-08-28 DIAGNOSIS — M25.552 CHRONIC LEFT HIP PAIN: Primary | ICD-10-CM

## 2024-08-28 DIAGNOSIS — M16.12 PRIMARY OSTEOARTHRITIS OF LEFT HIP: ICD-10-CM

## 2024-08-28 DIAGNOSIS — M25.552 LEFT HIP PAIN: ICD-10-CM

## 2024-08-28 DIAGNOSIS — M67.80 TENDINOSIS: ICD-10-CM

## 2024-08-28 DIAGNOSIS — S76.012S TEAR OF LEFT GLUTEUS MINIMUS TENDON, SEQUELA: ICD-10-CM

## 2024-08-28 DIAGNOSIS — S76.012S TEAR OF LEFT GLUTEUS MEDIUS TENDON, SEQUELA: ICD-10-CM

## 2024-08-28 PROCEDURE — 20611 DRAIN/INJ JOINT/BURSA W/US: CPT | Mod: PBBFAC | Performed by: FAMILY MEDICINE

## 2024-08-28 PROCEDURE — 99999PBSHW PR PBB SHADOW TECHNICAL ONLY FILED TO HB: Mod: PBBFAC,,,

## 2024-08-28 PROCEDURE — 20551 NJX 1 TENDON ORIGIN/INSJ: CPT | Mod: PBBFAC | Performed by: FAMILY MEDICINE

## 2024-08-28 PROCEDURE — 76942 ECHO GUIDE FOR BIOPSY: CPT | Mod: PBBFAC | Performed by: FAMILY MEDICINE

## 2024-08-28 PROCEDURE — 99999 PR PBB SHADOW E&M-EST. PATIENT-LVL IV: CPT | Mod: PBBFAC,,, | Performed by: FAMILY MEDICINE

## 2024-08-28 PROCEDURE — 99214 OFFICE O/P EST MOD 30 MIN: CPT | Mod: PBBFAC | Performed by: FAMILY MEDICINE

## 2024-08-28 RX ORDER — TRIAMCINOLONE ACETONIDE 40 MG/ML
40 INJECTION, SUSPENSION INTRA-ARTICULAR; INTRAMUSCULAR
Status: DISCONTINUED | OUTPATIENT
Start: 2024-08-28 | End: 2024-08-28 | Stop reason: HOSPADM

## 2024-08-28 RX ADMIN — TRIAMCINOLONE ACETONIDE 40 MG: 40 INJECTION, SUSPENSION INTRA-ARTICULAR; INTRAMUSCULAR at 01:08

## 2024-08-28 NOTE — PROCEDURES
"Large Joint Aspiration/Injection: L hip joint    Date/Time: 8/28/2024 1:45 PM    Performed by: Louis Hartley MD  Authorized by: Louis Hartley MD    Consent Done?:  Yes (Verbal)  Indications:  Pain  Site marked: the procedure site was marked    Timeout: prior to procedure the correct patient, procedure, and site was verified    Prep: patient was prepped and draped in usual sterile fashion    Local anesthesia used?: No      Details:  Needle Size:  22 G  Ultrasonic Guidance for needle placement?: Yes    Images are saved and documented.  Approach:  Anterior  Location:  Hip  Site:  L hip joint  Medications:  40 mg triamcinolone acetonide 40 mg/mL  Patient tolerance:  Patient tolerated the procedure well with no immediate complications     Description of ultrasound utilization for needle guidance:   Ultrasound guidance used for needle localization. Images saved and stored for documentation. The hip joint was visualized. Dynamic visualization of the 22g x 3.5" needle was continuous throughout the procedure.    Precautionary:  The patient's femoral artery, vein, and nerve were identified, and visualized throughout the procedure, so as to avoid needle contact with those structures.     "

## 2024-08-28 NOTE — PROCEDURES
"Tendon Origin: L hip joint    Date/Time: 8/28/2024 1:45 PM    Performed by: Louis Hartley MD  Authorized by: Louis Hartley MD    Consent Done?:  Yes (Verbal)  Timeout: prior to procedure the correct patient, procedure, and site was verified    Indications:  Pain  Site marked: the procedure site was marked    Timeout: prior to procedure the correct patient, procedure, and site was verified    Location:  Hip  Site:  L hip joint  Prep: patient was prepped and draped in usual sterile fashion    Ultrasonic Guidance for Needle Placement?: Yes    Needle size:  22 G  Approach:  Posterolateral  Medications:  40 mg triamcinolone acetonide 40 mg/mL  Patient tolerance:  Patient tolerated the procedure well with no immediate complications   Gluteus medius and gluteus minimus muscles, tendon sheaths, and tendon insertions injection    Description of ultrasound utilization for needle guidance:   Ultrasound guidance used for needle localization. Images saved and stored for documentation. The gluteus medius and minimus muscles and tendons were visualized at their insertions on the greater trochanter. Dynamic visualization of the 22g x 3.5" needle was continuous throughout the procedure.    "

## 2024-09-06 ENCOUNTER — LAB VISIT (OUTPATIENT)
Dept: LAB | Facility: HOSPITAL | Age: 54
End: 2024-09-06
Attending: FAMILY MEDICINE
Payer: OTHER GOVERNMENT

## 2024-09-06 ENCOUNTER — OFFICE VISIT (OUTPATIENT)
Dept: CARDIOLOGY | Facility: CLINIC | Age: 54
End: 2024-09-06
Payer: OTHER GOVERNMENT

## 2024-09-06 VITALS
WEIGHT: 132.25 LBS | HEIGHT: 62 IN | HEART RATE: 64 BPM | OXYGEN SATURATION: 98 % | SYSTOLIC BLOOD PRESSURE: 114 MMHG | BODY MASS INDEX: 24.34 KG/M2 | DIASTOLIC BLOOD PRESSURE: 69 MMHG

## 2024-09-06 DIAGNOSIS — I10 HYPERTENSION, ESSENTIAL: Primary | ICD-10-CM

## 2024-09-06 DIAGNOSIS — R94.5 ABNORMAL RESULTS OF LIVER FUNCTION STUDIES: ICD-10-CM

## 2024-09-06 DIAGNOSIS — E78.5 HYPERLIPIDEMIA, UNSPECIFIED HYPERLIPIDEMIA TYPE: ICD-10-CM

## 2024-09-06 DIAGNOSIS — I70.0 AORTIC ATHEROSCLEROSIS: ICD-10-CM

## 2024-09-06 DIAGNOSIS — R79.9 ABNORMAL BLOOD CHEMISTRY: ICD-10-CM

## 2024-09-06 LAB
ALT SERPL W/O P-5'-P-CCNC: 22 U/L (ref 10–44)
AST SERPL-CCNC: 20 U/L (ref 10–40)
CHOLEST SERPL-MCNC: 237 MG/DL (ref 120–199)
CHOLEST/HDLC SERPL: 2.2 {RATIO} (ref 2–5)
HDLC SERPL-MCNC: 108 MG/DL (ref 40–75)
HDLC SERPL: 45.6 % (ref 20–50)
LDLC SERPL CALC-MCNC: 113.4 MG/DL (ref 63–159)
NONHDLC SERPL-MCNC: 129 MG/DL
POTASSIUM SERPL-SCNC: 3.5 MMOL/L (ref 3.5–5.1)
TRIGL SERPL-MCNC: 78 MG/DL (ref 30–150)

## 2024-09-06 PROCEDURE — 99215 OFFICE O/P EST HI 40 MIN: CPT | Mod: PBBFAC

## 2024-09-06 PROCEDURE — 36415 COLL VENOUS BLD VENIPUNCTURE: CPT | Performed by: FAMILY MEDICINE

## 2024-09-06 PROCEDURE — 80061 LIPID PANEL: CPT | Performed by: FAMILY MEDICINE

## 2024-09-06 PROCEDURE — 84460 ALANINE AMINO (ALT) (SGPT): CPT | Performed by: FAMILY MEDICINE

## 2024-09-06 PROCEDURE — 99999 PR PBB SHADOW E&M-EST. PATIENT-LVL V: CPT | Mod: PBBFAC,,,

## 2024-09-06 PROCEDURE — 84450 TRANSFERASE (AST) (SGOT): CPT | Performed by: FAMILY MEDICINE

## 2024-09-06 PROCEDURE — 84132 ASSAY OF SERUM POTASSIUM: CPT | Performed by: FAMILY MEDICINE

## 2024-09-06 RX ORDER — EZETIMIBE 10 MG/1
10 TABLET ORAL DAILY
Qty: 90 TABLET | Refills: 3 | Status: SHIPPED | OUTPATIENT
Start: 2024-09-06 | End: 2025-09-06

## 2024-09-06 NOTE — ASSESSMENT & PLAN NOTE
Chronic, controlled.   Home meds for hypertension were reviewed and noted below.   Hypertension Medications               chlorthalidone (HYGROTEN) 25 MG Tab TAKE 1 TABLET DAILY    valsartan (DIOVAN) 160 MG tablet TAKE 1 TABLET DAILY             - Lifestyle changes to reduce systolic BP:  exercise 30 minutes per day,  5 days per week or 150 minutes weekly; sodium reduction and avoidance of high salt foods such as processed meats, frozen meals and  fast foods.    -Keeping a healthy weight/BMI can help with better BP control

## 2024-09-06 NOTE — ASSESSMENT & PLAN NOTE
- Lipid profile with high LDL, not on goal   Plan:   - Zetia 10 mg added   - Lipid profile in 12 weeks   - Continue Pravastatin 40 mg daily

## 2024-09-06 NOTE — ASSESSMENT & PLAN NOTE
-Noted on June 2021 LDCT - subsequent Agaston score 3 (7/27/2021).  -Starte on ASA and statin. Now on Pravastatin 40 mg   - Lipid profile with high LDL, not on goal   Plan:   - Continue ASA and Pravastatin  - Zetia added

## 2024-09-06 NOTE — PROGRESS NOTES
Cardiology Clinic Note  Reason for Visit: Sent by her PCP for concerning findings of calcifications on aorta seen on CT     HPI:   Pt is a 55 yo F with pmhx of HTN, HLD, avascular necrosis of R hip s/p replacement who presents to clinic to discuss about atheromatous plaques seen on aorta and coronaries on CT chest. Currently, patient denies any symptoms of chest pain, SOB, palpitations, orthopnea, PND, fainting, lower extremity edema or claudication. Of note she was last seen in clinic about a year ago by Dr. Garcia for chest pain and follow up of LFTs elevation secondary to statin use. At this time patient had a echo stress test done which showed normal findings with no signs of ischemia. Chart checking, patient has had two CT chest as part of lung cancer screening follow up ([id number]) which showed findings of atherosclerosis and calcifications on aorta and coronaries (on both).     Her Lipids have been followed by her PCP, she was switched from atorvastatin to pravastatin recently, due to LFTs elevation. Now she is on pravastatin 40 mg and tolerating it. She also follows her BP with her PCP and she is on Chlorthalidone 25 mg daily and Valsartan 160 mg daily. She does not measure her BP at home but today her BP is well controlled during office visit.          ROS:    Constitution: Negative for fever, chills, weight loss or gain.   HENT: Negative for sore throat, rhinorrhea, or headache.  Eyes: Negative for blurred or double vision.   Cardiovascular: See above  Pulmonary: Negative for SOB   Gastrointestinal: Negative for abdominal pain, nausea, vomiting, or diarrhea.   : Negative for dysuria.   Neurological: Negative for focal weakness or sensory changes.  PMH:     Past Medical History:   Diagnosis Date    Abnormal cervical Papanicolaou smear '88, '04    Colpo/Cryo    Benign paroxysmal vertigo, bilateral     Chronic diarrhea 02/12/2015    GERD (gastroesophageal reflux disease)     History of acute PID 1988     Hyperlipidemia     Hypertension     Normocytic anemia 12/22/2023    Thrombocytosis     Urticaria     Vertigo 12/22/2023     Past Surgical History:   Procedure Laterality Date    ADENOIDECTOMY      ARTHROPLASTY OF HIP BY ANTERIOR APPROACH Right 1/17/2024    Procedure: ARTHROPLASTY, HIP, TOTAL, ANTERIOR APPROACH: RIGHT: DEPUY - ACTIS + PINNACLE: SAME DAY;  Surgeon: Cresencio Collins III, MD;  Location: [facility] OR;  Service: Orthopedics;  Laterality: Right;    BREAST BIOPSY Right 06/09/2021    stereo benign    CARPAL TUNNEL RELEASE Left 09/21/2021    Procedure: RELEASE, CARPAL TUNNEL;  Surgeon: Alla Goff MD;  Location: [facility] OR;  Service: Orthopedics;  Laterality: Left;    CARPAL TUNNEL RELEASE Right 02/07/2023    Procedure: RELEASE, CARPAL TUNNEL;  Surgeon: Alla Goff MD;  Location: [facility] OR;  Service: Orthopedics;  Laterality: Right;    EYE SURGERY  2010    In Record    GANGLION CYST EXCISION      INJECTION OF JOINT Right 03/09/2023    Procedure: INJECTION, JOINT, RIGHT HIP  DIRECT REF;  Surgeon: Harleen Swan MD;  Location: [facility];  Service: Pain Management;  Laterality: Right;    INJECTION OF STEROID Right 09/21/2021    Procedure: INJECTION, STEROID-Right carpal tunnel;  Surgeon: Alla Goff MD;  Location: [facility] OR;  Service: Orthopedics;  Laterality: Right;    REFRACTIVE SURGERY Bilateral 2009    Dr. Vazquez Forbes     TONSILLECTOMY       Allergies:     Review of patient's allergies indicates:   Allergen Reactions    Adhesive tape-silicones     Penicillins Hives    Adhesive Hives    Celecoxib Itching and Rash     Medications:     Current Outpatient Medications on File Prior to Visit   Medication Sig Dispense Refill    acetaminophen (TYLENOL) 650 MG TbSR Take 1 tablet (650 mg total) by mouth every 8 (eight) hours. 120 tablet 0    astaxanthin 12 mg Cap Take by mouth Daily.      CALCIUM CARBONATE (CALCIUM 500 ORAL) Take 1 capsule by mouth.      cetirizine (ZYRTEC) 10 MG tablet Take 1 tablet (10 mg total)  by mouth once daily. 30 tablet 0    chlorthalidone (HYGROTEN) 25 MG Tab TAKE 1 TABLET DAILY 90 tablet 3    cyanocobalamin (VITAMIN B-12) 100 MCG tablet Take 100 mcg by mouth once daily.      desonide (DESOWEN) 0.05 % cream Apply topically 2 (two) times daily. (Patient taking differently: Apply topically as needed.) 60 g 1    ferrous sulfate 325 mg (65 mg iron) Tab tablet Take 325 mg by mouth daily with breakfast.      glucosamine sulfate (GLUCOSAMINE) 500 mg Tab Take 1,500 mg by mouth Daily.      inulin (PREBIOTIC FIBER ORAL) Take 500 mg by mouth once daily. Takes 2 cap in am.      L.acid/L.casei/B.bif/B.bernabe/FOS (PROBIOTIC BLEND ORAL) Take 400 mg by mouth 2 (two) times a day.      MAGNESIUM GLYCINATE, BULK, MISC 120 mg by Misc.(Non-Drug; Combo Route) route once daily.      meclizine (ANTIVERT) 25 mg tablet Take 1 tablet (25 mg total) by mouth 3 (three) times daily as needed for Dizziness. 30 tablet 0    meloxicam (MOBIC) 15 MG tablet Take 1 tablet (15 mg total) by mouth once daily. 30 tablet 1    MILK THISTLE ORAL Take 1,000 mg by mouth once daily.      OMEGA-3/DHA/EPA/FISH OIL (OMEGA-3 FISH OIL ORAL) Take 1 capsule by mouth.      pravastatin (PRAVACHOL) 20 MG tablet Take 1 tablet (20 mg total) by mouth once daily. 90 tablet 3    tacrolimus (PROTOPIC) 0.03 % ointment Apply topically 2 (two) times daily. 30 g 6    UNABLE TO FIND Take 1,950 mg by mouth Daily. medication name: Tumeric      valsartan (DIOVAN) 160 MG tablet TAKE 1 TABLET DAILY 90 tablet 3    aspirin (ECOTRIN) 81 MG EC tablet Take 1 tablet (81 mg total) by mouth 2 (two) times a day. 60 tablet 0    hydrOXYzine HCL (ATARAX) 25 MG tablet Take 1 tablet (25 mg total) by mouth 3 (three) times daily as needed for Itching. (Patient not taking: Reported on 9/6/2024) 30 tablet 0    ondansetron (ZOFRAN-ODT) 4 MG TbDL Take 1 tablet (4 mg total) by mouth 3 (three) times daily as needed (Nausea and vomiting). (Patient not taking: Reported on 9/6/2024) 30 tablet 0     "oxyCODONE (ROXICODONE) 5 MG immediate release tablet Take 1 tablet every 4-6 hours as needed for pain (Patient not taking: Reported on 2024) 30 tablet 0    pantoprazole (PROTONIX) 40 MG tablet Take 1 tablet (40 mg total) by mouth once daily. 30 tablet 0    predniSONE (DELTASONE) 10 MG tablet Take 6 pills a day for two days, then 5 pills on day 3, then 4 pills, then 3 pills then 2 pills then one pill then off. (Patient not taking: Reported on 2024) 30 tablet 0    senna-docusate 8.6-50 mg (SENNA WITH DOCUSATE SODIUM) 8.6-50 mg per tablet Take 1 tablet by mouth once daily. (Patient not taking: Reported on 2024) 30 tablet 0     No current facility-administered medications on file prior to visit.     Social History:     Social History     Tobacco Use    Smoking status: Former     Current packs/day: 0.00     Average packs/day: 1 pack/day for 23.1 years (23.1 ttl pk-yrs)     Types: Cigarettes     Start date: 1984     Quit date: 2007     Years since quittin.1    Smokeless tobacco: Never   Substance Use Topics    Alcohol use: Yes     Alcohol/week: 20.0 standard drinks of alcohol     Types: 6 Glasses of wine, 8 Cans of beer, 6 Drinks containing 0.5 oz of alcohol per week     Comment: 3-4 times per week     Family History:     Family History   Adopted: Yes   Problem Relation Name Age of Onset    Heart disease Mother Ct         Adopted    Cancer Father Gregory Chester     Physical Exam:   /69   Pulse 64   Ht 5' 2" (1.575 m)   Wt 60 kg (132 lb 4.4 oz)   LMP 2016 (Approximate)   SpO2 98%   BMI 24.19 kg/m²    Wt Readings from Last 4 Encounters:   24 60 kg (132 lb 4.4 oz)   24 59.9 kg (132 lb 0.9 oz)   24 59.4 kg (130 lb 15.3 oz)   24 60 kg (132 lb 4.4 oz)         Constitutional: No distress, obese, conversant  HEENT: Sclera anicteric, PERRLA  Neck: No JVD, no masses, good movement  CV: RRR, S1 and S2 normal, no additional heart sounds or murmurs. Pulses " "2+ and equal bilaterally in radial arteries and femoral, DP and PT areas bilaterally  Pulm: Clear to auscultation bilaterally with symmetrical expansion.   GI: Abdomen soft, non-tender, good bowel sounds  Extremities: Both extremities intact and grossly normal, skin is warm, no edema noted  Skin: No ecchymosis, erythema, or ulcers  Psych: AOx3, appropriate affect  Neuro: CNII-XII intact, no focal deficits      Labs:     Lab Results   Component Value Date     07/16/2024    K 3.5 09/06/2024     07/16/2024    CO2 27 07/16/2024    BUN 12 07/16/2024    CREATININE 0.6 07/16/2024    ANIONGAP 9 07/16/2024     Lab Results   Component Value Date    HGBA1C 5.4 07/16/2024     No results found for: "BNP", "BNPTRIAGEBLO" Lab Results   Component Value Date    WBC 5.89 07/16/2024    HGB 12.3 07/16/2024    HCT 38.3 07/16/2024     07/16/2024    GRAN 2.7 12/22/2023    GRAN 56.0 12/22/2023     Lab Results   Component Value Date    CHOL 237 (H) 09/06/2024     (H) 09/06/2024    LDLCALC 113.4 09/06/2024    TRIG 78 09/06/2024          Imaging:       Stress Test 7/11/2023:    Left Ventricle: The left ventricle is normal in size. Normal wall thickness. Normal wall motion. There is normal systolic function with a visually estimated ejection fraction of 60 - 65%. There is normal diastolic function.    Right Ventricle: Normal right ventricular cavity size. Systolic function is normal.    Pulmonary Artery: The estimated pulmonary artery systolic pressure is 30 mmHg.    IVC/SVC: Intermediate venous pressure at 8 mmHg.    Stress Protocol: The patient exercised for 8 minutes 37 seconds on a high ramp protocol, corresponding to a functional capacity of 15 METS, achieving a peak heart rate of 153 bpm, which is 97 % of the age predicted maximum heart rate. Their exercise capacity was excellent. The patient reported no symptoms during the stress test.    Post-stress Impression: The study is negative with no echocardiographic " evidence of stress induced ischemia.    Post-stress Echo: The left ventricle systolic function is hyperdynamic with an EF of 75 %.    Stress ECG: There are no ST segment deviation identified during the protocol. There are no arrhythmias during stress. There is normal blood pressure response with stress.    Assessment and Plan:       Hypertension, essential  Chronic, controlled.   Home meds for hypertension were reviewed and noted below.   Hypertension Medications               chlorthalidone (HYGROTEN) 25 MG Tab TAKE 1 TABLET DAILY    valsartan (DIOVAN) 160 MG tablet TAKE 1 TABLET DAILY             - Lifestyle changes to reduce systolic BP:  exercise 30 minutes per day,  5 days per week or 150 minutes weekly; sodium reduction and avoidance of high salt foods such as processed meats, frozen meals and  fast foods.    -Keeping a healthy weight/BMI can help with better BP control      Aortic atherosclerosis  -Noted on June 2021 LDCT - subsequent Agaston score 3 (7/27/2021).  -Starte on ASA and statin. Now on Pravastatin 40 mg   - Lipid profile with high LDL, not on goal   Plan:   - Continue ASA and Pravastatin  - Zetia added      Hyperlipidemia  - Lipid profile with high LDL, not on goal   Plan:   - Zetia 10 mg added   - Lipid profile in 12 weeks   - Continue Pravastatin 40 mg daily                 Signed:  Alexis Chaney M.D.  Cardiology Fellow  [facility]  9/6/2024 9:58 AM

## 2024-09-26 ENCOUNTER — OFFICE VISIT (OUTPATIENT)
Dept: ORTHOPEDICS | Facility: CLINIC | Age: 54
End: 2024-09-26
Payer: OTHER GOVERNMENT

## 2024-09-26 ENCOUNTER — PATIENT MESSAGE (OUTPATIENT)
Dept: ADMINISTRATIVE | Facility: OTHER | Age: 54
End: 2024-09-26
Payer: OTHER GOVERNMENT

## 2024-09-26 VITALS — WEIGHT: 136.25 LBS | HEIGHT: 62 IN | BODY MASS INDEX: 25.07 KG/M2

## 2024-09-26 DIAGNOSIS — M16.11 PRIMARY OSTEOARTHRITIS OF RIGHT HIP: Primary | ICD-10-CM

## 2024-09-26 DIAGNOSIS — Z96.641 PRESENCE OF RIGHT ARTIFICIAL HIP JOINT: ICD-10-CM

## 2024-09-26 PROCEDURE — 99999 PR PBB SHADOW E&M-EST. PATIENT-LVL II: CPT | Mod: PBBFAC,,, | Performed by: ORTHOPAEDIC SURGERY

## 2024-09-26 PROCEDURE — 99213 OFFICE O/P EST LOW 20 MIN: CPT | Mod: S$PBB,,, | Performed by: ORTHOPAEDIC SURGERY

## 2024-09-26 PROCEDURE — 99212 OFFICE O/P EST SF 10 MIN: CPT | Mod: PBBFAC | Performed by: ORTHOPAEDIC SURGERY

## 2024-09-26 NOTE — PROGRESS NOTES
Subjective:     HPI:   Kayleen Patel is a 54 y.o. female who presents for eval L hip    R ant NICK 1/17/24:   Overall doing well, IP injection helped,     L hip:   Progressive pain   Similar sx's  Restarted mobic, less help     Saw SB 7/29/24: ref to AG - L hip IA + GTB CSI 8/28/24 - 80% relief, still helping      History of Present Illness  The patient presents for evaluation of her right hip.    She reports that her right hip has been in good condition since receiving an iliopsoas injection, which has improved her mobility.    However, she is experiencing progressive pain in her left hip, which is now disrupting her sleep. She has been taking meloxicam, but it has not provided significant relief. An x-ray was performed in July 2023. She received a bursa injection on August 28, 2023, which she estimates has provided about 80% relief. Despite this, she continues to experience occasional flare-ups and is still taking meloxicam. She recalls being able to walk 8 miles over two days during a trip to [place] in June 2023.       Objective:   Body mass index is 24.92 kg/m².  Exam:    Physical Exam  Patient exhibits a nonantalgic gait with no significant limp. Trendelenburg test is negative. Right hip shows no groin pain during active straight leg raise and no pain during active or passive range of motion. Left side also shows no significant groin pain during straight leg raise. Hip flexion is 0-110, abduction is 30, adduction is 20, external rotation is 30, and internal rotation is 20. Pain is present at extremes of motion during abduction, adduction, external and internal range of motion. Distally, there is a 1 cm leg length discrepancy with the left side being shorter.    1cm LLD L short    Imaging:    Results  Imaging  X-ray of left hip shows progressive narrowing of the space. X-ray of right hip shows solid healing with no concerns.    HIP L ARTHRITIS         Indication:  Left hip pain  Exam Ordered:  Radiographs include an anteroposterior pelvis, an anteroposterior and lateral view of the proximal femur including the hip joint.  Details of Examination: Exam shows evidence of joint space narrowing, osteophyte formation, and subchondral sclerosis, all consistent with degenerative arthritis of the hip.  No other significant findings are noted.  Impression:  Degenerative Arthritis, Left Hip    L hip Tg3 arthritis, sig progression and FA joint space narrowing v 2022 XR      Assessment/Plan:       ICD-10-CM ICD-9-CM   1. Primary osteoarthritis of right hip  M16.11 715.15   2. Presence of right artificial hip joint  Z96.641 V43.64        R NICK doing well      Assessment/Plan:     Assessment & Plan  1. Right Hip Arthritis.  Her right hip is in good condition with no significant pain or concerns. She has no groin pain with active straight leg raise and no pain with active or passive range of motion. The right hip x-rays from March show that it is solid and healed well. A five-year reminder will be set in the system for her right hip. If her right hip remains in good condition by January 2024, a follow-up appointment will not be necessary.    2. Left Hip Arthritis.  Her left hip is showing signs of progressive deterioration, with pain at extremes of motion and a 1 cm leg length discrepancy on the left side. She received a hip injection on August 28, which provided approximately 80% relief. She continues to take meloxicam, although it has been less effective for the left hip compared to the right. Surgery is not immediately necessary, but if the pain localizes to the hip joint and she experiences more bad days than good, a hip replacement will be considered. She should contact the office if she experiences significant pain or functional limitations.      The above findings were discussed with patient length. We discussed the risks of conservative versus surgical management of the patients hip arthritis. Conservative  management consisting of anti-inflammatory medications, weight loss, physical therapy, and activity modification was discussed at length. At this point considering the patient's level of activity, pain, and radiographic findings I recommend continued conservative management.     R hip doing well   5 year R NICK    L hip NICK PRN  She wants to try to push off until 2025  Call to schedule, f/u for pre-op visit    No orders of the defined types were placed in this encounter.      This note was generated with the assistance of ambient listening technology. Verbal consent was obtained by the patient and accompanying visitor(s) for the recording of patient appointment to facilitate this note. I attest to having reviewed and edited the generated note for accuracy, though some syntax or spelling errors may persist. Please contact the author of this note for any clarification.            Past Medical History:   Diagnosis Date    Abnormal cervical Papanicolaou smear '88, '04    Colpo/Cryo    Benign paroxysmal vertigo, bilateral     Chronic diarrhea 02/12/2015    GERD (gastroesophageal reflux disease)     History of acute PID 1988    Hyperlipidemia     Hypertension     Normocytic anemia 12/22/2023    Thrombocytosis     Urticaria     Vertigo 12/22/2023       Past Surgical History:   Procedure Laterality Date    ADENOIDECTOMY      ARTHROPLASTY OF HIP BY ANTERIOR APPROACH Right 1/17/2024    Procedure: ARTHROPLASTY, HIP, TOTAL, ANTERIOR APPROACH: RIGHT: DEPUY - ACTIS + PINNACLE: SAME DAY;  Surgeon: Cresencio Collins III, MD;  Location: [facility];  Service: Orthopedics;  Laterality: Right;    BREAST BIOPSY Right 06/09/2021    stereo benign    CARPAL TUNNEL RELEASE Left 09/21/2021    Procedure: RELEASE, CARPAL TUNNEL;  Surgeon: Alla Goff MD;  Location: [facility] OR;  Service: Orthopedics;  Laterality: Left;    CARPAL TUNNEL RELEASE Right 02/07/2023    Procedure: RELEASE, CARPAL TUNNEL;  Surgeon: Alla Goff MD;  Location: [facility] OR;   Service: Orthopedics;  Laterality: Right;    EYE SURGERY  2010    In Record    GANGLION CYST EXCISION      INJECTION OF JOINT Right 2023    Procedure: INJECTION, JOINT, RIGHT HIP  DIRECT REF;  Surgeon: Harleen Swan MD;  Location: [facility] [place] [facility];  Service: Pain Management;  Laterality: Right;    INJECTION OF STEROID Right 2021    Procedure: INJECTION, STEROID-Right carpal tunnel;  Surgeon: Alla Goff MD;  Location: [facility] OR;  Service: Orthopedics;  Laterality: Right;    REFRACTIVE SURGERY Bilateral 2009    Dr. Vazquez Forbes     TONSILLECTOMY         Family History   Adopted: Yes   Problem Relation Name Age of Onset    Heart disease Mother Ct         Adopted    Cancer Father Gregory         Adopted       Social History     Socioeconomic History    Marital status: Single   Tobacco Use    Smoking status: Former     Current packs/day: 0.00     Average packs/day: 1 pack/day for 23.1 years (23.1 ttl pk-yrs)     Types: Cigarettes     Start date: 1984     Quit date: 2007     Years since quittin.2    Smokeless tobacco: Never   Substance and Sexual Activity    Alcohol use: Yes     Alcohol/week: 20.0 standard drinks of alcohol     Types: 6 Glasses of wine, 8 Cans of beer, 6 Drinks containing 0.5 oz of alcohol per week     Comment: 3-4 times per week    Drug use: No    Sexual activity: Not Currently     Partners: Male     Birth control/protection: None     Comment: In Menopause   Social History Narrative    Retired [employer]. Currently, working for the [employer]. Criminal justice degree from [employer].          Social Determinants of Health     Financial Resource Strain: Low Risk  (2023)    Overall Financial Resource Strain (CARDIA)     Difficulty of Paying Living Expenses: Not hard at all   Food Insecurity: No Food Insecurity (2023)    Hunger Vital Sign     Worried About Running Out of Food in the Last Year: Never true     Ran Out of Food in the Last Year: Never true   Transportation  Needs: No Transportation Needs (12/18/2023)    PRAPARE - Transportation     Lack of Transportation (Medical): No     Lack of Transportation (Non-Medical): No   Physical Activity: Sufficiently Active (12/18/2023)    Exercise Vital Sign     Days of Exercise per Week: 3 days     Minutes of Exercise per Session: 60 min   Stress: No Stress Concern Present (12/18/2023)    [place] [place] [employer] - Occupational Stress Questionnaire     Feeling of Stress : Not at all   Housing Stability: Low Risk  (12/18/2023)    Housing Stability Vital Sign     Unable to Pay for Housing in the Last Year: No     Number of Places Lived in the Last Year: 1     Unstable Housing in the Last Year: No

## 2024-10-28 ENCOUNTER — PATIENT MESSAGE (OUTPATIENT)
Dept: ORTHOPEDICS | Facility: CLINIC | Age: 54
End: 2024-10-28
Payer: OTHER GOVERNMENT

## 2024-10-29 ENCOUNTER — PATIENT MESSAGE (OUTPATIENT)
Dept: OTHER | Facility: OTHER | Age: 54
End: 2024-10-29
Payer: OTHER GOVERNMENT

## 2024-10-30 ENCOUNTER — PATIENT MESSAGE (OUTPATIENT)
Dept: ORTHOPEDICS | Facility: CLINIC | Age: 54
End: 2024-10-30
Payer: OTHER GOVERNMENT

## 2024-10-30 DIAGNOSIS — M25.551 RIGHT HIP PAIN: Primary | ICD-10-CM

## 2024-10-30 RX ORDER — IBUPROFEN 800 MG/1
800 TABLET ORAL 3 TIMES DAILY
Qty: 90 TABLET | Refills: 2 | Status: SHIPPED | OUTPATIENT
Start: 2024-10-30

## 2024-11-01 ENCOUNTER — PATIENT MESSAGE (OUTPATIENT)
Dept: INTERNAL MEDICINE | Facility: CLINIC | Age: 54
End: 2024-11-01
Payer: OTHER GOVERNMENT

## 2024-11-01 DIAGNOSIS — L98.9 SKIN LESION: Primary | ICD-10-CM

## 2024-11-06 ENCOUNTER — HOSPITAL ENCOUNTER (OUTPATIENT)
Dept: RADIOLOGY | Facility: HOSPITAL | Age: 54
Discharge: HOME | End: 2024-11-06
Attending: NURSE PRACTITIONER
Payer: OTHER GOVERNMENT

## 2024-11-06 DIAGNOSIS — M25.552 LEFT HIP PAIN: Primary | ICD-10-CM

## 2024-11-06 DIAGNOSIS — M25.552 LEFT HIP PAIN: ICD-10-CM

## 2024-11-06 PROCEDURE — 73502 X-RAY EXAM HIP UNI 2-3 VIEWS: CPT | Mod: 26,LT,, | Performed by: RADIOLOGY

## 2024-11-06 PROCEDURE — 73502 X-RAY EXAM HIP UNI 2-3 VIEWS: CPT | Mod: TC,LT

## 2024-11-08 ENCOUNTER — PATIENT MESSAGE (OUTPATIENT)
Dept: INTERNAL MEDICINE | Facility: CLINIC | Age: 54
End: 2024-11-08
Payer: OTHER GOVERNMENT

## 2024-11-08 DIAGNOSIS — M16.10 ARTHRITIS, HIP: Primary | ICD-10-CM

## 2024-11-18 ENCOUNTER — PATIENT MESSAGE (OUTPATIENT)
Dept: ADMINISTRATIVE | Facility: HOSPITAL | Age: 54
End: 2024-11-18
Payer: OTHER GOVERNMENT

## 2024-11-21 ENCOUNTER — PATIENT OUTREACH (OUTPATIENT)
Dept: ADMINISTRATIVE | Facility: HOSPITAL | Age: 54
End: 2024-11-21
Payer: OTHER GOVERNMENT

## 2024-11-21 DIAGNOSIS — Z12.11 ENCOUNTER FOR SCREENING FOR MALIGNANT NEOPLASM OF COLON: Primary | ICD-10-CM

## 2024-11-21 NOTE — PROGRESS NOTES
Health Maintenance Due   Topic Date Due    High Dose Statin  Never done    Pneumococcal Vaccines (Age 0-64) (2 of 2 - PPSV23 or PCV20) 08/14/2020    Colorectal Cancer Screening  02/12/2025       Chart reviewed and updated. Reconciled immunizations. Placed referral for colonoscopy from [facility].    April Hong LPN   Clinical Care Coordinator  Primary Care and Wellness

## 2024-11-23 ENCOUNTER — CLINICAL SUPPORT (OUTPATIENT)
Dept: ENDOSCOPY | Facility: HOSPITAL | Age: 54
End: 2024-11-23
Attending: FAMILY MEDICINE
Payer: OTHER GOVERNMENT

## 2024-11-23 DIAGNOSIS — Z12.11 ENCOUNTER FOR SCREENING FOR MALIGNANT NEOPLASM OF COLON: ICD-10-CM

## 2024-11-23 NOTE — PLAN OF CARE
Referral for procedure from PAT appointment      Spoke to pt to schedule procedure(s) Colonoscopy       Physician to perform procedure(s) Dr. GIOVANNI Ross  Date of Procedure (s) 1/16/25  Arrival Time 11:30 AM  Time of Procedure(s) 12:30 PM   Location of Procedure(s) [facility]  Type of Rx Prep sent to patient: PEG  Instructions provided to patient via [facility]    Patient was informed on the following information and verbalized understanding. Screening questionnaire reviewed with patient and complete. If procedure requires anesthesia, a responsible adult needs to be present to accompany the patient home, patient cannot drive after receiving anesthesia. Appointment details are tentative, especially check-in time. Patient will receive a prep-op call 7 days prior to confirm check-in time for procedure. If applicable the patient should contact their pharmacy to verify Rx for procedure prep is ready for pick-up. Patient was advised to call the scheduling department at [phone] if pharmacy states no Rx is available. Patient was advised to call the endoscopy scheduling department if any questions or concerns arise.      [facility] Scheduling Department

## 2024-11-25 ENCOUNTER — TELEPHONE (OUTPATIENT)
Dept: GASTROENTEROLOGY | Facility: CLINIC | Age: 54
End: 2024-11-25
Payer: OTHER GOVERNMENT

## 2024-11-25 NOTE — TELEPHONE ENCOUNTER
----- Message from [employer] sent at 11/25/2024 11:31 AM CST -----  Type: General Call Back     Name of Caller:Pt  Symptoms:colonoscopy  Would the patient rather a call back or a response via [employer]? Call back  Best Call Back Number:[phone]   Additional Information: Pt needs to speak with nurse Gaytan about the appt she scheduled or anyone that can change the date. The patient sts she advised she would be out of town on the 16th and would need to 13th if poss.

## 2024-11-26 ENCOUNTER — TELEPHONE (OUTPATIENT)
Dept: ENDOSCOPY | Facility: HOSPITAL | Age: 54
End: 2024-11-26
Payer: OTHER GOVERNMENT

## 2024-12-01 ENCOUNTER — PATIENT MESSAGE (OUTPATIENT)
Dept: INTERNAL MEDICINE | Facility: CLINIC | Age: 54
End: 2024-12-01
Payer: OTHER GOVERNMENT

## 2024-12-01 DIAGNOSIS — R91.8 LUNG NODULES: ICD-10-CM

## 2024-12-01 DIAGNOSIS — Z87.891 PERSONAL HISTORY OF NICOTINE DEPENDENCE: Primary | ICD-10-CM

## 2024-12-01 DIAGNOSIS — R91.1 SOLITARY PULMONARY NODULE: ICD-10-CM

## 2024-12-03 ENCOUNTER — PATIENT MESSAGE (OUTPATIENT)
Dept: ORTHOPEDICS | Facility: CLINIC | Age: 54
End: 2024-12-03
Payer: OTHER GOVERNMENT

## 2024-12-03 NOTE — ASSESSMENT & PLAN NOTE
-seen by Pulmonary Medicine 03/26/2024, reviewed note, recommended follow-up CT scan chest December 20, 2024

## 2024-12-05 ENCOUNTER — OFFICE VISIT (OUTPATIENT)
Dept: DERMATOLOGY | Facility: CLINIC | Age: 54
End: 2024-12-05
Payer: OTHER GOVERNMENT

## 2024-12-05 VITALS — WEIGHT: 136 LBS | BODY MASS INDEX: 24.87 KG/M2

## 2024-12-05 DIAGNOSIS — L30.9 DERMATITIS: Primary | ICD-10-CM

## 2024-12-05 DIAGNOSIS — L82.0 LICHENOID KERATOSIS: ICD-10-CM

## 2024-12-05 DIAGNOSIS — L98.9 SKIN LESION: ICD-10-CM

## 2024-12-05 DIAGNOSIS — D48.5 NEOPLASM OF UNCERTAIN BEHAVIOR OF SKIN: ICD-10-CM

## 2024-12-05 PROCEDURE — 99999 PR PBB SHADOW E&M-EST. PATIENT-LVL III: CPT | Mod: PBBFAC,,, | Performed by: DERMATOLOGY

## 2024-12-05 PROCEDURE — 99213 OFFICE O/P EST LOW 20 MIN: CPT | Mod: PBBFAC,PO | Performed by: DERMATOLOGY

## 2024-12-05 PROCEDURE — 11102 TANGNTL BX SKIN SINGLE LES: CPT | Mod: PBBFAC,PO | Performed by: DERMATOLOGY

## 2024-12-05 RX ORDER — MOMETASONE FUROATE 1 MG/G
CREAM TOPICAL
Qty: 50 G | Refills: 3 | Status: SHIPPED | OUTPATIENT
Start: 2024-12-05

## 2024-12-05 NOTE — PROGRESS NOTES
Subjective:      Patient ID:  Kayleen Patel is a 54 y.o. female who presents for   Chief Complaint   Patient presents with    Skin Check     She has a lesion on her right thigh for months, was an irritated hair follicle or bite which was red has faded and left a spot.   Her atopic dermatitis has improved on her eyelids, (see previous notes patch tests were negative)  now with rash on buttocks for 2 days used neosporin no better and a spot on her cheek which has grown some.       Review of Systems   Constitutional:  Negative for fever, chills, weight loss, weight gain, fatigue and malaise.   Skin:  Negative for daily sunscreen use and wears hat.   Hematologic/Lymphatic: Does not bruise/bleed easily.       Objective:   Physical Exam   Constitutional: She appears well-developed and well-nourished.   Neurological: She is alert and oriented to person, place, and time.   Psychiatric: She has a normal mood and affect.   Skin:   Areas Examined (abnormalities noted in diagram):   Head / Face Inspection Performed  Neck Inspection Performed  Back Inspection Performed  RUE Inspected  LUE Inspection Performed  RLE Inspected  LLE Inspection Performed                 Diagram Legend     Erythematous scaling macule/papule c/w actinic keratosis       Vascular papule c/w angioma      Pigmented verrucoid papule/plaque c/w seborrheic keratosis      Yellow umbilicated papule c/w sebaceous hyperplasia      Irregularly shaped tan macule c/w lentigo     1-2 mm smooth white papules consistent with Milia      Movable subcutaneous cyst with punctum c/w epidermal inclusion cyst      Subcutaneous movable cyst c/w pilar cyst      Firm pink to brown papule c/w dermatofibroma      Pedunculated fleshy papule(s) c/w skin tag(s)      Evenly pigmented macule c/w junctional nevus     Mildly variegated pigmented, slightly irregular-bordered macule c/w mildly atypical nevus      Flesh colored to evenly pigmented papule c/w intradermal  nevus       Pink pearly papule/plaque c/w basal cell carcinoma      Erythematous hyperkeratotic cursted plaque c/w SCC      Surgical scar with no sign of skin cancer recurrence      Open and closed comedones      Inflammatory papules and pustules      Verrucoid papule consistent consistent with wart     Erythematous eczematous patches and plaques     Dystrophic onycholytic nail with subungual debris c/w onychomycosis     Umbilicated papule    Erythematous-base heme-crusted tan verrucoid plaque consistent with inflamed seborrheic keratosis     Erythematous Silvery Scaling Plaque c/w Psoriasis     See annotation      Assessment / Plan:      Pathology Orders:       Normal Orders This Visit    Specimen to Pathology, Dermatology     Questions:    Procedure Type: Dermatology and skin neoplasms    Number of Specimens: 1    ------------------------: -------------------------    Spec 1 Procedure: Biopsy    Spec 1 Clinical Impression: irritated hair follicle vs scar    Spec 1 Source: right thigh    Release to patient:           Dermatitis buttocks, irritant contact   -     mometasone 0.1% (ELOCON) 0.1 % cream; Use daily  Dispense: 50 g; Refill: 3    Skin lesion  -     Ambulatory referral/consult to Dermatology    Neoplasm of uncertain behavior of skin  -     Specimen to Pathology, Dermatology  Shave removal procedure note:    Shave removal performed after verbal consent including risk of infection, scar, recurrence, need for additional treatment of site. Area prepped with alcohol, anesthetized 1% lidocaine with epinephrine. Lesional tissue shaved. Lesion defect size 3mm No complications. Dressing applied. Wound care explained.              Lichenoid keratosis  She has some desonide cream at home, will try this for 2-3 weeks to see if redness improves   Call if not improved

## 2024-12-10 ENCOUNTER — LAB VISIT (OUTPATIENT)
Dept: LAB | Facility: HOSPITAL | Age: 54
End: 2024-12-10
Payer: OTHER GOVERNMENT

## 2024-12-10 ENCOUNTER — PATIENT MESSAGE (OUTPATIENT)
Dept: DERMATOLOGY | Facility: CLINIC | Age: 54
End: 2024-12-10
Payer: OTHER GOVERNMENT

## 2024-12-10 DIAGNOSIS — E78.5 HYPERLIPIDEMIA, UNSPECIFIED HYPERLIPIDEMIA TYPE: ICD-10-CM

## 2024-12-10 LAB
CHOLEST SERPL-MCNC: 186 MG/DL (ref 120–199)
CHOLEST/HDLC SERPL: 1.8 {RATIO} (ref 2–5)
HDLC SERPL-MCNC: 101 MG/DL (ref 40–75)
HDLC SERPL: 54.3 % (ref 20–50)
LDLC SERPL CALC-MCNC: 71.4 MG/DL (ref 63–159)
NONHDLC SERPL-MCNC: 85 MG/DL
TRIGL SERPL-MCNC: 68 MG/DL (ref 30–150)

## 2024-12-10 PROCEDURE — 80061 LIPID PANEL: CPT

## 2024-12-10 PROCEDURE — 36415 COLL VENOUS BLD VENIPUNCTURE: CPT

## 2024-12-31 ENCOUNTER — HOSPITAL ENCOUNTER (OUTPATIENT)
Dept: RADIOLOGY | Facility: HOSPITAL | Age: 54
Discharge: HOME | End: 2024-12-31
Attending: FAMILY MEDICINE
Payer: OTHER GOVERNMENT

## 2024-12-31 DIAGNOSIS — R91.1 SOLITARY PULMONARY NODULE: ICD-10-CM

## 2024-12-31 DIAGNOSIS — Z87.891 PERSONAL HISTORY OF NICOTINE DEPENDENCE: ICD-10-CM

## 2024-12-31 DIAGNOSIS — R91.8 LUNG NODULES: ICD-10-CM

## 2024-12-31 PROCEDURE — 71250 CT THORAX DX C-: CPT | Mod: TC

## 2024-12-31 PROCEDURE — 71250 CT THORAX DX C-: CPT | Mod: 26,,, | Performed by: RADIOLOGY

## 2025-01-01 ENCOUNTER — TELEPHONE (OUTPATIENT)
Dept: INTERNAL MEDICINE | Facility: CLINIC | Age: 55
End: 2025-01-01

## 2025-01-01 DIAGNOSIS — Z87.891 PERSONAL HISTORY OF NICOTINE DEPENDENCE: Primary | ICD-10-CM

## 2025-01-01 DIAGNOSIS — R91.8 LUNG NODULES: ICD-10-CM

## 2025-01-01 DIAGNOSIS — R91.1 SOLITARY PULMONARY NODULE: ICD-10-CM

## 2025-01-07 ENCOUNTER — PATIENT MESSAGE (OUTPATIENT)
Dept: ADMINISTRATIVE | Facility: OTHER | Age: 55
End: 2025-01-07
Payer: OTHER GOVERNMENT

## 2025-01-07 ENCOUNTER — OFFICE VISIT (OUTPATIENT)
Dept: ORTHOPEDICS | Facility: CLINIC | Age: 55
End: 2025-01-07
Payer: OTHER GOVERNMENT

## 2025-01-07 VITALS — HEIGHT: 63 IN | BODY MASS INDEX: 24.14 KG/M2 | WEIGHT: 136.25 LBS

## 2025-01-07 DIAGNOSIS — M16.10 ARTHRITIS, HIP: ICD-10-CM

## 2025-01-07 DIAGNOSIS — M16.12 PRIMARY OSTEOARTHRITIS OF LEFT HIP: Primary | ICD-10-CM

## 2025-01-07 PROCEDURE — 99999 PR PBB SHADOW E&M-EST. PATIENT-LVL III: CPT | Mod: PBBFAC,,, | Performed by: ORTHOPAEDIC SURGERY

## 2025-01-07 PROCEDURE — 99213 OFFICE O/P EST LOW 20 MIN: CPT | Mod: PBBFAC | Performed by: ORTHOPAEDIC SURGERY

## 2025-01-07 PROCEDURE — 99213 OFFICE O/P EST LOW 20 MIN: CPT | Mod: S$PBB,,, | Performed by: ORTHOPAEDIC SURGERY

## 2025-01-07 NOTE — PROGRESS NOTES
"  Subjective:     HPI:   Kayleen Patel is a 54 y.o. female who presents for repeat eval L hip     R ant NICK 1/17/24:   Overall doing well, IP injection helped, resolved    L hip:   Progressive pain   Similar sx's  Restarted mobic, less help     Saw SB 7/29/24: ref to AG - L hip IA + GTB CSI 8/28/24 - 80% relief, has worn off         History of Present Illness    CHIEF COMPLAINT:  - Ms. Patel presents today for follow-up evaluation of left hip pain and to discuss potential surgery.    HPI:  Ms. Patel presents for evaluation of progressive left hip pain. She reports the worst pain is located in the anterior, lateral, and posterior aspects of the hip, describing it as a "C sign around the hip." The pain interferes with sleep and sometimes affects her ability to walk. She recalls a specific incident in late October when she experienced severe difficulty walking after extensive ambulation during a concert. Ms. Patel compares the current left hip pain to her right hip before surgery, suggesting similarity in symptoms.    Ms. Patel mentions that Meloxicam (Mobic) and Naproxen don't always provide relief, which led to a prescription for Motrin. She expresses a preference for Mobic over Motrin. She has received an intra-articular injection for the hip pain.    Ms. Patel reports difficulty with certain movements, including putting on socks and internal rotation of the hip. She also mentions discomfort during air travel, requiring her to hold her leg in a specific position throughout the flight. Ms. Patel notes that the pain is worsening, particularly in the medial aspect of the hip.    A new diagnosis of high cholesterol is mentioned. Ms. Patel is scheduled for a colonoscopy the following Monday.    Ms. Patel denies any tingling sensations in the affected area. She denies any new medical problems other than the recently diagnosed high cholesterol.    MEDICATIONS:  - Mobic (meloxicam): MsBinu" Jorge prefers this over Motrin, but sometimes feels it does not help  - Motrin (ibuprofen): Was prescribed after surgery, but patient reports having to take it more frequently than desired  - Celebrex: Mentioned as a medication to avoid, replaced with Mobic  - Naproxen: Ms. Patel reports it sometimes does not help    SURGICAL HISTORY:  - Right hip surgery: Outpatient procedure with same-day discharge    WORK STATUS:  - Employed  - Upcoming work-related travel  - Out of work for 1 week in January due to a work trip            Objective:   Body mass index is 24.29 kg/m².  Exam:    Physical Exam    Musculoskeletal: Incisions look good. Pain in right hip.  Cardiovascular: Good pulses.  IMAGING:  - X-rays left hip November         Patient exhibits a ant gait and limp L side. Trendelenburg test is negative. Right hip shows no groin pain during active straight leg raise and no pain during active or passive range of motion. Left side shows groin pain during straight leg raise. Hip flexion is 0-80, abduction is 30, adduction is 20, external rotation is 30, and internal rotation is 10. Pain is present at extremes of motion during abduction, adduction, external and internal range of motion. Distally, there is a 1 cm leg length discrepancy with the left side being shorter. Skin intact ant hip      1cm LLD L short    Imaging:    Results              XR 11/24  HIP L ARTHRITIS         Indication:  Left hip pain  Exam Ordered: Radiographs include an anteroposterior pelvis, an anteroposterior and lateral view of the proximal femur including the hip joint.  Details of Examination: Exam shows evidence of joint space narrowing, osteophyte formation, and subchondral sclerosis, all consistent with degenerative arthritis of the hip.  No other significant findings are noted.  Impression:  Degenerative Arthritis, Left Hip    Tg3 left hip severe bone on bone arthritis  R NICK doing well      Assessment/Plan:       ICD-10-CM ICD-9-CM    1. Arthritis, hip  M16.10 716.95        No sig PMHx     Assessment/Plan:     Assessment & Plan    MEDICATIONS PRESCRIBED:   Prescribed Mobic instead of Celebrex for post-surgery pain management. Prescribed baby aspirin twice daily for 30 days after surgery to prevent blood clots.    IMAGING ORDERS:   Ordered pre-op x-rays as part of the pre-surgical process.    PROCEDURES:   Discussed hip replacement surgery for the left hip. Explained the risks and benefits. Risks include infection, bleeding, nerve damage, heart and lung issues, blood clots, and potential bone breakage during insertion. Planned for same-day discharge, similar to the previous hip surgery. Ms. Patel understands the risks and benefits and elects to proceed with surgery.    FOLLOW UP:   Follow up with Wolfgang Snow, or Hari for paperwork. Visit the pre-op center for labs and labs.         This patient has significant symptoms in their hip that are affecting their quality of life and daily activities.  They have tried non-operative treatment including analgesics, an exercise program, and activity modification, but the symptoms have persisted. I believe they make a good candidate for hip arthroplasty.     The risks and benefits of hip arthroplasty have been discussed with the patient which include, but are not limited to infections (including severe sequelae), potential component failure, fracture, DVT, pulmonary embolus, nerve palsy, dislocation, leg length inequality, persistent pain, wound healing complications, transfusions, medical complications and death.     We discussed surgical options including implant type and why I believe the implant selected is a good match for the patient's needs. The patient expressed understanding and agrees to proceed with the planned surgery.     Pre-operative planning will include the following:  A pre-surgical evaluation will be arranged.  Pre-op orders will be placed.  We will make arrangements with the  operating room for proper time and staffing.  We will make Social Service arrangements for the patient.    Approach: Anterior    Implants:   Company: [employer]  Cup:  Emphasys      Stem: Actis    DVT prophylaxis: ASA 81mg BID x1 month    Dispo: home health PT    Admission status: Outpatient/23hr OBS    Location: [facility] ant NICK   Same day   Mobic instead of celebrex   R+B        No orders of the defined types were placed in this encounter.      This note was generated with the assistance of ambient listening technology. Verbal consent was obtained by the patient and accompanying visitor(s) for the recording of patient appointment to facilitate this note. I attest to having reviewed and edited the generated note for accuracy, though some syntax or spelling errors may persist. Please contact the author of this note for any clarification.            Past Medical History:   Diagnosis Date    Abnormal cervical Papanicolaou smear '88, '04    Colpo/Cryo    Benign paroxysmal vertigo, bilateral     Chronic diarrhea 02/12/2015    GERD (gastroesophageal reflux disease)     History of acute PID 1988    Hyperlipidemia     Hypertension     Normocytic anemia 12/22/2023    Thrombocytosis     Urticaria     Vertigo 12/22/2023       Past Surgical History:   Procedure Laterality Date    ADENOIDECTOMY      ARTHROPLASTY OF HIP BY ANTERIOR APPROACH Right 1/17/2024    Procedure: ARTHROPLASTY, HIP, TOTAL, ANTERIOR APPROACH: RIGHT: DEPUY - ACTIS + PINNACLE: SAME DAY;  Surgeon: Cresencio Collins III, MD;  Location: [facility] OR;  Service: Orthopedics;  Laterality: Right;    BREAST BIOPSY Right 06/09/2021    stereo benign    CARPAL TUNNEL RELEASE Left 09/21/2021    Procedure: RELEASE, CARPAL TUNNEL;  Surgeon: Alla Goff MD;  Location: [facility] OR;  Service: Orthopedics;  Laterality: Left;    CARPAL TUNNEL RELEASE Right 02/07/2023    Procedure: RELEASE, CARPAL TUNNEL;  Surgeon: Alla Goff MD;  Location: [facility] OR;  Service: Orthopedics;   Laterality: Right;    EYE SURGERY  2010    In Record    GANGLION CYST EXCISION      INJECTION OF JOINT Right 2023    Procedure: INJECTION, JOINT, RIGHT HIP  DIRECT REF;  Surgeon: Harleen Swan MD;  Location: [facility] PAIN MGT;  Service: Pain Management;  Laterality: Right;    INJECTION OF STEROID Right 2021    Procedure: INJECTION, STEROID-Right carpal tunnel;  Surgeon: Alla Goff MD;  Location: [facility] OR;  Service: Orthopedics;  Laterality: Right;    REFRACTIVE SURGERY Bilateral     Dr. Vazquez Forbes     TONSILLECTOMY         Family History   Adopted: Yes   Problem Relation Name Age of Onset    Heart disease Mother Ct         Adopted    Cancer Father Gregory         Adopted       Social History     Socioeconomic History    Marital status: Single   Tobacco Use    Smoking status: Former     Current packs/day: 0.00     Average packs/day: 1 pack/day for 23.1 years (23.1 ttl pk-yrs)     Types: Cigarettes     Start date: 1984     Quit date: 2007     Years since quittin.5    Smokeless tobacco: Never   Substance and Sexual Activity    Alcohol use: Yes     Alcohol/week: 20.0 standard drinks of alcohol     Types: 6 Glasses of wine, 8 Cans of beer, 6 Drinks containing 0.5 oz of alcohol per week     Comment: 3-4 times per week    Drug use: No    Sexual activity: Not Currently     Partners: Male     Birth control/protection: None     Comment: In Menopause   Social History Narrative    Retired [employer]. Currently, working for the [employer]. Criminal justice degree from [employer].          Social Drivers of Health     Financial Resource Strain: Low Risk  (2023)    Overall Financial Resource Strain (CARDIA)     Difficulty of Paying Living Expenses: Not hard at all   Food Insecurity: No Food Insecurity (2023)    Hunger Vital Sign     Worried About Running Out of Food in the Last Year: Never true     Ran Out of Food in the Last Year: Never true   Transportation Needs: No Transportation  Needs (12/18/2023)    PRAPARE - Transportation     Lack of Transportation (Medical): No     Lack of Transportation (Non-Medical): No   Physical Activity: Sufficiently Active (12/18/2023)    Exercise Vital Sign     Days of Exercise per Week: 3 days     Minutes of Exercise per Session: 60 min   Stress: No Stress Concern Present (12/18/2023)    [employer] [place] [employer] - Occupational Stress Questionnaire     Feeling of Stress : Not at all   Housing Stability: Low Risk  (12/18/2023)    Housing Stability Vital Sign     Unable to Pay for Housing in the Last Year: No     Number of Places Lived in the Last Year: 1     Unstable Housing in the Last Year: No

## 2025-01-09 ENCOUNTER — ANESTHESIA EVENT (OUTPATIENT)
Dept: ENDOSCOPY | Facility: HOSPITAL | Age: 55
End: 2025-01-09
Payer: OTHER GOVERNMENT

## 2025-01-09 NOTE — ANESTHESIA PREPROCEDURE EVALUATION
01/09/2025  Kayleen Patel is a 54 y.o., female.  [facility]  Anesthesia Pre-Operative Evaluation       Patient Name: Kayleen Patel  YOB: 1970  MRN: [id number]  CSN: [id number]      Code Status: Prior   Date of Procedure: 1/13/2025  Anesthesia: Choice Procedure: Procedure(s) (LRB):  COLONOSCOPY, SCREENING, LOW RISK PATIENT (N/A)  Pre-Operative Diagnosis: Encounter for screening for malignant neoplasm of colon [Z12.11]  Proceduralist: Surgeons and Role:     * Sandy Denton MD - Primary        SUBJECTIVE:   Kayleen Patel is a 54 y.o. female who  has a past medical history of Abnormal cervical Papanicolaou smear ('88, '04), Benign paroxysmal vertigo, bilateral, Chronic diarrhea (02/12/2015), GERD (gastroesophageal reflux disease), History of acute PID (1988), Hyperlipidemia, Hypertension, Normocytic anemia (12/22/2023), Thrombocytosis, Urticaria, and Vertigo (12/22/2023)..     she has a current medication list which includes the following long-term medication(s): aspirin, cetirizine, chlorthalidone, desonide, ezetimibe, meclizine, pantoprazole, pravastatin, and valsartan.     ALLERGIES:     Review of patient's allergies indicates:   Allergen Reactions    Adhesive tape-silicones     Penicillins Hives    Adhesive Hives    Celecoxib Itching and Rash     LDA:          Lines/Drains/Airways       None                  Anesthesia Evaluation      Airway   Mallampati: II  TM distance: Normal  Dental      Pulmonary    Cardiovascular   (+) hypertension    Rate: Normal    Neuro/Psych      GI/Hepatic/Renal    (+) GERD    Endo/Other    (+) arthritis (Arthritis of the hip)  Abdominal                   MEDICATIONS:     Antibiotics (From admission, onward)      None          VTE Risk Mitigation (From admission, onward)      None              No current  facility-administered medications for this encounter.     Current Outpatient Medications   Medication Sig Dispense Refill    acetaminophen (TYLENOL) 650 MG TbSR Take 1 tablet (650 mg total) by mouth every 8 (eight) hours. 120 tablet 0    aspirin (ECOTRIN) 81 MG EC tablet Take 1 tablet (81 mg total) by mouth 2 (two) times a day. 60 tablet 0    astaxanthin 12 mg Cap Take by mouth Daily.      CALCIUM CARBONATE (CALCIUM 500 ORAL) Take 1 capsule by mouth.      cetirizine (ZYRTEC) 10 MG tablet Take 1 tablet (10 mg total) by mouth once daily. 30 tablet 0    chlorthalidone (HYGROTEN) 25 MG Tab TAKE 1 TABLET DAILY 90 tablet 3    cyanocobalamin (VITAMIN B-12) 100 MCG tablet Take 100 mcg by mouth once daily.      desonide (DESOWEN) 0.05 % cream Apply topically 2 (two) times daily. (Patient taking differently: Apply topically as needed.) 60 g 1    ezetimibe (ZETIA) 10 mg tablet Take 1 tablet (10 mg total) by mouth once daily. 90 tablet 3    ferrous sulfate 325 mg (65 mg iron) Tab tablet Take 325 mg by mouth daily with breakfast.      glucosamine sulfate (GLUCOSAMINE) 500 mg Tab Take 1,500 mg by mouth Daily.      hydrOXYzine HCL (ATARAX) 25 MG tablet Take 1 tablet (25 mg total) by mouth 3 (three) times daily as needed for Itching. 30 tablet 0    ibuprofen (ADVIL,MOTRIN) 800 MG tablet Take 1 tablet (800 mg total) by mouth 3 (three) times daily. 90 tablet 2    inulin (PREBIOTIC FIBER ORAL) Take 500 mg by mouth once daily. Takes 2 cap in am.      L.acid/L.casei/B.bif/B.bernabe/FOS (PROBIOTIC BLEND ORAL) Take 400 mg by mouth 2 (two) times a day.      MAGNESIUM GLYCINATE, BULK, MISC 120 mg by Misc.(Non-Drug; Combo Route) route once daily.      meclizine (ANTIVERT) 25 mg tablet Take 1 tablet (25 mg total) by mouth 3 (three) times daily as needed for Dizziness. 30 tablet 0    MILK THISTLE ORAL Take 1,000 mg by mouth once daily.      OMEGA-3/DHA/EPA/FISH OIL (OMEGA-3 FISH OIL ORAL) Take 1 capsule by mouth.       ondansetron (ZOFRAN-ODT) 4 MG TbDL Take 1 tablet (4 mg total) by mouth 3 (three) times daily as needed (Nausea and vomiting). 30 tablet 0    oxyCODONE (ROXICODONE) 5 MG immediate release tablet Take 1 tablet every 4-6 hours as needed for pain 30 tablet 0    pantoprazole (PROTONIX) 40 MG tablet Take 1 tablet (40 mg total) by mouth once daily. 30 tablet 0    pravastatin (PRAVACHOL) 20 MG tablet Take 1 tablet (20 mg total) by mouth once daily. 90 tablet 3    predniSONE (DELTASONE) 10 MG tablet Take 6 pills a day for two days, then 5 pills on day 3, then 4 pills, then 3 pills then 2 pills then one pill then off. 30 tablet 0    senna-docusate 8.6-50 mg (SENNA WITH DOCUSATE SODIUM) 8.6-50 mg per tablet Take 1 tablet by mouth once daily. 30 tablet 0    tacrolimus (PROTOPIC) 0.03 % ointment Apply topically 2 (two) times daily. 30 g 6    UNABLE TO FIND Take 1,950 mg by mouth Daily. medication name: Tumeric      valsartan (DIOVAN) 160 MG tablet TAKE 1 TABLET DAILY 90 tablet 3          History:   There are no hospital problems to display for this patient.    Surgical History:    has a past surgical history that includes Tonsillectomy; Ganglion cyst excision; Refractive surgery (Bilateral, 2009); Carpal tunnel release (Left, 09/21/2021); Injection of steroid (Right, 09/21/2021); Breast biopsy (Right, 06/09/2021); Carpal tunnel release (Right, 02/07/2023); Injection of joint (Right, 03/09/2023); Eye surgery (2010); Adenoidectomy; and Arthroplasty of hip by anterior approach (Right, 1/17/2024).   Social History:    reports that she is not currently sexually active and has had partner(s) who are male. She reports using the following method of birth control/protection: None.  reports that she quit smoking about 17 years ago. Her smoking use included cigarettes. She started smoking about 40 years ago. She has a 23.1 pack-year smoking history. She has never used smokeless tobacco. She reports current alcohol use of about 20.0  "standard drinks of alcohol per week. She reports that she does not use drugs.     OBJECTIVE:     Vital Signs (Most Recent):    Vital Signs Range (Last 24H):          There is no height or weight on file to calculate BMI.   Wt Readings from Last 4 Encounters:   01/07/25 61.8 kg (136 lb 3.9 oz)   12/05/24 61.7 kg (136 lb)   09/26/24 61.8 kg (136 lb 3.9 oz)   09/06/24 60 kg (132 lb 4.4 oz)       Significant Labs:  Lab Results   Component Value Date    WBC 5.89 07/16/2024    HGB 12.3 07/16/2024    HCT 38.3 07/16/2024     07/16/2024     07/16/2024    K 3.5 09/06/2024     07/16/2024    CREATININE 0.6 07/16/2024    BUN 12 07/16/2024    CO2 27 07/16/2024    GLU 98 07/16/2024    CALCIUM 9.8 07/16/2024    ALKPHOS 48 (L) 07/16/2024    ALT 22 09/06/2024    AST 20 09/06/2024    ALBUMIN 4.1 07/16/2024    INR 0.9 12/22/2023    GLUF 103 09/22/2017    HGBA1C 5.4 07/16/2024    CPK 59 05/18/2005     Patient's last menstrual period was 02/07/2016 (approximate).  No results found for this or any previous visit (from the past 72 hours).    EKG:   Results for orders placed or performed during the hospital encounter of 12/22/23   EKG 12-lead    Collection Time: 12/22/23 10:57 AM    Narrative    Test Reason : Z01.818,    Vent. Rate : 081 BPM     Atrial Rate : 081 BPM     P-R Int : 142 ms          QRS Dur : 082 ms      QT Int : 400 ms       P-R-T Axes : 068 062 056 degrees     QTc Int : 464 ms    Normal sinus rhythm  Normal ECG  When compared with ECG of 30-AUG-2023 13:32,  No significant change was found  Confirmed by Brooke Zamudio MD ([id number]) on 12/22/2023 2:52:21 PM    Referred By: EULOGIO BUTT           Confirmed By:Brooke Zamudio MD       TTE:  No results found. However, due to the size of the patient record, not all encounters were searched. Please check Results Review for a complete set of results.  No results found for: "EF"   No results found. However, due to the size of the patient record, not all encounters " "were searched. Please check Results Review for a complete set of results.  MOLLY:  No results found. However, due to the size of the patient record, not all encounters were searched. Please check Results Review for a complete set of results.  Stress Test:  No results found for this or any previous visit.     LHC:  No results found for this or any previous visit.     PFT:  No results found for: "FEV1", "FVC", "NRG3RWF", "TLC", "DLCO"     ASSESSMENT/PLAN:        Pre-op Assessment    I have reviewed the Patient Summary Reports.     I have reviewed the Nursing Notes. I have reviewed the NPO Status.   I have reviewed the Medications.     Review of Systems  Anesthesia Hx:  No problems with previous Anesthesia             Denies Family Hx of Anesthesia complications.    Denies Personal Hx of Anesthesia complications.                    Social:  Smoker, Alcohol Use Insomnia      Hematology/Oncology:    Oncology Normal    -- Anemia:                                  EENT/Dental:  EENT/Dental Normal           Cardiovascular:     Hypertension           hyperlipidemia                               Pulmonary:  Pulmonary Normal                       Renal/:  Renal/ Normal                 Hepatic/GI:     GERD                Musculoskeletal:  Arthritis (Arthritis of the hip)               Neurological:  Neurology Normal                                      Endocrine:  Endocrine Normal          Obesity / BMI > 30  Dermatological:  Skin Normal    Psych:  Psychiatric Normal                  Physical Exam  General: Well nourished    Airway:  Mallampati: II   Mouth Opening: Normal  TM Distance: Normal    Chest/Lungs:  Normal Respiratory Rate    Heart:  Rate: Normal    Anesthesia Plan  Type of Anesthesia, risks & benefits discussed:    Anesthesia Type: Gen Natural Airway  Intra-op Monitoring Plan: Standard ASA Monitors  Post Op Pain Control Plan: multimodal analgesia  Induction:  IV  Informed Consent: Informed consent signed with the " Patient and all parties understand the risks and agree with anesthesia plan.  All questions answered.   ASA Score: 2  Day of Surgery Review of History & Physical: H&P Update referred to the surgeon/provider.    Ready For Surgery From Anesthesia Perspective.     .

## 2025-01-10 DIAGNOSIS — M79.609 PAIN IN EXTREMITY, UNSPECIFIED EXTREMITY: Primary | ICD-10-CM

## 2025-01-10 DIAGNOSIS — Z01.818 PRE-OP TESTING: ICD-10-CM

## 2025-01-10 NOTE — ANESTHESIA PAT ROS NOTE
01/10/2025  Kayleen Patel is a 54 y.o., female.      Pre-op Assessment          Review of Systems           Anesthesia Assessment: Preoperative EQUATION    Planned Procedure: Procedure(s) (LRB):  ARTHROPLASTY, HIP, TOTAL, ANTERIOR APPROACH: LEFT: DEPUY - ACTIS + EMPHASYS: SAME DAY (Left)  Requested Anesthesia Type:Spinal/Epidural  Surgeon: Cresencio Collins III, MD  Service: Orthopedics  Known or anticipated Date of Surgery:2/3/2025    Surgeon notes: reviewed    Electronic QUestionnaire Assessment completed via nurse interview with patient.        Triage considerations:     The patient has no apparent active cardiac condition (No unstable coronary Syndrome such as severe unstable angina or recent [<1 month] myocardial infarction, decompensated CHF, severe valvular   disease or significant arrhythmia)    Previous anesthesia records:GETA, CSE, and No problems  1/14/24   ARTHROPLASTY, HIP, TOTAL, ANTERIOR APPROACH: RIGHT: DEPUY - ACTIS + PINNACLE: SAME DAY (Right: Hip)   Airway:  Mallampati: III / II  Mouth Opening: Normal  TM Distance: Normal  Tongue: Normal  Neck ROM: Normal ROM    Last PCP note: 3-6 months ago , [facility]   Subspecialty notes: Cardiology: General, Dermatology, Ortho    Other important co-morbidities: HLD, HTN, and Former Smoker, h/o Heavy ETOH Use and elevated LFTs       Tests already available:  Available tests,  within [facility] .   9/6/24 AST, ALT   7/16/24 CMP, CBC, A1C  12/22/23 EKG   6/13/23 Lumbar MRI   8/30/23 Stress Echo   3/13/20 Carotid US            Instructions given. (See in Nurse's note)    Optimization:  Anesthesia Preop Clinic Assessment  Indicated POC    Medical Opinion Indicated       Sub-specialist consult indicated:   [facility] NP          Plan:    Testing:  CMP, EKG, Hematology Profile, and PT/INR   Pre-anesthesia  visit       Visit focus:  possible regional anesthesia and/or nerve block      Consultation:Pre Op Center NP for medical and anesthesia optimization       Patient  has previously scheduled Medical Appointment: 1/15    Navigation: Tests Scheduled.              Consults scheduled.             Results will be tracked by Preop Clinic.

## 2025-01-12 NOTE — H&P
Short Stay Endoscopy History and Physical    PCP - Herbert Clay MD  Referring Physician - Herbert Clay MD  [address] [place] [address]    Procedure - Colonoscopy  ASA - per anesthesia  Mallampati - per anesthesia  History of Anesthesia problems - no  Family history Anesthesia problems -  no   Plan of anesthesia - General    HPI  54 y.o. female  Reason for procedure:   Encounter for screening for malignant neoplasm of colon [Z12.11]    Cscope 2015 no polyps. Dark spots in TI biopsies showed melanosis     ROS:  Constitutional: No fevers, chills, No weight loss  CV: No chest pain  Pulm: No cough, No shortness of breath  GI: see HPI    Medical History:  has a past medical history of Abnormal cervical Papanicolaou smear ('88, '04), Benign paroxysmal vertigo, bilateral, Chronic diarrhea (02/12/2015), GERD (gastroesophageal reflux disease), History of acute PID (1988), Hyperlipidemia, Hypertension, Normocytic anemia (12/22/2023), Thrombocytosis, Urticaria, and Vertigo (12/22/2023).    Surgical History:  has a past surgical history that includes Tonsillectomy; Ganglion cyst excision; Refractive surgery (Bilateral, 2009); Carpal tunnel release (Left, 09/21/2021); Injection of steroid (Right, 09/21/2021); Breast biopsy (Right, 06/09/2021); Carpal tunnel release (Right, 02/07/2023); Injection of joint (Right, 03/09/2023); Eye surgery (2010); Adenoidectomy; and Arthroplasty of hip by anterior approach (Right, 1/17/2024).    Family History: family history includes Cancer in her father; Heart disease in her mother. She was adopted..    Social History:  reports that she quit smoking about 17 years ago. Her smoking use included cigarettes. She started smoking about 40 years ago. She has a 23.1 pack-year smoking history. She has never used smokeless tobacco. She reports current alcohol use of about 20.0 standard drinks of alcohol per week. She reports that she does not use drugs.    Review of  patient's allergies indicates:   Allergen Reactions    Adhesive tape-silicones     Penicillins Hives    Adhesive Hives    Celecoxib Itching and Rash       Medications:   Medications Prior to Admission   Medication Sig Dispense Refill Last Dose/Taking    acetaminophen (TYLENOL) 650 MG TbSR Take 1 tablet (650 mg total) by mouth every 8 (eight) hours. 120 tablet 0     aspirin (ECOTRIN) 81 MG EC tablet Take 1 tablet (81 mg total) by mouth 2 (two) times a day. 60 tablet 0     astaxanthin 12 mg Cap Take by mouth Daily.       CALCIUM CARBONATE (CALCIUM 500 ORAL) Take 1 capsule by mouth.       cetirizine (ZYRTEC) 10 MG tablet Take 1 tablet (10 mg total) by mouth once daily. 30 tablet 0     chlorthalidone (HYGROTEN) 25 MG Tab TAKE 1 TABLET DAILY 90 tablet 3     cyanocobalamin (VITAMIN B-12) 100 MCG tablet Take 100 mcg by mouth once daily.       desonide (DESOWEN) 0.05 % cream Apply topically 2 (two) times daily. (Patient taking differently: Apply topically as needed.) 60 g 1     ezetimibe (ZETIA) 10 mg tablet Take 1 tablet (10 mg total) by mouth once daily. 90 tablet 3     ferrous sulfate 325 mg (65 mg iron) Tab tablet Take 325 mg by mouth daily with breakfast.       glucosamine sulfate (GLUCOSAMINE) 500 mg Tab Take 1,500 mg by mouth Daily.       hydrOXYzine HCL (ATARAX) 25 MG tablet Take 1 tablet (25 mg total) by mouth 3 (three) times daily as needed for Itching. 30 tablet 0     ibuprofen (ADVIL,MOTRIN) 800 MG tablet Take 1 tablet (800 mg total) by mouth 3 (three) times daily. 90 tablet 2     inulin (PREBIOTIC FIBER ORAL) Take 500 mg by mouth once daily. Takes 2 cap in am.       L.acid/L.casei/B.bif/B.bernabe/FOS (PROBIOTIC BLEND ORAL) Take 400 mg by mouth 2 (two) times a day.       MAGNESIUM GLYCINATE, BULK, MISC 120 mg by Misc.(Non-Drug; Combo Route) route once daily.       meclizine (ANTIVERT) 25 mg tablet Take 1 tablet (25 mg total) by mouth 3 (three) times daily as needed for Dizziness. 30 tablet 0     MILK THISTLE ORAL  Take 1,000 mg by mouth once daily.       OMEGA-3/DHA/EPA/FISH OIL (OMEGA-3 FISH OIL ORAL) Take 1 capsule by mouth.       ondansetron (ZOFRAN-ODT) 4 MG TbDL Take 1 tablet (4 mg total) by mouth 3 (three) times daily as needed (Nausea and vomiting). 30 tablet 0     oxyCODONE (ROXICODONE) 5 MG immediate release tablet Take 1 tablet every 4-6 hours as needed for pain 30 tablet 0     pantoprazole (PROTONIX) 40 MG tablet Take 1 tablet (40 mg total) by mouth once daily. 30 tablet 0     pravastatin (PRAVACHOL) 20 MG tablet Take 1 tablet (20 mg total) by mouth once daily. 90 tablet 3     predniSONE (DELTASONE) 10 MG tablet Take 6 pills a day for two days, then 5 pills on day 3, then 4 pills, then 3 pills then 2 pills then one pill then off. 30 tablet 0     senna-docusate 8.6-50 mg (SENNA WITH DOCUSATE SODIUM) 8.6-50 mg per tablet Take 1 tablet by mouth once daily. 30 tablet 0     tacrolimus (PROTOPIC) 0.03 % ointment Apply topically 2 (two) times daily. 30 g 6     UNABLE TO FIND Take 1,950 mg by mouth Daily. medication name: Tumeric       valsartan (DIOVAN) 160 MG tablet TAKE 1 TABLET DAILY 90 tablet 3        Physical Exam:    Vital Signs: There were no vitals filed for this visit.    General Appearance: Well appearing in no acute distress  Abdomen: Soft, non tender, non distended with normal bowel sounds, no masses    Labs:  Lab Results   Component Value Date    WBC 5.89 07/16/2024    HGB 12.3 07/16/2024    HCT 38.3 07/16/2024     07/16/2024    CHOL 186 12/10/2024    TRIG 68 12/10/2024     (H) 12/10/2024    ALT 22 09/06/2024    AST 20 09/06/2024     07/16/2024    K 3.5 09/06/2024     07/16/2024    CREATININE 0.6 07/16/2024    BUN 12 07/16/2024    CO2 27 07/16/2024    TSH 1.064 07/11/2023    INR 0.9 12/22/2023    GLUF 103 09/22/2017    HGBA1C 5.4 07/16/2024       I have explained the risks and benefits of this endoscopic procedure to the patient including but not limited to bleeding, inflammation,  infection, perforation, and death.    Assessment/Plan:     CRC Screening     - Proceed with colonoscopy     Sandy Denton MD  Gastroenterology   [facility]

## 2025-01-13 ENCOUNTER — ANESTHESIA (OUTPATIENT)
Dept: ENDOSCOPY | Facility: HOSPITAL | Age: 55
End: 2025-01-13
Payer: OTHER GOVERNMENT

## 2025-01-13 ENCOUNTER — HOSPITAL ENCOUNTER (OUTPATIENT)
Facility: HOSPITAL | Age: 55
Discharge: HOME | End: 2025-01-13
Attending: STUDENT IN AN ORGANIZED HEALTH CARE EDUCATION/TRAINING PROGRAM | Admitting: STUDENT IN AN ORGANIZED HEALTH CARE EDUCATION/TRAINING PROGRAM
Payer: OTHER GOVERNMENT

## 2025-01-13 VITALS
WEIGHT: 135 LBS | DIASTOLIC BLOOD PRESSURE: 66 MMHG | TEMPERATURE: 99 F | OXYGEN SATURATION: 100 % | SYSTOLIC BLOOD PRESSURE: 115 MMHG | HEART RATE: 72 BPM | HEIGHT: 62 IN | RESPIRATION RATE: 18 BRPM | BODY MASS INDEX: 24.84 KG/M2

## 2025-01-13 DIAGNOSIS — Z12.11 SCREEN FOR COLON CANCER: Primary | ICD-10-CM

## 2025-01-13 DIAGNOSIS — Z12.11 COLON CANCER SCREENING: ICD-10-CM

## 2025-01-13 PROCEDURE — 63600175 PHARM REV CODE 636 W HCPCS: Performed by: NURSE ANESTHETIST, CERTIFIED REGISTERED

## 2025-01-13 PROCEDURE — D9220A PRA ANESTHESIA: Mod: ,,, | Performed by: NURSE ANESTHETIST, CERTIFIED REGISTERED

## 2025-01-13 PROCEDURE — G0121 COLON CA SCRN NOT HI RSK IND: HCPCS | Performed by: STUDENT IN AN ORGANIZED HEALTH CARE EDUCATION/TRAINING PROGRAM

## 2025-01-13 PROCEDURE — 37000009 HC ANESTHESIA EA ADD 15 MINS: Performed by: STUDENT IN AN ORGANIZED HEALTH CARE EDUCATION/TRAINING PROGRAM

## 2025-01-13 PROCEDURE — G0121 COLON CA SCRN NOT HI RSK IND: HCPCS | Mod: ,,, | Performed by: STUDENT IN AN ORGANIZED HEALTH CARE EDUCATION/TRAINING PROGRAM

## 2025-01-13 PROCEDURE — 99900035 HC TECH TIME PER 15 MIN (STAT)

## 2025-01-13 PROCEDURE — 94761 N-INVAS EAR/PLS OXIMETRY MLT: CPT

## 2025-01-13 PROCEDURE — 37000008 HC ANESTHESIA 1ST 15 MINUTES: Performed by: STUDENT IN AN ORGANIZED HEALTH CARE EDUCATION/TRAINING PROGRAM

## 2025-01-13 RX ORDER — PROPOFOL 10 MG/ML
VIAL (ML) INTRAVENOUS CONTINUOUS PRN
Status: DISCONTINUED | OUTPATIENT
Start: 2025-01-13 | End: 2025-01-13

## 2025-01-13 RX ORDER — SODIUM CHLORIDE 9 MG/ML
INJECTION, SOLUTION INTRAVENOUS CONTINUOUS
Status: DISCONTINUED | OUTPATIENT
Start: 2025-01-13 | End: 2025-01-13 | Stop reason: HOSPADM

## 2025-01-13 RX ORDER — LIDOCAINE HYDROCHLORIDE 20 MG/ML
INJECTION INTRAVENOUS
Status: DISCONTINUED | OUTPATIENT
Start: 2025-01-13 | End: 2025-01-13

## 2025-01-13 RX ADMIN — PROPOFOL 100 MG: 10 INJECTION, EMULSION INTRAVENOUS at 12:01

## 2025-01-13 RX ADMIN — PROPOFOL 165 MCG/KG/MIN: 10 INJECTION, EMULSION INTRAVENOUS at 12:01

## 2025-01-13 RX ADMIN — LIDOCAINE HYDROCHLORIDE 20 MG: 20 INJECTION INTRAVENOUS at 12:01

## 2025-01-13 NOTE — ANESTHESIA POSTPROCEDURE EVALUATION
Anesthesia Post Evaluation    Patient: Kayleen Patel    Procedure(s) Performed: Procedure(s) (LRB):  COLONOSCOPY, SCREENING, LOW RISK PATIENT (N/A)    Final Anesthesia Type: general      Patient location during evaluation: PACU  Patient participation: Yes- Able to Participate  Level of consciousness: awake and alert  Post-procedure vital signs: reviewed and stable  Pain management: adequate  Airway patency: patent    PONV status at discharge: No PONV  Anesthetic complications: no      Cardiovascular status: stable  Respiratory status: spontaneous ventilation  Hydration status: euvolemic  Follow-up not needed.          Vitals Value Taken Time   /72 01/13/25 1253   Temp 37 °C (98.6 °F) 01/13/25 1227   Pulse 65 01/13/25 1255   Resp 13 01/13/25 1254   SpO2 89 % 01/13/25 1255   Vitals shown include unfiled device data.      Event Time   Out of Recovery 12:50:00         Pain/Gladis Score: Gladis Score: 10 (1/13/2025 12:50 PM)

## 2025-01-13 NOTE — TRANSFER OF CARE
"Anesthesia Transfer of Care Note    Patient: Kayleen Patel    Procedure(s) Performed: Procedure(s) (LRB):  COLONOSCOPY, SCREENING, LOW RISK PATIENT (N/A)    Patient location: PACU    Anesthesia Type: general    Transport from OR: Transported from OR on 6-10 L/min O2 by face mask with adequate spontaneous ventilation    Post pain: adequate analgesia    Post assessment: no apparent anesthetic complications    Post vital signs: stable    Level of consciousness: sedated    Nausea/Vomiting: no nausea/vomiting    Complications: none    Transfer of care protocol was followed      Last vitals: Visit Vitals  BP 90/60   Pulse 62   Temp 36.4 °C (97.5 °F) (Temporal)   Resp 17   Ht 5' 2" (1.575 m)   Wt 61.2 kg (135 lb)   LMP 02/07/2016 (Approximate)   SpO2 100%   Breastfeeding No   BMI 24.69 kg/m²     "

## 2025-01-13 NOTE — PROVATION PATIENT INSTRUCTIONS
Discharge Summary/Instructions after an Endoscopic Procedure  Patient Name: Kayleen Patel  Patient MRN: [id number]  Patient YOB: 1970 Monday, January 13, 2025  Sandy Denton MD  Dear patient,  As a result of recent federal legislation (The Federal Cures Act), you may   receive lab or pathology results from your procedure in your [facility]   account before your physician is able to contact you. Your physician or   their representative will relay the results to you with their   recommendations at their soonest availability.  Thank you,  RESTRICTIONS:  During your procedure today, you received medications for sedation.  These   medications may affect your judgment, balance and coordination.  Therefore,   for 24 hours, you have the following restrictions:   - DO NOT drive a car, operate machinery, make legal/financial decisions,   sign important papers or drink alcohol.    ACTIVITY:  Today: no heavy lifting, straining or running due to procedural   sedation/anesthesia.  The following day: return to full activity including work.  DIET:  Eat and drink normally unless instructed otherwise.     TREATMENT FOR COMMON SIDE EFFECTS:  - Mild abdominal pain, nausea, belching, bloating or excessive gas:  rest,   eat lightly and use a heating pad.  - Sore Throat: treat with throat lozenges and/or gargle with warm salt   water.  - Because air was used during the procedure, expelling large amounts of air   from your rectum or belching is normal.  - If a bowel prep was taken, you may not have a bowel movement for 1-3 days.    This is normal.  SYMPTOMS TO WATCH FOR AND REPORT TO YOUR PHYSICIAN:  1. Abdominal pain or bloating, other than gas cramps.  2. Chest pain.  3. Back pain.  4. Signs of infection such as: chills or fever occurring within 24 hours   after the procedure.  5. Rectal bleeding, which would show as bright red, maroon, or black stools.   (A tablespoon of blood from the rectum is not serious, especially  if   hemorrhoids are present.)  6. Vomiting.  7. Weakness or dizziness.  GO DIRECTLY TO THE NEAREST EMERGENCY ROOM IF YOU HAVE ANY OF THE FOLLOWING:      Difficulty breathing              Chills and/or fever over 101 F   Persistent vomiting and/or vomiting blood   Severe abdominal pain   Severe chest pain   Black, tarry stools   Bleeding- more than one tablespoon   Any other symptom or condition that you feel may need urgent attention  Your doctor recommends these additional instructions:  If any biopsies were taken, your doctors clinic will contact you in 1 to 2   weeks with any results.  - Discharge patient to home (ambulatory).   - Resume regular diet.   - Continue present medications.   - Repeat colonoscopy in 10 years for screening purposes.  For questions, problems or results please call your physician - Sandy Denton MD at Work:  [phone].  [facility], EMERGENCY ROOM PHONE NUMBER: [phone]  IF A COMPLICATION OR EMERGENCY SITUATION ARISES AND YOU ARE UNABLE TO REACH   YOUR PHYSICIAN - GO DIRECTLY TO THE EMERGENCY ROOM.  Sandy Denton MD  1/13/2025 12:52:03 PM  This report has been verified and signed electronically.  Dear patient,  As a result of recent federal legislation (The Federal Cures Act), you may   receive lab or pathology results from your procedure in your [facility]   account before your physician is able to contact you. Your physician or   their representative will relay the results to you with their   recommendations at their soonest availability.  Thank you,  PROVATION

## 2025-01-13 NOTE — PLAN OF CARE
Chart reviewed. Preop nursing care completed per orders. Safe surgery checklist complete. Pt denies any open wounds cuts or sores. Pt denies any metal in body or use of weight loss injections. Pt AAOX3, VSS on room air. Pt toileted, Bed locked in lowest position, Call light within reach. Pt denies any needs at this time. Belongings placed in locker #7.

## 2025-01-15 ENCOUNTER — HOSPITAL ENCOUNTER (OUTPATIENT)
Dept: CARDIOLOGY | Facility: CLINIC | Age: 55
Discharge: HOME | End: 2025-01-15
Payer: OTHER GOVERNMENT

## 2025-01-15 ENCOUNTER — OFFICE VISIT (OUTPATIENT)
Dept: INTERNAL MEDICINE | Facility: CLINIC | Age: 55
End: 2025-01-15
Payer: OTHER GOVERNMENT

## 2025-01-15 ENCOUNTER — OFFICE VISIT (OUTPATIENT)
Dept: ORTHOPEDICS | Facility: CLINIC | Age: 55
End: 2025-01-15
Payer: OTHER GOVERNMENT

## 2025-01-15 ENCOUNTER — HOSPITAL ENCOUNTER (OUTPATIENT)
Dept: RADIOLOGY | Facility: HOSPITAL | Age: 55
Discharge: HOME | End: 2025-01-15
Payer: OTHER GOVERNMENT

## 2025-01-15 VITALS
HEART RATE: 73 BPM | DIASTOLIC BLOOD PRESSURE: 58 MMHG | HEIGHT: 62 IN | SYSTOLIC BLOOD PRESSURE: 126 MMHG | BODY MASS INDEX: 24.34 KG/M2 | WEIGHT: 132.25 LBS | OXYGEN SATURATION: 98 % | TEMPERATURE: 99 F

## 2025-01-15 VITALS — WEIGHT: 133.38 LBS | BODY MASS INDEX: 24.54 KG/M2 | HEIGHT: 62 IN

## 2025-01-15 DIAGNOSIS — M16.12 PRIMARY OSTEOARTHRITIS OF LEFT HIP: ICD-10-CM

## 2025-01-15 DIAGNOSIS — Z01.818 PRE-OP TESTING: ICD-10-CM

## 2025-01-15 DIAGNOSIS — R93.1 AGATSTON CAC SCORE, <100: ICD-10-CM

## 2025-01-15 DIAGNOSIS — I10 HYPERTENSION, ESSENTIAL: ICD-10-CM

## 2025-01-15 DIAGNOSIS — K21.9 GASTROESOPHAGEAL REFLUX DISEASE, UNSPECIFIED WHETHER ESOPHAGITIS PRESENT: ICD-10-CM

## 2025-01-15 DIAGNOSIS — I70.0 AORTIC ATHEROSCLEROSIS: ICD-10-CM

## 2025-01-15 DIAGNOSIS — Z96.642 S/P HIP REPLACEMENT, LEFT: Primary | ICD-10-CM

## 2025-01-15 DIAGNOSIS — Z01.818 PREOPERATIVE EXAMINATION: Primary | ICD-10-CM

## 2025-01-15 DIAGNOSIS — R91.8 LUNG NODULES: ICD-10-CM

## 2025-01-15 DIAGNOSIS — E78.5 HYPERLIPIDEMIA, UNSPECIFIED HYPERLIPIDEMIA TYPE: ICD-10-CM

## 2025-01-15 DIAGNOSIS — Z78.9 ALCOHOL CONSUMPTION OF MORE THAN FOUR DRINKS PER WEEK: ICD-10-CM

## 2025-01-15 LAB
OHS QRS DURATION: 82 MS
OHS QTC CALCULATION: 418 MS

## 2025-01-15 PROCEDURE — 99214 OFFICE O/P EST MOD 30 MIN: CPT | Mod: S$PBB,,,

## 2025-01-15 PROCEDURE — 99215 OFFICE O/P EST HI 40 MIN: CPT | Mod: S$PBB,,, | Performed by: NURSE PRACTITIONER

## 2025-01-15 PROCEDURE — 72170 X-RAY EXAM OF PELVIS: CPT | Mod: TC

## 2025-01-15 PROCEDURE — 99215 OFFICE O/P EST HI 40 MIN: CPT | Mod: PBBFAC,25,27 | Performed by: NURSE PRACTITIONER

## 2025-01-15 PROCEDURE — 99999 PR PBB SHADOW E&M-EST. PATIENT-LVL III: CPT | Mod: PBBFAC,,,

## 2025-01-15 PROCEDURE — 99213 OFFICE O/P EST LOW 20 MIN: CPT | Mod: PBBFAC,25

## 2025-01-15 PROCEDURE — 99999 PR PBB SHADOW E&M-EST. PATIENT-LVL V: CPT | Mod: PBBFAC,,, | Performed by: NURSE PRACTITIONER

## 2025-01-15 PROCEDURE — 93005 ELECTROCARDIOGRAM TRACING: CPT | Mod: PBBFAC | Performed by: STUDENT IN AN ORGANIZED HEALTH CARE EDUCATION/TRAINING PROGRAM

## 2025-01-15 PROCEDURE — 93010 ELECTROCARDIOGRAM REPORT: CPT | Mod: S$PBB,,, | Performed by: STUDENT IN AN ORGANIZED HEALTH CARE EDUCATION/TRAINING PROGRAM

## 2025-01-15 PROCEDURE — 72170 X-RAY EXAM OF PELVIS: CPT | Mod: 26,,, | Performed by: INTERNAL MEDICINE

## 2025-01-15 RX ORDER — MELOXICAM 15 MG/1
15 TABLET ORAL DAILY
Status: CANCELLED | OUTPATIENT
Start: 2025-01-15

## 2025-01-15 RX ORDER — PREGABALIN 75 MG/1
75 CAPSULE ORAL
Status: CANCELLED | OUTPATIENT
Start: 2025-01-15

## 2025-01-15 RX ORDER — ONDANSETRON HYDROCHLORIDE 2 MG/ML
4 INJECTION, SOLUTION INTRAVENOUS EVERY 8 HOURS PRN
Status: CANCELLED | OUTPATIENT
Start: 2025-01-15

## 2025-01-15 RX ORDER — SODIUM CHLORIDE 9 MG/ML
INJECTION, SOLUTION INTRAVENOUS
Status: CANCELLED | OUTPATIENT
Start: 2025-01-15

## 2025-01-15 RX ORDER — MUPIROCIN 20 MG/G
1 OINTMENT TOPICAL
Status: CANCELLED | OUTPATIENT
Start: 2025-01-15

## 2025-01-15 RX ORDER — METHOCARBAMOL 750 MG/1
750 TABLET, FILM COATED ORAL 3 TIMES DAILY
Status: CANCELLED | OUTPATIENT
Start: 2025-01-15

## 2025-01-15 RX ORDER — ASPIRIN 81 MG/1
81 TABLET ORAL 2 TIMES DAILY
Status: CANCELLED | OUTPATIENT
Start: 2025-01-15

## 2025-01-15 RX ORDER — FENTANYL CITRATE 50 UG/ML
25 INJECTION, SOLUTION INTRAMUSCULAR; INTRAVENOUS EVERY 5 MIN PRN
Status: CANCELLED | OUTPATIENT
Start: 2025-01-15

## 2025-01-15 RX ORDER — OXYCODONE HYDROCHLORIDE 5 MG/1
10 TABLET ORAL
Status: CANCELLED | OUTPATIENT
Start: 2025-01-15

## 2025-01-15 RX ORDER — AMOXICILLIN 250 MG
1 CAPSULE ORAL 2 TIMES DAILY
Status: CANCELLED | OUTPATIENT
Start: 2025-01-15

## 2025-01-15 RX ORDER — SODIUM CHLORIDE 9 MG/ML
INJECTION, SOLUTION INTRAVENOUS CONTINUOUS
Status: CANCELLED | OUTPATIENT
Start: 2025-01-15 | End: 2025-01-16

## 2025-01-15 RX ORDER — LIDOCAINE HYDROCHLORIDE 10 MG/ML
1 INJECTION, SOLUTION EPIDURAL; INFILTRATION; INTRACAUDAL; PERINEURAL
Status: CANCELLED | OUTPATIENT
Start: 2025-01-15

## 2025-01-15 RX ORDER — PROCHLORPERAZINE EDISYLATE 5 MG/ML
5 INJECTION INTRAMUSCULAR; INTRAVENOUS EVERY 6 HOURS PRN
Status: CANCELLED | OUTPATIENT
Start: 2025-01-15

## 2025-01-15 RX ORDER — MELOXICAM 15 MG/1
15 TABLET ORAL ONCE
Status: CANCELLED | OUTPATIENT
Start: 2025-01-15 | End: 2025-01-15

## 2025-01-15 RX ORDER — NALOXONE HCL 0.4 MG/ML
0.02 VIAL (ML) INJECTION
Status: CANCELLED | OUTPATIENT
Start: 2025-01-15

## 2025-01-15 RX ORDER — FAMOTIDINE 20 MG/1
20 TABLET, FILM COATED ORAL 2 TIMES DAILY
Status: CANCELLED | OUTPATIENT
Start: 2025-01-15

## 2025-01-15 RX ORDER — OXYCODONE HYDROCHLORIDE 5 MG/1
5 TABLET ORAL
Status: CANCELLED | OUTPATIENT
Start: 2025-01-15

## 2025-01-15 RX ORDER — PREGABALIN 75 MG/1
75 CAPSULE ORAL NIGHTLY
Status: CANCELLED | OUTPATIENT
Start: 2025-01-15

## 2025-01-15 RX ORDER — MUPIROCIN 20 MG/G
1 OINTMENT TOPICAL 2 TIMES DAILY
Status: CANCELLED | OUTPATIENT
Start: 2025-01-15 | End: 2025-01-20

## 2025-01-15 RX ORDER — ACETAMINOPHEN 500 MG
1000 TABLET ORAL
Status: CANCELLED | OUTPATIENT
Start: 2025-01-15

## 2025-01-15 RX ORDER — ACETAMINOPHEN 500 MG
1000 TABLET ORAL EVERY 6 HOURS
Status: CANCELLED | OUTPATIENT
Start: 2025-01-15

## 2025-01-15 RX ORDER — MORPHINE SULFATE 2 MG/ML
2 INJECTION, SOLUTION INTRAMUSCULAR; INTRAVENOUS
Status: CANCELLED | OUTPATIENT
Start: 2025-01-15

## 2025-01-15 RX ORDER — POLYETHYLENE GLYCOL 3350 17 G/17G
17 POWDER, FOR SOLUTION ORAL DAILY
Status: CANCELLED | OUTPATIENT
Start: 2025-01-15

## 2025-01-15 RX ORDER — FENTANYL CITRATE 50 UG/ML
100 INJECTION, SOLUTION INTRAMUSCULAR; INTRAVENOUS
Status: CANCELLED | OUTPATIENT
Start: 2025-01-15 | End: 2025-01-16

## 2025-01-15 RX ORDER — MIDAZOLAM HYDROCHLORIDE 1 MG/ML
4 INJECTION, SOLUTION INTRAMUSCULAR; INTRAVENOUS
Status: CANCELLED | OUTPATIENT
Start: 2025-01-15 | End: 2025-01-16

## 2025-01-15 NOTE — PROGRESS NOTES
Kayleen Patel is a 54 y.o. year old here today for pre surgery optimization visit  in preparation for a Left total hip arthroplasty to be performed by Dr. Collins on 2/3/2025.  she was last seen and treated in the clinic on 1/7/2025. she will be medically optimized by the pre op center. There has been no significant change in medical status since last visit. No fever, chills, malaise, or unexplained weight change.      She presents unaccompanied at today's visit.     Allergies, Medications, past medical and surgical history reviewed.    Focused examination performed.    Patient did not request to see surgeon in clinic today. All questions answered. Patient encouraged to call with questions. Contact information given.     Pre, herman, and post operative procedures and expectations discussed. Goals of successful surgery reviewed and include manageable pain levels, surgical site free of infection, medication management, and ambulation with PT/OT assistance. Healthy weight management discussed with patient and caregiver who were receptive to eduction of healthy diet and activity. No other necessary lifestyle changes identified. Educated patient about signs and symptoms of infection, medication management, anticoagulation therapy, risk of tobacco and alcohol use, and self-care to promote healing. Surgical guide given for future reference. Hibiclens given to patient with instructions. All questions were answered.     Kayleen Patel verbalized an understanding to the education and goals. Patient has displayed readiness to engage in care and is ready to proceed with surgery.  Patient reports her close friend is able and ready to provide assistance at home after discharge.    Surgical and blood consents signed.    DME will be arranged. The mobility limitation cannot be sufficiently resolved by the use of a cane. Patient's functional mobility deficit can be sufficiently resolved with the use of a (Rolling  "Walker or Walker). Patient's mobility limitation significantly impairs their ability to participate in one of more activities of daily living. The use of a (Rolling Walker or Walker) will significantly improve the patient's ability to participate in MRADLS and the patient will use it on regular basis in the home."     Kayleen Patel will contact us if there are any questions, concerns, or changes in medical status prior to surgery.       Joint class: n/a prior R NICK    Patient has discussed discharge planning with surgeon. Patient will be discharged to home following surgery.   patient will be scheduled with Home Health during hospitalization.     30 minutes of time was spent on patient education, review of records, templating, H&P, , appointment scheduling and optimizing patient for surgery.     "

## 2025-01-15 NOTE — H&P
CC:  Left hip pain    Kayleen Patel is a 54 y.o. female with Left hip pain.  Pain is worse with activity and weight bearing.  Patient has experienced interference of activities of daily living due to increased pain and decreased range of motion. Patient has failed non-operative treatment including NSAIDs, as well as greater than 3 months of activity modification. Kayleen Patel ambulates independently.     Relevant medical conditions of significance in perioperative period:  HTN - on medication managed by PCP   HLD - on medication managed by PCP   Normocytic anemia - on medication managed by PCP     Given documented medical comorbidities including those listed above and based off of CMS criteria patient would meet inpatient admission status guidelines. This case has been approved as inpatient.     Past Medical History:   Diagnosis Date    Abnormal cervical Papanicolaou smear '88, '04    Colpo/Cryo    Benign paroxysmal vertigo, bilateral     Chronic diarrhea 02/12/2015    GERD (gastroesophageal reflux disease)     History of acute PID 1988    Hyperlipidemia     Hypertension     Normocytic anemia 12/22/2023    Thrombocytosis     Urticaria     Vertigo 12/22/2023       Past Surgical History:   Procedure Laterality Date    ADENOIDECTOMY      ARTHROPLASTY OF HIP BY ANTERIOR APPROACH Right 1/17/2024    Procedure: ARTHROPLASTY, HIP, TOTAL, ANTERIOR APPROACH: RIGHT: DEPUY - ACTIS + PINNACLE: SAME DAY;  Surgeon: Cresencio Collins III, MD;  Location: [facility] OR;  Service: Orthopedics;  Laterality: Right;    BREAST BIOPSY Right 06/09/2021    stereo benign    CARPAL TUNNEL RELEASE Left 09/21/2021    Procedure: RELEASE, CARPAL TUNNEL;  Surgeon: Alla Goff MD;  Location: [facility] OR;  Service: Orthopedics;  Laterality: Left;    CARPAL TUNNEL RELEASE Right 02/07/2023    Procedure: RELEASE, CARPAL TUNNEL;  Surgeon: Alla Goff MD;  Location: [facility] OR;  Service: Orthopedics;  Laterality: Right;     COLONOSCOPY, SCREENING, LOW RISK PATIENT N/A 1/13/2025    Procedure: COLONOSCOPY, SCREENING, LOW RISK PATIENT;  Surgeon: Sandy Denton MD;  Location: [facility] ENDOSCOPY;  Service: Endoscopy;  Laterality: N/A;  referred by camila montalvo to portal.cf  1/6/25- attempted pc, [place] and [place] connection.  portal msg sent. DBM  1/7/25-Precall complete-DS    EYE SURGERY  2010    In Record    GANGLION CYST EXCISION      INJECTION OF JOINT Right 03/09/2023    Procedure: INJECTION, JOINT, RIGHT HIP  DIRECT REF;  Surgeon: Harleen Swan MD;  Location: [facility] PAIN MGT;  Service: Pain Management;  Laterality: Right;    INJECTION OF STEROID Right 09/21/2021    Procedure: INJECTION, STEROID-Right carpal tunnel;  Surgeon: Alla Goff MD;  Location: [facility] OR;  Service: Orthopedics;  Laterality: Right;    REFRACTIVE SURGERY Bilateral 2009    Dr. Vazquez Forbes     TONSILLECTOMY         Family History   Adopted: Yes   Problem Relation Name Age of Onset    Heart disease Mother Ct         Adopted    Cancer Father Gregory         Adopted       Review of patient's allergies indicates:   Allergen Reactions    Adhesive tape-silicones     Penicillins Hives    Adhesive Hives    Celecoxib Itching and Rash         Current Outpatient Medications:     aspirin (ECOTRIN) 81 MG EC tablet, Take 1 tablet (81 mg total) by mouth 2 (two) times a day. (Patient taking differently: Take 81 mg by mouth once daily.), Disp: 60 tablet, Rfl: 0    astaxanthin 12 mg Cap, Take by mouth Daily., Disp: , Rfl:     CALCIUM CARBONATE (CALCIUM 500 ORAL), Take 1 capsule by mouth., Disp: , Rfl:     cetirizine (ZYRTEC) 10 MG tablet, Take 1 tablet (10 mg total) by mouth once daily. (Patient not taking: Reported on 1/15/2025), Disp: 30 tablet, Rfl: 0    chlorthalidone (HYGROTEN) 25 MG Tab, TAKE 1 TABLET DAILY, Disp: 90 tablet, Rfl: 3    cyanocobalamin (VITAMIN B-12) 100 MCG tablet, Take 100 mcg by mouth once daily., Disp: , Rfl:     ezetimibe (ZETIA) 10 mg  tablet, Take 1 tablet (10 mg total) by mouth once daily., Disp: 90 tablet, Rfl: 3    ferrous sulfate 325 mg (65 mg iron) Tab tablet, Take 325 mg by mouth daily with breakfast., Disp: , Rfl:     glucosamine sulfate (GLUCOSAMINE) 500 mg Tab, Take 1,500 mg by mouth Daily., Disp: , Rfl:     hydrOXYzine HCL (ATARAX) 25 MG tablet, Take 1 tablet (25 mg total) by mouth 3 (three) times daily as needed for Itching., Disp: 30 tablet, Rfl: 0    ibuprofen (ADVIL,MOTRIN) 800 MG tablet, Take 1 tablet (800 mg total) by mouth 3 (three) times daily., Disp: 90 tablet, Rfl: 2    inulin (PREBIOTIC FIBER ORAL), Take 500 mg by mouth once daily. Takes 2 cap in am., Disp: , Rfl:     L.acid/L.casei/B.bif/B.bernabe/FOS (PROBIOTIC BLEND ORAL), Take 400 mg by mouth 2 (two) times a day., Disp: , Rfl:     MAGNESIUM GLYCINATE, BULK, MISC, 120 mg by Misc.(Non-Drug; Combo Route) route once daily., Disp: , Rfl:     meclizine (ANTIVERT) 25 mg tablet, Take 1 tablet (25 mg total) by mouth 3 (three) times daily as needed for Dizziness., Disp: 30 tablet, Rfl: 0    MILK THISTLE ORAL, Take 1,000 mg by mouth once daily., Disp: , Rfl:     OMEGA-3/DHA/EPA/FISH OIL (OMEGA-3 FISH OIL ORAL), Take 1 capsule by mouth., Disp: , Rfl:     ondansetron (ZOFRAN-ODT) 4 MG TbDL, Take 1 tablet (4 mg total) by mouth 3 (three) times daily as needed (Nausea and vomiting)., Disp: 30 tablet, Rfl: 0    pantoprazole (PROTONIX) 40 MG tablet, Take 1 tablet (40 mg total) by mouth once daily., Disp: 30 tablet, Rfl: 0    pravastatin (PRAVACHOL) 20 MG tablet, Take 1 tablet (20 mg total) by mouth once daily., Disp: 90 tablet, Rfl: 3    UNABLE TO FIND, Take 1,950 mg by mouth Daily. medication name: Tumeric, Disp: , Rfl:     valsartan (DIOVAN) 160 MG tablet, TAKE 1 TABLET DAILY, Disp: 90 tablet, Rfl: 3    Review of Systems:   Constitutional: no fever or chills  Eyes: no visual changes  ENT: no nasal congestion or sore throat  Respiratory: no cough or shortness of breath  Cardiovascular: no  "chest pain or palpitations  Gastrointestinal: no nausea or vomiting, tolerating diet  Genitourinary: no hematuria or dysuria  Integument/Breast: no rash or pruritis  Hematologic/Lymphatic: no easy bruising or lymphadenopathy  Musculoskeletal: positive for hip pain  Neurological: no seizures or tremors  Behavioral/Psych: no auditory or visual hallucinations  Endocrine: no heat or cold intolerance    PE:  Ht 5' 2" (1.575 m)   Wt 60.5 kg (133 lb 6.1 oz)   LMP 02/07/2016 (Approximate)   BMI 24.40 kg/m²   General: Pleasant, cooperative, NAD   Gait: antalgic  HEENT: NCAT, sclera nonicteric   Lungs: Respirations are clear, equal and unlabored.   CV: S1S2; 2+ bilateral upper and lower extremity pulses.   Skin: Intact throughout LE with no rashes, erythema, or lesions  Extremities: No LE edema, NVI lower extremities    Left Hip Exam:  80 degrees flexion  0 degrees extension   10 degrees internal rotation  30 degrees external rotation  30 degrees abduction  20 degrees adduction  There is pain with passive range of motion.     Radiographs: Radiographs reveal advanced degenerative changes including subchondral cyst formation, subchondral sclerosis, osteophyte formation, joint space narrowing.     Diagnosis: Primary osteoarthritis Left hip    Plan: Left total hip arthroplasty     Due to the serious nature of total joint infection and the high prevalence of community acquired MRSA, vancomycin will be used perioperatively.             "

## 2025-01-15 NOTE — ASSESSMENT & PLAN NOTE
FINDINGS:  Quantitative assessment of coronary artery calcium was performed with dedicated technique including full field of view and targeted reformatted images.     Agatston calcium score equals 3.     In this 51-year old female this score translates to the 50th percentile for coronary calcium load according to age and gender. Standard interpretation for a calcium score of 3 follows:     This indicates Minimal plaque burden. Low cardiovascular disease risk.     Clinical interpretation: Significant coronary artery disease very unlikely.     Gender and age issues: Applicable to men and women over 40, but note that the presence of chest pain, multiple risk factors, younger age subjects, or female gender (especially pre-menopausal) should encourage a more aggressive approach to therapy/management.     CT Score 7/27/21  Recommended clinical action: Discuss general public health guidelines for primary cardiovascular disease prevention.     Additional findings:     Stable calcified granulomas within the lateral aspect of the right lower lobe.  Multiple small ill-defined pulmonary nodules are present, some of which are ground-glass. These are all unchanged when compared to 06/07/2021. Overall, there are less pulmonary nodules when compared to that exam.  No new nodules.     The accompanying full field of view images, acquired from the pulmonary valve through the posterior wall the left ventricle, reveal no convincing evidence of pulmonary disease, pleural disease, lymph node enlargement, cardiac decompensation, pneumothorax, pneumomediastinum, pneumoperitoneum, or subcutaneous emphysema.     Impression:     Agatston calcium score equals 3, which translates to the 50th percentile for coronary calcium load based on age and sex.  Additional findings and recommendations above.     Redemonstration of multiple ill-defined pulmonary nodules, all of which are unchanged when compared to 06/07/2021.  Recommend follow-up for 6  months from the prior exam based on Lung-RADS recommendations (December 2021).     Electronically signed by resident: Benigno Funes  Date:                                            07/27/2021  Time:                                           09:22

## 2025-01-15 NOTE — H&P (VIEW-ONLY)
CC:  Left hip pain    Kayleen Patel is a 54 y.o. female with Left hip pain.  Pain is worse with activity and weight bearing.  Patient has experienced interference of activities of daily living due to increased pain and decreased range of motion. Patient has failed non-operative treatment including NSAIDs, as well as greater than 3 months of activity modification. Kayleen Patel ambulates independently.     Relevant medical conditions of significance in perioperative period:  HTN - on medication managed by PCP   HLD - on medication managed by PCP   Normocytic anemia - on medication managed by PCP     Given documented medical comorbidities including those listed above and based off of CMS criteria patient would meet inpatient admission status guidelines. This case has been approved as inpatient.     Past Medical History:   Diagnosis Date    Abnormal cervical Papanicolaou smear '88, '04    Colpo/Cryo    Benign paroxysmal vertigo, bilateral     Chronic diarrhea 02/12/2015    GERD (gastroesophageal reflux disease)     History of acute PID 1988    Hyperlipidemia     Hypertension     Normocytic anemia 12/22/2023    Thrombocytosis     Urticaria     Vertigo 12/22/2023       Past Surgical History:   Procedure Laterality Date    ADENOIDECTOMY      ARTHROPLASTY OF HIP BY ANTERIOR APPROACH Right 1/17/2024    Procedure: ARTHROPLASTY, HIP, TOTAL, ANTERIOR APPROACH: RIGHT: DEPUY - ACTIS + PINNACLE: SAME DAY;  Surgeon: Cresenico Collins III, MD;  Location: [facility] OR;  Service: Orthopedics;  Laterality: Right;    BREAST BIOPSY Right 06/09/2021    stereo benign    CARPAL TUNNEL RELEASE Left 09/21/2021    Procedure: RELEASE, CARPAL TUNNEL;  Surgeon: Alla Goff MD;  Location: [facility] OR;  Service: Orthopedics;  Laterality: Left;    CARPAL TUNNEL RELEASE Right 02/07/2023    Procedure: RELEASE, CARPAL TUNNEL;  Surgeon: Alla Goff MD;  Location: [facility] OR;  Service: Orthopedics;  Laterality: Right;     COLONOSCOPY, SCREENING, LOW RISK PATIENT N/A 1/13/2025    Procedure: COLONOSCOPY, SCREENING, LOW RISK PATIENT;  Surgeon: Sandy Denton MD;  Location: [facility] ENDOSCOPY;  Service: Endoscopy;  Laterality: N/A;  referred by camila montalvo to portal.cf  1/6/25- attempted pc, [place] and [place] bad connection.  portal msg sent. DBM  1/7/25-Precall complete-DS    EYE SURGERY  2010    In Record    GANGLION CYST EXCISION      INJECTION OF JOINT Right 03/09/2023    Procedure: INJECTION, JOINT, RIGHT HIP  DIRECT REF;  Surgeon: Harleen Swan MD;  Location: [facility] PAIN MGT;  Service: Pain Management;  Laterality: Right;    INJECTION OF STEROID Right 09/21/2021    Procedure: INJECTION, STEROID-Right carpal tunnel;  Surgeon: Alla Goff MD;  Location: [facility] OR;  Service: Orthopedics;  Laterality: Right;    REFRACTIVE SURGERY Bilateral 2009    Dr. Vazquez Forbes     TONSILLECTOMY         Family History   Adopted: Yes   Problem Relation Name Age of Onset    Heart disease Mother Ct         Adopted    Cancer Father Gregory         Adopted       Review of patient's allergies indicates:   Allergen Reactions    Adhesive tape-silicones     Penicillins Hives    Adhesive Hives    Celecoxib Itching and Rash         Current Outpatient Medications:     aspirin (ECOTRIN) 81 MG EC tablet, Take 1 tablet (81 mg total) by mouth 2 (two) times a day. (Patient taking differently: Take 81 mg by mouth once daily.), Disp: 60 tablet, Rfl: 0    astaxanthin 12 mg Cap, Take by mouth Daily., Disp: , Rfl:     CALCIUM CARBONATE (CALCIUM 500 ORAL), Take 1 capsule by mouth., Disp: , Rfl:     cetirizine (ZYRTEC) 10 MG tablet, Take 1 tablet (10 mg total) by mouth once daily. (Patient not taking: Reported on 1/15/2025), Disp: 30 tablet, Rfl: 0    chlorthalidone (HYGROTEN) 25 MG Tab, TAKE 1 TABLET DAILY, Disp: 90 tablet, Rfl: 3    cyanocobalamin (VITAMIN B-12) 100 MCG tablet, Take 100 mcg by mouth once daily., Disp: , Rfl:     ezetimibe (ZETIA) 10 mg  tablet, Take 1 tablet (10 mg total) by mouth once daily., Disp: 90 tablet, Rfl: 3    ferrous sulfate 325 mg (65 mg iron) Tab tablet, Take 325 mg by mouth daily with breakfast., Disp: , Rfl:     glucosamine sulfate (GLUCOSAMINE) 500 mg Tab, Take 1,500 mg by mouth Daily., Disp: , Rfl:     hydrOXYzine HCL (ATARAX) 25 MG tablet, Take 1 tablet (25 mg total) by mouth 3 (three) times daily as needed for Itching., Disp: 30 tablet, Rfl: 0    ibuprofen (ADVIL,MOTRIN) 800 MG tablet, Take 1 tablet (800 mg total) by mouth 3 (three) times daily., Disp: 90 tablet, Rfl: 2    inulin (PREBIOTIC FIBER ORAL), Take 500 mg by mouth once daily. Takes 2 cap in am., Disp: , Rfl:     L.acid/L.casei/B.bif/B.bernabe/FOS (PROBIOTIC BLEND ORAL), Take 400 mg by mouth 2 (two) times a day., Disp: , Rfl:     MAGNESIUM GLYCINATE, BULK, MISC, 120 mg by Misc.(Non-Drug; Combo Route) route once daily., Disp: , Rfl:     meclizine (ANTIVERT) 25 mg tablet, Take 1 tablet (25 mg total) by mouth 3 (three) times daily as needed for Dizziness., Disp: 30 tablet, Rfl: 0    MILK THISTLE ORAL, Take 1,000 mg by mouth once daily., Disp: , Rfl:     OMEGA-3/DHA/EPA/FISH OIL (OMEGA-3 FISH OIL ORAL), Take 1 capsule by mouth., Disp: , Rfl:     ondansetron (ZOFRAN-ODT) 4 MG TbDL, Take 1 tablet (4 mg total) by mouth 3 (three) times daily as needed (Nausea and vomiting)., Disp: 30 tablet, Rfl: 0    pantoprazole (PROTONIX) 40 MG tablet, Take 1 tablet (40 mg total) by mouth once daily., Disp: 30 tablet, Rfl: 0    pravastatin (PRAVACHOL) 20 MG tablet, Take 1 tablet (20 mg total) by mouth once daily., Disp: 90 tablet, Rfl: 3    UNABLE TO FIND, Take 1,950 mg by mouth Daily. medication name: Tumeric, Disp: , Rfl:     valsartan (DIOVAN) 160 MG tablet, TAKE 1 TABLET DAILY, Disp: 90 tablet, Rfl: 3    Review of Systems:   Constitutional: no fever or chills  Eyes: no visual changes  ENT: no nasal congestion or sore throat  Respiratory: no cough or shortness of breath  Cardiovascular: no  "chest pain or palpitations  Gastrointestinal: no nausea or vomiting, tolerating diet  Genitourinary: no hematuria or dysuria  Integument/Breast: no rash or pruritis  Hematologic/Lymphatic: no easy bruising or lymphadenopathy  Musculoskeletal: positive for hip pain  Neurological: no seizures or tremors  Behavioral/Psych: no auditory or visual hallucinations  Endocrine: no heat or cold intolerance    PE:  Ht 5' 2" (1.575 m)   Wt 60.5 kg (133 lb 6.1 oz)   LMP 02/07/2016 (Approximate)   BMI 24.40 kg/m²   General: Pleasant, cooperative, NAD   Gait: antalgic  HEENT: NCAT, sclera nonicteric   Lungs: Respirations are clear, equal and unlabored.   CV: S1S2; 2+ bilateral upper and lower extremity pulses.   Skin: Intact throughout LE with no rashes, erythema, or lesions  Extremities: No LE edema, NVI lower extremities    Left Hip Exam:  80 degrees flexion  0 degrees extension   10 degrees internal rotation  30 degrees external rotation  30 degrees abduction  20 degrees adduction  There is pain with passive range of motion.     Radiographs: Radiographs reveal advanced degenerative changes including subchondral cyst formation, subchondral sclerosis, osteophyte formation, joint space narrowing.     Diagnosis: Primary osteoarthritis Left hip    Plan: Left total hip arthroplasty     Due to the serious nature of total joint infection and the high prevalence of community acquired MRSA, vancomycin will be used perioperatively.             "

## 2025-01-15 NOTE — DISCHARGE INSTRUCTIONS
Your surgery has been scheduled for:__________2/3/2025________________________________    You [facility]:  [facility] located on the [place] side of the first floor of the           [facility] ([phone])  ____[facility], located on the [facility] side of the            Second floor of the [facility] [phone])  ____3rd Floor [facility] located on the [facility] side of the [facility] [phone]  __X__[facility]: located at [address] [place] [address]. [address]     Please Note   Tell your doctor if you take Aspirin, products containing Aspirin, herbal medications  or blood thinners, such as Coumadin, Ticlid, or Plavix.  (Consult your provider regarding holding or stopping before surgery).  Arrange for someone to drive you home following surgery.  You will not be allowed to leave the surgical facility alone or drive yourself home following sedation and anesthesia.    Before Surgery  Stop taking all herbal medications, vitamins, and supplements 7 days prior to surgery  No Motrin/Advil (Ibuprofen) 7 days before surgery  No Aleve (Naproxen) 7 days before surgery   No Goody's/BC Powder 7 days before surgery  Refrain from drinking alcoholic beverages for 24 hours before and after surgery  Stop or limit smoking at least 24 hours prior to surgery  You may take Tylenol for pain    Night before Surgery  Do not eat or drink after midnight  Take a shower or bath (shower is recommended).  Bathe with Hibiclens soap or an antibacterial soap from the neck down.  If not supplied by your surgeon, hibiclens soap will need to be purchased over the counter in pharmacy.  Rinse soap off thoroughly.  Shampoo your hair with your regular shampoo    The Day of Surgery  Take another bath or shower with hibiclens or any antibacterial soap, to reduce the chance of infection.  Take heart  and blood pressure medications with a small sip of water, as advised by the perioperative team.  Do not take fluid pills  You may brush your teeth and rinse your mouth, but do not swallow any additional water.   Do not apply perfumes, powder, body lotions or deodorant on the day of surgery.  Nail polish should be removed.  Do not wear makeup or moisturizer  Wear comfortable clothes, such as a button front shirt and loose fitting pants.  Leave all jewelry, including body piercings, and valuables at home.    Bring any devices you will need after surgery such as crutches or canes.  If you have sleep apnea, please bring your CPAP machine  In the event that your physical condition changes including the onset of a cold or respiratory illness, or if you have to delay or cancel your surgery, please notify your surgeon.

## 2025-01-15 NOTE — PROGRESS NOTES
[facility]  Progress Note    Patient Name: Kayleen Patel  MRN: [id number]  Date of Evaluation- 01/15/2025  PCP- Herbert Clay MD    Future cases for Kayleen Patel [[id number]]       Case ID Status Date Time Naeem Procedure Provider Location    [id number] [facility] 2/3/2025  8:00  ARTHROPLASTY, HIP, TOTAL, ANTERIOR APPROACH: LEFT: DEPUY - ACTIS + EMPHASYS: SAME DAY Cresencio Collins III, MD [[id number]] [facility] OR            HPI:  This is a 54 y.o. female  who presents today for a preoperative evaluation in preparation for left hip arthroplasty.  Surgery is indicated for osteoarthritis of left hip.   I have seen this patient before in December 2023.  The history has been obtained by speaking with the patient and reviewing the electronic medical record and/or outside health information. Significant health conditions for the perioperative period are discussed below in assessment and plan.   Patient reports current health status to be Good.  Denies any new symptoms before surgery.       Subjective/ Objective:     Chief Complaint: Preoperative evaulation, perioperative medical management, and complication reduction plan.     Functional Capacity: low impact workouts at [employer]. Parked in garage and walked to [facility] without CP/SOB.       Anesthesia issues: None    Difficulty mouth opening: No    Steroid use in the last 12 months:  No    Dental Issues: None    Family anesthesia difficulty: None       Family Hx of Thrombosis: None    Past Medical History:   Diagnosis Date    Abnormal cervical Papanicolaou smear '88, '04    Colpo/Cryo    Benign paroxysmal vertigo, bilateral     Chronic diarrhea 02/12/2015    GERD (gastroesophageal reflux disease)     History of acute PID 1988    Hyperlipidemia     Hypertension     Normocytic anemia 12/22/2023    Thrombocytosis     Urticaria     Vertigo 12/22/2023         Past Medical History Pertinent Negatives:   Diagnosis Date Noted    Anxiety 12/22/2023     Asthma 12/22/2023    Atypical hyperplasia of breast 06/14/2023    BRCA1 negative 06/14/2023    BRCA1 positive 06/14/2023    BRCA2 negative 06/14/2023    BRCA2 positive 06/14/2023    Breast cancer 06/14/2023    CHF (congestive heart failure) 12/22/2023    Colon cancer 06/14/2023    COPD (chronic obstructive pulmonary disease) 12/22/2023    Coronary artery disease 12/22/2023    Deep vein thrombosis 12/22/2023    Depression 12/22/2023    Diabetes mellitus, type 2 12/22/2023    Disorder of kidney and ureter 12/22/2023    Endometrial cancer 06/14/2023    Lobular carcinoma in situ 06/14/2023    Myocardial infarction 12/22/2023    Ovarian cancer 06/14/2023    Pulmonary embolism 12/22/2023    Seizures 12/22/2023    Stroke 12/22/2023    Thyroid disease 12/22/2023    Usual hyperplasia of lactiferous duct 06/14/2023         Past Surgical History:   Procedure Laterality Date    ADENOIDECTOMY      ARTHROPLASTY OF HIP BY ANTERIOR APPROACH Right 1/17/2024    Procedure: ARTHROPLASTY, HIP, TOTAL, ANTERIOR APPROACH: RIGHT: DEPUY - ACTIS + PINNACLE: SAME DAY;  Surgeon: Cresencio Collins III, MD;  Location: [facility] OR;  Service: Orthopedics;  Laterality: Right;    BREAST BIOPSY Right 06/09/2021    stereo benign    CARPAL TUNNEL RELEASE Left 09/21/2021    Procedure: RELEASE, CARPAL TUNNEL;  Surgeon: Alla Goff MD;  Location: [facility] OR;  Service: Orthopedics;  Laterality: Left;    CARPAL TUNNEL RELEASE Right 02/07/2023    Procedure: RELEASE, CARPAL TUNNEL;  Surgeon: Alla Goff MD;  Location: [facility] OR;  Service: Orthopedics;  Laterality: Right;    COLONOSCOPY, SCREENING, LOW RISK PATIENT N/A 1/13/2025    Procedure: COLONOSCOPY, SCREENING, LOW RISK PATIENT;  Surgeon: Sandy Denton MD;  Location: [facility] ENDOSCOPY;  Service: Endoscopy;  Laterality: N/A;  referred by camila montalvo to [email or web address]  1/6/25- attempted pc, [place] and [place] bad connection.  portal msg sent. DBM  1/7/25-Precall complete-DS    EYE SURGERY  2010     "In Record    GANGLION CYST EXCISION      INJECTION OF JOINT Right 03/09/2023    Procedure: INJECTION, JOINT, RIGHT HIP  DIRECT REF;  Surgeon: Harleen Swan MD;  [facility];  Service: Pain Management;  Laterality: Right;    INJECTION OF STEROID Right 09/21/2021    Procedure: INJECTION, STEROID-Right carpal tunnel;  Surgeon: Alla Goff MD;  Location: [facility] OR;  Service: Orthopedics;  Laterality: Right;    REFRACTIVE SURGERY Bilateral 2009    Dr. Vazquez Forbes     TONSILLECTOMY         Review of Systems   Constitutional:  Negative for chills, fatigue, fever and unexpected weight change.   HENT:  Negative for congestion, hearing loss, rhinorrhea, sore throat, tinnitus and trouble swallowing.         Hoarseness- Covid test negative- home   Eyes:  Negative for visual disturbance.   Respiratory:  Negative for cough, chest tightness, shortness of breath and wheezing.    Cardiovascular:  Negative for chest pain, palpitations and leg swelling.   Gastrointestinal:  Negative for constipation and diarrhea.        Denies Fatty liver, Hepatitis   Genitourinary:  Negative for decreased urine volume, difficulty urinating, dysuria, frequency, hematuria and urgency.        Nocturia   Musculoskeletal:  Positive for arthralgias (left hip). Negative for back pain, neck pain and neck stiffness.   Skin:  Negative for rash and wound.   Neurological:  Negative for dizziness (vertigo), syncope, weakness, numbness and headaches.   Hematological:  Bruises/bleeds easily.   Psychiatric/Behavioral:  Negative for sleep disturbance and suicidal ideas.               VITALS  Visit Vitals  BP (!) 126/58 (BP Location: Left arm, Patient Position: Sitting)   Pulse 73   Temp 98.6 °F (37 °C) (Oral)   Ht 5' 2" (1.575 m)   Wt 60 kg (132 lb 4.4 oz)   LMP 02/07/2016 (Approximate)   SpO2 98%   BMI 24.19 kg/m²          Physical Exam  Vitals reviewed.   Constitutional:       General: She is not in acute distress.     Appearance: She is " well-developed.   HENT:      Head: Normocephalic.      Nose: Nose normal.      Mouth/Throat:      Pharynx: No oropharyngeal exudate.   Eyes:      General:         Right eye: No discharge.         Left eye: No discharge.      Conjunctiva/sclera: Conjunctivae normal.      Pupils: Pupils are equal, round, and reactive to light.   Neck:      Thyroid: No thyromegaly.      Vascular: No carotid bruit or JVD.      Trachea: No tracheal deviation.   Cardiovascular:      Rate and Rhythm: Normal rate and regular rhythm.      Pulses:           Carotid pulses are 2+ on the right side and 2+ on the left side.       Dorsalis pedis pulses are 2+ on the right side and 2+ on the left side.        Posterior tibial pulses are 2+ on the right side and 2+ on the left side.      Heart sounds: Normal heart sounds. No murmur heard.  Pulmonary:      Effort: Pulmonary effort is normal. No respiratory distress.      Breath sounds: Normal breath sounds. No stridor. No wheezing, rhonchi or rales.   Abdominal:      General: Bowel sounds are normal. There is no distension.      Palpations: Abdomen is soft.      Tenderness: There is no abdominal tenderness. There is no guarding.   Musculoskeletal:      Cervical back: Normal range of motion. No pain with movement.      Right lower leg: No edema.      Left lower leg: No edema.   Lymphadenopathy:      Cervical: No cervical adenopathy.   Skin:     General: Skin is warm and dry.      Capillary Refill: Capillary refill takes less than 2 seconds.      Findings: No erythema or rash.   Neurological:      Mental Status: She is alert and oriented to person, place, and time.   Psychiatric:         Behavior: Behavior normal. Behavior is cooperative.          Significant Labs:  Lab Results   Component Value Date    WBC 6.06 01/15/2025    HGB 13.7 01/15/2025    HCT 40.1 01/15/2025     01/15/2025    CHOL 186 12/10/2024    TRIG 68 12/10/2024     (H) 12/10/2024    ALT 34 01/15/2025    AST 32 01/15/2025      01/15/2025    K 4.1 01/15/2025     01/15/2025    CREATININE 0.6 01/15/2025    BUN 14 01/15/2025    CO2 27 01/15/2025    TSH 1.064 07/11/2023    INR 0.9 01/15/2025    GLUF 103 09/22/2017    HGBA1C 5.4 07/16/2024           EKG:   Results for orders placed or performed during the hospital encounter of 01/15/25   EKG 12-lead    Collection Time: 01/15/25  9:57 AM   Result Value Ref Range    QRS Duration 82 ms    [facility] QTC Calculation 418 ms    Narrative    Test Reason : Z01.818,    Vent. Rate :  65 BPM     Atrial Rate :  65 BPM     P-R Int : 150 ms          QRS Dur :  82 ms      QT Int : 402 ms       P-R-T Axes :  65  48  41 degrees    QTcB Int : 418 ms    Normal sinus rhythm  Normal ECG  When compared with ECG of 22-Dec-2023 10:57,  No significant change was found  Confirmed by Gilberto Preston ([id number]) on 1/15/2025 1:25:48 PM    Referred By: KENDAL BARCENAS           Confirmed By: Gilberto Preston         Stress Echo 8/30/23   Left Ventricle: The left ventricle is normal in size. Normal wall thickness. Normal wall motion. There is normal systolic function with a visually estimated ejection fraction of 60 - 65%. There is normal diastolic function.    Right Ventricle: Normal right ventricular cavity size. Systolic function is normal.    Pulmonary Artery: The estimated pulmonary artery systolic pressure is 30 mmHg.    IVC/SVC: Intermediate venous pressure at 8 mmHg.    Stress Protocol: The patient exercised for 8 minutes 37 seconds on a high ramp protocol, corresponding to a functional capacity of 15 METS, achieving a peak heart rate of 153 bpm, which is 97 % of the age predicted maximum heart rate. Their exercise capacity was excellent. The patient reported no symptoms during the stress test.    Post-stress Impression: The study is negative with no echocardiographic evidence of stress induced ischemia.    Post-stress Echo: The left ventricle systolic function is hyperdynamic with an EF of 75 %.     Stress ECG: There are no ST segment deviation identified during the protocol. There are no arrhythmias during stress. There is normal blood pressure response with stress.        Imaging   MRI L-Spine 6/13/23  FINDINGS:  Lumbar spine alignment is within normal limits. The vertebral body heights are well maintained.  Diffuse heterogeneous signal of the bone marrow, likely related to red marrow reconversion.  No focal infiltrative process.     Mild desiccation at L2-L3 and L5-S1 with mild height loss at L2-L3. No endplate edema.     The cauda equina appears within normal limits without findings to suggest arachnoiditis. The conus is normal in appearance, and terminates at the L1 level.     The adjacent soft tissue structures show no significant abnormalities. Paraspinal muscle bulk is maintained.     The proximal sacrum is unremarkable.  The sacroiliac joints are symmetric     There are findings of lumbar spondylosis, as below.     T12-L1: No spinal canal stenosis or neural foraminal narrowing.     L1-L2: No spinal canal stenosis or neural foraminal narrowing.     L2-L3: Circumferential disc bulge.  No spinal canal stenosis or neural foraminal narrowing.     L3-L4: Left subarticular disc protrusion causes mass effect on the left lateral recess.  Mild-to-moderate left lateral recess stenosis with possible abutment of the left descending L4 nerve root.No neural foraminal narrowing.     L4-L5:  No spinal canal stenosis or neural foraminal narrowing.     L5-S1:  No spinal canal stenosis or neural foraminal narrowing.     Impression:     1. Left subarticular disc protrusion at L3-L4 causing mild to moderate left lateral recess stenosis with possible abutment of the left L4 descending nerve root.     Electronically signed by resident: Jt Ann  Date:                                            06/13/2023  Time:                                           16:14     Electronically signed by: Miguelito Oneal MD  Date:                                             06/13/2023  Time:                                           16:57          Active Cardiac Conditions: None      Revised Cardiac Risk Index   High -Risk Surgery  Intraperitoneal; Intrathoracic; suprainguinal vascular Yes- + 1 No- 0   History of Ischemic Heart Disease   (Hx of MI/positive exercise test/current chest pain due to ischemia/use of nitrate therapy/EKG with pathological Q waves) Yes- + 1 No- 0   History of CHF  (Pulmonary edema/bilateral rales or S3 gallop/PND/CXR showing pulmonary vascular redistribution) Yes- + 1 No- 0   History of CVA   (Prior stroke or TIA) Yes- + 1 No- 0   Pre-operative treatment with insulin Yes- + 1 No- 0   Pre-operative creatinine > 2mg/dl Yes- + 1 No- 0   Total: 0      Risk Status:  Estimated risk of cardiac complications after non-cardiac surgery using the Revised Cardiac Risk Index for Preoperative risk is 3.9 %      ARISCAT (Canet) risk index: Low: 1.6% risk of post-op pulmonary complications; Intermediate: 13.3% risk of post-op pulmonary complications if surgery is > 3 hours.     American Society of Anesthesiologists Physical Status classification (ASA): 2                            Assessment/Plan:     Primary osteoarthritis of left hip  Scheduled with Dr. Collins on 2/3/25 for left hip arthroplasty.    Alcohol consumption of more than four drinks per week  Drinks 4-5 drinks (seltzers/wine) at least  3x weekly  Will decrease amount of drinking 1 week before surgery.  On multivitamins  Audit-C score: 6-7 - alcohol misuse and possible liver damage.  At increased risk for alcohol withdrawal during the perioperative period.    Lung nodules  Stable nodules to right lung per CT chest 12/31/24  Seen by Pulmonology 3/2024: needs 5 years of stability given size and smoking history.  Denies CP/SOB/cough/congestion/wheezing    CT chest 12/31/24  FINDINGS:  Base of Neck: No significant abnormality.     Thoracic soft tissues: Normal.     Aorta:  Left-sided aortic arch.  No aneurysm and no significant atherosclerosis     Heart: Normal size. No effusion.     Pulmonary vasculature: Pulmonary arteries distribute normally.  There are four pulmonary veins.     Mahi/Mediastinum: No pathologic angie enlargement.     Airways: Patent.     Lungs/Pleura: Clear lungs. No pleural effusion or thickening.  0.7 cm ground-glass nodule in the right upper lobe (series 4, image 145), previously measuring 0.7 cm.  Ground-glass nodule in the right upper lobe measuring 0.7 cm (series 4, image 93), previously measuring 0.7 cm.  A few additional ground-glass nodules are noted similar to prior exam. Stable calcified granuloma in the right lower lobe.  Linear atelectasis of the left lower lobe.     Esophagus: Normal.     Upper Abdomen: No abnormality of the partially imaged upper abdomen.     Bones: No acute fracture. No suspicious lytic or sclerotic lesions.     Impression:     A few stable ground-glass nodules, not significantly changed compared to prior exam.  Continued follow-up with repeat CT is recommended in 12 months.        Electronically signed by: Charli Sahu MD  Date:                                            12/31/2024  Time:                                           13:43    Hypertension, essential  Current BP  controlled today.    Taking: valsartan    Lifestyle changes to reduce systolic BP:   exercise 30 minutes per day,  5 days per week or 150 minutes weekly; sodium reduction and avoidance of high salt foods such as processed meats, frozen meals and  fast foods.   Keeping a healthy weight/BMI can help with better BP control    BP acceptable for surgery. I recommend monitoring BP during perioperative period as uncontrolled pain can elevate blood pressure.         Aortic atherosclerosis  Per CT chest 6/2021  Treating with pravastatin/ASA/Zetia    Hyperlipidemia  Recommend  healthy diet (DASH/Mediterranean) and exercise.   Patient should exercise 30 minutes at  least five times weekly once recovered from surgery. Limit alcohol.  Treated with: pravastatin/ASA/Zetia    Agatston CAC score, <100  FINDINGS:  Quantitative assessment of coronary artery calcium was performed with dedicated technique including full field of view and targeted reformatted images.     Agatston calcium score equals 3.     In this 51-year old female this score translates to the 50th percentile for coronary calcium load according to age and gender. Standard interpretation for a calcium score of 3 follows:     This indicates Minimal plaque burden. Low cardiovascular disease risk.     Clinical interpretation: Significant coronary artery disease very unlikely.     Gender and age issues: Applicable to men and women over 40, but note that the presence of chest pain, multiple risk factors, younger age subjects, or female gender (especially pre-menopausal) should encourage a more aggressive approach to therapy/management.     CT Score 7/27/21  Recommended clinical action: Discuss general public health guidelines for primary cardiovascular disease prevention.     Additional findings:     Stable calcified granulomas within the lateral aspect of the right lower lobe.  Multiple small ill-defined pulmonary nodules are present, some of which are ground-glass. These are all unchanged when compared to 06/07/2021. Overall, there are less pulmonary nodules when compared to that exam.  No new nodules.     The accompanying full field of view images, acquired from the pulmonary valve through the posterior wall the left ventricle, reveal no convincing evidence of pulmonary disease, pleural disease, lymph node enlargement, cardiac decompensation, pneumothorax, pneumomediastinum, pneumoperitoneum, or subcutaneous emphysema.     Impression:     Agatston calcium score equals 3, which translates to the 50th percentile for coronary calcium load based on age and sex.  Additional findings and recommendations above.      Redemonstration of multiple ill-defined pulmonary nodules, all of which are unchanged when compared to 06/07/2021.  Recommend follow-up for 6 months from the prior exam based on Lung-RADS recommendations (December 2021).     Electronically signed by resident: Benigno Funes  Date:                                            07/27/2021  Time:                                           09:22    Gastroesophageal reflux disease  Not currently treated; no longer having symptoms.   History of gastric ulcers- attributed to smoking      Discussion/Management of Perioperative Care    Thromboembolic prophylaxis (VTE) Care: Risk factors for thrombosis include: surgical procedure.  I recommend prophylaxis of thromboembolism with the use of compression stockings/pneumatic devices, and/or pharmacologic agents. The benefits should outweigh the risks for pharmacologic prophylaxis in the perioperative period. I also encourage early ambulation if not contraindicated during the post-operative period.    Risk factors for post-operative pulmonary complications include:HTN. To reduce the risk of pulmonary complications, prophylactic recommendations include: incentive spirometry use/deep breathing, early ambulation, and pain control.    Risk factors for renal complications include: HTN. To reduce the risk of postoperative renal complications, I recommend the patient maintain adequate hydration.  Avoid/reduce NSAIDS and GREGG-2 inhibitors use as well as IV contrast for renal protection.    I recommend the use of appropriate prophylactic antibiotics to reduce the risk of surgical site infections.      The patient is at an increased risk for urinary retention due to : possible regional anesthesia. I recommend to avoid/decrease the use of benzodiazepines, anticholinergics, and Benadryl in the perioperative period. I also recommend using opioids for the shortest period of time if possible.          This visit was focused on Preoperative  evaluation, Perioperative Medical management, complication reduction plans. I suggest that the patient follows up with primary care or relevant sub specialists for ongoing health care.    I appreciate the opportunity to be involved in this patients care. Please feel free to contact me if there were any questions about this consultation.          I spent a total of 46 minutes on the day of the visit.  This includes face to face time and non-face to face time preparing to see the patient (eg, review of tests), obtaining and/or reviewing separately obtained history, documenting clinical information in the electronic or other health record, independently interpreting results and communicating results to the patient/family/caregiver, or care coordinator.         Patient is optimized for surgery.       Atif Barraza NP  Perioperative Medicine  [facility]

## 2025-01-15 NOTE — ASSESSMENT & PLAN NOTE
Drinks 4-5 drinks (seltzers/wine) at least  3x weekly  Will decrease amount of drinking 1 week before surgery.  On multivitamins  Audit-C score: 6-7 - alcohol misuse and possible liver damage.  At increased risk for alcohol withdrawal during the perioperative period.

## 2025-01-15 NOTE — ASSESSMENT & PLAN NOTE
Stable nodules to right lung per CT chest 12/31/24  Seen by Pulmonology 3/2024: needs 5 years of stability given size and smoking history.  Denies CP/SOB/cough/congestion/wheezing    CT chest 12/31/24  FINDINGS:  Base of Neck: No significant abnormality.     Thoracic soft tissues: Normal.     Aorta: Left-sided aortic arch.  No aneurysm and no significant atherosclerosis     Heart: Normal size. No effusion.     Pulmonary vasculature: Pulmonary arteries distribute normally.  There are four pulmonary veins.     Mahi/Mediastinum: No pathologic angie enlargement.     Airways: Patent.     Lungs/Pleura: Clear lungs. No pleural effusion or thickening.  0.7 cm ground-glass nodule in the right upper lobe (series 4, image 145), previously measuring 0.7 cm.  Ground-glass nodule in the right upper lobe measuring 0.7 cm (series 4, image 93), previously measuring 0.7 cm.  A few additional ground-glass nodules are noted similar to prior exam. Stable calcified granuloma in the right lower lobe.  Linear atelectasis of the left lower lobe.     Esophagus: Normal.     Upper Abdomen: No abnormality of the partially imaged upper abdomen.     Bones: No acute fracture. No suspicious lytic or sclerotic lesions.     Impression:     A few stable ground-glass nodules, not significantly changed compared to prior exam.  Continued follow-up with repeat CT is recommended in 12 months.        Electronically signed by: Charli Sahu MD  Date:                                            12/31/2024  Time:                                           13:43

## 2025-01-15 NOTE — ASSESSMENT & PLAN NOTE
Recommend  healthy diet (DASH/Mediterranean) and exercise.   Patient should exercise 30 minutes at least five times weekly once recovered from surgery. Limit alcohol.  Treated with: pravastatin/ASA/Zetia

## 2025-01-15 NOTE — HPI
This is a 54 y.o. female  who presents today for a preoperative evaluation in preparation for left hip arthroplasty.  Surgery is indicated for osteoarthritis of left hip.   I have seen this patient before in December 2023.  The history has been obtained by speaking with the patient and reviewing the electronic medical record and/or outside health information. Significant health conditions for the perioperative period are discussed below in assessment and plan.   Patient reports current health status to be Good.  Denies any new symptoms before surgery.

## 2025-01-15 NOTE — OUTPATIENT SUBJECTIVE & OBJECTIVE
Outpatient Subjective & Objective      Chief Complaint: Preoperative evaulation, perioperative medical management, and complication reduction plan.     Functional Capacity: low impact workouts at [employer]. Parked in garage and walked to POC without CP/SOB.       Anesthesia issues: None    Difficulty mouth opening: No    Steroid use in the last 12 months:  No    Dental Issues: None    Family anesthesia difficulty: None       Family Hx of Thrombosis: None    Past Medical History:   Diagnosis Date    Abnormal cervical Papanicolaou smear '88, '04    Colpo/Cryo    Benign paroxysmal vertigo, bilateral     Chronic diarrhea 02/12/2015    GERD (gastroesophageal reflux disease)     History of acute PID 1988    Hyperlipidemia     Hypertension     Normocytic anemia 12/22/2023    Thrombocytosis     Urticaria     Vertigo 12/22/2023         Past Medical History Pertinent Negatives:   Diagnosis Date Noted    Anxiety 12/22/2023    Asthma 12/22/2023    Atypical hyperplasia of breast 06/14/2023    BRCA1 negative 06/14/2023    BRCA1 positive 06/14/2023    BRCA2 negative 06/14/2023    BRCA2 positive 06/14/2023    Breast cancer 06/14/2023    CHF (congestive heart failure) 12/22/2023    Colon cancer 06/14/2023    COPD (chronic obstructive pulmonary disease) 12/22/2023    Coronary artery disease 12/22/2023    Deep vein thrombosis 12/22/2023    Depression 12/22/2023    Diabetes mellitus, type 2 12/22/2023    Disorder of kidney and ureter 12/22/2023    Endometrial cancer 06/14/2023    Lobular carcinoma in situ 06/14/2023    Myocardial infarction 12/22/2023    Ovarian cancer 06/14/2023    Pulmonary embolism 12/22/2023    Seizures 12/22/2023    Stroke 12/22/2023    Thyroid disease 12/22/2023    Usual hyperplasia of lactiferous duct 06/14/2023         Past Surgical History:   Procedure Laterality Date    ADENOIDECTOMY      ARTHROPLASTY OF HIP BY ANTERIOR APPROACH Right 1/17/2024    Procedure: ARTHROPLASTY, HIP, TOTAL, ANTERIOR APPROACH: RIGHT:  DEPUY - ACTIS + PINNACLE: SAME DAY;  Surgeon: Cresencio Collins III, MD;  Location: [facility] OR;  Service: Orthopedics;  Laterality: Right;    BREAST BIOPSY Right 06/09/2021    stereo benign    CARPAL TUNNEL RELEASE Left 09/21/2021    Procedure: RELEASE, CARPAL TUNNEL;  Surgeon: Alla Goff MD;  Location: [facility] OR;  Service: Orthopedics;  Laterality: Left;    CARPAL TUNNEL RELEASE Right 02/07/2023    Procedure: RELEASE, CARPAL TUNNEL;  Surgeon: Alla Goff MD;  Location: [facility] OR;  Service: Orthopedics;  Laterality: Right;    COLONOSCOPY, SCREENING, LOW RISK PATIENT N/A 1/13/2025    Procedure: COLONOSCOPY, SCREENING, LOW RISK PATIENT;  Surgeon: Sandy Denton MD;  Location: [facility] ENDOSCOPY;  Service: Endoscopy;  Laterality: N/A;  referred by camila montalvo to portal.cf  1/6/25- attempted pc, [place] and [place] bad connection.  portal msg sent. DBM  1/7/25-Precall complete-DS    EYE SURGERY  2010    In Record    GANGLION CYST EXCISION      INJECTION OF JOINT Right 03/09/2023    Procedure: INJECTION, JOINT, RIGHT HIP  DIRECT REF;  Surgeon: Harleen Swan MD;  Location: [facility] PAIN MGT;  Service: Pain Management;  Laterality: Right;    INJECTION OF STEROID Right 09/21/2021    Procedure: INJECTION, STEROID-Right carpal tunnel;  Surgeon: Alla Goff MD;  Location: [facility] OR;  Service: Orthopedics;  Laterality: Right;    REFRACTIVE SURGERY Bilateral 2009    Dr. Vazquez Forbes     TONSILLECTOMY         Review of Systems   Constitutional:  Negative for chills, fatigue, fever and unexpected weight change.   HENT:  Negative for congestion, hearing loss, rhinorrhea, sore throat, tinnitus and trouble swallowing.         Hoarseness- Covid test negative- home   Eyes:  Negative for visual disturbance.   Respiratory:  Negative for cough, chest tightness, shortness of breath and wheezing.    Cardiovascular:  Negative for chest pain, palpitations and leg swelling.   Gastrointestinal:  Negative for constipation and  "diarrhea.        Denies Fatty liver, Hepatitis   Genitourinary:  Negative for decreased urine volume, difficulty urinating, dysuria, frequency, hematuria and urgency.        Nocturia   Musculoskeletal:  Positive for arthralgias (left hip). Negative for back pain, neck pain and neck stiffness.   Skin:  Negative for rash and wound.   Neurological:  Negative for dizziness (vertigo), syncope, weakness, numbness and headaches.   Hematological:  Bruises/bleeds easily.   Psychiatric/Behavioral:  Negative for sleep disturbance and suicidal ideas.               VITALS  Visit Vitals  BP (!) 126/58 (BP Location: Left arm, Patient Position: Sitting)   Pulse 73   Temp 98.6 °F (37 °C) (Oral)   Ht 5' 2" (1.575 m)   Wt 60 kg (132 lb 4.4 oz)   LMP 02/07/2016 (Approximate)   SpO2 98%   BMI 24.19 kg/m²          Physical Exam  Vitals reviewed.   Constitutional:       General: She is not in acute distress.     Appearance: She is well-developed.   HENT:      Head: Normocephalic.      Nose: Nose normal.      Mouth/Throat:      Pharynx: No oropharyngeal exudate.   Eyes:      General:         Right eye: No discharge.         Left eye: No discharge.      Conjunctiva/sclera: Conjunctivae normal.      Pupils: Pupils are equal, round, and reactive to light.   Neck:      Thyroid: No thyromegaly.      Vascular: No carotid bruit or JVD.      Trachea: No tracheal deviation.   Cardiovascular:      Rate and Rhythm: Normal rate and regular rhythm.      Pulses:           Carotid pulses are 2+ on the right side and 2+ on the left side.       Dorsalis pedis pulses are 2+ on the right side and 2+ on the left side.        Posterior tibial pulses are 2+ on the right side and 2+ on the left side.      Heart sounds: Normal heart sounds. No murmur heard.  Pulmonary:      Effort: Pulmonary effort is normal. No respiratory distress.      Breath sounds: Normal breath sounds. No stridor. No wheezing, rhonchi or rales.   Abdominal:      General: Bowel sounds are " normal. There is no distension.      Palpations: Abdomen is soft.      Tenderness: There is no abdominal tenderness. There is no guarding.   Musculoskeletal:      Cervical back: Normal range of motion. No pain with movement.      Right lower leg: No edema.      Left lower leg: No edema.   Lymphadenopathy:      Cervical: No cervical adenopathy.   Skin:     General: Skin is warm and dry.      Capillary Refill: Capillary refill takes less than 2 seconds.      Findings: No erythema or rash.   Neurological:      Mental Status: She is alert and oriented to person, place, and time.   Psychiatric:         Behavior: Behavior normal. Behavior is cooperative.          Significant Labs:  Lab Results   Component Value Date    WBC 6.06 01/15/2025    HGB 13.7 01/15/2025    HCT 40.1 01/15/2025     01/15/2025    CHOL 186 12/10/2024    TRIG 68 12/10/2024     (H) 12/10/2024    ALT 34 01/15/2025    AST 32 01/15/2025     01/15/2025    K 4.1 01/15/2025     01/15/2025    CREATININE 0.6 01/15/2025    BUN 14 01/15/2025    CO2 27 01/15/2025    TSH 1.064 07/11/2023    INR 0.9 01/15/2025    GLUF 103 09/22/2017    HGBA1C 5.4 07/16/2024           EKG:   Results for orders placed or performed during the hospital encounter of 01/15/25   EKG 12-lead    Collection Time: 01/15/25  9:57 AM   Result Value Ref Range    QRS Duration 82 ms    OHS QTC Calculation 418 ms    Narrative    Test Reason : Z01.818,    Vent. Rate :  65 BPM     Atrial Rate :  65 BPM     P-R Int : 150 ms          QRS Dur :  82 ms      QT Int : 402 ms       P-R-T Axes :  65  48  41 degrees    QTcB Int : 418 ms    Normal sinus rhythm  Normal ECG  When compared with ECG of 22-Dec-2023 10:57,  No significant change was found  Confirmed by Gilberto Preston ([id number]) on 1/15/2025 1:25:48 PM    Referred By: KENDAL BARCENAS           Confirmed By: Gilberto Preston         Stress Echo 8/30/23   Left Ventricle: The left ventricle is normal in size. Normal wall  thickness. Normal wall motion. There is normal systolic function with a visually estimated ejection fraction of 60 - 65%. There is normal diastolic function.    Right Ventricle: Normal right ventricular cavity size. Systolic function is normal.    Pulmonary Artery: The estimated pulmonary artery systolic pressure is 30 mmHg.    IVC/SVC: Intermediate venous pressure at 8 mmHg.    Stress Protocol: The patient exercised for 8 minutes 37 seconds on a high ramp protocol, corresponding to a functional capacity of 15 METS, achieving a peak heart rate of 153 bpm, which is 97 % of the age predicted maximum heart rate. Their exercise capacity was excellent. The patient reported no symptoms during the stress test.    Post-stress Impression: The study is negative with no echocardiographic evidence of stress induced ischemia.    Post-stress Echo: The left ventricle systolic function is hyperdynamic with an EF of 75 %.    Stress ECG: There are no ST segment deviation identified during the protocol. There are no arrhythmias during stress. There is normal blood pressure response with stress.        Imaging   MRI L-Spine 6/13/23  FINDINGS:  Lumbar spine alignment is within normal limits. The vertebral body heights are well maintained.  Diffuse heterogeneous signal of the bone marrow, likely related to red marrow reconversion.  No focal infiltrative process.     Mild desiccation at L2-L3 and L5-S1 with mild height loss at L2-L3. No endplate edema.     The cauda equina appears within normal limits without findings to suggest arachnoiditis. The conus is normal in appearance, and terminates at the L1 level.     The adjacent soft tissue structures show no significant abnormalities. Paraspinal muscle bulk is maintained.     The proximal sacrum is unremarkable.  The sacroiliac joints are symmetric     There are findings of lumbar spondylosis, as below.     T12-L1: No spinal canal stenosis or neural foraminal narrowing.     L1-L2: No spinal  canal stenosis or neural foraminal narrowing.     L2-L3: Circumferential disc bulge.  No spinal canal stenosis or neural foraminal narrowing.     L3-L4: Left subarticular disc protrusion causes mass effect on the left lateral recess.  Mild-to-moderate left lateral recess stenosis with possible abutment of the left descending L4 nerve root.No neural foraminal narrowing.     L4-L5:  No spinal canal stenosis or neural foraminal narrowing.     L5-S1:  No spinal canal stenosis or neural foraminal narrowing.     Impression:     1. Left subarticular disc protrusion at L3-L4 causing mild to moderate left lateral recess stenosis with possible abutment of the left L4 descending nerve root.     Electronically signed by resident: Jt Ann  Date:                                            06/13/2023  Time:                                           16:14     Electronically signed by: Miguelito Oneal MD  Date:                                            06/13/2023  Time:                                           16:57          Active Cardiac Conditions: None      Revised Cardiac Risk Index   High -Risk Surgery  Intraperitoneal; Intrathoracic; suprainguinal vascular Yes- + 1 No- 0   History of Ischemic Heart Disease   (Hx of MI/positive exercise test/current chest pain due to ischemia/use of nitrate therapy/EKG with pathological Q waves) Yes- + 1 No- 0   History of CHF  (Pulmonary edema/bilateral rales or S3 gallop/PND/CXR showing pulmonary vascular redistribution) Yes- + 1 No- 0   History of CVA   (Prior stroke or TIA) Yes- + 1 No- 0   Pre-operative treatment with insulin Yes- + 1 No- 0   Pre-operative creatinine > 2mg/dl Yes- + 1 No- 0   Total: 0      Risk Status:  Estimated risk of cardiac complications after non-cardiac surgery using the Revised Cardiac Risk Index for Preoperative risk is 3.9 %      ARISCAT (Canet) risk index: Low: 1.6% risk of post-op pulmonary complications; Intermediate: 13.3% risk of post-op  pulmonary complications if surgery is > 3 hours.     American Society of Anesthesiologists Physical Status classification (ASA): 2             Outpatient Subjective & Objective

## 2025-01-15 NOTE — PROGRESS NOTES
[facility]    HOME HEALTH ORDERS  FACE TO FACE ENCOUNTER    Patient Name: Kayleen Patel  YOB: 1970    PCP: Herbert Clay MD   [facility] [address] [facility] [address]  PCP Phone Number: [phone]  PCP Fax: [phone]    Encounter Date: 01/15/2025    Admit to Home Health    Diagnoses:  There are no hospital problems to display for this patient.      Future Appointments   Date Time Provider Department Center   2/5/2025  5:20 PM Gwendolyn Alcantar NP [facility] [place]   2/18/2025 10:40 AM Gwendolyn Alcantar NP [facility] [place]   3/18/2025 11:00 AM Gwendolyn Alcantar NP [facility] [place]   5/6/2025  8:45 AM Cresencio Collins III, MD [facility] [place]           I have seen and examined this patient face to face today. My clinical findings that support the need for the home health skilled services and home bound status are the following:  Weakness/numbness causing balance and gait disturbance due to Joint Replacement making it taxing to leave home.    Allergies:  Review of patient's allergies indicates:   Allergen Reactions    Adhesive tape-silicones     Penicillins Hives    Adhesive Hives    Celecoxib Itching and Rash       Diet: regular diet    Activities: activity as tolerated    Home Health Admitting Clinician:   SN/PT to complete comprehensive assessment including routine vital signs. Instruct on disease process and s/s of complications to report to MD. Follow specific home health arthoplasty protocol. Review/verify medication list sent home with the patient at time of discharge  and instruct patient/caregiver as needed. If coumadin ordered, coumadin clinic to manage INR with INR draws 2x per week with a goal to maintain INR between 1.8 and 2.2. Frequency may be adjusted depending on start of care date.    Notify MD if SBP > 160 or < 90; DBP > 90 or < 50; HR > 120 or < 50; Temp > 101    Home Medical Equipment:  Silverio  3-1 bedside commode, transfer tub bench    CONSULTS:    Physical Therapy may admit if patient not on coumadin, PT to perform comprehensive assessment if performing admit visit and generate therapy plan of care. Evaluate for home safety and equipment needs; Establish/upgrade home exercise program. Perform/instruct on therapeutic exercises, gait training, transfer training, and Range of Motion.      OTHER: (only select if patient needs other therapy disciplines)  Occupational Therapy to evaluate and treat. Evaluate home environment for safety and equipment needs. Perform/Instruct on transfers, ADL training, ROM, and therapeutic exercises.    WOUND CARE ORDERS:  Assess Surgical Incision/DSRG each TX  Aquacel AG drsg applied post-op leave on 14 days post op. Call MD if any drainage reaches border to border of drsg horizontally, s/s of infection, temp >101, induration, swelling or redness.  If dressing is removed per MD order, then apply island dressing, change/teach caregiver to perform daily dressing change if island dressing present.    Medications: Review discharge medications with patient and family and provide education.      Cannot display discharge medications since this is not an admission.      I certify that this patient is confined to her home and needs physical therapy and occupational therapy.    Dr. Collins agrees with home health therapy.

## 2025-01-15 NOTE — ASSESSMENT & PLAN NOTE
Current BP  controlled today.    Taking: valsartan    Lifestyle changes to reduce systolic BP:   exercise 30 minutes per day,  5 days per week or 150 minutes weekly; sodium reduction and avoidance of high salt foods such as processed meats, frozen meals and  fast foods.   Keeping a healthy weight/BMI can help with better BP control    BP acceptable for surgery. I recommend monitoring BP during perioperative period as uncontrolled pain can elevate blood pressure.

## 2025-01-29 ENCOUNTER — TELEPHONE (OUTPATIENT)
Dept: ORTHOPEDICS | Facility: CLINIC | Age: 55
End: 2025-01-29
Payer: OTHER GOVERNMENT

## 2025-01-29 NOTE — TELEPHONE ENCOUNTER
I called the patient today regarding surgery on 2/3/2025 with Dr. Cresencio Collins. I informed the patient that her surgery will be at  [facility] ([facility] [place] [address]). I informed the patient they must arrive at 5:00am and their surgery will start at 7:00am.     Per the [facility] COVID-19 Pre-Procedural Testing Policy (administered 10/26/2022), patients do not need tested for COVID-19 regardless of vaccination status.    I reminded the patient that they cannot eat or drink after midnight, the night before surgery.      I reminded the patient to be careful of their skin over the next few days to make sure they do not get any cuts, scratches or scrapes.    The patient that they have a walker, bedside commode and shower chair from a previous surgery and do not need another order for them.    The patient verbalized understanding and has no further questions.

## 2025-01-31 DIAGNOSIS — Z96.641 STATUS POST RIGHT HIP REPLACEMENT: ICD-10-CM

## 2025-01-31 DIAGNOSIS — Z96.642 S/P HIP REPLACEMENT, LEFT: Primary | ICD-10-CM

## 2025-01-31 RX ORDER — PANTOPRAZOLE SODIUM 40 MG/1
40 TABLET, DELAYED RELEASE ORAL DAILY
Qty: 30 TABLET | Refills: 0 | Status: SHIPPED | OUTPATIENT
Start: 2025-01-31 | End: 2025-03-05

## 2025-01-31 RX ORDER — ASPIRIN 81 MG/1
81 TABLET ORAL 2 TIMES DAILY
Qty: 60 TABLET | Refills: 0 | Status: SHIPPED | OUTPATIENT
Start: 2025-01-31 | End: 2025-03-05

## 2025-01-31 RX ORDER — AMOXICILLIN 250 MG
1 CAPSULE ORAL DAILY
Qty: 30 TABLET | Refills: 0 | Status: SHIPPED | OUTPATIENT
Start: 2025-01-31

## 2025-01-31 RX ORDER — METHOCARBAMOL 750 MG/1
750 TABLET, FILM COATED ORAL 4 TIMES DAILY PRN
Qty: 40 TABLET | Refills: 0 | Status: SHIPPED | OUTPATIENT
Start: 2025-01-31 | End: 2025-02-13

## 2025-01-31 RX ORDER — DEXTROMETHORPHAN HYDROBROMIDE, GUAIFENESIN 5; 100 MG/5ML; MG/5ML
650 LIQUID ORAL EVERY 8 HOURS
Qty: 120 TABLET | Refills: 0 | Status: SHIPPED | OUTPATIENT
Start: 2025-01-31

## 2025-01-31 RX ORDER — MELOXICAM 15 MG/1
15 TABLET ORAL DAILY
Qty: 30 TABLET | Refills: 1 | Status: SHIPPED | OUTPATIENT
Start: 2025-01-31

## 2025-01-31 RX ORDER — CEFADROXIL 500 MG/1
500 CAPSULE ORAL EVERY 12 HOURS
Qty: 14 CAPSULE | Refills: 0 | Status: SHIPPED | OUTPATIENT
Start: 2025-01-31

## 2025-01-31 RX ORDER — OXYCODONE HYDROCHLORIDE 5 MG/1
TABLET ORAL
Qty: 30 TABLET | Refills: 0 | Status: SHIPPED | OUTPATIENT
Start: 2025-01-31

## 2025-02-01 ENCOUNTER — ANESTHESIA EVENT (OUTPATIENT)
Dept: SURGERY | Facility: HOSPITAL | Age: 55
End: 2025-02-01
Payer: OTHER GOVERNMENT

## 2025-02-03 ENCOUNTER — HOSPITAL ENCOUNTER (OUTPATIENT)
Facility: HOSPITAL | Age: 55
Discharge: HOME | End: 2025-02-03
Attending: ORTHOPAEDIC SURGERY | Admitting: ORTHOPAEDIC SURGERY
Payer: OTHER GOVERNMENT

## 2025-02-03 ENCOUNTER — ANESTHESIA (OUTPATIENT)
Dept: SURGERY | Facility: HOSPITAL | Age: 55
End: 2025-02-03
Payer: OTHER GOVERNMENT

## 2025-02-03 VITALS
OXYGEN SATURATION: 96 % | DIASTOLIC BLOOD PRESSURE: 71 MMHG | HEIGHT: 62 IN | RESPIRATION RATE: 15 BRPM | BODY MASS INDEX: 24.48 KG/M2 | WEIGHT: 133 LBS | TEMPERATURE: 98 F | HEART RATE: 78 BPM | SYSTOLIC BLOOD PRESSURE: 120 MMHG

## 2025-02-03 DIAGNOSIS — Z96.642 S/P HIP REPLACEMENT, LEFT: ICD-10-CM

## 2025-02-03 DIAGNOSIS — M16.12 PRIMARY OSTEOARTHRITIS OF LEFT HIP: ICD-10-CM

## 2025-02-03 PROCEDURE — 25000003 PHARM REV CODE 250

## 2025-02-03 PROCEDURE — 37000009 HC ANESTHESIA EA ADD 15 MINS: Performed by: ORTHOPAEDIC SURGERY

## 2025-02-03 PROCEDURE — 97530 THERAPEUTIC ACTIVITIES: CPT

## 2025-02-03 PROCEDURE — 71000016 HC POSTOP RECOV ADDL HR: Performed by: ORTHOPAEDIC SURGERY

## 2025-02-03 PROCEDURE — 97161 PT EVAL LOW COMPLEX 20 MIN: CPT

## 2025-02-03 PROCEDURE — 36000710: Performed by: ORTHOPAEDIC SURGERY

## 2025-02-03 PROCEDURE — 97166 OT EVAL MOD COMPLEX 45 MIN: CPT

## 2025-02-03 PROCEDURE — 27130 TOTAL HIP ARTHROPLASTY: CPT | Mod: LT,,, | Performed by: ORTHOPAEDIC SURGERY

## 2025-02-03 PROCEDURE — 37000008 HC ANESTHESIA 1ST 15 MINUTES: Performed by: ORTHOPAEDIC SURGERY

## 2025-02-03 PROCEDURE — 94761 N-INVAS EAR/PLS OXIMETRY MLT: CPT

## 2025-02-03 PROCEDURE — 97116 GAIT TRAINING THERAPY: CPT

## 2025-02-03 PROCEDURE — 36000711: Performed by: ORTHOPAEDIC SURGERY

## 2025-02-03 PROCEDURE — 27130 TOTAL HIP ARTHROPLASTY: CPT | Mod: AS,LT,,

## 2025-02-03 PROCEDURE — 99900035 HC TECH TIME PER 15 MIN (STAT)

## 2025-02-03 PROCEDURE — D9220A PRA ANESTHESIA: Mod: CRNA,,, | Performed by: NURSE ANESTHETIST, CERTIFIED REGISTERED

## 2025-02-03 PROCEDURE — 27100025 HC TUBING, SET FLUID WARMER: Performed by: ANESTHESIOLOGY

## 2025-02-03 PROCEDURE — 71000015 HC POSTOP RECOV 1ST HR: Performed by: ORTHOPAEDIC SURGERY

## 2025-02-03 PROCEDURE — 63600175 PHARM REV CODE 636 W HCPCS: Mod: TB,JZ | Performed by: ORTHOPAEDIC SURGERY

## 2025-02-03 PROCEDURE — 71000033 HC RECOVERY, INTIAL HOUR: Performed by: ORTHOPAEDIC SURGERY

## 2025-02-03 PROCEDURE — 25000003 PHARM REV CODE 250: Performed by: NURSE ANESTHETIST, CERTIFIED REGISTERED

## 2025-02-03 PROCEDURE — D9220A PRA ANESTHESIA: Mod: ANES,,, | Performed by: ANESTHESIOLOGY

## 2025-02-03 PROCEDURE — 63600175 PHARM REV CODE 636 W HCPCS

## 2025-02-03 PROCEDURE — C1776 JOINT DEVICE (IMPLANTABLE): HCPCS | Performed by: ORTHOPAEDIC SURGERY

## 2025-02-03 PROCEDURE — 71000039 HC RECOVERY, EACH ADD'L HOUR: Performed by: ORTHOPAEDIC SURGERY

## 2025-02-03 PROCEDURE — 27201423 OPTIME MED/SURG SUP & DEVICES STERILE SUPPLY: Performed by: ORTHOPAEDIC SURGERY

## 2025-02-03 PROCEDURE — 97535 SELF CARE MNGMENT TRAINING: CPT

## 2025-02-03 PROCEDURE — 63600175 PHARM REV CODE 636 W HCPCS: Performed by: ANESTHESIOLOGY

## 2025-02-03 PROCEDURE — C1713 ANCHOR/SCREW BN/BN,TIS/BN: HCPCS | Performed by: ORTHOPAEDIC SURGERY

## 2025-02-03 PROCEDURE — 25000003 PHARM REV CODE 250: Performed by: ANESTHESIOLOGY

## 2025-02-03 PROCEDURE — 63600175 PHARM REV CODE 636 W HCPCS: Performed by: NURSE ANESTHETIST, CERTIFIED REGISTERED

## 2025-02-03 DEVICE — EMPHASYS ACETABULAR SHELL THREE-HOLE 48MM CEMENTLESS
Type: IMPLANTABLE DEVICE | Site: HIP | Status: FUNCTIONAL
Brand: EMPHASYS

## 2025-02-03 DEVICE — PINNACLE CANCELLOUS BONE SCREW 6.5MM X 15MM
Type: IMPLANTABLE DEVICE | Site: HIP | Status: FUNCTIONAL
Brand: PINNACLE

## 2025-02-03 DEVICE — ACTIS DUOFIX HIP PROSTHESIS (FEMORAL STEM 12/14 TAPER CEMENTLESS SIZE 2 HIGH COLLAR)  CE
Type: IMPLANTABLE DEVICE | Site: HIP | Status: FUNCTIONAL
Brand: ACTIS

## 2025-02-03 DEVICE — PINNACLE CANCELLOUS BONE SCREW 6.5MM X 30MM
Type: IMPLANTABLE DEVICE | Site: HIP | Status: FUNCTIONAL
Brand: PINNACLE

## 2025-02-03 DEVICE — BIOLOX DELTA CERAMIC FEMORAL HEAD +1.5 36MM DIA 12/14 TAPER
Type: IMPLANTABLE DEVICE | Site: HIP | Status: FUNCTIONAL
Brand: BIOLOX DELTA

## 2025-02-03 DEVICE — EMPHASYS POLYETHYLENE LINER AOX NEUTRAL 48MM 36MM
Type: IMPLANTABLE DEVICE | Site: HIP | Status: FUNCTIONAL
Brand: EMPHASYS

## 2025-02-03 RX ORDER — OXYCODONE HYDROCHLORIDE 10 MG/1
10 TABLET ORAL
Status: DISCONTINUED | OUTPATIENT
Start: 2025-02-03 | End: 2025-02-03 | Stop reason: HOSPADM

## 2025-02-03 RX ORDER — MELOXICAM 7.5 MG/1
15 TABLET ORAL ONCE
Status: COMPLETED | OUTPATIENT
Start: 2025-02-03 | End: 2025-02-03

## 2025-02-03 RX ORDER — MELOXICAM 7.5 MG/1
15 TABLET ORAL DAILY
Status: DISCONTINUED | OUTPATIENT
Start: 2025-02-03 | End: 2025-02-03

## 2025-02-03 RX ORDER — METHOCARBAMOL 750 MG/1
750 TABLET, FILM COATED ORAL 3 TIMES DAILY
Status: DISCONTINUED | OUTPATIENT
Start: 2025-02-03 | End: 2025-02-03 | Stop reason: HOSPADM

## 2025-02-03 RX ORDER — FAMOTIDINE 20 MG/1
20 TABLET, FILM COATED ORAL 2 TIMES DAILY
Status: DISCONTINUED | OUTPATIENT
Start: 2025-02-03 | End: 2025-02-03 | Stop reason: HOSPADM

## 2025-02-03 RX ORDER — CEFAZOLIN 2 G/1
2 INJECTION, POWDER, FOR SOLUTION INTRAMUSCULAR; INTRAVENOUS
Status: COMPLETED | OUTPATIENT
Start: 2025-02-03 | End: 2025-02-03

## 2025-02-03 RX ORDER — AMOXICILLIN 250 MG
1 CAPSULE ORAL 2 TIMES DAILY
Status: DISCONTINUED | OUTPATIENT
Start: 2025-02-03 | End: 2025-02-03 | Stop reason: HOSPADM

## 2025-02-03 RX ORDER — OXYCODONE HYDROCHLORIDE 5 MG/1
5 TABLET ORAL
Status: DISCONTINUED | OUTPATIENT
Start: 2025-02-03 | End: 2025-02-03 | Stop reason: HOSPADM

## 2025-02-03 RX ORDER — MUPIROCIN 20 MG/G
1 OINTMENT TOPICAL
Status: COMPLETED | OUTPATIENT
Start: 2025-02-03 | End: 2025-02-03

## 2025-02-03 RX ORDER — ONDANSETRON HYDROCHLORIDE 2 MG/ML
INJECTION, SOLUTION INTRAVENOUS
Status: DISCONTINUED | OUTPATIENT
Start: 2025-02-03 | End: 2025-02-03

## 2025-02-03 RX ORDER — FENTANYL CITRATE 50 UG/ML
100 INJECTION, SOLUTION INTRAMUSCULAR; INTRAVENOUS
Status: DISCONTINUED | OUTPATIENT
Start: 2025-02-03 | End: 2025-02-03 | Stop reason: HOSPADM

## 2025-02-03 RX ORDER — MORPHINE SULFATE 2 MG/ML
2 INJECTION, SOLUTION INTRAMUSCULAR; INTRAVENOUS
Status: DISCONTINUED | OUTPATIENT
Start: 2025-02-03 | End: 2025-02-03 | Stop reason: HOSPADM

## 2025-02-03 RX ORDER — ACETAMINOPHEN 500 MG
1000 TABLET ORAL EVERY 6 HOURS
Status: DISCONTINUED | OUTPATIENT
Start: 2025-02-03 | End: 2025-02-03 | Stop reason: HOSPADM

## 2025-02-03 RX ORDER — ASPIRIN 81 MG/1
81 TABLET ORAL 2 TIMES DAILY
Status: DISCONTINUED | OUTPATIENT
Start: 2025-02-03 | End: 2025-02-03 | Stop reason: HOSPADM

## 2025-02-03 RX ORDER — KETOROLAC TROMETHAMINE 30 MG/ML
INJECTION, SOLUTION INTRAMUSCULAR; INTRAVENOUS
Status: DISCONTINUED | OUTPATIENT
Start: 2025-02-03 | End: 2025-02-03 | Stop reason: HOSPADM

## 2025-02-03 RX ORDER — ONDANSETRON HYDROCHLORIDE 2 MG/ML
4 INJECTION, SOLUTION INTRAVENOUS EVERY 8 HOURS PRN
Status: DISCONTINUED | OUTPATIENT
Start: 2025-02-03 | End: 2025-02-03 | Stop reason: HOSPADM

## 2025-02-03 RX ORDER — SODIUM CHLORIDE 9 MG/ML
INJECTION, SOLUTION INTRAVENOUS CONTINUOUS
Status: DISCONTINUED | OUTPATIENT
Start: 2025-02-03 | End: 2025-02-03 | Stop reason: HOSPADM

## 2025-02-03 RX ORDER — VANCOMYCIN HYDROCHLORIDE 1 G/20ML
INJECTION, POWDER, LYOPHILIZED, FOR SOLUTION INTRAVENOUS
Status: DISCONTINUED | OUTPATIENT
Start: 2025-02-03 | End: 2025-02-03 | Stop reason: HOSPADM

## 2025-02-03 RX ORDER — FENTANYL CITRATE 50 UG/ML
25 INJECTION, SOLUTION INTRAMUSCULAR; INTRAVENOUS EVERY 5 MIN PRN
Status: DISCONTINUED | OUTPATIENT
Start: 2025-02-03 | End: 2025-02-03 | Stop reason: HOSPADM

## 2025-02-03 RX ORDER — PROPOFOL 10 MG/ML
VIAL (ML) INTRAVENOUS CONTINUOUS PRN
Status: DISCONTINUED | OUTPATIENT
Start: 2025-02-03 | End: 2025-02-03

## 2025-02-03 RX ORDER — POLYETHYLENE GLYCOL 3350 17 G/17G
17 POWDER, FOR SOLUTION ORAL DAILY
Status: DISCONTINUED | OUTPATIENT
Start: 2025-02-03 | End: 2025-02-03 | Stop reason: HOSPADM

## 2025-02-03 RX ORDER — PREGABALIN 75 MG/1
75 CAPSULE ORAL NIGHTLY
Status: DISCONTINUED | OUTPATIENT
Start: 2025-02-03 | End: 2025-02-03 | Stop reason: HOSPADM

## 2025-02-03 RX ORDER — CEFAZOLIN 2 G/1
2 INJECTION, POWDER, FOR SOLUTION INTRAMUSCULAR; INTRAVENOUS
Status: DISCONTINUED | OUTPATIENT
Start: 2025-02-03 | End: 2025-02-03

## 2025-02-03 RX ORDER — FAMOTIDINE 10 MG/ML
INJECTION INTRAVENOUS
Status: DISCONTINUED | OUTPATIENT
Start: 2025-02-03 | End: 2025-02-03

## 2025-02-03 RX ORDER — HALOPERIDOL 5 MG/ML
0.5 INJECTION INTRAMUSCULAR EVERY 10 MIN PRN
Status: DISCONTINUED | OUTPATIENT
Start: 2025-02-03 | End: 2025-02-03 | Stop reason: HOSPADM

## 2025-02-03 RX ORDER — MORPHINE SULFATE 4 MG/ML
INJECTION, SOLUTION INTRAMUSCULAR; INTRAVENOUS
Status: DISCONTINUED | OUTPATIENT
Start: 2025-02-03 | End: 2025-02-03 | Stop reason: HOSPADM

## 2025-02-03 RX ORDER — NALOXONE HCL 0.4 MG/ML
0.02 VIAL (ML) INJECTION
Status: DISCONTINUED | OUTPATIENT
Start: 2025-02-03 | End: 2025-02-03 | Stop reason: HOSPADM

## 2025-02-03 RX ORDER — CEFAZOLIN 2 G/1
2 INJECTION, POWDER, FOR SOLUTION INTRAMUSCULAR; INTRAVENOUS
Status: DISCONTINUED | OUTPATIENT
Start: 2025-02-03 | End: 2025-02-03 | Stop reason: HOSPADM

## 2025-02-03 RX ORDER — MELOXICAM 7.5 MG/1
15 TABLET ORAL DAILY
Status: DISCONTINUED | OUTPATIENT
Start: 2025-02-03 | End: 2025-02-03 | Stop reason: HOSPADM

## 2025-02-03 RX ORDER — LIDOCAINE HYDROCHLORIDE 20 MG/ML
INJECTION INTRAVENOUS
Status: DISCONTINUED | OUTPATIENT
Start: 2025-02-03 | End: 2025-02-03

## 2025-02-03 RX ORDER — PREGABALIN 75 MG/1
75 CAPSULE ORAL
Status: COMPLETED | OUTPATIENT
Start: 2025-02-03 | End: 2025-02-03

## 2025-02-03 RX ORDER — LIDOCAINE HYDROCHLORIDE 10 MG/ML
1 INJECTION, SOLUTION EPIDURAL; INFILTRATION; INTRACAUDAL; PERINEURAL
Status: DISCONTINUED | OUTPATIENT
Start: 2025-02-03 | End: 2025-02-03 | Stop reason: HOSPADM

## 2025-02-03 RX ORDER — PHENYLEPHRINE HYDROCHLORIDE 10 MG/ML
INJECTION INTRAVENOUS
Status: DISCONTINUED | OUTPATIENT
Start: 2025-02-03 | End: 2025-02-03

## 2025-02-03 RX ORDER — SODIUM CHLORIDE 9 MG/ML
INJECTION, SOLUTION INTRAVENOUS
Status: COMPLETED | OUTPATIENT
Start: 2025-02-03 | End: 2025-02-03

## 2025-02-03 RX ORDER — ACETAMINOPHEN 500 MG
1000 TABLET ORAL
Status: COMPLETED | OUTPATIENT
Start: 2025-02-03 | End: 2025-02-03

## 2025-02-03 RX ORDER — METHYLPREDNISOLONE ACETATE 40 MG/ML
INJECTION, SUSPENSION INTRA-ARTICULAR; INTRALESIONAL; INTRAMUSCULAR; SOFT TISSUE
Status: DISCONTINUED | OUTPATIENT
Start: 2025-02-03 | End: 2025-02-03 | Stop reason: HOSPADM

## 2025-02-03 RX ORDER — CARBOXYMETHYLCELLULOSE SODIUM 10 MG/ML
GEL OPHTHALMIC
Status: DISCONTINUED | OUTPATIENT
Start: 2025-02-03 | End: 2025-02-03

## 2025-02-03 RX ORDER — PROCHLORPERAZINE EDISYLATE 5 MG/ML
5 INJECTION INTRAMUSCULAR; INTRAVENOUS EVERY 6 HOURS PRN
Status: DISCONTINUED | OUTPATIENT
Start: 2025-02-03 | End: 2025-02-03 | Stop reason: HOSPADM

## 2025-02-03 RX ORDER — MUPIROCIN 20 MG/G
1 OINTMENT TOPICAL 2 TIMES DAILY
Status: DISCONTINUED | OUTPATIENT
Start: 2025-02-03 | End: 2025-02-03 | Stop reason: HOSPADM

## 2025-02-03 RX ORDER — ROPIVACAINE HYDROCHLORIDE 5 MG/ML
INJECTION, SOLUTION EPIDURAL; INFILTRATION; PERINEURAL
Status: DISCONTINUED | OUTPATIENT
Start: 2025-02-03 | End: 2025-02-03 | Stop reason: HOSPADM

## 2025-02-03 RX ORDER — EPHEDRINE SULFATE 50 MG/ML
INJECTION, SOLUTION INTRAVENOUS
Status: DISCONTINUED | OUTPATIENT
Start: 2025-02-03 | End: 2025-02-03

## 2025-02-03 RX ORDER — DEXAMETHASONE SODIUM PHOSPHATE 4 MG/ML
INJECTION, SOLUTION INTRA-ARTICULAR; INTRALESIONAL; INTRAMUSCULAR; INTRAVENOUS; SOFT TISSUE
Status: DISCONTINUED | OUTPATIENT
Start: 2025-02-03 | End: 2025-02-03

## 2025-02-03 RX ORDER — FLUORESCEIN SODIUM AND BENOXINATE HYDROCHLORIDE 2.6; 4.4 MG/ML; MG/ML
1 SOLUTION/ DROPS OPHTHALMIC ONCE
Status: COMPLETED | OUTPATIENT
Start: 2025-02-03 | End: 2025-02-03

## 2025-02-03 RX ORDER — MIDAZOLAM HYDROCHLORIDE 1 MG/ML
4 INJECTION, SOLUTION INTRAMUSCULAR; INTRAVENOUS
Status: DISCONTINUED | OUTPATIENT
Start: 2025-02-03 | End: 2025-02-03 | Stop reason: HOSPADM

## 2025-02-03 RX ORDER — PROPOFOL 10 MG/ML
VIAL (ML) INTRAVENOUS
Status: DISCONTINUED | OUTPATIENT
Start: 2025-02-03 | End: 2025-02-03

## 2025-02-03 RX ORDER — SODIUM CHLORIDE 0.9 % (FLUSH) 0.9 %
3 SYRINGE (ML) INJECTION
Status: DISCONTINUED | OUTPATIENT
Start: 2025-02-03 | End: 2025-02-03 | Stop reason: HOSPADM

## 2025-02-03 RX ORDER — KETAMINE HCL IN 0.9 % NACL 50 MG/5 ML
SYRINGE (ML) INTRAVENOUS
Status: DISCONTINUED | OUTPATIENT
Start: 2025-02-03 | End: 2025-02-03

## 2025-02-03 RX ADMIN — ONDANSETRON 4 MG: 2 INJECTION INTRAMUSCULAR; INTRAVENOUS at 08:02

## 2025-02-03 RX ADMIN — Medication 10 MG: at 07:02

## 2025-02-03 RX ADMIN — FLUORESCEIN SODIUM AND BENOXINATE HYDROCHLORIDE 1 DROP: 2.6; 4.4 SOLUTION/ DROPS OPHTHALMIC at 12:02

## 2025-02-03 RX ADMIN — PHENYLEPHRINE HYDROCHLORIDE 50 MCG: 10 INJECTION INTRAVENOUS at 07:02

## 2025-02-03 RX ADMIN — VANCOMYCIN HYDROCHLORIDE 1000 MG: 1 INJECTION, POWDER, LYOPHILIZED, FOR SOLUTION INTRAVENOUS at 06:02

## 2025-02-03 RX ADMIN — DEXAMETHASONE SODIUM PHOSPHATE 8 MG: 4 INJECTION, SOLUTION INTRAMUSCULAR; INTRAVENOUS at 07:02

## 2025-02-03 RX ADMIN — FENTANYL CITRATE 50 MCG: 50 INJECTION, SOLUTION INTRAMUSCULAR; INTRAVENOUS at 07:02

## 2025-02-03 RX ADMIN — ACETAMINOPHEN 1000 MG: 500 TABLET ORAL at 06:02

## 2025-02-03 RX ADMIN — FAMOTIDINE 20 MG: 10 INJECTION, SOLUTION INTRAVENOUS at 07:02

## 2025-02-03 RX ADMIN — CEFAZOLIN 2 G: 2 INJECTION, POWDER, FOR SOLUTION INTRAMUSCULAR; INTRAVENOUS at 07:02

## 2025-02-03 RX ADMIN — PHENYLEPHRINE HYDROCHLORIDE 50 MCG: 10 INJECTION INTRAVENOUS at 08:02

## 2025-02-03 RX ADMIN — EPHEDRINE SULFATE 2.5 MG: 50 INJECTION INTRAVENOUS at 08:02

## 2025-02-03 RX ADMIN — MEPIVACAINE HYDROCHLORIDE 3 ML: 15 INJECTION, SOLUTION EPIDURAL; INFILTRATION at 07:02

## 2025-02-03 RX ADMIN — SODIUM CHLORIDE: 0.9 INJECTION, SOLUTION INTRAVENOUS at 06:02

## 2025-02-03 RX ADMIN — SODIUM CHLORIDE, SODIUM GLUCONATE, SODIUM ACETATE, POTASSIUM CHLORIDE, MAGNESIUM CHLORIDE, SODIUM PHOSPHATE, DIBASIC, AND POTASSIUM PHOSPHATE: .53; .5; .37; .037; .03; .012; .00082 INJECTION, SOLUTION INTRAVENOUS at 08:02

## 2025-02-03 RX ADMIN — LIDOCAINE HYDROCHLORIDE 60 MG: 20 INJECTION INTRAVENOUS at 07:02

## 2025-02-03 RX ADMIN — TRANEXAMIC ACID 2000 MG: 100 INJECTION INTRAVENOUS at 07:02

## 2025-02-03 RX ADMIN — SODIUM CHLORIDE: 0.9 INJECTION, SOLUTION INTRAVENOUS at 05:02

## 2025-02-03 RX ADMIN — METHOCARBAMOL 750 MG: 750 TABLET ORAL at 09:02

## 2025-02-03 RX ADMIN — OXYCODONE 5 MG: 5 TABLET ORAL at 09:02

## 2025-02-03 RX ADMIN — TRANEXAMIC ACID 1000 MG: 100 INJECTION INTRAVENOUS at 08:02

## 2025-02-03 RX ADMIN — MUPIROCIN 1 G: 20 OINTMENT TOPICAL at 06:02

## 2025-02-03 RX ADMIN — PROPOFOL 125 MCG/KG/MIN: 10 INJECTION, EMULSION INTRAVENOUS at 07:02

## 2025-02-03 RX ADMIN — CARBOXYMETHYLCELLULOSE SODIUM 4 DROP: 10 GEL OPHTHALMIC at 07:02

## 2025-02-03 RX ADMIN — PHENYLEPHRINE HYDROCHLORIDE 100 MCG: 10 INJECTION INTRAVENOUS at 07:02

## 2025-02-03 RX ADMIN — EPHEDRINE SULFATE 5 MG: 50 INJECTION INTRAVENOUS at 07:02

## 2025-02-03 RX ADMIN — PROPOFOL 20 MG: 10 INJECTION, EMULSION INTRAVENOUS at 07:02

## 2025-02-03 RX ADMIN — SODIUM CHLORIDE, SODIUM GLUCONATE, SODIUM ACETATE, POTASSIUM CHLORIDE, MAGNESIUM CHLORIDE, SODIUM PHOSPHATE, DIBASIC, AND POTASSIUM PHOSPHATE: .53; .5; .37; .037; .03; .012; .00082 INJECTION, SOLUTION INTRAVENOUS at 07:02

## 2025-02-03 RX ADMIN — PREGABALIN 75 MG: 75 CAPSULE ORAL at 06:02

## 2025-02-03 RX ADMIN — HALOPERIDOL LACTATE 0.5 MG: 5 INJECTION, SOLUTION INTRAMUSCULAR at 09:02

## 2025-02-03 RX ADMIN — MELOXICAM 15 MG: 7.5 TABLET ORAL at 06:02

## 2025-02-03 NOTE — OP NOTE
Dennis MUÑIZ left hip  OPERATIVE NOTE      Date of Operation:   2/3/2025    Providers Performing:   Surgeon(s):  Cresencio Collins III, MD  Assistant: Hari Carcamo PA-C, I certify that the services for which payment is claimed were medically necessary, and that no qualified resident was available to perform the services.        Preoperative Diagnosis:   Left hip osteoarthritis, M16.12    Postoperative Diagnosis:   Same    Operative Procedure:   Left Total Hip Arthroplasty, Anterior Approach, CPT 43668    Anesthesia:   Spinal+catheter  LISA cocktail: Collins Block, with toradol    Estimated Blood Loss:   350 cc    Specimens:   None    Complications:   None, stable to PACU.    Implants Utilized:   [id number]  Emphasys three-hole acetabular shell; Size 48  Emphasys AOX polyethylene liner; 48 OD, 36 ID, neutral  Actis size 2 HO  Biolox Delta Ceramic 12/14 taper 36mm + 1.5  Acetabular screw 15mm, 30mm    Aquamantys    Implant Name Type Inv. Item Serial No.  Lot No. LRB No. Used Action   SHELL EMPHASYS ACET 3H 48MM - [id number]  SHELL EMPHASYS ACET 3H 48MM  [employer] [id number] Left 1 Implanted   LINER EMPHASYS NEUT 07R74ZY - [id number]  LINER EMPHASYS NEUT [id number]  [employer] [id number] Left 1 Implanted   SCREW BONE CANCACT 6.5X15 - [id number]  SCREW BONE CANCACT 6.5X15  [employer] [id number] Left 1 Implanted   SCREW PINACLE 6.5MM X 30MM - [id number]  SCREW PINACLE 6.5MM X 30MM  [employer] [id number] Left 1 Implanted   STEM DUOFIX HI OFST CLLR SZ2 - [id number]  STEM DUOFIX HI OFST CLLR SZ2  [employer] [id number] Left 1 Implanted   HEAD FEM 12/14 TAPER 1.5X36 - [id number]  HEAD FEM 12/14 TAPER 1.5X36  [employer] [id number] Left 1 Implanted       Indications:   The patient has longstanding left hip pain secondary to the above diagnosis. They have failed conservative management which includes anti-inflammatory medications and activity modification. We reviewed the risks and benefits of the direct anterior hip replacement with the  patient prior to surgery including risk of infection, lateral thigh numbness, blood clot, leg length inequality, dislocation, components loosening, components wearing out, need for revision surgery, continued pain, limping, and injury to nerves and vessels.  After discussing the risks and benefits of the procedure they would like to proceed with surgery.    Operative Notes:   The patient was met in the holding area. The operative site was marked. Anesthesia administered spinal anesthesia. The patient was placed supine on a [place] table and the operative site was identified. The hip was prepped and draped in the usual fashion. IV antibiotics were administered and a timeout was performed.     I performed a direct anterior approach to the hip. Skin incision was made and the fascia of the tensor fascia jacque was identified. I utilized the interval between the tensor fascia jacque and sartorious for direct dissection to the hip joint. I identified and coagulated the ascending branch of the lateral circumflex vessel. Standard retractors over the anterior hip capsule were placed and the capsulotomy was performed. The capsule was tagged for retraction. The femoral head was identified and the femoral neck osteotomy was made in accordance with preoperative templating. The femoral head was then removed with a corkscrew.    The standard anterior, posterior, and superior retractors were placed around the acetabulum. The capsular labrum and foveal contents were removed. Sequential acetabular reamers were used to prepare the acetabulum. Once good good fit was achieved, the final acetabular cup was selected to be one millimeter larger in diameter than the last reamer used. The cup was checked for a good rim fit and a provisional impaction was performed to verify positioning on fluoroscopy. Once this was satisfactory, the impaction was completed. I checked to make sure there was no overhanging osteophytes anteriorly as well as making  sure that there was not excessive acetabular cup exposed. Screws were placed in the cup to augment initial fixation. The final liner was impacted into place and I confirmed that it was well seated.    The hydraulic hook was placed around the femur. The femur was externally rotated and the standard calcar and greater trochanter retractors were placed. A provisional posterior capsulotomy was performed. Then, gross traction was released and the leg was extended and adducted. The appropriate release was performed to allow elevation of the femur for good visualization of the femoral canal.     A box osteotome was used to open the femoral canal and a canal finder was used to sound the canal. The starter broach and sequential broaches were used until stability was appropriate. A trial reduction was performed and intraoperative fluoroscopy was used to confirm appropriate leg lengths and offset.  Once the trial femoral components appeared appropriately positioned, the hip was dislocated, the femur re-exposed, and the trial femoral components were removed. The canal was irrigated and the final femoral stem was impacted to the level of the neck cut. The final ball was impacted onto a dry trunnion and the hip was reduced checking for appropriate soft tissue tension. Final intra-operative fluoroscopy was obtained to confirm implant positioning, leg length, and offset.     While checking xray, a 0.35% betadine solution was left to soak in the wound. The wound was copiously irrigated. I checked for any residual bleeders and made sure that there was appropriate hemostasis. The local anesthetic cocktail was administered. 1 gram of vancomycin powder was placed in the wound. The wound was closed in layers using #1 vicryl at the fascia, then 2-0 vicryl, 3-0 monocryl, 4-0 monocryl and Prineo Mesh+Dermabond. A sterile dressing was placed.     There were no complications or evidence of intraoperative fracture. All counts were  correct.    The first-assistant was critical to all steps of the operation, including retraction during exposure and bone preparation, as well as the deep and superficial wound closure.  Dr. Collins performed and/or supervised the key portions of this surgical procedure, including evaluation of the bone cuts, reshaping of the bony elements, and insertion of the provisional and final components.    Post Op Plan:   The patient will be weightbearing as tolerated with a walker with no extremes of motion  ASA for DVT prophylaxis (81mg BID for one month)  24 hours of IV antibiotics.    Same day  Mobic instead of tylenol     Due patient and or surgical factors the patient will receive an Rx for cefadroxil 500mg BID x7 days after discharge per [place] data:   Jesi MM, Stacey JE, Seema HDZ, Yesika HALEY. The \A [facility]ology [facility] [place] [facility]\"" Clinical Research Award: Extended Oral Antibiotics Prevent Periprosthetic Joint Infection in High-Risk Cases: 3855 Patients With 1-Year Follow-Up. J Arthroplasty. 2021 Jul;36(7S):S18-S25. doi: 10.1016/j.arth.2021.01.051. Epub 2021 Jan 23. PMID: [id number].    Signed by: Cresencio Collins III, MD

## 2025-02-03 NOTE — DISCHARGE SUMMARY
[place] - Surgery (Hospital)  Discharge Note  Short Stay    Procedure(s) (LRB):  ARTHROPLASTY, HIP, TOTAL, ANTERIOR APPROACH: LEFT: DEPUY - ACTIS + EMPHASYS: SAME DAY (Left)      OUTCOME: Patient tolerated treatment/procedure well without complication and is now ready for discharge.    DISPOSITION: Home or Self Care    FINAL DIAGNOSIS:  Primary osteoarthritis of left hip    FOLLOWUP: In clinic    DISCHARGE INSTRUCTIONS:    Discharge Procedure Orders   Activity as tolerated     Sponge bath only until clinic visit     Lifting restrictions   Order Comments: No strenuous exercise or lifting of > 10 lbs     Weight bearing as tolerated     No driving, operating heavy equipment or signing legal documents while taking pain medication     Leave dressing on - Keep it clean, dry, and intact until clinic visit   Order Comments: Do not remove surgical dressing for 2 weeks post-op. This will be done only by MD at initial post-op visit. If dressing is completely saturated, replace with identical dressing - silver-impregnated hydrocolloid dressing.    Do not get dressings wet. Do not shower.    If dressing continues to be saturated or there are signs of infection, please call [facility] [phone] for further instructions and to make appt to be seen.     Call MD for:  temperature >100.4     Call MD for:  persistent nausea and vomiting     Call MD for:  severe uncontrolled pain     Call MD for:  difficulty breathing, headache or visual disturbances     Call MD for:  redness, tenderness, or signs of infection (pain, swelling, redness, odor or green/yellow discharge around incision site)     Call MD for:  hives     Call MD for:  persistent dizziness or light-headedness     Call MD for:  extreme fatigue        TIME SPENT ON DISCHARGE: 45 minutes

## 2025-02-03 NOTE — PT/OT/SLP EVAL
"Physical Therapy Evaluation, Treatment, and Discharge Note    Patient Name:  Kayleen Patel   MRN:  [id number]    Recommendations:     Discharge Recommendations: Low Intensity Therapy  Discharge Equipment Recommendations: none   Barriers to discharge: None    Assessment:     Kayleen Patel is a 54 y.o. female admitted with a medical diagnosis of Primary osteoarthritis of left hip. Patient tolerated PT session well. Patient ambulated 80ft with RW and contact guard assistance. No LOB or SOB noted. Patient ascended/descended 4" step with RW and contact guard assistance. Patient educated in anterior hip precautions. Patient ready to discharge home from PT standpoint.     Recent Surgery: Procedure(s) (LRB):  ARTHROPLASTY, HIP, TOTAL, ANTERIOR APPROACH: LEFT: DEPUY - ACTIS + EMPHASYS: SAME DAY (Left) Day of Surgery    Plan:     During this hospitalization, patient does not require further acute PT services.  Please re-consult if situation changes.      Subjective     Chief Complaint: Left hip pain. Right eye feels like it has a scratch.  Patient/Family Comments/goals: To get back to activities.   Pain/Comfort:  Pain Rating 1: 7/10  Location - Side 1: Left  Location 1: hip  Pain Addressed 1: Reposition, Distraction, Pre-medicate for activity    Patients cultural, spiritual, Congregation conflicts given the current situation:  n/a    Living Environment:  Patient lives alone in a [facility] with 1 step to enter. Prior to admission, patients level of function was independent for functional mobility. Equipment at home from previous surgery: walker, rolling, cane, straight, bedside commode, bath bench (shoe horn). Upon discharge, patient will have assistance from friend.    Objective:     Communicated with RN prior to session. Patient found  seated in wheelchair with cryotherapy upon PT entry to room. Friend present in room.    General Precautions: Standard, fall    Orthopedic Precautions:LLE weight bearing as " "tolerated, LLE anterior precautions (Anterior total hip, WBAT with walker, no extremes of motion, hip flexion 0-90 degrees)   Braces: N/A  Respiratory Status: Room air    Exams:  Cognitive Exam:  Patient is oriented to Person, Place, Time, and Situation  Gross Motor Coordination:  WFL  RLE ROM: WFL  RLE Strength: WFL  LLE ROM: WFL within anterior hip precautions  LLE Strength: appears WFL but did not formally assess due to pain and recent surgery    Functional Mobility:  Transfers:     Sit to Stand: stand by assistance with rolling walker x1 from wheelchair  Gait: Patient ambulated 80ft with Rolling Walker and contact guard assistance using reciprocal gait. Patient demonstrated decreased katie and decreased step length during gait due to pain.  Stairs:  Patient ascended/descended 4" step with RW and contact guard assistance.      Treatment and Education:  Patient educated in:  -PT role and POC  -safety with transfers including hand placement  -gait sequencing and RW management  -OOB activity to maximize recovery including ambulating at home to prevent DVT   -car transfer  -curb training  -HEP for therex at home with handout provided     AM-PAC 6 CLICK MOBILITY  Total Score:23     Patient left  seated in transport chair with all lines intact, call button in reach, RN notified, and friend present.    GOALS:   Multidisciplinary Problems       Physical Therapy Goals       Not on file              Multidisciplinary Problems (Resolved)          Problem: Physical Therapy    Goal Priority Disciplines Outcome Interventions   Physical Therapy Goal   (Resolved)     PT, PT/OT Met                      History:     Past Medical History:   Diagnosis Date    Abnormal cervical Papanicolaou smear '88, '04    Colpo/Cryo    Benign paroxysmal vertigo, bilateral     Chronic diarrhea 02/12/2015    Gastroesophageal reflux disease 12/21/2023    GERD (gastroesophageal reflux disease)     History of acute PID 1988    Hyperlipidemia     " Hypertension     Normocytic anemia 12/22/2023    Thrombocytosis     Urticaria     Vertigo 12/22/2023       Past Surgical History:   Procedure Laterality Date    ADENOIDECTOMY      ARTHROPLASTY OF HIP BY ANTERIOR APPROACH Right 1/17/2024    Procedure: ARTHROPLASTY, HIP, TOTAL, ANTERIOR APPROACH: RIGHT: DEPUY - ACTIS + PINNACLE: SAME DAY;  Surgeon: rCesencio Collins III, MD;  Location: [facility] OR;  Service: Orthopedics;  Laterality: Right;    BREAST BIOPSY Right 06/09/2021    stereo benign    CARPAL TUNNEL RELEASE Left 09/21/2021    Procedure: RELEASE, CARPAL TUNNEL;  Surgeon: Alla Goff MD;  Location: [facility] OR;  Service: Orthopedics;  Laterality: Left;    CARPAL TUNNEL RELEASE Right 02/07/2023    Procedure: RELEASE, CARPAL TUNNEL;  Surgeon: Alla Goff MD;  Location: [facility] OR;  Service: Orthopedics;  Laterality: Right;    COLONOSCOPY, SCREENING, LOW RISK PATIENT N/A 1/13/2025    Procedure: COLONOSCOPY, SCREENING, LOW RISK PATIENT;  Surgeon: Sandy Denton MD;  Location: [facility] ENDOSCOPY;  Service: Endoscopy;  Laterality: N/A;  referred by camila montalvo to portal.cf  1/6/25- attempted pc, [place] and [place].  portal msg sent. DBM  1/7/25-Precall complete-DS    EYE SURGERY  2010    In Record    GANGLION CYST EXCISION      INJECTION OF JOINT Right 03/09/2023    Procedure: INJECTION, JOINT, RIGHT HIP  DIRECT REF;  Surgeon: Harleen Swan MD;  Location: [facility] PAIN MGT;  Service: Pain Management;  Laterality: Right;    INJECTION OF STEROID Right 09/21/2021    Procedure: INJECTION, STEROID-Right carpal tunnel;  Surgeon: Alla Goff MD;  Location: [facility];  Service: Orthopedics;  Laterality: Right;    REFRACTIVE SURGERY Bilateral 2009    Dr. Vazquez Forbes     TONSILLECTOMY         Time Tracking:     PT Received On: 02/03/25  PT Start Time: 1150     PT Stop Time: 1205  PT Total Time (min): 15 min     Billable Minutes: Evaluation 7 and Gait Training 8    02/03/2025

## 2025-02-03 NOTE — PT/OT/SLP EVAL
"Occupational Therapy   Evaluation & Treatment    Name: Kayleen Patel  MRN: [id number]  Admitting Diagnosis: Primary osteoarthritis of left hip  Recent Surgery: Procedure(s) (LRB):  ARTHROPLASTY, HIP, TOTAL, ANTERIOR APPROACH: LEFT: DEPUY - ACTIS + EMPHASYS: SAME DAY (Left) Day of Surgery    Recommendations:     Discharge Recommendations:    Discharge Equipment Recommendations:  grab bar  Barriers to discharge:  None    Assessment:     Kayleen Patel is a 54 y.o. female with a medical diagnosis of Primary osteoarthritis of left hip.  She presents with the following performance deficits affecting function: impaired endurance, impaired functional mobility, gait instability, impaired balance, decreased lower extremity function, pain, impaired cardiopulmonary response to activity, decreased ROM, orthopedic precautions.      Pt agreeable to session, motivated to participate, and tolerated well. Pt receptive to education provided regarding safe body mechanics to promote integrity of healing of incision, maintain anterior hip precautions, and RW management to prevent falls. Pt demonstrating activity tolerance, functional endurance, and functional strength needed for safe participation in self-care routines and is appropriate to return to her least restrictive home environment at this time.     Rehab Prognosis: Good; patient would benefit from acute skilled OT services to address these deficits and reach maximum level of function.       Plan:     Patient to be seen   to address the above listed problems via    Plan of Care Expires:    Plan of Care Reviewed with: patient, friend    Subjective     Chief Complaint: pain at L hip near incision  Patient/Family Comments/goals: "Why do I look so pale?"    Occupational Profile:  Living Environment: Pt lives alone in a [facility] with 1 TREVOR. She has access to a walk-in shower with a shower chair.   Previous level of function: IND with ADLs and ambulation  Roles and " Routines: Pt is not currently working or driving.   Equipment Used at Home: shower chair, walker, rolling, cane, straight, bedside commode, hip kit  Assistance upon Discharge: friend available to assist as needed    Pain/Comfort:  Pain Rating 1: 10/10  Location - Side 1: Left  Location - Orientation 1: anterior  Location 1: hip (near incision)  Pain Addressed 1: Reposition, Distraction  Pain Rating Post-Intervention 1: other (see comments) (not rated)    Patients cultural, spiritual, Christianity conflicts given the current situation: no    Objective:     Communicated with: Nursing prior to session.  Patient found HOB elevated with Other (comments), cryotherapy (no active lines - nursing removing telemetry, Pure Wick, pulse ox, and IV prior to OT entry) upon OT entry to room.    General Precautions: Standard, fall  Orthopedic Precautions: LLE weight bearing as tolerated, LLE anterior precautions  Braces: N/A  Respiratory Status: Room air    Occupational Performance:    Bed Mobility:    Patient completed Scooting/Bridging with supervision  Patient completed Supine to Sit with supervision    Functional Mobility/Transfers:  Patient completed Sit <> Stand Transfer with stand by assistance  with  rolling walker   Patient completed Toilet Transfer Stand Pivot technique with stand by assistance with  rolling walker, bedside commode, and grab bars  Functional Mobility: Pt able to tolerate ambulating throughout room, hallway, and bathroom to simulate household/community distances with RW and SBA provided for safety. Min verbal cues provided for RW management, sequencing of task, safe hand placement during functional transfers, offload WB through BUE as needed, and to kick LLE out when lowering to seated to minimize pain and maintain precautions. No LOB or SOB noted.     Activities of Daily Living:  Grooming: supervision for washing hands in standing at sink  Upper Body Dressing: stand by assistance for donning/doffing hospital  gowns and donning personal bra and sweater in seated in WC  Lower Body Dressing: stand by assistance for doffing hospital socks with reacher, donning personal underwear and yoga pants with reacher in seated and in standing, donning socks with sock-aide in seated, and for donning personal slip-on shoes in seated  Toileting: stand by assistance for herman hygiene and clothing management while seated on toilet    Cognitive/Visual Perceptual:  Cognitive/Psychosocial Skills:     -       Oriented to: Person, Place, Time, and Situation   -       Follows Commands/attention:Follows multistep  commands  -       Communication: clear/fluent  -       Memory: No Deficits noted  -       Safety awareness/insight to disability: impaired   -       Mood/Affect/Coping skills/emotional control: Appropriate to situation and Cooperative    Physical Exam:  Sensation:    -       Intact  light/touch BUE  Upper Extremity Range of Motion:     -       Right Upper Extremity: WFL  -       Left Upper Extremity: WFL  Upper Extremity Strength:    -       Right Upper Extremity: WFL  -       Left Upper Extremity: WFL   Strength:    -       Right Upper Extremity: WFL  -       Left Upper Extremity: WFL  Fine Motor Coordination:    -       Intact  Left hand thumb/finger opposition skills, Right hand thumb/finger opposition skills, Left hand, manipulation of objects, and Right hand, manipulation of objects  Gross motor coordination:   WFL    AMPAC 6 Click ADL:  AMPAC Total Score: 24    Treatment & Education:  Provided education on the role of OT, POC, and therapy goals while in the acute care setting.   Provided education on the importance of continued mobilization and participation in OOB activities to increase functional endurance and activity tolerance for increased participation in ADL routines.   Provided education on safe transfer techniques and proper hand placement to promote safety awareness and prevent falls with functional transfers.    Provided education on potential for orthostatic hypotension with change in position and how to manage symptoms prior to initiating ambulation to prevent falls  Provided education on anterior hip precautions, AE to promote functional independence with ADL routines, showering schedule, and energy conservation techniques  All questions/concerns within the scope of OT answered/addressed - pt verbalized understanding.   Instructed pt to call for assistance when wanting to ambulate or participate in OOB activities to promote safety and prevent falls.     Patient left  up in wheelchair  with  nursing notified and PT present    GOALS:   Multidisciplinary Problems       Occupational Therapy Goals       Not on file                    DME Justifications:  No DME recommended requiring DME justifications    History:     Past Medical History:   Diagnosis Date    Abnormal cervical Papanicolaou smear '88, '04    Colpo/Cryo    Benign paroxysmal vertigo, bilateral     Chronic diarrhea 02/12/2015    Gastroesophageal reflux disease 12/21/2023    GERD (gastroesophageal reflux disease)     History of acute PID 1988    Hyperlipidemia     Hypertension     Normocytic anemia 12/22/2023    Thrombocytosis     Urticaria     Vertigo 12/22/2023         Past Surgical History:   Procedure Laterality Date    ADENOIDECTOMY      ARTHROPLASTY OF HIP BY ANTERIOR APPROACH Right 1/17/2024    Procedure: ARTHROPLASTY, HIP, TOTAL, ANTERIOR APPROACH: RIGHT: DEPUY - ACTIS + PINNACLE: SAME DAY;  Surgeon: Cresencio Collins III, MD;  Location: [facility] OR;  Service: Orthopedics;  Laterality: Right;    BREAST BIOPSY Right 06/09/2021    stereo benign    CARPAL TUNNEL RELEASE Left 09/21/2021    Procedure: RELEASE, CARPAL TUNNEL;  Surgeon: Alla Goff MD;  Location: [facility] OR;  Service: Orthopedics;  Laterality: Left;    CARPAL TUNNEL RELEASE Right 02/07/2023    Procedure: RELEASE, CARPAL TUNNEL;  Surgeon: Alla Goff MD;  Location: [facility] OR;  Service:  Orthopedics;  Laterality: Right;    COLONOSCOPY, SCREENING, LOW RISK PATIENT N/A 1/13/2025    Procedure: COLONOSCOPY, SCREENING, LOW RISK PATIENT;  Surgeon: Sandy Denton MD;  Location: [facility] ENDOSCOPY;  Service: Endoscopy;  Laterality: N/A;  referred by camila montalvo to portal.cf  1/6/25- attempted pc, [place] is [place] and [place] bad connection.  portal msg sent. DBM  1/7/25-Precall complete-DS    EYE SURGERY  2010    In Record    GANGLION CYST EXCISION      INJECTION OF JOINT Right 03/09/2023    Procedure: INJECTION, JOINT, RIGHT HIP  DIRECT REF;  Surgeon: Harleen Swan MD;  Location: [facility] PAIN MGT;  Service: Pain Management;  Laterality: Right;    INJECTION OF STEROID Right 09/21/2021    Procedure: INJECTION, STEROID-Right carpal tunnel;  Surgeon: Alla Goff MD;  Location: [facility] OR;  Service: Orthopedics;  Laterality: Right;    REFRACTIVE SURGERY Bilateral 2009    Dr. Vazquez Forbes     TONSILLECTOMY         Time Tracking:     OT Date of Treatment: 02/03/25  OT Start Time: 1111  OT Stop Time: 1143  OT Total Time (min): 32 min    Billable Minutes:Evaluation 8 minutes  Self Care/Home Management 16 minutes  Therapeutic Activity 8 minutes    2/3/2025

## 2025-02-03 NOTE — ANESTHESIA POSTPROCEDURE EVALUATION
Anesthesia Post Evaluation    Patient: Kayleen Patel    Procedure(s) Performed: Procedure(s) (LRB):  ARTHROPLASTY, HIP, TOTAL, ANTERIOR APPROACH: LEFT: DEPUY - ACTIS + EMPHASYS: SAME DAY (Left)    Final Anesthesia Type: spinal      Patient location during evaluation: PACU  Patient participation: Yes- Able to Participate  Level of consciousness: awake and alert  Post-procedure vital signs: reviewed and stable  Pain management: adequate  Airway patency: patent  CLARISA mitigation strategies: Multimodal analgesia  PONV status at discharge: No PONV  Anesthetic complications: no      Cardiovascular status: blood pressure returned to baseline  Respiratory status: unassisted  Hydration status: euvolemic  Follow-up not needed.              Vitals Value Taken Time   /71 02/03/25 1102   Temp 36.7 °C (98.1 °F) 02/03/25 1147   Pulse 78 02/03/25 1116   Resp 27 02/03/25 1116   SpO2 98 % 02/03/25 1116   Vitals shown include unfiled device data.      Event Time   Out of Recovery 10:48:00         Pain/Gladis Score: Pain Rating Prior to Med Admin: 2 (2/3/2025  9:15 AM)  Gladis Score: 10 (2/3/2025 10:17 AM)

## 2025-02-03 NOTE — ANESTHESIA PREPROCEDURE EVALUATION
02/03/2025  Kayleen Patel is a 54 y.o., female.    Pre-operative evaluation for Procedure(s) (LRB):  ARTHROPLASTY, HIP, TOTAL, ANTERIOR APPROACH: LEFT: DEPUY - ACTIS + EMPHASYS: SAME DAY (Left)      Patient Active Problem List   Diagnosis    DJD (degenerative joint disease) of knee    Calcified granuloma of lung    Hypertension, essential    Fasciculation    Chronic pain of both knees    Insomnia    Tachycardia    Overweight (BMI 25.0-29.9)    Lung nodules    Personal history of nicotine dependence    Aortic atherosclerosis    Left carpal tunnel syndrome    Femoral acetabular impingement    Vertebral anomaly    Right carpal tunnel syndrome    Avascular necrosis of hip    Primary osteoarthritis of right hip    Hyperlipidemia    Alcohol consumption of more than four drinks per week    Elevated liver enzymes    Normocytic anemia    Statin intolerance    Arthritis, hip    Agatston CAC score, <100    Primary osteoarthritis of left hip       Review of patient's allergies indicates:   Allergen Reactions    Adhesive tape-silicones     Penicillins Hives    Adhesive Hives    Celecoxib Itching and Rash       No current facility-administered medications on file prior to encounter.     Current Outpatient Medications on File Prior to Encounter   Medication Sig Dispense Refill    chlorthalidone (HYGROTEN) 25 MG Tab TAKE 1 TABLET DAILY 90 tablet 3    ezetimibe (ZETIA) 10 mg tablet Take 1 tablet (10 mg total) by mouth once daily. 90 tablet 3    pravastatin (PRAVACHOL) 20 MG tablet Take 1 tablet (20 mg total) by mouth once daily. 90 tablet 3    valsartan (DIOVAN) 160 MG tablet TAKE 1 TABLET DAILY 90 tablet 3    astaxanthin 12 mg Cap Take by mouth Daily.      CALCIUM CARBONATE (CALCIUM 500 ORAL) Take 1 capsule by mouth.      cyanocobalamin (VITAMIN B-12) 100 MCG tablet Take 100 mcg by mouth once daily.      ferrous  sulfate 325 mg (65 mg iron) Tab tablet Take 325 mg by mouth daily with breakfast.      glucosamine sulfate (GLUCOSAMINE) 500 mg Tab Take 1,500 mg by mouth Daily.      hydrOXYzine HCL (ATARAX) 25 MG tablet Take 1 tablet (25 mg total) by mouth 3 (three) times daily as needed for Itching. 30 tablet 0    inulin (PREBIOTIC FIBER ORAL) Take 500 mg by mouth once daily. Takes 2 cap in am.      L.acid/L.casei/B.bif/B.bernabe/FOS (PROBIOTIC BLEND ORAL) Take 400 mg by mouth 2 (two) times a day.      MAGNESIUM GLYCINATE, BULK, MISC 120 mg by Misc.(Non-Drug; Combo Route) route once daily.      meclizine (ANTIVERT) 25 mg tablet Take 1 tablet (25 mg total) by mouth 3 (three) times daily as needed for Dizziness. 30 tablet 0    MILK THISTLE ORAL Take 1,000 mg by mouth once daily.      OMEGA-3/DHA/EPA/FISH OIL (OMEGA-3 FISH OIL ORAL) Take 1 capsule by mouth.      ondansetron (ZOFRAN-ODT) 4 MG TbDL Take 1 tablet (4 mg total) by mouth 3 (three) times daily as needed (Nausea and vomiting). 30 tablet 0    UNABLE TO FIND Take 1,950 mg by mouth Daily. medication name: Tumeric         Past Surgical History:   Procedure Laterality Date    ADENOIDECTOMY      ARTHROPLASTY OF HIP BY ANTERIOR APPROACH Right 1/17/2024    Procedure: ARTHROPLASTY, HIP, TOTAL, ANTERIOR APPROACH: RIGHT: DEPUY - ACTIS + PINNACLE: SAME DAY;  Surgeon: Cresencio Collins III, MD;  Location: [facility] OR;  Service: Orthopedics;  Laterality: Right;    BREAST BIOPSY Right 06/09/2021    stereo benign    CARPAL TUNNEL RELEASE Left 09/21/2021    Procedure: RELEASE, CARPAL TUNNEL;  Surgeon: Alla Goff MD;  Location: [facility] OR;  Service: Orthopedics;  Laterality: Left;    CARPAL TUNNEL RELEASE Right 02/07/2023    Procedure: RELEASE, CARPAL TUNNEL;  Surgeon: Alla Goff MD;  Location: [facility] OR;  Service: Orthopedics;  Laterality: Right;    COLONOSCOPY, SCREENING, LOW RISK PATIENT N/A 1/13/2025    Procedure: COLONOSCOPY, SCREENING, LOW RISK PATIENT;  Surgeon: Sandy Denton MD;   Location: [facility] ENDOSCOPY;  Service: Endoscopy;  Laterality: N/A;  referred by camila montalvo to [email or web address]  1/6/25- attempted pc, [place] and [place] connection.  portal msg sent. DBM  1/7/25-Precall complete-DS    EYE SURGERY  2010    In Record    GANGLION CYST EXCISION      INJECTION OF JOINT Right 03/09/2023    Procedure: INJECTION, JOINT, RIGHT HIP  DIRECT REF;  Surgeon: Harleen Swan MD;  Location: [facility] PAIN MGT;  Service: Pain Management;  Laterality: Right;    INJECTION OF STEROID Right 09/21/2021    Procedure: INJECTION, STEROID-Right carpal tunnel;  Surgeon: Alla Goff MD;  Location: [facility] OR;  Service: Orthopedics;  Laterality: Right;    REFRACTIVE SURGERY Bilateral 2009    Dr. Vazquez Forbes     TONSILLECTOMY           CBC:  Lab Results   Component Value Date    WBC 6.06 01/15/2025    RBC 4.20 01/15/2025    HGB 13.7 01/15/2025    HCT 40.1 01/15/2025     01/15/2025    MCV 96 01/15/2025    MCH 32.6 (H) 01/15/2025    MCHC 34.2 01/15/2025       CMP:   Lab Results   Component Value Date     01/15/2025    K 4.1 01/15/2025     01/15/2025    CO2 27 01/15/2025    BUN 14 01/15/2025    CREATININE 0.6 01/15/2025     01/15/2025    CALCIUM 10.5 01/15/2025    ALBUMIN 4.4 01/15/2025    PROT 7.6 01/15/2025    ALKPHOS 67 01/15/2025    ALT 34 01/15/2025    AST 32 01/15/2025    BILITOT 0.4 01/15/2025       INR:  Lab Results   Component Value Date    INR 0.9 01/15/2025         Diagnostic Studies:      EKG:   Results for orders placed or performed during the hospital encounter of 01/15/25   EKG 12-lead    Collection Time: 01/15/25  9:57 AM   Result Value Ref Range    QRS Duration 82 ms    OHS QTC Calculation 418 ms    Narrative    Test Reason : Z01.818,    Vent. Rate :  65 BPM     Atrial Rate :  65 BPM     P-R Int : 150 ms          QRS Dur :  82 ms      QT Int : 402 ms       P-R-T Axes :  65  48  41 degrees    QTcB Int : 418 ms    Normal sinus rhythm  Normal ECG  When compared with ECG  "of 22-Dec-2023 10:57,  No significant change was found  Confirmed by Gilberto Preston ([id number]) on 1/15/2025 1:25:48 PM    Referred By: KENDAL BARCENAS           Confirmed By: Gilberto Preston        Pre-op Vitals [02/03/25 0543]   BP Pulse Resp Temp SpO2   119/67 68 18 36.8 °C (98.2 °F) 97 %      Height Weight BMI (Calculated)     5' 2" 133 lb 24.3            Pre-op Assessment          Review of Systems      Physical Exam  General: Well nourished and Cooperative    Airway:  Mallampati: II   Mouth Opening: Normal  TM Distance: Normal  Tongue: Normal    Dental:  Intact    Chest/Lungs:  Normal Respiratory Rate    Heart:  Rate: Normal  Rhythm: Regular Rhythm        Last PCP note: 3-6 months ago , within [facility]   Subspecialty notes: Cardiology: General, Dermatology, Ortho    Other important co-morbidities: HLD, HTN, and Former Smoker, h/o Heavy ETOH Use and elevated LFTs                      Anesthesia Plan  Type of Anesthesia, risks & benefits discussed:    Anesthesia Type: Gen ETT, CSE, Spinal  Intra-op Monitoring Plan: Standard ASA Monitors  Post Op Pain Control Plan: multimodal analgesia and IV/PO Opioids PRN  Induction:  IV  Informed Consent: Informed consent signed with the Patient and all parties understand the risks and agree with anesthesia plan.  All questions answered.   ASA Score: 2  Anesthesia Plan Notes: Platelets 300  INR 0.9    Ready For Surgery From Anesthesia Perspective.     .  "

## 2025-02-03 NOTE — OPERATIVE NOTE ADDENDUM
Certification of Assistant at Surgery       Surgery Date: 2/3/2025     Participating Surgeons:  Surgeons and Role:     * Cresencio Collins III, MD - Primary    Procedures:  Procedure(s) (LRB):  ARTHROPLASTY, HIP, TOTAL, ANTERIOR APPROACH: LEFT: DEPUY - ACTIS + EMPHASYS: SAME DAY (Left)    Assistant Surgeon's Certification of Necessity:  I understand that section 1842 (b) (6) (d) of the Social Security Act generally prohibits Medicare Part B reasonable charge payment for the services of assistants at surgery in teaching hospitals when qualified residents are available to furnish such services. I certify that the services for which payment is claimed were medically necessary, and that no qualified resident was available to perform the services. I further understand that these services are subject to post-payment review by the Medicare carrier.      Hari Carcamo PA-C    02/03/2025  8:41 AM

## 2025-02-03 NOTE — PLAN OF CARE
"Pt c/o eye leakage and a "scratch" to her R eye. Eye flushed with saline, pt states it feels a little better but still feels a scratch. Dr. Clayton notified. At bedside to assess pt.    "

## 2025-02-03 NOTE — PLAN OF CARE
"Patient tolerated PT session well. Patient ambulated 80ft with RW and contact guard assistance. No LOB or SOB noted. Patient ascended/descended 4" step with RW and contact guard assistance. Patient educated in anterior hip precautions. Patient ready to discharge home from PT standpoint.      Problem: Physical Therapy  Goal: Physical Therapy Goal  Outcome: Met     "

## 2025-02-03 NOTE — TRANSFER OF CARE
"Anesthesia Transfer of Care Note    Patient: Kayleen Patel    Procedure(s) Performed: Procedure(s) (LRB):  ARTHROPLASTY, HIP, TOTAL, ANTERIOR APPROACH: LEFT: DEPUY - ACTIS + EMPHASYS: SAME DAY (Left)    Patient location: PACU    Anesthesia Type: CSE    Transport from OR: Transported from OR on 6-10 L/min O2 by face mask with adequate spontaneous ventilation    Post pain: adequate analgesia    Post assessment: no apparent anesthetic complications and tolerated procedure well    Post vital signs: stable    Level of consciousness: sedated and responds to stimulation    Nausea/Vomiting: no nausea/vomiting    Complications: none    Transfer of care protocol was followed      Last vitals: Visit Vitals  /67   Pulse 65   Temp 36.8 °C (98.2 °F) (Temporal)   Resp 16   Ht 5' 2" (1.575 m)   Wt 60.3 kg (133 lb)   LMP 02/07/2016 (Approximate)   SpO2 97%   Breastfeeding No   BMI 24.33 kg/m²     "

## 2025-02-03 NOTE — PLAN OF CARE
VSS. Pt able to tolerate oral liquids. Pt reports tolerable pain level for D/C. Ice pack and dressing intact. Friend received home meds per bedside delivery. Pt has walker at home. No distress noted. Pt states she is ready for D/C. D/C instructions reviewed with pt and friend, verbalized understanding. Blue armband on pt.

## 2025-02-03 NOTE — PLAN OF CARE
Pre Op complete with no distress noted.  Friend at bedside and no more questions at this time.  Friend at bedside and will take belongings.  Patient has walker in car.

## 2025-02-03 NOTE — ANESTHESIA PROCEDURE NOTES
CSE    Patient location during procedure: OR  Start time: 2/3/2025 7:02 AM  Timeout: 2/3/2025 7:00 AM  End time: 2/3/2025 7:04 AM      Staffing  Authorizing Provider: Arlin Clayton MD  Performing Provider: Arlin Clayton MD    Staffing  Performed by: Arlin Clayton MD  Authorized by: Arlin Clayton MD    Preanesthetic Checklist  Completed: patient identified, IV checked, site marked, risks and benefits discussed, surgical consent, monitors and equipment checked, pre-op evaluation and timeout performed  CSE  Patient position: sitting  Prep: ChloraPrep  Patient monitoring: heart rate, cardiac monitor, continuous pulse ox and continuous capnometry  Approach: midline  Spinal Needle  Needle type: Tiffany   Needle gauge: 25 G  Needle length: 5 in  Epidural Needle  Injection technique: CECE air  Needle type: Tuohy   Needle gauge: 17 G  Needle length: 3.5 in  Needle insertion depth: 4 cm  Location: L3-4  Needle localization: anatomical landmarks   Catheter  Catheter type: springwound  Catheter size: 19 G  Catheter at skin depth: 9 cm  Assessment  Sensory level: T8   Dermatomal levels determined by pinch or prick  Intrathecal Medications:   administered: primary anesthetic mcg of    Medications:    Medications: Intrathecal - mepivacaine (CARBOCAINE) injection 15 mg/mL (1.5%) - Intrathecal   3 mL - 2/3/2025 7:04:00 AM

## 2025-02-05 ENCOUNTER — PATIENT MESSAGE (OUTPATIENT)
Dept: ADMINISTRATIVE | Facility: OTHER | Age: 55
End: 2025-02-05
Payer: OTHER GOVERNMENT

## 2025-02-05 ENCOUNTER — CLINICAL SUPPORT (OUTPATIENT)
Dept: ORTHOPEDICS | Facility: CLINIC | Age: 55
End: 2025-02-05
Payer: OTHER GOVERNMENT

## 2025-02-05 DIAGNOSIS — Z96.641 STATUS POST RIGHT HIP REPLACEMENT: Primary | ICD-10-CM

## 2025-02-05 NOTE — PROGRESS NOTES
I called the patient today regarding her  surgery with Dr. Collins. The patient had a left anterior NICK on 2/3/25.     Pain Scale: 6 / 10    Any issues with Fever: No.    Any issues with medications (specifically DVT prophylaxis): No.    Pain medication: no;  Celebrex : no  Protonix : no  Resume home meds : Yes    Any issues with:   Bowel movements: No.  Passing westley: No.  Urination: No.    Completing at home exercises: Yes    Any concerns regarding their dressing/bandage:  No.    Patient confirmed first HH-PT appointment:  yes on  2/6/25 at 1200.     Any other concerns: No.    Blue Bracelet : yes    The patient was informed that if they have any urgent issues with their bandage, medications or any other health concerns regarding their surgery to call the 24/7 Orthopedic Post-op Hot Line at [phone]. The patient was reminded that if they have any chest pain or shortness of breath to call 911 or go to the ER.    The patient verbalized understanding and does not have any other questions

## 2025-02-10 ENCOUNTER — PATIENT MESSAGE (OUTPATIENT)
Dept: ADMINISTRATIVE | Facility: OTHER | Age: 55
End: 2025-02-10
Payer: OTHER GOVERNMENT

## 2025-02-13 ENCOUNTER — PATIENT MESSAGE (OUTPATIENT)
Dept: ORTHOPEDICS | Facility: CLINIC | Age: 55
End: 2025-02-13
Payer: OTHER GOVERNMENT

## 2025-02-14 DIAGNOSIS — Z96.642 S/P HIP REPLACEMENT, LEFT: ICD-10-CM

## 2025-02-14 RX ORDER — METHOCARBAMOL 750 MG/1
750 TABLET, FILM COATED ORAL 4 TIMES DAILY PRN
Qty: 40 TABLET | Refills: 0 | Status: SHIPPED | OUTPATIENT
Start: 2025-02-14 | End: 2025-02-24

## 2025-02-18 ENCOUNTER — OFFICE VISIT (OUTPATIENT)
Dept: ORTHOPEDICS | Facility: CLINIC | Age: 55
End: 2025-02-18
Payer: OTHER GOVERNMENT

## 2025-02-18 DIAGNOSIS — Z96.642 S/P HIP REPLACEMENT, LEFT: Primary | ICD-10-CM

## 2025-02-18 PROCEDURE — 99999 PR PBB SHADOW E&M-EST. PATIENT-LVL IV: CPT | Mod: PBBFAC,,, | Performed by: NURSE PRACTITIONER

## 2025-02-18 PROCEDURE — 99214 OFFICE O/P EST MOD 30 MIN: CPT | Mod: PBBFAC | Performed by: NURSE PRACTITIONER

## 2025-02-18 NOTE — PROGRESS NOTES
Kayleen Patel presents for initial post-operative visit following a left total hip arthroplasty performed by Dr. Collins on 2/3/2025.       Exam:    Patient is ambulating well without assistive device   Incision is clean and dry without drainage or erythema.      Initial post-operative radiographs reviewed today revealing satisfactory position of the prosthesis.    A/P  2 weeks s/p left total hip arthroplasty    - Patient advised to keep the incision clean and dry for the next 24 hours after which she  may wash the area with antibacterial soap in the shower, but will not submerge incision until one month post op.  - Home health PT completed  - Continue anterior hip precautions x 6 weeks postop.    - Continue aspirin for 1 month post op  - Pain medication: refill not needed  - Follow up in 4 weeks with x-ray. Pt will call clinic immediately with problems/concerns.

## 2025-02-25 ENCOUNTER — TELEPHONE (OUTPATIENT)
Dept: ORTHOPEDICS | Facility: CLINIC | Age: 55
End: 2025-02-25
Payer: OTHER GOVERNMENT

## 2025-02-25 NOTE — TELEPHONE ENCOUNTER
Patient called the joint hotline to report a fall onto her surgical hip, while leafs blowing, Denies pain, deformity or bleeding, still able to bear weight at the same level as before the fell. Advised to ice, rest and continue to monitor. Use more caution when moving and performing yard work as well as report to the joint hotline any changes in swelling , pain, or weight bearing status, verbalized understanding and no additional concerns at this time.

## 2025-03-04 ENCOUNTER — PATIENT MESSAGE (OUTPATIENT)
Dept: ADMINISTRATIVE | Facility: OTHER | Age: 55
End: 2025-03-04
Payer: OTHER GOVERNMENT

## 2025-03-05 ENCOUNTER — PATIENT MESSAGE (OUTPATIENT)
Dept: ADMINISTRATIVE | Facility: OTHER | Age: 55
End: 2025-03-05
Payer: OTHER GOVERNMENT

## 2025-03-18 ENCOUNTER — OFFICE VISIT (OUTPATIENT)
Dept: ORTHOPEDICS | Facility: CLINIC | Age: 55
End: 2025-03-18
Payer: OTHER GOVERNMENT

## 2025-03-18 ENCOUNTER — HOSPITAL ENCOUNTER (OUTPATIENT)
Dept: RADIOLOGY | Facility: HOSPITAL | Age: 55
Discharge: HOME | End: 2025-03-18
Attending: NURSE PRACTITIONER
Payer: OTHER GOVERNMENT

## 2025-03-18 DIAGNOSIS — Z96.642 S/P HIP REPLACEMENT, LEFT: Primary | ICD-10-CM

## 2025-03-18 DIAGNOSIS — Z96.642 S/P HIP REPLACEMENT, LEFT: ICD-10-CM

## 2025-03-18 PROCEDURE — 99213 OFFICE O/P EST LOW 20 MIN: CPT | Mod: PBBFAC,25 | Performed by: NURSE PRACTITIONER

## 2025-03-18 PROCEDURE — 73502 X-RAY EXAM HIP UNI 2-3 VIEWS: CPT | Mod: TC,LT

## 2025-03-18 PROCEDURE — 73502 X-RAY EXAM HIP UNI 2-3 VIEWS: CPT | Mod: 26,LT,, | Performed by: RADIOLOGY

## 2025-03-18 PROCEDURE — 99999 PR PBB SHADOW E&M-EST. PATIENT-LVL III: CPT | Mod: PBBFAC,,, | Performed by: NURSE PRACTITIONER

## 2025-03-18 PROCEDURE — 99024 POSTOP FOLLOW-UP VISIT: CPT | Mod: ,,, | Performed by: NURSE PRACTITIONER

## 2025-03-19 NOTE — PROGRESS NOTES
Kayleen Patel presents for 6 week post-operative visit following a left total hip arthroplasty performed by Dr. Collins on 2/3/2025.       Exam:    Patient is ambulating well without assistive device   Incision is healed without drainage or erythema.      Post-operative radiographs reviewed today revealing satisfactory position of the prosthesis.    A/P  6 weeks s/p left total hip arthroplasty    - phase II exercises given to patient  - Follow up in 6 weeks with Dr. Collins. Pt will call clinic immediately with problems/concerns.

## 2025-03-26 ENCOUNTER — EXTERNAL HOME HEALTH (OUTPATIENT)
Dept: HOME HEALTH SERVICES | Facility: HOSPITAL | Age: 55
End: 2025-03-26
Payer: OTHER GOVERNMENT

## 2025-04-04 ENCOUNTER — PATIENT MESSAGE (OUTPATIENT)
Dept: ADMINISTRATIVE | Facility: OTHER | Age: 55
End: 2025-04-04
Payer: OTHER GOVERNMENT

## 2025-04-15 ENCOUNTER — OFFICE VISIT (OUTPATIENT)
Dept: URGENT CARE | Facility: CLINIC | Age: 55
End: 2025-04-15
Payer: OTHER GOVERNMENT

## 2025-04-15 ENCOUNTER — PATIENT MESSAGE (OUTPATIENT)
Dept: INTERNAL MEDICINE | Facility: CLINIC | Age: 55
End: 2025-04-15
Payer: OTHER GOVERNMENT

## 2025-04-15 VITALS
BODY MASS INDEX: 24.48 KG/M2 | HEART RATE: 75 BPM | WEIGHT: 133 LBS | RESPIRATION RATE: 16 BRPM | TEMPERATURE: 99 F | DIASTOLIC BLOOD PRESSURE: 72 MMHG | HEIGHT: 62 IN | SYSTOLIC BLOOD PRESSURE: 107 MMHG | OXYGEN SATURATION: 98 %
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
## 2025-04-15 DIAGNOSIS — L03.115 CELLULITIS OF RIGHT LOWER EXTREMITY: Primary | ICD-10-CM

## 2025-04-15 DIAGNOSIS — S80.861A INSECT BITE OF RIGHT LOWER LEG, INITIAL ENCOUNTER: ICD-10-CM

## 2025-04-15 PROCEDURE — 99214 OFFICE O/P EST MOD 30 MIN: CPT | Mod: 25,S$GLB,, | Performed by: INTERNAL MEDICINE

## 2025-04-15 PROCEDURE — 96372 THER/PROPH/DIAG INJ SC/IM: CPT | Mod: 59,S$GLB,, | Performed by: INTERNAL MEDICINE

## 2025-04-15 RX ORDER — CEFADROXIL 500 MG/1
1 CAPSULE ORAL EVERY 12 HOURS
Qty: 28 CAPSULE | Refills: 0 | Status: SHIPPED | OUTPATIENT
Start: 2025-04-15 | End: 2025-04-22

## 2025-04-15 RX ORDER — PREDNISONE 20 MG/1
40 TABLET ORAL DAILY
Qty: 4 TABLET | Refills: 0 | Status: SHIPPED | OUTPATIENT
Start: 2025-04-16 | End: 2025-04-18

## 2025-04-15 RX ORDER — LIDOCAINE HYDROCHLORIDE 10 MG/ML
2.1 INJECTION, SOLUTION INFILTRATION; PERINEURAL
Status: COMPLETED | OUTPATIENT
Start: 2025-04-15 | End: 2025-04-15

## 2025-04-15 RX ORDER — PREDNISONE 20 MG/1
40 TABLET ORAL
Status: COMPLETED | OUTPATIENT
Start: 2025-04-15 | End: 2025-04-15

## 2025-04-15 RX ORDER — CEFTRIAXONE 1 G/1
1 INJECTION, POWDER, FOR SOLUTION INTRAMUSCULAR; INTRAVENOUS
Status: COMPLETED | OUTPATIENT
Start: 2025-04-15 | End: 2025-04-15

## 2025-04-15 RX ADMIN — PREDNISONE 40 MG: 20 TABLET ORAL at 03:04

## 2025-04-15 RX ADMIN — CEFTRIAXONE 1 G: 1 INJECTION, POWDER, FOR SOLUTION INTRAMUSCULAR; INTRAVENOUS at 03:04

## 2025-04-15 RX ADMIN — LIDOCAINE HYDROCHLORIDE 2.1 ML: 10 INJECTION, SOLUTION INFILTRATION; PERINEURAL at 04:04

## 2025-04-15 NOTE — PROGRESS NOTES
"Subjective:      Patient ID: Kayleen Patel is a 54 y.o. female.    Vitals:  height is 5' 2" (1.575 m) and weight is 60.3 kg (133 lb). Her oral temperature is 98.5 °F (36.9 °C). Her blood pressure is 107/72 and her pulse is 75. Her respiration is 16 and oxygen saturation is 98%.     Chief Complaint: Insect Bite    This is a 54 y.o. female who presents today with a chief complaint of  insect bite. Patient was stung by a wasp on her lower right leg. Patient has swelling  from just above the ankle and the entire. Patient is also having dizzy spells.       Insect Bite  This is a new problem. The current episode started yesterday. The problem occurs constantly. The problem has been gradually worsening. The symptoms are aggravated by walking and standing. Treatments tried: peroxide and antibacterial soap. The treatment provided mild relief.     ROS   Objective:     Physical Exam  There is a insect bite/sting on the anterior right shin. There is swelling of the leg distal to the bite, there is redness overlying the dorsum of the right foot. Theer is no fluctuance/drainage.   Assessment:     1. Cellulitis of right lower extremity    2. Insect bite of right lower leg, initial encounter        Plan:       Cellulitis of right lower extremity  -     cefTRIAXone injection 1 g  -     cefadroxil (DURICEF) 500 MG Cap; Take 2 capsules (1 g total) by mouth every 12 (twelve) hours. for 7 days  Dispense: 28 capsule; Refill: 0  -     predniSONE (DELTASONE) 20 MG tablet; Take 2 tablets (40 mg total) by mouth once daily. for 2 days  Dispense: 4 tablet; Refill: 0  -     predniSONE tablet 40 mg  -     LIDOcaine HCL 10 mg/ml (1%) injection 2.1 mL    Insect bite of right lower leg, initial encounter  -     LIDOcaine HCL 10 mg/ml (1%) injection 2.1 mL                    "

## 2025-04-29 ENCOUNTER — PATIENT MESSAGE (OUTPATIENT)
Dept: ORTHOPEDICS | Facility: CLINIC | Age: 55
End: 2025-04-29
Payer: OTHER GOVERNMENT

## 2025-04-29 DIAGNOSIS — Z96.643 HISTORY OF BILATERAL HIP REPLACEMENTS: Primary | ICD-10-CM

## 2025-04-29 RX ORDER — CLINDAMYCIN HYDROCHLORIDE 300 MG/1
600 CAPSULE ORAL ONCE
Qty: 2 CAPSULE | Refills: 2 | Status: SHIPPED | OUTPATIENT
Start: 2025-04-29 | End: 2025-04-29

## 2025-05-05 ENCOUNTER — PATIENT MESSAGE (OUTPATIENT)
Dept: ADMINISTRATIVE | Facility: OTHER | Age: 55
End: 2025-05-05
Payer: OTHER GOVERNMENT

## 2025-05-06 ENCOUNTER — OFFICE VISIT (OUTPATIENT)
Dept: ORTHOPEDICS | Facility: CLINIC | Age: 55
End: 2025-05-06
Payer: OTHER GOVERNMENT

## 2025-05-06 VITALS — WEIGHT: 127.63 LBS | HEIGHT: 62 IN | BODY MASS INDEX: 23.49 KG/M2

## 2025-05-06 DIAGNOSIS — Z96.642 S/P HIP REPLACEMENT, LEFT: Primary | ICD-10-CM

## 2025-05-06 PROCEDURE — 99213 OFFICE O/P EST LOW 20 MIN: CPT | Mod: PBBFAC | Performed by: ORTHOPAEDIC SURGERY

## 2025-05-06 PROCEDURE — 99999 PR PBB SHADOW E&M-EST. PATIENT-LVL III: CPT | Mod: PBBFAC,,, | Performed by: ORTHOPAEDIC SURGERY

## 2025-05-06 PROCEDURE — 99024 POSTOP FOLLOW-UP VISIT: CPT | Mod: ,,, | Performed by: ORTHOPAEDIC SURGERY

## 2025-05-06 NOTE — PROGRESS NOTES
Subjective:     HPI:   Kayleen Patel is a 54 y.o. female who presents 12 weeks out from left NICK     Date of surgery:   L ant NICK 2/3/25   R ant NICK 1/17/24    History of Present Illness    CHIEF COMPLAINT:  - Follow-up s/p left total hip arthroplasty.    HPI:  Ms. Patel presents for follow-up after left hip surgery. She reports a history of foot swelling that progressed throughout the day, preventing her from wearing shoes. This led to an urgent care visit where the provider suspected early cellulitis due to discoloration. She was prescribed Cephalexin for seven days, 4 times daily, along with a short course of steroids. She mentions having a cyst removed by a dermatologist on the side of her leg prior to the surgery.    Regarding her current left hip status, she denies pain and reports no limitations with using a walker or crutches. However, she still has some discomfort in the hip flexor tendon area when lifting her leg, particularly when putting on pants or socks. She compares this to her right hip post-surgery experience, noting that the current left hip is less problematic than the right was at this stage. She recently attended a [employer] festival and was able to walk extensively without significant pain.    Her surgical history includes bilateral hip replacements, with the right hip done first and the left more recently. She is approximately nine months post-op for the left hip and one year for the right hip.    She denies any medical diagnoses.    PREVIOUS TREATMENTS:  - Ms. Patel received a steroid injection in the right hip after surgery due to a stubborn tendon    MEDICATIONS:  - Cephalexin: 4 pills daily for 7 days  - Oral steroids: Short course    SURGICAL HISTORY:  - Right hip replacement: Approximately 1 year ago  - Left hip replacement: Approximately 9 months ago  - Cyst removal on leg by dermatologist    WORK STATUS:  - Ms. Patel is cleared to return to work         Medications:  "none    Assistive Devices: none    Limitations: standing hip flexion putting on pants/socks a little sore, not as bad as R was post op     Update:   4/15/2024 visit to urgent care: wasp sting right lower leg    "This is a 54 y.o. female who presents today with a chief complaint of  insect bite. Patient was stung by a wasp on her lower right leg. Patient has swelling  from just above the ankle and the entire. Patient is also having dizzy spells.    Insect Bite  This is a new problem. The current episode started yesterday. The problem occurs constantly. The problem has been gradually worsening. The symptoms are aggravated by walking and standing. Treatments tried: peroxide and antibacterial soap. The treatment provided mild relief.     Plan:   Cellulitis of right lower extremity  -     cefTRIAXone injection 1 g  -     cefadroxil (DURICEF) 500 MG Cap; Take 2 capsules (1 g total) by mouth every 12 (twelve) hours. for 7 days  Dispense: 28 capsule; Refill: 0  -     predniSONE (DELTASONE) 20 MG tablet; Take 2 tablets (40 mg total) by mouth once daily. for 2 days  Dispense: 4 tablet; Refill: 0  -     predniSONE tablet 40 mg  -     LIDOcaine HCL 10 mg/ml (1%) injection 2.1 mL    Insect bite of right lower leg, initial encounter  -     LIDOcaine HCL 10 mg/ml (1%) injection 2.1 mL"    Today:   All now healing well  Doing better than R side at this stage  Did well no pain at [facility]      Objective:   Body mass index is 23.35 kg/m².  Exam:    Gait: limp/antalgic/trendelenburg none    Incision: healed    Active straight leg raise: good strength, no discomfort    Extension: 0    Flexion: 110 - mild discomfort extreme flexion but no pain aSLR    Abduction: 40    Adduction: 30    External rotation: 30    Internal rotation: 20    LLD: 0 cm supine     Physical Exam    Musculoskeletal: Active straight leg raise without pain.  IMAGING:  - XR Bilateral Hips: Initial XRs before any surgeries, revealing severe bilateral hip " degeneration  - XR Bilateral Hips: Current XRs showing bilateral hip replacements with satisfactory alignment and positioning  - XR Left Hip: Demonstrates appropriate positioning and alignment  - XR Bilateral Hips: Two screws visible in each hip replacement         Imaging:  None today    Results                Assessment:       ICD-10-CM ICD-9-CM   1. S/P hip replacement, left  Z96.642 V43.64      Doing well     Plan:       Patient is doing very well with the hip replacement at this time.  They will continue routine care of their hip and see me for their routine follow-up.  If there are problems in the interim they will see me back sooner. Prophylactic antibiotic protocol given and explained to patient.     Assessment & Plan    ARTIFICIAL HIP JOINTS:   Continue with phase 2 exercises.   Avoid extremes of motion.   Keep feet positioned with inside of feet as the safe position, avoid outside positioning.    PAIN MANAGEMENT AND WALKING:   Pace yourself during workouts, listening to your body.   Avoid discomfort.    FOLLOW-UP CARE:   Follow up at 9 months post-op for left hip.   Follow up at 1 year anniversary for right hip.   Follow up every 5 years for both hips after the initial follow-ups.         9 month follow up for annual xray    This note was generated with the assistance of ambient listening technology. Verbal consent was obtained by the patient and accompanying visitor(s) for the recording of patient appointment to facilitate this note. I attest to having reviewed and edited the generated note for accuracy, though some syntax or spelling errors may persist. Please contact the author of this note for any clarification.      No orders of the defined types were placed in this encounter.            Past Medical History:   Diagnosis Date    Abnormal cervical Papanicolaou smear '88, '04    Colpo/Cryo    Benign paroxysmal vertigo, bilateral     Chronic diarrhea 02/12/2015    Gastroesophageal reflux disease  12/21/2023    GERD (gastroesophageal reflux disease)     History of acute PID 1988    Hyperlipidemia     Hypertension     Normocytic anemia 12/22/2023    Thrombocytosis     Urticaria     Vertigo 12/22/2023       Past Surgical History:   Procedure Laterality Date    ADENOIDECTOMY      ARTHROPLASTY OF HIP BY ANTERIOR APPROACH Right 1/17/2024    Procedure: ARTHROPLASTY, HIP, TOTAL, ANTERIOR APPROACH: RIGHT: DEPUY - ACTIS + PINNACLE: SAME DAY;  Surgeon: Cresencio Collins III, MD;  Location: [facility] OR;  Service: Orthopedics;  Laterality: Right;    ARTHROPLASTY OF HIP BY ANTERIOR APPROACH Left 2/3/2025    Procedure: ARTHROPLASTY, HIP, TOTAL, ANTERIOR APPROACH: LEFT: DEPUY - ACTIS + EMPHASYS: SAME DAY;  Surgeon: Cresencio Collins III, MD;  Location: [facility] OR;  Service: Orthopedics;  Laterality: Left;  Large C-Arm Non-Op Side    BREAST BIOPSY Right 06/09/2021    stereo benign    CARPAL TUNNEL RELEASE Left 09/21/2021    Procedure: RELEASE, CARPAL TUNNEL;  Surgeon: Alla Goff MD;  Location: [facility] OR;  Service: Orthopedics;  Laterality: Left;    CARPAL TUNNEL RELEASE Right 02/07/2023    Procedure: RELEASE, CARPAL TUNNEL;  Surgeon: Alla Goff MD;  Location: [facility] OR;  Service: Orthopedics;  Laterality: Right;    COLONOSCOPY, SCREENING, LOW RISK PATIENT N/A 1/13/2025    Procedure: COLONOSCOPY, SCREENING, LOW RISK PATIENT;  Surgeon: Sandy Denton MD;  Location: [facility] ENDOSCOPY;  Service: Endoscopy;  Laterality: N/A;  referred by camila montalvo to portal.cf  1/6/25- attempted pc, [place] is [place] and has bad connection.  portal msg sent. DBM  1/7/25-Precall complete-DS    EYE SURGERY  2010    In Record    GANGLION CYST EXCISION      INJECTION OF JOINT Right 03/09/2023    Procedure: INJECTION, JOINT, RIGHT HIP  DIRECT REF;  Surgeon: Harleen Swan MD;  Location: [facility] PAIN MGT;  Service: Pain Management;  Laterality: Right;    INJECTION OF STEROID Right 09/21/2021    Procedure: INJECTION, STEROID-Right carpal tunnel;   Surgeon: Alla Goff MD;  Location: [facility];  Service: Orthopedics;  Laterality: Right;    REFRACTIVE SURGERY Bilateral     Dr. Vazquez Forbes     TONSILLECTOMY         Family History   Adopted: Yes   Problem Relation Name Age of Onset    Heart disease Mother Ct         Adopted    Cancer Father Gregory         Adopted       Social History     Socioeconomic History    Marital status: Single   Tobacco Use    Smoking status: Former     Current packs/day: 0.00     Average packs/day: 1 pack/day for 23.1 years (23.1 ttl pk-yrs)     Types: Cigarettes     Start date: 1984     Quit date: 2007     Years since quittin.8    Smokeless tobacco: Never   Substance and Sexual Activity    Alcohol use: Yes     Alcohol/week: 20.0 standard drinks of alcohol     Types: 6 Glasses of wine, 8 Cans of beer, 6 Drinks containing 0.5 oz of alcohol per week     Comment: 3-4 times per week    Drug use: No    Sexual activity: Not Currently     Partners: Male     Birth control/protection: None     Comment: In Menopause   Social History Narrative    Retired [employer]. Currently, working for the [employer]. Criminal justice degree from [employer].          Social Drivers of Health     Financial Resource Strain: Low Risk  (2025)    Overall Financial Resource Strain (CARDIA)     Difficulty of Paying Living Expenses: Not hard at all   Food Insecurity: No Food Insecurity (2025)    Hunger Vital Sign     Worried About Running Out of Food in the Last Year: Never true     Ran Out of Food in the Last Year: Never true   Transportation Needs: No Transportation Needs (2023)    PRAPARE - Transportation     Lack of Transportation (Medical): No     Lack of Transportation (Non-Medical): No   Physical Activity: Insufficiently Active (2025)    Exercise Vital Sign     Days of Exercise per Week: 3 days     Minutes of Exercise per Session: 30 min   Stress: No Stress Concern Present (2025)    [employer] [place] [employer] - Occupational Stress Questionnaire     Feeling of Stress : Not at all   Housing Stability: Low Risk  (12/18/2023)    Housing Stability Vital Sign     Unable to Pay for Housing in the Last Year: No     Number of Places Lived in the Last Year: 1     Unstable Housing in the Last Year: No

## 2025-05-29 ENCOUNTER — PATIENT MESSAGE (OUTPATIENT)
Dept: INTERNAL MEDICINE | Facility: CLINIC | Age: 55
End: 2025-05-29
Payer: OTHER GOVERNMENT

## 2025-05-29 DIAGNOSIS — Z12.31 SCREENING MAMMOGRAM, ENCOUNTER FOR: Primary | ICD-10-CM

## 2025-06-10 ENCOUNTER — PATIENT MESSAGE (OUTPATIENT)
Dept: ORTHOPEDICS | Facility: CLINIC | Age: 55
End: 2025-06-10
Payer: OTHER GOVERNMENT

## 2025-06-12 ENCOUNTER — OFFICE VISIT (OUTPATIENT)
Dept: ORTHOPEDICS | Facility: CLINIC | Age: 55
End: 2025-06-12
Payer: OTHER GOVERNMENT

## 2025-06-12 ENCOUNTER — HOSPITAL ENCOUNTER (OUTPATIENT)
Dept: RADIOLOGY | Facility: HOSPITAL | Age: 55
Discharge: HOME | End: 2025-06-12
Payer: OTHER GOVERNMENT

## 2025-06-12 DIAGNOSIS — Z96.642 S/P HIP REPLACEMENT, LEFT: ICD-10-CM

## 2025-06-12 DIAGNOSIS — M76.02 GLUTEAL TENDONITIS OF LEFT BUTTOCK: Primary | ICD-10-CM

## 2025-06-12 PROCEDURE — 73502 X-RAY EXAM HIP UNI 2-3 VIEWS: CPT | Mod: TC,LT

## 2025-06-12 PROCEDURE — 99214 OFFICE O/P EST MOD 30 MIN: CPT | Mod: S$PBB,,,

## 2025-06-12 PROCEDURE — 99999 PR PBB SHADOW E&M-EST. PATIENT-LVL III: CPT | Mod: PBBFAC,,,

## 2025-06-12 PROCEDURE — 73502 X-RAY EXAM HIP UNI 2-3 VIEWS: CPT | Mod: 26,LT,, | Performed by: RADIOLOGY

## 2025-06-12 PROCEDURE — 99213 OFFICE O/P EST LOW 20 MIN: CPT | Mod: PBBFAC,25

## 2025-06-12 RX ORDER — PREGABALIN 75 MG/1
75 CAPSULE ORAL 2 TIMES DAILY
Qty: 60 CAPSULE | Refills: 0 | Status: SHIPPED | OUTPATIENT
Start: 2025-06-12

## 2025-06-13 NOTE — PROGRESS NOTES
"  SUBJECTIVE:     History of Present Illness    CHIEF COMPLAINT:  - Left hip pain    HPI:  Ms. Patel presents with left hip pain that started 10 days ago on June 2nd, worse than before her hip surgery performed approximately 4 months ago in February. The pain originates from the bone at the top of her hip, radiates down the side, and now extends to her lower back. Pain is primarily felt when standing or walking. She denies pain when sitting or lying down.    She reports swelling in the affected area and tenderness to touch, describing the pain as "shooting" and "burning" when walking. She has attempted various treatments without relief, including meloxicam, Motrin, Aleve, muscle relaxers from her previous surgery, and a short course of prednisone (4 tablets) left over from a previous prescription for a wasp bite in April. Ice therapy was ineffective. Stretching the area provides temporary relief for about 2 minutes.    She has been working out at "hot works," primarily doing isometric exercises, and wonders if she might have overdone it or irritated a tendon.    She denies any numbness, tingling, or pain radiating down the leg.    PREVIOUS TREATMENTS:  - Meloxicam: No benefit  - Motrin: No benefit  - Aleve: No benefit  - Muscle relaxers from previous surgery: No benefit  - Ice application: No benefit  - Prednisone (4 tablets): Taken for 2 days, no benefit  - Stretching the affected area: Provides temporary relief for about 2 minutes    MEDICATIONS:  - Meloxicam  - Prednisone 20 mg 2 tablets by mouth  - Methocarbamol    SURGICAL HISTORY:  - Left total hip arthroplasty:  02/03/2025 by Dr. Collins  - Right total hip arthroplasty: 01/17/2024 by Dr. Collins          Past Medical History:   Diagnosis Date    Abnormal cervical Papanicolaou smear '88, '04    Colpo/Cryo    Benign paroxysmal vertigo, bilateral     Chronic diarrhea 02/12/2015    Gastroesophageal reflux disease 12/21/2023    GERD (gastroesophageal reflux " disease)     History of acute PID 1988    Hyperlipidemia     Hypertension     Normocytic anemia 12/22/2023    Thrombocytosis     Urticaria     Vertigo 12/22/2023       Past Surgical History:   Procedure Laterality Date    ADENOIDECTOMY      ARTHROPLASTY OF HIP BY ANTERIOR APPROACH Right 1/17/2024    Procedure: ARTHROPLASTY, HIP, TOTAL, ANTERIOR APPROACH: RIGHT: DEPUY - ACTIS + PINNACLE: SAME DAY;  Surgeon: Cresencio Collins III, MD;  Location: [facility] OR;  Service: Orthopedics;  Laterality: Right;    ARTHROPLASTY OF HIP BY ANTERIOR APPROACH Left 2/3/2025    Procedure: ARTHROPLASTY, HIP, TOTAL, ANTERIOR APPROACH: LEFT: DEPUY - ACTIS + EMPHASYS: SAME DAY;  Surgeon: Cresencio Collins III, MD;  Location: [facility] OR;  Service: Orthopedics;  Laterality: Left;  Large C-Arm Non-Op Side    BREAST BIOPSY Right 06/09/2021    stereo benign    CARPAL TUNNEL RELEASE Left 09/21/2021    Procedure: RELEASE, CARPAL TUNNEL;  Surgeon: Alla Goff MD;  Location: [facility] OR;  Service: Orthopedics;  Laterality: Left;    CARPAL TUNNEL RELEASE Right 02/07/2023    Procedure: RELEASE, CARPAL TUNNEL;  Surgeon: Alla Goff MD;  Location: [facility] OR;  Service: Orthopedics;  Laterality: Right;    COLONOSCOPY, SCREENING, LOW RISK PATIENT N/A 1/13/2025    Procedure: COLONOSCOPY, SCREENING, LOW RISK PATIENT;  Surgeon: Sandy Denton MD;  Location: [facility] ENDOSCOPY;  Service: Endoscopy;  Laterality: N/A;  referred by camila montalvo to portal.cf  1/6/25- attempted pc, pt is out of country and has bad connection.  portal msg sent. DBM  1/7/25-Precall complete-DS    EYE SURGERY  2010    In Record    GANGLION CYST EXCISION      INJECTION OF JOINT Right 03/09/2023    Procedure: INJECTION, JOINT, RIGHT HIP  DIRECT REF;  Surgeon: Harleen Swan MD;  Location: [facility] PAIN MGT;  Service: Pain Management;  Laterality: Right;    INJECTION OF STEROID Right 09/21/2021    Procedure: INJECTION, STEROID-Right carpal tunnel;  Surgeon: Alla Goff MD;  Location: [facility]  OR;  Service: Orthopedics;  Laterality: Right;    REFRACTIVE SURGERY Bilateral 2009    Dr. Vazquez Forbes     TONSILLECTOMY         Family History   Adopted: Yes   Problem Relation Name Age of Onset    Heart disease Mother Ct         Adopted    Cancer Father Gregory         Adopted       Review of patient's allergies indicates:   Allergen Reactions    Adhesive tape-silicones     Penicillins Hives    Adhesive Hives    Celecoxib Itching and Rash       Current Medications[1]      Review of Systems:  ROS:  The updated medical history is in the chart and has been reviewed. A review of systems is updated and there is no reported vision changes, ear/nose/mouth/throat complaints, chest pain, shortness of breath, abdominal pain, urological complaints, fevers or chills, psychiatric complaints. Musculoskeletal and neurologcial symptoms are as documented. All other systems are negative.      OBJECTIVE:     PHYSICAL EXAM:  LMP 02/07/2016 (Approximate)   General: Pleasant, cooperative, NAD.  HEENT: NCAT, sclera nonicteric.  Lungs: Respirations are equal and unlabored.   Abdomen: Soft and non-tender.  CV: 2+ bilateral upper and lower extremity pulses.  Neuro: Sensation intact to light touch.  Skin: Intact throughout LE with no rashes, erythema, or lesions.  Extremities: No LE edema, NVI lower extremities.  Mildly antalgic gait.      left Hip Exam:  TTP:  Superolateral glute antonio  100 degrees flexion  10 degrees extension   20 degrees internal rotation  30 degrees external rotation  40 degrees abduction  30 degrees adduction   0 flexion contracture   negative CHANDA   negative FADIR  negative [facility]    Back Exam:  Tenderness: None   Swelling:  None       Range of Motion:      Flexion: 50     Extension: 10     Lateral Bend:   Right 10                              Left 10     Rotation:          Right 5                              Left 5       SLR:                  Right Negative                             Left Negative        Muscle Strength      Hip Flexion 5/5     Hip Extension 5/5     Abductor: 5/5     Adductor: 5/5     Quadriceps: 5/5     Hamstrings: 5/5     Gastrocnemius: 5/5     Anterior tibialis: 5/5       Reflexes     Patellar: Normal    Achilles: Normal       Sensation: Normal         RADIOGRAPHS:  X-rays of the left hip taken today personally reviewed. Imaging reveals well-aligned and positioned prosthesis with no acute fractures or dislocations.    ASSESSMENT:       ICD-10-CM ICD-9-CM   1. Gluteal tendonitis of left buttock  M76.02 726.5   2. S/P hip replacement, left  Z96.642 V43.64       PLAN:     We discussed with the patient at length all the different treatment options available including anti-inflammatories, acetaminophen, rest, ice, lower extremity strengthening exercise, occasional cortisone injections for temporary relief, and finally surgical intervention.      MEDICATIONS:  - Started Lyrica twice daily for nerve pain.  - Continue Meloxicam daily.  - Take Tylenol as needed for pain.    PROCEDURES:  - Discussed potential ultrasound-guided steroid injection if current treatment plan is ineffective.    FOLLOW UP:  - Follow up in 10 days if symptoms persist.    PATIENT INSTRUCTIONS:  - Rest the affected hip as much as possible.  - Apply ice to the affected area occasionally.  - Avoid extracurricular activities.  - Continue stretching exercises as tolerated.          This note was generated with the assistance of ambient listening technology. Verbal consent was obtained by the patient and accompanying visitor(s) for the recording of patient appointment to facilitate this note. I attest to having reviewed and edited the generated note for accuracy, though some syntax or spelling errors may persist. Please contact the author of this note for any clarification.      Wolfgang Brennan Jr., PA-C           [1]   Current Outpatient Medications:     acetaminophen (TYLENOL) 650 MG TbSR, Take 1 tablet (650 mg total) by mouth every 8  (eight) hours., Disp: 120 tablet, Rfl: 0    aspirin (ECOTRIN) 81 MG EC tablet, Take 1 tablet (81 mg total) by mouth 2 (two) times a day., Disp: 60 tablet, Rfl: 0    CALCIUM CARBONATE (CALCIUM 500 ORAL), Take 1 capsule by mouth., Disp: , Rfl:     chlorthalidone (HYGROTEN) 25 MG Tab, TAKE 1 TABLET DAILY, Disp: 90 tablet, Rfl: 3    cyanocobalamin (VITAMIN B-12) 100 MCG tablet, Take 100 mcg by mouth once daily., Disp: , Rfl:     ezetimibe (ZETIA) 10 mg tablet, Take 1 tablet (10 mg total) by mouth once daily., Disp: 90 tablet, Rfl: 3    ferrous sulfate 325 mg (65 mg iron) Tab tablet, Take 325 mg by mouth daily with breakfast., Disp: , Rfl:     glucosamine sulfate (GLUCOSAMINE) 500 mg Tab, Take 1,500 mg by mouth Daily., Disp: , Rfl:     inulin (PREBIOTIC FIBER ORAL), Take 500 mg by mouth once daily. Takes 2 cap in am., Disp: , Rfl:     L.acid/L.casei/B.bif/B.bernabe/FOS (PROBIOTIC BLEND ORAL), Take 400 mg by mouth 2 (two) times a day., Disp: , Rfl:     MAGNESIUM GLYCINATE, BULK, MISC, 120 mg by Misc.(Non-Drug; Combo Route) route once daily., Disp: , Rfl:     meclizine (ANTIVERT) 25 mg tablet, Take 1 tablet (25 mg total) by mouth 3 (three) times daily as needed for Dizziness., Disp: 30 tablet, Rfl: 0    MILK THISTLE ORAL, Take 1,000 mg by mouth once daily., Disp: , Rfl:     OMEGA-3/DHA/EPA/FISH OIL (OMEGA-3 FISH OIL ORAL), Take 1 capsule by mouth., Disp: , Rfl:     ondansetron (ZOFRAN-ODT) 4 MG TbDL, Take 1 tablet (4 mg total) by mouth 3 (three) times daily as needed (Nausea and vomiting)., Disp: 30 tablet, Rfl: 0    pantoprazole (PROTONIX) 40 MG tablet, Take 1 tablet (40 mg total) by mouth once daily., Disp: 30 tablet, Rfl: 0    pravastatin (PRAVACHOL) 20 MG tablet, Take 1 tablet (20 mg total) by mouth once daily., Disp: 90 tablet, Rfl: 3    pregabalin (LYRICA) 75 MG capsule, Take 1 capsule (75 mg total) by mouth 2 (two) times daily., Disp: 60 capsule, Rfl: 0    senna-docusate 8.6-50 mg (SENNA WITH DOCUSATE SODIUM) 8.6-50 mg  per tablet, Take 1 tablet by mouth once daily. (Patient not taking: Reported on 4/15/2025), Disp: 30 tablet, Rfl: 0    UNABLE TO FIND, Take 1,950 mg by mouth Daily. medication name: Tumeric, Disp: , Rfl:     valsartan (DIOVAN) 160 MG tablet, TAKE 1 TABLET DAILY, Disp: 90 tablet, Rfl: 3

## 2025-06-19 ENCOUNTER — TELEPHONE (OUTPATIENT)
Dept: INTERNAL MEDICINE | Facility: CLINIC | Age: 55
End: 2025-06-19
Payer: OTHER GOVERNMENT

## 2025-06-19 ENCOUNTER — HOSPITAL ENCOUNTER (OUTPATIENT)
Dept: RADIOLOGY | Facility: HOSPITAL | Age: 55
Discharge: HOME | End: 2025-06-19
Attending: FAMILY MEDICINE
Payer: OTHER GOVERNMENT

## 2025-06-19 ENCOUNTER — PATIENT MESSAGE (OUTPATIENT)
Dept: INTERNAL MEDICINE | Facility: CLINIC | Age: 55
End: 2025-06-19
Payer: OTHER GOVERNMENT

## 2025-06-19 DIAGNOSIS — Z12.31 SCREENING MAMMOGRAM, ENCOUNTER FOR: ICD-10-CM

## 2025-06-19 DIAGNOSIS — Z12.31 ENCOUNTER FOR SCREENING MAMMOGRAM FOR MALIGNANT NEOPLASM OF BREAST: Primary | ICD-10-CM

## 2025-06-19 NOTE — TELEPHONE ENCOUNTER
----- Message from Nurse Sheldon sent at 6/19/2025  1:51 PM CDT -----  Regarding: order for mammogram  Patient was scheduled for a screening mammogram today, but she is requesting a screening mammogram with contrast.  I can change the order for Dr Clay to contrast screen and resend to him to sign if that is ok with Dr Clay.  Thank you.

## 2025-06-20 ENCOUNTER — HOSPITAL ENCOUNTER (OUTPATIENT)
Dept: RADIOLOGY | Facility: HOSPITAL | Age: 55
Discharge: HOME | End: 2025-06-20
Attending: FAMILY MEDICINE
Payer: OTHER GOVERNMENT

## 2025-06-20 ENCOUNTER — TELEPHONE (OUTPATIENT)
Dept: RADIOLOGY | Facility: HOSPITAL | Age: 55
End: 2025-06-20
Payer: OTHER GOVERNMENT

## 2025-06-20 ENCOUNTER — PATIENT MESSAGE (OUTPATIENT)
Dept: INTERNAL MEDICINE | Facility: CLINIC | Age: 55
End: 2025-06-20
Payer: OTHER GOVERNMENT

## 2025-06-20 DIAGNOSIS — Z12.31 ENCOUNTER FOR SCREENING MAMMOGRAM FOR MALIGNANT NEOPLASM OF BREAST: ICD-10-CM

## 2025-06-20 LAB
CREAT SERPL-MCNC: 0.7 MG/DL (ref 0.5–1.4)
SAMPLE: NORMAL

## 2025-06-20 PROCEDURE — 77067 SCR MAMMO BI INCL CAD: CPT | Mod: 26,,, | Performed by: RADIOLOGY

## 2025-06-20 PROCEDURE — 77067 SCR MAMMO BI INCL CAD: CPT | Mod: TC

## 2025-06-20 PROCEDURE — 25500020 PHARM REV CODE 255: Performed by: FAMILY MEDICINE

## 2025-06-20 PROCEDURE — 77063 BREAST TOMOSYNTHESIS BI: CPT | Mod: 26,,, | Performed by: RADIOLOGY

## 2025-06-20 PROCEDURE — 96374 THER/PROPH/DIAG INJ IV PUSH: CPT | Mod: ,,, | Performed by: RADIOLOGY

## 2025-06-20 RX ADMIN — IOHEXOL 90 ML: 350 INJECTION, SOLUTION INTRAVENOUS at 01:06

## 2025-06-20 NOTE — TELEPHONE ENCOUNTER
[facility] scheduled [facility]25 . Patient was instructed on fasting for the contrast mammogram, nothing to eat or drink 2 hours prior to the contrast mammogram.

## 2025-06-20 NOTE — TELEPHONE ENCOUNTER
LOV with Herbert Clay MD , 7/16/2024  Pt reports a contrast enhanced mammogram was recommended when she went for her appt; pt requesting an order for this.

## 2025-06-20 NOTE — TELEPHONE ENCOUNTER
There is no mammogram with contrast.    I reordered the mammogram with janelle. See radiology orders and please call patient to schedule ordered test. Thank you.

## 2025-06-21 ENCOUNTER — RESULTS FOLLOW-UP (OUTPATIENT)
Dept: INTERNAL MEDICINE | Facility: CLINIC | Age: 55
End: 2025-06-21

## 2025-06-24 ENCOUNTER — PATIENT MESSAGE (OUTPATIENT)
Dept: ORTHOPEDICS | Facility: CLINIC | Age: 55
End: 2025-06-24
Payer: OTHER GOVERNMENT

## 2025-06-24 DIAGNOSIS — M76.02 GLUTEAL TENDONITIS OF LEFT BUTTOCK: Primary | ICD-10-CM

## 2025-06-24 DIAGNOSIS — Z96.642 S/P HIP REPLACEMENT, LEFT: ICD-10-CM

## 2025-07-01 ENCOUNTER — OFFICE VISIT (OUTPATIENT)
Dept: ORTHOPEDICS | Facility: CLINIC | Age: 55
End: 2025-07-01
Payer: OTHER GOVERNMENT

## 2025-07-01 VITALS — WEIGHT: 125.69 LBS | BODY MASS INDEX: 23.13 KG/M2 | HEIGHT: 62 IN

## 2025-07-01 DIAGNOSIS — Z96.642 S/P HIP REPLACEMENT, LEFT: Primary | ICD-10-CM

## 2025-07-01 DIAGNOSIS — Z96.642 PRESENCE OF LEFT ARTIFICIAL HIP JOINT: ICD-10-CM

## 2025-07-01 PROCEDURE — 99024 POSTOP FOLLOW-UP VISIT: CPT | Mod: ,,, | Performed by: ORTHOPAEDIC SURGERY

## 2025-07-01 PROCEDURE — 99999 PR PBB SHADOW E&M-EST. PATIENT-LVL II: CPT | Mod: PBBFAC,,, | Performed by: ORTHOPAEDIC SURGERY

## 2025-07-01 PROCEDURE — 99212 OFFICE O/P EST SF 10 MIN: CPT | Mod: PBBFAC | Performed by: ORTHOPAEDIC SURGERY

## 2025-07-01 RX ORDER — METHYLPREDNISOLONE 4 MG/1
TABLET ORAL
Qty: 1 EACH | Refills: 0 | Status: SHIPPED | OUTPATIENT
Start: 2025-07-01

## 2025-07-01 NOTE — PROGRESS NOTES
Subjective:     HPI:   Kayleen Patel is a 55 y.o. female who presents 12 weeks out from left NICK     Date of surgery:   L ant NICK 2/3/25   R ant NICK 1/17/24    History of Present Illness    CHIEF COMPLAINT:  - Left hip pain    HPI:  Ms. Patel presents with left hip pain that started suddenly one month ago during a trip to [place] for a concert. Pain initially localized to the left hip area, described as a burning sensation, and has since spread across the hip, radiating from back to front. She characterizes it as feeling similar to a bruise and states it burns. Pain is significant with standing and walking, and she cannot stand on the affected leg for long due to severe pain. Initially, she required a cane for ambulation due to pain severity. Although improved somewhat, she still experiences discomfort with each step, describing it as more of an aggravating sensation than severe pain.    She reports tenderness to touch along the iliac crest and occasional warmth in the area. Pain does not radiate down the back of her thigh or leg. 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    Treatments tried include Meloxicam, Lyrica, and Tylenol, which provided minimal relief. Muscle relaxants before sleep and heat application sometimes help, but ice was ineffective. She has attempted stretching and foam rolling with limited benefit. One instance of significant pain relief occurred after taking Tramadol, but pain returned. Prior to pain onset, she had resumed exercise in May, participating in low-impact isometric holds in an infrared sauna environment.    She denies swelling in the left leg, and pain with hip range of motion testing.    PREVIOUS TREATMENTS:  - Meloxicam: Tried for about a week, minimal benefit  - Heat application: Sometimes helps, but pain returns after  - Ice application: Initially tried, no benefit  - Muscle relaxants: Taken  "before sleep, provides some relief  - Yoga and isometric holds: Performed in hot environment  - Foam rolling: Attempted, limited benefit    MEDICATIONS:  - Meloxicam: Minimal benefit  - Lyrica: No significant relief  - Tylenol: No significant relief  - Muscle relaxant: Taken before sleep, provides some relief  - Tramadol: Taken once, provided temporary relief    SURGICAL HISTORY:  - Carpal tunnel surgery: Date unspecified    WORK STATUS:  - Works as a   - Job involves both sitting and standing at a desk  - Not physically strenuous  - Coworkers have noticed a change in walking pattern due to hip pain         Medications: none    Assistive Devices: none    Limitations: none    I saw her 5/6/25 she was doing well, left side doing better than R at that point, benign exam   Then saw Wolfgang Brennan 6/12/25:   "Ms. Patel presents with left hip pain that started 10 days ago on June 2nd, worse than before her hip surgery performed approximately 4 months ago in February. The pain originates from the bone at the top of her hip, radiates down the side, and now extends to her lower back. Pain is primarily felt when standing or walking. She denies pain when sitting or lying down. "  "She has been working out at "hot works," primarily doing isometric exercises, and wonders if she might have overdone it or irritated a tendon. "  MEDICATIONS:  - Started Lyrica twice daily for nerve pain.  - Continue Meloxicam daily.  - Take Tylenol as needed for pain.    -did not Rx steroids, had just gotten them in April after wasp sting    Today:   Sx's started 6/2/25 points to post lat buttock, burning, now across lat hip/iliac crest, doesn't radiate distally down post tigh or leg   Not GT  Rx's above did not help   Also m relaxer at night   Heat sometimes helps         Objective:   Body mass index is 22.98 kg/m².  Exam:    Gait: limp/antalgic slight, trendelenburg none    Incision: healed    Active straight leg raise: good " strength, no discomfort - no pain with aSLR/resisted hip flexion     Extension: 0    Flexion: 100    Abduction: 40    Adduction: 30    External rotation: 50    Internal rotation: 20 - no pain hip a/p ROM     LLD: 0 cm supine     TTP iliac crest - PSIS along iliac crest to ASIS   Nttp abductors, nttp GT, no pain with a/resisted hip abduction   No postural rib/pelvis contact    Physical Exam    MSK: Spine - Hip: Pain on palpation of iliac crest. Normal hip abduction strength. No tenderness over greater trochanter. Normal hip range of motion.  Extremities: No leg swelling.  Musculoskeletal: Normal straight leg raise bilaterally.  Skin: Well-healed incisions.  IMAGING:  - XR Hip: June 12th, normal findings         Imaging:  XR 6/12/25 - no change, stable     Results                  Assessment:       ICD-10-CM ICD-9-CM   1. S/P hip replacement, left  Z96.642 V43.64   2. Presence of left artificial hip joint  Z96.642 V43.64        NICK mech ok  Ttp along iliac crest from PSIS to ASIS   ?quadratus     Plan:       Patient is doing very well with the hip replacement at this time.  They will continue routine care of their hip and see me for their routine follow-up.  If there are problems in the interim they will see me back sooner. Prophylactic antibiotic protocol given and explained to patient.     Assessment & Plan    PAIN IN LEFT HIP:   Prescribed Medrol Dosepak (steroids) to address potential soft tissue inflammation, irritation, or tendinitis.   Ordered MRI Iliac Crest.    FOLLOW-UP:   Follow up after MRI.       Unusual pain distribution   NICK ok does not appear to be involved  Hip abductors appear asymptomatic     RX medrol dose pack   MRI pelvis to evaluate iliac crest region of pain     Rest, act mod    F/u after MRI       This note was generated with the assistance of ambient listening technology. Verbal consent was obtained by the patient and accompanying visitor(s) for the recording of patient appointment to  facilitate this note. I attest to having reviewed and edited the generated note for accuracy, though some syntax or spelling errors may persist. Please contact the author of this note for any clarification.      Orders Placed This Encounter   Procedures    MRI Hip Without Contrast Left     MARS MRI left hip non-contrast: pain along iliac crest from PSIS to ASIS, eval iliac crest     Standing Status:   Future     Expected Date:   7/1/2025     Expiration Date:   7/1/2026     Does the patient have or ever had a pacemaker or a defibrillator (Note: Some facilities may not be able to schedule an MRI for patients with pacemakers and defibrillators. You should contact your local radiology dept to determine if this is the case.)?:   No     Does the patient have an aneurysm or surgical clip, pump, nerve/brain stimulator, middle/inner ear prosthesis, or other metal implant or foreign object (bullet, shrapnel)? If they have a card related to their implant, ask them to bring it. Issues related to the implant may cause the MRI to be delayed.:   Yes              NICK = MRI compatible     Will the patient require po anxiolysis or sedation?:   No     May the Radiologist modify the order per protocol to meet the clinical needs of the patient?:   Yes     Does the patient have on a skin patch for medication with aluminized backing?:   No       Medications Ordered This Encounter   Medications    methylPREDNISolone (MEDROL DOSEPACK) 4 mg tablet     Sig: use as directed     Dispense:  1 each     Refill:  0        Past Medical History:   Diagnosis Date    Abnormal cervical Papanicolaou smear '88, '04    Colpo/Cryo    Benign paroxysmal vertigo, bilateral     Chronic diarrhea 02/12/2015    Gastroesophageal reflux disease 12/21/2023    GERD (gastroesophageal reflux disease)     History of acute PID 1988    Hyperlipidemia     Hypertension     Normocytic anemia 12/22/2023    Thrombocytosis     Urticaria     Vertigo 12/22/2023       Past Surgical  History:   Procedure Laterality Date    ADENOIDECTOMY      ARTHROPLASTY OF HIP BY ANTERIOR APPROACH Right 1/17/2024    Procedure: ARTHROPLASTY, HIP, TOTAL, ANTERIOR APPROACH: RIGHT: DEPUY - ACTIS + PINNACLE: SAME DAY;  Surgeon: Cresencio Collins III, MD;  Location: [facility];  Service: Orthopedics;  Laterality: Right;    ARTHROPLASTY OF HIP BY ANTERIOR APPROACH Left 2/3/2025    Procedure: ARTHROPLASTY, HIP, TOTAL, ANTERIOR APPROACH: LEFT: DEPUY - ACTIS + EMPHASYS: SAME DAY;  Surgeon: Cresencio Collins III, MD;  Location: [facility] OR;  Service: Orthopedics;  Laterality: Left;  Large C-Arm Non-Op Side    BREAST BIOPSY Right 06/09/2021    stereo benign    CARPAL TUNNEL RELEASE Left 09/21/2021    Procedure: RELEASE, CARPAL TUNNEL;  Surgeon: Alla Goff MD;  Location: [facility] OR;  Service: Orthopedics;  Laterality: Left;    CARPAL TUNNEL RELEASE Right 02/07/2023    Procedure: RELEASE, CARPAL TUNNEL;  Surgeon: Alla Goff MD;  Location: [facility] OR;  Service: Orthopedics;  Laterality: Right;    COLONOSCOPY, SCREENING, LOW RISK PATIENT N/A 1/13/2025    Procedure: COLONOSCOPY, SCREENING, LOW RISK PATIENT;  Surgeon: Sandy Denton MD;  Location: [facility];  Service: Endoscopy;  Laterality: N/A;  referred by camila montalvo to [email or web address]  1/6/25- attempted pc, [place] is [place] of [place] and has bad connection.  portal msg sent. DBM  1/7/25-Precall complete-DS    EYE SURGERY  2010    In Record    GANGLION CYST EXCISION      INJECTION OF JOINT Right 03/09/2023    Procedure: INJECTION, JOINT, RIGHT HIP  DIRECT REF;  Surgeon: Harleen Swan MD;  Location: [facility] [place], operated by [facility];  Service: Pain Management;  Laterality: Right;    INJECTION OF STEROID Right 09/21/2021    Procedure: INJECTION, STEROID-Right carpal tunnel;  Surgeon: Alla Goff MD;  Location: [facility];  Service: Orthopedics;  Laterality: Right;    REFRACTIVE SURGERY Bilateral 2009    Dr. Vazquez Forbes     TONSILLECTOMY         Family History   Adopted: Yes   Problem Relation Name  Age of Onset    Heart disease Mother Ct         Adopted    Cancer Father Gregory         Adopted       Social History     Socioeconomic History    Marital status: Single   Tobacco Use    Smoking status: Former     Current packs/day: 0.00     Average packs/day: 1 pack/day for 23.1 years (23.1 ttl pk-yrs)     Types: Cigarettes     Start date: 1984     Quit date: 2007     Years since quittin.9    Smokeless tobacco: Never   Substance and Sexual Activity    Alcohol use: Yes     Alcohol/week: 20.0 standard drinks of alcohol     Types: 6 Glasses of wine, 8 Cans of beer, 6 Drinks containing 0.5 oz of alcohol per week     Comment: 3-4 times per week    Drug use: No    Sexual activity: Not Currently     Partners: Male     Birth control/protection: None     Comment: In Menopause   Social History Narrative    Retired [employer]. Currently, working for the [employer]. Criminal justice degree from [employer].          Social Drivers of Health     Financial Resource Strain: Low Risk  (2025)    Overall Financial Resource Strain (CARDIA)     Difficulty of Paying Living Expenses: Not hard at all   Food Insecurity: No Food Insecurity (2025)    Hunger Vital Sign     Worried About Running Out of Food in the Last Year: Never true     Ran Out of Food in the Last Year: Never true   Transportation Needs: No Transportation Needs (2023)    PRAPARE - Transportation     Lack of Transportation (Medical): No     Lack of Transportation (Non-Medical): No   Physical Activity: Insufficiently Active (2025)    Exercise Vital Sign     Days of Exercise per Week: 3 days     Minutes of Exercise per Session: 30 min   Stress: No Stress Concern Present (2025)    Citizen of [place] [employer] - Occupational Stress Questionnaire     Feeling of Stress : Not at all   Housing Stability: Low Risk  (2023)    Housing Stability Vital Sign     Unable to Pay for Housing in the Last Year: No     Number of Places Lived  in the Last Year: 1     Unstable Housing in the Last Year: No

## 2025-07-17 ENCOUNTER — LAB VISIT (OUTPATIENT)
Dept: LAB | Facility: HOSPITAL | Age: 55
End: 2025-07-17
Payer: OTHER GOVERNMENT

## 2025-07-17 ENCOUNTER — OFFICE VISIT (OUTPATIENT)
Dept: INTERNAL MEDICINE | Facility: CLINIC | Age: 55
End: 2025-07-17
Attending: FAMILY MEDICINE
Payer: OTHER GOVERNMENT

## 2025-07-17 VITALS
HEART RATE: 73 BPM | WEIGHT: 129.44 LBS | DIASTOLIC BLOOD PRESSURE: 68 MMHG | BODY MASS INDEX: 23.82 KG/M2 | HEIGHT: 62 IN | SYSTOLIC BLOOD PRESSURE: 104 MMHG | OXYGEN SATURATION: 96 %

## 2025-07-17 DIAGNOSIS — E78.5 HYPERLIPIDEMIA, UNSPECIFIED HYPERLIPIDEMIA TYPE: ICD-10-CM

## 2025-07-17 DIAGNOSIS — Z98.890 HISTORY OF BILATERAL CARPAL TUNNEL RELEASE: ICD-10-CM

## 2025-07-17 DIAGNOSIS — R91.8 LUNG NODULES: ICD-10-CM

## 2025-07-17 DIAGNOSIS — I70.0 AORTIC ATHEROSCLEROSIS: ICD-10-CM

## 2025-07-17 DIAGNOSIS — R93.1 AGATSTON CAC SCORE, <100: ICD-10-CM

## 2025-07-17 DIAGNOSIS — I10 HYPERTENSION, ESSENTIAL: ICD-10-CM

## 2025-07-17 DIAGNOSIS — I10 ESSENTIAL HYPERTENSION: ICD-10-CM

## 2025-07-17 DIAGNOSIS — Z87.891 PERSONAL HISTORY OF NICOTINE DEPENDENCE: ICD-10-CM

## 2025-07-17 DIAGNOSIS — Z00.00 ANNUAL PHYSICAL EXAM: Primary | ICD-10-CM

## 2025-07-17 DIAGNOSIS — M87.059 AVASCULAR NECROSIS OF BONE OF HIP, UNSPECIFIED LATERALITY: ICD-10-CM

## 2025-07-17 DIAGNOSIS — Z78.9 STATIN INTOLERANCE: ICD-10-CM

## 2025-07-17 DIAGNOSIS — Z00.00 ANNUAL PHYSICAL EXAM: ICD-10-CM

## 2025-07-17 DIAGNOSIS — G56.02 LEFT CARPAL TUNNEL SYNDROME: ICD-10-CM

## 2025-07-17 PROBLEM — M16.11 PRIMARY OSTEOARTHRITIS OF RIGHT HIP: Status: RESOLVED | Noted: 2023-12-21 | Resolved: 2025-07-17

## 2025-07-17 PROBLEM — G89.29 CHRONIC PAIN OF BOTH KNEES: Status: RESOLVED | Noted: 2018-11-01 | Resolved: 2025-07-17

## 2025-07-17 PROBLEM — M16.10 ARTHRITIS, HIP: Status: RESOLVED | Noted: 2024-07-16 | Resolved: 2025-07-17

## 2025-07-17 PROBLEM — M25.859 FEMORAL ACETABULAR IMPINGEMENT: Status: RESOLVED | Noted: 2022-04-23 | Resolved: 2025-07-17

## 2025-07-17 PROBLEM — M16.12 PRIMARY OSTEOARTHRITIS OF LEFT HIP: Status: RESOLVED | Noted: 2025-01-15 | Resolved: 2025-07-17

## 2025-07-17 PROBLEM — M25.561 CHRONIC PAIN OF BOTH KNEES: Status: RESOLVED | Noted: 2018-11-01 | Resolved: 2025-07-17

## 2025-07-17 PROBLEM — M25.562 CHRONIC PAIN OF BOTH KNEES: Status: RESOLVED | Noted: 2018-11-01 | Resolved: 2025-07-17

## 2025-07-17 LAB
ALBUMIN SERPL BCP-MCNC: 4.6 G/DL (ref 3.5–5.2)
ALP SERPL-CCNC: 54 UNIT/L (ref 40–150)
ALT SERPL W/O P-5'-P-CCNC: 27 UNIT/L (ref 10–44)
ANION GAP (OHS): 10 MMOL/L (ref 8–16)
AST SERPL-CCNC: 25 UNIT/L (ref 11–45)
BILIRUB SERPL-MCNC: 0.5 MG/DL (ref 0.1–1)
BUN SERPL-MCNC: 10 MG/DL (ref 6–20)
CALCIUM SERPL-MCNC: 10.1 MG/DL (ref 8.7–10.5)
CHLORIDE SERPL-SCNC: 105 MMOL/L (ref 95–110)
CHOLEST SERPL-MCNC: 183 MG/DL (ref 120–199)
CHOLEST/HDLC SERPL: 2 {RATIO} (ref 2–5)
CO2 SERPL-SCNC: 27 MMOL/L (ref 23–29)
CREAT SERPL-MCNC: 0.6 MG/DL (ref 0.5–1.4)
GFR SERPLBLD CREATININE-BSD FMLA CKD-EPI: >60 ML/MIN/1.73/M2
GLUCOSE SERPL-MCNC: 99 MG/DL (ref 70–110)
HDLC SERPL-MCNC: 90 MG/DL (ref 40–75)
HDLC SERPL: 49.2 % (ref 20–50)
LDLC SERPL CALC-MCNC: 76.8 MG/DL (ref 63–159)
NONHDLC SERPL-MCNC: 93 MG/DL
POTASSIUM SERPL-SCNC: 4.1 MMOL/L (ref 3.5–5.1)
PROT SERPL-MCNC: 7.3 GM/DL (ref 6–8.4)
SODIUM SERPL-SCNC: 142 MMOL/L (ref 136–145)
TRIGL SERPL-MCNC: 81 MG/DL (ref 30–150)

## 2025-07-17 PROCEDURE — 99396 PREV VISIT EST AGE 40-64: CPT | Mod: S$PBB,,, | Performed by: FAMILY MEDICINE

## 2025-07-17 PROCEDURE — 82465 ASSAY BLD/SERUM CHOLESTEROL: CPT

## 2025-07-17 PROCEDURE — 36415 COLL VENOUS BLD VENIPUNCTURE: CPT

## 2025-07-17 PROCEDURE — 80053 COMPREHEN METABOLIC PANEL: CPT

## 2025-07-17 PROCEDURE — 99999 PR PBB SHADOW E&M-EST. PATIENT-LVL IV: CPT | Mod: PBBFAC,,, | Performed by: FAMILY MEDICINE

## 2025-07-17 PROCEDURE — 99214 OFFICE O/P EST MOD 30 MIN: CPT | Mod: PBBFAC | Performed by: FAMILY MEDICINE

## 2025-07-17 RX ORDER — CHLORTHALIDONE 25 MG/1
25 TABLET ORAL DAILY
Qty: 90 TABLET | Refills: 3 | Status: SHIPPED | OUTPATIENT
Start: 2025-07-17

## 2025-07-17 RX ORDER — PRAVASTATIN SODIUM 20 MG/1
20 TABLET ORAL DAILY
Qty: 90 TABLET | Refills: 3 | Status: SHIPPED | OUTPATIENT
Start: 2025-07-17 | End: 2025-07-17 | Stop reason: SDUPTHER

## 2025-07-17 RX ORDER — VALSARTAN 160 MG/1
160 TABLET ORAL DAILY
Qty: 90 TABLET | Refills: 3 | Status: SHIPPED | OUTPATIENT
Start: 2025-07-17 | End: 2025-07-17 | Stop reason: SDUPTHER

## 2025-07-17 RX ORDER — PRAVASTATIN SODIUM 20 MG/1
20 TABLET ORAL DAILY
Qty: 90 TABLET | Refills: 3 | Status: SHIPPED | OUTPATIENT
Start: 2025-07-17

## 2025-07-17 RX ORDER — VALSARTAN 160 MG/1
160 TABLET ORAL DAILY
Qty: 90 TABLET | Refills: 3 | Status: SHIPPED | OUTPATIENT
Start: 2025-07-17

## 2025-07-17 RX ORDER — CHLORTHALIDONE 25 MG/1
25 TABLET ORAL DAILY
Qty: 90 TABLET | Refills: 3 | Status: SHIPPED | OUTPATIENT
Start: 2025-07-17 | End: 2025-07-17 | Stop reason: SDUPTHER

## 2025-07-17 NOTE — ASSESSMENT & PLAN NOTE
-Noted on June 2021 LDCT - subsequent Agaston score 3 (7/27/2021).  -did not tolerate atorvastatin (LFT's) 2024  -pravastatin low dose  -zetia added by cardiology 9/2024    -cardiology annual  f/u

## 2025-07-17 NOTE — PROGRESS NOTES
Subjective:       Patient ID: Kayleen Patel is a 55 y.o. female.    Chief Complaint: Annual Exam    Established patient for an annual wellness check/physical exam and also chronic disease management. Specific complaints - see dictation, M*model entries and please see ROS.  Past, Surgical, Family, Social Histories; Medications, Allergies reviewed and reconciled.  Health maintenance file reviewed and addressed items due. Recent applicable lab, imaging and cardiovascular results reviewed.  Problem list items reviewed and modified or added entries (in the overview section) may not be transcribed into this encounter note due to note writer format.      L hip surgery. Still seeing ortho.    Cardiology in jan.        Review of Systems   Constitutional:  Negative for activity change and unexpected weight change.   HENT:  Negative for hearing loss, rhinorrhea and trouble swallowing.    Eyes:  Negative for discharge and visual disturbance.   Respiratory:  Negative for chest tightness and wheezing.    Cardiovascular:  Positive for palpitations. Negative for chest pain.   Gastrointestinal:  Negative for blood in stool, constipation, diarrhea and vomiting.   Endocrine: Positive for polydipsia. Negative for polyuria.   Genitourinary:  Negative for difficulty urinating, dysuria, hematuria and menstrual problem.   Musculoskeletal:  Positive for arthralgias and gait problem. Negative for joint swelling and neck pain.   Neurological:  Negative for weakness and headaches.   Psychiatric/Behavioral:  Negative for confusion and dysphoric mood.        Objective:      Physical Exam  Vitals and nursing note reviewed.   Constitutional:       Appearance: She is well-developed. She is not diaphoretic.   Eyes:      General: No scleral icterus.  Neck:      Thyroid: No thyromegaly.      Vascular: No JVD.      Trachea: No tracheal deviation.   Cardiovascular:      Rate and Rhythm: Normal rate.      Heart sounds: Normal heart sounds.  No murmur heard.     No friction rub. No gallop.   Pulmonary:      Effort: Pulmonary effort is normal. No respiratory distress.      Breath sounds: Normal breath sounds. No wheezing or rales.   Abdominal:      General: There is no distension or abdominal bruit.      Palpations: Abdomen is soft. There is no mass.      Tenderness: There is no abdominal tenderness. There is no guarding or rebound.   Musculoskeletal:      Cervical back: Normal range of motion and neck supple.   Lymphadenopathy:      Cervical: No cervical adenopathy.   Skin:     General: Skin is warm and dry.      Findings: No erythema or rash.   Neurological:      Mental Status: She is alert and oriented to person, place, and time.      Cranial Nerves: No cranial nerve deficit.      Motor: No tremor.      Coordination: Coordination normal.      Gait: Gait normal.   Psychiatric:         Behavior: Behavior normal.         Thought Content: Thought content normal.         Judgment: Judgment normal.         Assessment:       1. Annual physical exam    2. Hypertension, essential    3. Statin intolerance    4. Hyperlipidemia, unspecified hyperlipidemia type    5. Aortic atherosclerosis    6. Agatston CAC score, <100    7. Lung nodules    8. Personal history of nicotine dependence    9. Avascular necrosis of bone of hip, unspecified laterality    10. Left carpal tunnel syndrome    11. History of bilateral carpal tunnel release    12. Essential hypertension        Plan:     Medication List with Changes/Refills   Current Medications    ASPIRIN (ECOTRIN) 81 MG EC TABLET    Take 1 tablet (81 mg total) by mouth 2 (two) times a day.    CALCIUM CARBONATE (CALCIUM 500 ORAL)    Take 1 capsule by mouth.    CYANOCOBALAMIN (VITAMIN B-12) 100 MCG TABLET    Take 100 mcg by mouth once daily.    EZETIMIBE (ZETIA) 10 MG TABLET    Take 1 tablet (10 mg total) by mouth once daily.    FERROUS SULFATE 325 MG (65 MG IRON) TAB TABLET    Take 325 mg by mouth daily with breakfast.     GLUCOSAMINE SULFATE (GLUCOSAMINE) 500 MG TAB    Take 1,500 mg by mouth Daily.    L.ACID/L.CASEI/B.BIF/B.DENIA/FOS (PROBIOTIC BLEND ORAL)    Take 400 mg by mouth 2 (two) times a day.    MAGNESIUM GLYCINATE, BULK, MISC    120 mg by Misc.(Non-Drug; Combo Route) route once daily.    MILK THISTLE ORAL    Take 1,000 mg by mouth once daily.    OMEGA-3/DHA/EPA/FISH OIL (OMEGA-3 FISH OIL ORAL)    Take 1 capsule by mouth.   Changed and/or Refilled Medications    Modified Medication Previous Medication    CHLORTHALIDONE (HYGROTEN) 25 MG TAB chlorthalidone (HYGROTEN) 25 MG Tab       Take 1 tablet (25 mg total) by mouth once daily.    TAKE 1 TABLET DAILY    PRAVASTATIN (PRAVACHOL) 20 MG TABLET pravastatin (PRAVACHOL) 20 MG tablet       Take 1 tablet (20 mg total) by mouth once daily.    Take 1 tablet (20 mg total) by mouth once daily.    VALSARTAN (DIOVAN) 160 MG TABLET valsartan (DIOVAN) 160 MG tablet       Take 1 tablet (160 mg total) by mouth once daily.    TAKE 1 TABLET DAILY   Discontinued Medications    ACETAMINOPHEN (TYLENOL) 650 MG TBSR    Take 1 tablet (650 mg total) by mouth every 8 (eight) hours.    INULIN (PREBIOTIC FIBER ORAL)    Take 500 mg by mouth once daily. Takes 2 cap in am.    MECLIZINE (ANTIVERT) 25 MG TABLET    Take 1 tablet (25 mg total) by mouth 3 (three) times daily as needed for Dizziness.    METHYLPREDNISOLONE (MEDROL DOSEPACK) 4 MG TABLET    use as directed    ONDANSETRON (ZOFRAN-ODT) 4 MG TBDL    Take 1 tablet (4 mg total) by mouth 3 (three) times daily as needed (Nausea and vomiting).    PANTOPRAZOLE (PROTONIX) 40 MG TABLET    Take 1 tablet (40 mg total) by mouth once daily.    PREGABALIN (LYRICA) 75 MG CAPSULE    Take 1 capsule (75 mg total) by mouth 2 (two) times daily.    SENNA-DOCUSATE 8.6-50 MG (SENNA WITH DOCUSATE SODIUM) 8.6-50 MG PER TABLET    Take 1 tablet by mouth once daily.    UNABLE TO FIND    Take 1,950 mg by mouth Daily. medication name: Tumeric     1. Annual physical exam  -      "Comprehensive Metabolic Panel; Future; Expected date: 07/17/2025  -     Lipid Panel; Future; Expected date: 07/17/2025    2. Hypertension, essential    3. Statin intolerance  Overview:  -did not tolerate atorvastatin (LFT's) 2024 (aortic and coronary calcifications)  -pravastatin low dose tolerated   -zetia      4. Hyperlipidemia, unspecified hyperlipidemia type  Assessment & Plan:  -atorvastatin intolerant    -cont   ASA  Pravastatin  Zetia per cardiology    Orders:  -     Comprehensive Metabolic Panel; Future; Expected date: 07/17/2025  -     Lipid Panel; Future; Expected date: 07/17/2025  -     Discontinue: pravastatin (PRAVACHOL) 20 MG tablet; Take 1 tablet (20 mg total) by mouth once daily.  Dispense: 90 tablet; Refill: 3  -     pravastatin (PRAVACHOL) 20 MG tablet; Take 1 tablet (20 mg total) by mouth once daily.  Dispense: 90 tablet; Refill: 3    5. Aortic atherosclerosis  Overview:  -Noted on June 2021 LDCT - subsequent Agaston score 3 (7/27/2021).  -did not tolerate atorvastatin (LFT's) 2024  -pravastatin low dose    Assessment & Plan:  -Noted on June 2021 LDCT - subsequent Agaston score 3 (7/27/2021).  -did not tolerate atorvastatin (LFT's) 2024  -pravastatin low dose  -zetia added by cardiology 9/2024    -cardiology annual  f/u      6. Agatston CAC score, <100  Overview:  - Agaston score 3 (7/27/2021)   -tolerating low dose pravastatin    -cardiology 9/6/2024: Continue ASA and Pravastatin, Zetia added       7. Lung nodules  Overview:  -former smoker, quit circa 2007  -noted on serial scans, stable through 12/15/2022 LDCT   -saw pulm while in [place], per patient hx  -12/15/2023 LDCT - 'Lung-RADS Category: 2 - Benign, continue annual screening with LDCT in 12 months.  -3/26/2024 pulm consult - GGO stable. Warrants 5 years of stability given size and smoking history (throught 6/2026). Quit smoking in 2007, but latest guidelines have removed "time since quitting". Recommend CT  -CT 12/2024 stable    Assessment " & Plan:  -cont annual CT through 2026 then consider pulm re consult for surveillance recs      8. Personal history of nicotine dependence  Overview:  -24-25 year, PPD, quit circa 2007      9. Avascular necrosis of bone of hip, unspecified laterality  Overview:  -R NICK Jan 2024  -L NICK Feb 2025      10. Left carpal tunnel syndrome  Overview:  -CTR       11. History of bilateral carpal tunnel release  Overview:  -L 9/2021  -R 2/2023      12. Essential hypertension  -     Discontinue: chlorthalidone (HYGROTEN) 25 MG Tab; Take 1 tablet (25 mg total) by mouth once daily.  Dispense: 90 tablet; Refill: 3  -     Discontinue: valsartan (DIOVAN) 160 MG tablet; Take 1 tablet (160 mg total) by mouth once daily.  Dispense: 90 tablet; Refill: 3  -     chlorthalidone (HYGROTEN) 25 MG Tab; Take 1 tablet (25 mg total) by mouth once daily.  Dispense: 90 tablet; Refill: 3  -     valsartan (DIOVAN) 160 MG tablet; Take 1 tablet (160 mg total) by mouth once daily.  Dispense: 90 tablet; Refill: 3      See meds, orders, follow up, routing and instructions sections of encounter and AVS. Discussed with patient and provided on AVS.    Discussed diet and exercise as therapeutic modalities for metabolic and other conditions. Provided patient information, which are included as links on the AVS for detailed information.    Lab Results   Component Value Date     01/15/2025    K 4.1 01/15/2025     01/15/2025    BUN 14 01/15/2025    CREATININE 0.6 01/15/2025     01/15/2025    HGBA1C 5.4 07/16/2024    AST 32 01/15/2025    ALT 34 01/15/2025    ALBUMIN 4.4 01/15/2025    PROT 7.6 01/15/2025    BILITOT 0.4 01/15/2025    CHOL 186 12/10/2024     (H) 12/10/2024    LDLCALC 71.4 12/10/2024    TRIG 68 12/10/2024    WBC 6.06 01/15/2025    HGB 13.7 01/15/2025    HCT 40.1 01/15/2025     01/15/2025    TSH 1.064 07/11/2023

## 2025-07-23 ENCOUNTER — HOSPITAL ENCOUNTER (OUTPATIENT)
Dept: RADIOLOGY | Facility: HOSPITAL | Age: 55
Discharge: HOME | End: 2025-07-23
Attending: ORTHOPAEDIC SURGERY
Payer: OTHER GOVERNMENT

## 2025-07-23 DIAGNOSIS — Z96.642 S/P HIP REPLACEMENT, LEFT: ICD-10-CM

## 2025-07-23 PROCEDURE — 73721 MRI JNT OF LWR EXTRE W/O DYE: CPT | Mod: TC,LT

## 2025-07-23 PROCEDURE — 73721 MRI JNT OF LWR EXTRE W/O DYE: CPT | Mod: 26,LT,, | Performed by: RADIOLOGY

## 2025-07-30 ENCOUNTER — PATIENT MESSAGE (OUTPATIENT)
Dept: CARDIOLOGY | Facility: CLINIC | Age: 55
End: 2025-07-30
Payer: OTHER GOVERNMENT

## 2025-07-30 ENCOUNTER — TELEPHONE (OUTPATIENT)
Dept: ORTHOPEDICS | Facility: CLINIC | Age: 55
End: 2025-07-30
Payer: OTHER GOVERNMENT

## 2025-07-30 DIAGNOSIS — E78.5 HYPERLIPIDEMIA, UNSPECIFIED HYPERLIPIDEMIA TYPE: ICD-10-CM

## 2025-07-30 DIAGNOSIS — I70.0 AORTIC ATHEROSCLEROSIS: ICD-10-CM

## 2025-07-30 RX ORDER — EZETIMIBE 10 MG/1
10 TABLET ORAL DAILY
Qty: 90 TABLET | Refills: 0 | Status: SHIPPED | OUTPATIENT
Start: 2025-07-30

## 2025-07-30 NOTE — TELEPHONE ENCOUNTER
I called the patient today regarding her appointment. The patient said that overall she is doing much better from her last visit with Dr. Collins. She said the medrol dose pack helped and she is still decreasing how much she is working out. I told the patient about the MRI per Dr. Collins. The patient said she wants to give it more time but if it doesn't get any better or she starts to plateau with her recovery, she will give us a call back to get in with our PCSM team for non-op management. The patient verbalized understanding and has no further questions.             ----- Message from Cresencio Collins MD sent at 7/30/2025  6:46 AM CDT -----  Regarding: MRI f/u  Can you check on her and see how she is doing?     NICK looks fine on MRI  MRI shows Edema adjacent to the superior left iliac crest at the origin of the fascia jacque and iliotibial track suggestive of a mild strain.    So consistent with exam     If hasn't gotten better with rest and medrol dose pack next step I think could be PCSM for non-op management

## 2025-07-30 NOTE — TELEPHONE ENCOUNTER
Kayleen Patel to [facility] Staff (supporting Alexis Chaney MD)    7/30/25  7:04 AM  Hello, I requested a refill prescription for Ezetimibe and was notified by [facility] that it was canceled.  I was prescribed this medication in conjunction with Pravastatin to control my cholesterol, but also to manage my liver enzymes.  Can you please tell me why the prescription for Ezetimibe was not approved?  Thank you.     Kayleen

## 2025-07-30 NOTE — TELEPHONE ENCOUNTER
Refill Decision Note   Kayleen Patel  is requesting a refill authorization.  Brief Assessment and Rationale for Refill:  Approve     Medication Therapy Plan:        Comments:     Note composed:10:33 AM 07/30/2025

## 2025-08-20 ENCOUNTER — PATIENT MESSAGE (OUTPATIENT)
Dept: OBSTETRICS AND GYNECOLOGY | Facility: CLINIC | Age: 55
End: 2025-08-20

## 2025-08-20 ENCOUNTER — OFFICE VISIT (OUTPATIENT)
Dept: OBSTETRICS AND GYNECOLOGY | Facility: CLINIC | Age: 55
End: 2025-08-20
Payer: OTHER GOVERNMENT

## 2025-08-20 VITALS
WEIGHT: 130.75 LBS | DIASTOLIC BLOOD PRESSURE: 64 MMHG | SYSTOLIC BLOOD PRESSURE: 102 MMHG | BODY MASS INDEX: 24.06 KG/M2 | HEIGHT: 62 IN

## 2025-08-20 DIAGNOSIS — Z78.0 MENOPAUSE: ICD-10-CM

## 2025-08-20 DIAGNOSIS — Z01.419 WELL WOMAN EXAM WITH ROUTINE GYNECOLOGICAL EXAM: Primary | ICD-10-CM

## 2025-08-20 DIAGNOSIS — N63.21 MASS OF UPPER OUTER QUADRANT OF LEFT BREAST: ICD-10-CM

## 2025-08-20 PROCEDURE — 99213 OFFICE O/P EST LOW 20 MIN: CPT | Mod: PBBFAC | Performed by: OBSTETRICS & GYNECOLOGY

## 2025-08-20 PROCEDURE — 99999 PR PBB SHADOW E&M-EST. PATIENT-LVL III: CPT | Mod: PBBFAC,,, | Performed by: OBSTETRICS & GYNECOLOGY

## 2025-08-20 RX ORDER — PROGESTERONE 100 MG/1
100 CAPSULE ORAL NIGHTLY
Qty: 90 CAPSULE | Refills: 3 | Status: SHIPPED | OUTPATIENT
Start: 2025-08-20 | End: 2025-08-20 | Stop reason: SDUPTHER

## 2025-08-20 RX ORDER — PROGESTERONE 100 MG/1
100 CAPSULE ORAL NIGHTLY
Qty: 90 CAPSULE | Refills: 3 | Status: SHIPPED | OUTPATIENT
Start: 2025-08-20 | End: 2026-08-20

## 2025-08-20 RX ORDER — ESTRADIOL 0.03 MG/D
1 FILM, EXTENDED RELEASE TRANSDERMAL
Qty: 24 PATCH | Refills: 3 | Status: SHIPPED | OUTPATIENT
Start: 2025-08-21 | End: 2025-08-20 | Stop reason: SDUPTHER

## 2025-08-20 RX ORDER — ESTRADIOL 0.03 MG/D
1 FILM, EXTENDED RELEASE TRANSDERMAL
Qty: 24 PATCH | Refills: 3 | Status: SHIPPED | OUTPATIENT
Start: 2025-08-21

## 2025-08-26 ENCOUNTER — TELEPHONE (OUTPATIENT)
Dept: RADIOLOGY | Facility: HOSPITAL | Age: 55
End: 2025-08-26
Payer: OTHER GOVERNMENT

## (undated) DEVICE — SOL NACL IRR 1000ML BTL

## (undated) DEVICE — KIT TOTAL HIP HPOFH OMC

## (undated) DEVICE — GAUZE SPONGE 4X4 12PLY

## (undated) DEVICE — PENCIL SMK EVAC CONNECTOR 10FT

## (undated) DEVICE — SOL 9P NACL IRR PIC IL

## (undated) DEVICE — COVER LIGHT HANDLE 80/CA

## (undated) DEVICE — NDL 18GA X1 1/2 REG BEVEL

## (undated) DEVICE — PAD CAST SPECIALIST STRL 3

## (undated) DEVICE — UNDERGLOVES BIOGEL PI SIZE 8.5

## (undated) DEVICE — SUT MONOCYRL 4-0 PS2 UND

## (undated) DEVICE — UNDERGLOVES BIOGEL PI SIZE 7.5

## (undated) DEVICE — TOWEL OR DISP STRL BLUE 4/PK

## (undated) DEVICE — SUT MONOCRYL 3-0 PS-2 UND

## (undated) DEVICE — PACK DRAPE UNIVERSAL CONVERTOR

## (undated) DEVICE — BRUSH SCRUB HIBICLENS 4%

## (undated) DEVICE — Device

## (undated) DEVICE — STOCKINETTE DBL PLY ST 4X

## (undated) DEVICE — DRESSING AQUACEL AG RBBN 2X45

## (undated) DEVICE — DISCONTINUED HS CLEANUP

## (undated) DEVICE — DRESSING ADAPTIC N ADH 3X8IN

## (undated) DEVICE — KNIFE CARPAL TUNNEL

## (undated) DEVICE — DRAPE THREE-QTR REINF 53X77IN

## (undated) DEVICE — SOL NACL IRR 3000ML

## (undated) DEVICE — BNDG COFLEX FOAM LF2 ST 6X5YD

## (undated) DEVICE — GLOVE BIOGEL PI MICRO SZ 7

## (undated) DEVICE — HOOD T7 W/ PEEL AWAY LENS

## (undated) DEVICE — SUT 1 36IN COATED VICRYL UN

## (undated) DEVICE — GLOVE BIOGEL SKINSENSE PI 8.0

## (undated) DEVICE — SUT 2/0 36IN COATED VICRYL

## (undated) DEVICE — GOWN SMARTGOWN 3XL XLONG

## (undated) DEVICE — SYS CLSR DERMABOND PRINEO 22CM

## (undated) DEVICE — DRAPE C-ARM ELAS CLIP 42X120IN

## (undated) DEVICE — SEE MEDLINE ITEM 152528

## (undated) DEVICE — BNDG COFLEX FOAM LF2 ST 3X5YD

## (undated) DEVICE — DRESSING N ADH OIL EMUL 3X3

## (undated) DEVICE — SPONGE COTTON TRAY 4X4IN

## (undated) DEVICE — TAPE SILK 3IN

## (undated) DEVICE — PULSAVAC ZIMMER

## (undated) DEVICE — DRESSING TRANS 4X4 TEGADERM

## (undated) DEVICE — TOWEL OR XRAY WHITE 17X26IN

## (undated) DEVICE — ELECTRODE REM PLYHSV RETURN 9

## (undated) DEVICE — COVER CAMERA OPERATING ROOM

## (undated) DEVICE — BNDG COFLEX FOAM LF2 ST 4X5YD

## (undated) DEVICE — PENCIL ROCKER SWITCH 10FT CORD

## (undated) DEVICE — DRAPE IOBAN 2 STERI

## (undated) DEVICE — DRESSING TELFA N ADH 3X8

## (undated) DEVICE — SOL BETADINE 5%

## (undated) DEVICE — NDL SAFETY 22G X 1.5 ECLIPSE

## (undated) DEVICE — PACK ECLIPSE UNIVERSAL STERILE

## (undated) DEVICE — SEALER AQUAMANTYS 3.48MM

## (undated) DEVICE — KIT PT CARE HANA PROFX SSXT

## (undated) DEVICE — NDL HYPO STD REG BVL 18GX1.5IN

## (undated) DEVICE — ALCOHOL 70% ANTISEPTIC ISO 4OZ

## (undated) DEVICE — BLADE SAGITTAL 18 X 1.27 X 90M

## (undated) DEVICE — TOWEL COTTON SURG WHT 27X17IN

## (undated) DEVICE — SUT PROLENE 3-0 FS-2 18

## (undated) DEVICE — KIT IRR SUCTION HND PIECE

## (undated) DEVICE — DRESSING LEUKOPLAST FLEX 1X3IN

## (undated) DEVICE — PAD CAST 2 IN X 4YDS STERILE

## (undated) DEVICE — TOURNIQUET SB QC DP 18X4IN

## (undated) DEVICE — TOURNIQUET SB QC SP 18X4IN

## (undated) DEVICE — WRAP COBAN ELASTIC 3X5YD NS

## (undated) DEVICE — DRAPE INVISISHIELD TOWEL SMALL

## (undated) DEVICE — SOL NACL .9% 250ML INJ